# Patient Record
Sex: MALE | Race: WHITE | NOT HISPANIC OR LATINO | Employment: OTHER | URBAN - METROPOLITAN AREA
[De-identification: names, ages, dates, MRNs, and addresses within clinical notes are randomized per-mention and may not be internally consistent; named-entity substitution may affect disease eponyms.]

---

## 2020-01-18 ENCOUNTER — OFFICE VISIT (OUTPATIENT)
Dept: URGENT CARE | Facility: PHYSICIAN GROUP | Age: 60
End: 2020-01-18
Payer: OTHER GOVERNMENT

## 2020-01-18 VITALS
SYSTOLIC BLOOD PRESSURE: 124 MMHG | HEART RATE: 78 BPM | WEIGHT: 268 LBS | TEMPERATURE: 98.1 F | RESPIRATION RATE: 20 BRPM | BODY MASS INDEX: 42.06 KG/M2 | HEIGHT: 67 IN | DIASTOLIC BLOOD PRESSURE: 80 MMHG | OXYGEN SATURATION: 94 %

## 2020-01-18 DIAGNOSIS — J06.9 VIRAL URI: ICD-10-CM

## 2020-01-18 DIAGNOSIS — H00.012 HORDEOLUM EXTERNUM OF RIGHT LOWER EYELID: ICD-10-CM

## 2020-01-18 PROCEDURE — 99203 OFFICE O/P NEW LOW 30 MIN: CPT | Performed by: NURSE PRACTITIONER

## 2020-01-18 ASSESSMENT — ENCOUNTER SYMPTOMS
NAUSEA: 0
FEVER: 0
CHILLS: 0
DIARRHEA: 0
SINUS PAIN: 1
MYALGIAS: 0
EYE DISCHARGE: 0
SORE THROAT: 0
HEADACHES: 1
ORTHOPNEA: 0
COUGH: 0

## 2020-01-18 NOTE — PROGRESS NOTES
Subjective:      Kaiden Quiroz is a 59 y.o. male who presents with Sinus Problem (Post nasal drip, facial pain ongoing 1 week)            HPI New. 59 year old male with nasal congestion and runny nose x 3 days. He has associated sinus pain and pressure. Denies fever, chills, cough, sore throat, nausea or diarrhea. Additionally he reports one week history of swelling and pain to right lower eyelid. Denies discharge or redness to eye itself. He has not taken any medication for these symptoms. He is a smoker.  Patient has no known allergies.  No current outpatient medications on file prior to visit.     No current facility-administered medications on file prior to visit.      Social History     Socioeconomic History   • Marital status:      Spouse name: Not on file   • Number of children: Not on file   • Years of education: Not on file   • Highest education level: Not on file   Occupational History   • Not on file   Social Needs   • Financial resource strain: Not on file   • Food insecurity:     Worry: Not on file     Inability: Not on file   • Transportation needs:     Medical: Not on file     Non-medical: Not on file   Tobacco Use   • Smoking status: Not on file   Substance and Sexual Activity   • Alcohol use: Not on file   • Drug use: Not on file   • Sexual activity: Not on file   Lifestyle   • Physical activity:     Days per week: Not on file     Minutes per session: Not on file   • Stress: Not on file   Relationships   • Social connections:     Talks on phone: Not on file     Gets together: Not on file     Attends Tenriism service: Not on file     Active member of club or organization: Not on file     Attends meetings of clubs or organizations: Not on file     Relationship status: Not on file   • Intimate partner violence:     Fear of current or ex partner: Not on file     Emotionally abused: Not on file     Physically abused: Not on file     Forced sexual activity: Not on file   Other Topics Concern    "  • Not on file   Social History Narrative   • Not on file     Breast Cancer-related family history is not on file.      Review of Systems   Constitutional: Positive for malaise/fatigue. Negative for chills and fever.   HENT: Positive for congestion and sinus pain. Negative for sore throat.    Eyes: Negative for discharge.   Respiratory: Negative for cough.    Cardiovascular: Negative for chest pain and orthopnea.   Gastrointestinal: Negative for diarrhea and nausea.   Musculoskeletal: Negative for myalgias.   Skin:        Swelling right lower eyelid.   Neurological: Positive for headaches.   Endo/Heme/Allergies: Negative for environmental allergies.          Objective:     /80   Pulse 78   Temp 36.7 °C (98.1 °F) (Temporal)   Resp 20   Ht 1.702 m (5' 7\")   Wt 121.6 kg (268 lb)   SpO2 94%   BMI 41.97 kg/m²      Physical Exam  Constitutional:       General: He is not in acute distress.     Appearance: He is well-developed.   HENT:      Head: Normocephalic and atraumatic.      Right Ear: Ear canal and external ear normal. No middle ear effusion. Tympanic membrane is not injected or perforated.      Left Ear: Ear canal and external ear normal.  No middle ear effusion. Tympanic membrane is not injected or perforated.      Nose: Mucosal edema and rhinorrhea present.      Mouth/Throat:      Pharynx: Posterior oropharyngeal erythema present. No oropharyngeal exudate.   Eyes:      General:         Right eye: Hordeolum present. No discharge.         Left eye: No discharge.      Conjunctiva/sclera: Conjunctivae normal.     Neck:      Musculoskeletal: Normal range of motion and neck supple.   Cardiovascular:      Rate and Rhythm: Normal rate and regular rhythm.      Heart sounds: Normal heart sounds. No murmur.   Pulmonary:      Effort: Pulmonary effort is normal. No respiratory distress.      Breath sounds: Normal breath sounds.   Musculoskeletal: Normal range of motion.      Comments: Normal movement of all 4 " extremities.   Lymphadenopathy:      Cervical: No cervical adenopathy.      Upper Body:      Right upper body: No supraclavicular adenopathy.      Left upper body: No supraclavicular adenopathy.   Skin:     General: Skin is warm and dry.   Neurological:      Mental Status: He is alert and oriented to person, place, and time.      Gait: Gait normal.   Psychiatric:         Behavior: Behavior normal.         Thought Content: Thought content normal.                 Assessment/Plan:       1. Viral URI     2. Hordeolum externum of right lower eyelid       Warm, moist compresses to eyelid.  Viral illness at this time with no indication for antibiotics. Reviewed with patient expected course of illness and also reviewed OTC medications that may be used for symptom relief. Follow up 7-10 days if not improving.

## 2020-02-05 ENCOUNTER — TELEPHONE (OUTPATIENT)
Dept: SCHEDULING | Facility: IMAGING CENTER | Age: 60
End: 2020-02-05

## 2020-09-02 ENCOUNTER — HOSPITAL ENCOUNTER (OUTPATIENT)
Dept: LAB | Facility: MEDICAL CENTER | Age: 60
End: 2020-09-02
Attending: INTERNAL MEDICINE
Payer: OTHER GOVERNMENT

## 2020-09-02 LAB
ALBUMIN SERPL BCP-MCNC: 4.2 G/DL (ref 3.2–4.9)
ALBUMIN/GLOB SERPL: 1.5 G/DL
ALP SERPL-CCNC: 68 U/L (ref 30–99)
ALT SERPL-CCNC: 39 U/L (ref 2–50)
ANION GAP SERPL CALC-SCNC: 13 MMOL/L (ref 7–16)
AST SERPL-CCNC: 33 U/L (ref 12–45)
BASOPHILS # BLD AUTO: 0.9 % (ref 0–1.8)
BASOPHILS # BLD: 0.06 K/UL (ref 0–0.12)
BILIRUB SERPL-MCNC: 0.9 MG/DL (ref 0.1–1.5)
BUN SERPL-MCNC: 8 MG/DL (ref 8–22)
CALCIUM SERPL-MCNC: 9.3 MG/DL (ref 8.5–10.5)
CHLORIDE SERPL-SCNC: 103 MMOL/L (ref 96–112)
CHOLEST SERPL-MCNC: 240 MG/DL (ref 100–199)
CO2 SERPL-SCNC: 25 MMOL/L (ref 20–33)
CREAT SERPL-MCNC: 1.03 MG/DL (ref 0.5–1.4)
EOSINOPHIL # BLD AUTO: 0.34 K/UL (ref 0–0.51)
EOSINOPHIL NFR BLD: 5 % (ref 0–6.9)
ERYTHROCYTE [DISTWIDTH] IN BLOOD BY AUTOMATED COUNT: 47.4 FL (ref 35.9–50)
FASTING STATUS PATIENT QL REPORTED: NORMAL
GLOBULIN SER CALC-MCNC: 2.8 G/DL (ref 1.9–3.5)
GLUCOSE SERPL-MCNC: 102 MG/DL (ref 65–99)
HCT VFR BLD AUTO: 51.8 % (ref 42–52)
HDLC SERPL-MCNC: 47 MG/DL
HGB BLD-MCNC: 18.1 G/DL (ref 14–18)
IMM GRANULOCYTES # BLD AUTO: 0.03 K/UL (ref 0–0.11)
IMM GRANULOCYTES NFR BLD AUTO: 0.4 % (ref 0–0.9)
LDLC SERPL CALC-MCNC: 130 MG/DL
LYMPHOCYTES # BLD AUTO: 2.4 K/UL (ref 1–4.8)
LYMPHOCYTES NFR BLD: 35.6 % (ref 22–41)
MCH RBC QN AUTO: 34.8 PG (ref 27–33)
MCHC RBC AUTO-ENTMCNC: 34.9 G/DL (ref 33.7–35.3)
MCV RBC AUTO: 99.6 FL (ref 81.4–97.8)
MONOCYTES # BLD AUTO: 0.6 K/UL (ref 0–0.85)
MONOCYTES NFR BLD AUTO: 8.9 % (ref 0–13.4)
NEUTROPHILS # BLD AUTO: 3.31 K/UL (ref 1.82–7.42)
NEUTROPHILS NFR BLD: 49.2 % (ref 44–72)
NRBC # BLD AUTO: 0 K/UL
NRBC BLD-RTO: 0 /100 WBC
PLATELET # BLD AUTO: 170 K/UL (ref 164–446)
PMV BLD AUTO: 10.4 FL (ref 9–12.9)
POTASSIUM SERPL-SCNC: 4.7 MMOL/L (ref 3.6–5.5)
PROT SERPL-MCNC: 7 G/DL (ref 6–8.2)
RBC # BLD AUTO: 5.2 M/UL (ref 4.7–6.1)
SODIUM SERPL-SCNC: 141 MMOL/L (ref 135–145)
TRIGL SERPL-MCNC: 316 MG/DL (ref 0–149)
WBC # BLD AUTO: 6.7 K/UL (ref 4.8–10.8)

## 2020-09-02 PROCEDURE — 80061 LIPID PANEL: CPT

## 2020-09-02 PROCEDURE — 85025 COMPLETE CBC W/AUTO DIFF WBC: CPT

## 2020-09-02 PROCEDURE — 80053 COMPREHEN METABOLIC PANEL: CPT

## 2020-09-02 PROCEDURE — 36415 COLL VENOUS BLD VENIPUNCTURE: CPT

## 2021-03-15 DIAGNOSIS — Z23 NEED FOR VACCINATION: ICD-10-CM

## 2022-04-02 ENCOUNTER — OFFICE VISIT (OUTPATIENT)
Dept: URGENT CARE | Facility: PHYSICIAN GROUP | Age: 62
End: 2022-04-02
Payer: OTHER GOVERNMENT

## 2022-04-02 VITALS
WEIGHT: 215 LBS | HEIGHT: 67 IN | OXYGEN SATURATION: 95 % | BODY MASS INDEX: 33.74 KG/M2 | SYSTOLIC BLOOD PRESSURE: 164 MMHG | RESPIRATION RATE: 18 BRPM | DIASTOLIC BLOOD PRESSURE: 82 MMHG | TEMPERATURE: 98 F | HEART RATE: 92 BPM

## 2022-04-02 DIAGNOSIS — L08.9 WOUND INFECTION: ICD-10-CM

## 2022-04-02 DIAGNOSIS — T14.8XXA WOUND INFECTION: ICD-10-CM

## 2022-04-02 PROCEDURE — 99213 OFFICE O/P EST LOW 20 MIN: CPT | Performed by: STUDENT IN AN ORGANIZED HEALTH CARE EDUCATION/TRAINING PROGRAM

## 2022-04-02 RX ORDER — DOXYCYCLINE 100 MG/1
100 CAPSULE ORAL 2 TIMES DAILY
Qty: 14 CAPSULE | Refills: 0 | Status: SHIPPED | OUTPATIENT
Start: 2022-04-02 | End: 2022-04-09

## 2022-04-02 RX ORDER — AMOXICILLIN AND CLAVULANATE POTASSIUM 875; 125 MG/1; MG/1
1 TABLET, FILM COATED ORAL 2 TIMES DAILY
Qty: 14 TABLET | Refills: 0 | Status: SHIPPED | OUTPATIENT
Start: 2022-04-02 | End: 2022-04-09

## 2022-04-02 ASSESSMENT — FIBROSIS 4 INDEX: FIB4 SCORE: 1.87

## 2022-04-02 NOTE — LETTER
[facility] [place]  [address]  [place] [address]     April 2, 2022    Patient: Kaiden Quiroz   YOB: 1960   Date of Visit: 4/2/2022       To Whom It May Concern:    Kaiden Quiroz was seen and treated in our department on 4/2/2022. Patient is excused from work on 4/3/22-4/4/22. Patient may return to work on 4/5/22 without restriction.    Sincerely,     Vishnu Machuca P.A.-C.

## 2022-04-02 NOTE — PROGRESS NOTES
"Subjective:   Kaiden Quiroz is a 61 y.o. male who presents for Foot Problem (Abrasion on top of both feet x 1 week)      HPI:  Pleasant 61-year-old male with history of type 2 diabetes presents the clinic with foot injury that occurred on 3/23/2022 and redness around the wounds.  Patient states that he was playing with his grandchildren and got a rug burn on both of his feet.  Patient states that he has been trying to soak his feet to help with healing, but his wounds have not fully healed and he is worried about a possible infection.  Patient denies fever, chills, fatigue, rash, numbness, tingling, burning, weeping from the wounds, discharge from the wounds, sore throat, change in vision, nausea, vomiting, abdominal pain, diarrhea, lower leg pain, lower leg swelling, chest pain, cough, shortness of breath, dizziness, or headache.        Medications:    • amoxicillin-clavulanate Tabs  • doxycycline    Allergies: Patient has no known allergies.    Problem List: Kaiden Quiroz does not have a problem list on file.    Surgical History:  No past surgical history on file.    Past Social Hx: Kaiden Quiroz  reports that he has been smoking. He has been smoking about 0.00 packs per day. He has never used smokeless tobacco.     Past Family Hx:  Kaiden Quiroz family history is not on file.     Problem list, medications, and allergies reviewed by myself today in Epic.     Objective:     BP (!) 164/82   Pulse 92   Temp 36.7 °C (98 °F) (Temporal)   Resp 18   Ht 1.702 m (5' 7\")   Wt 97.5 kg (215 lb)   SpO2 95%   BMI 33.67 kg/m²     Physical Exam  Vitals reviewed.   Constitutional:       Appearance: Normal appearance.   Cardiovascular:      Rate and Rhythm: Normal rate and regular rhythm.      Pulses: Normal pulses.      Heart sounds: Normal heart sounds. No murmur heard.  Pulmonary:      Effort: Pulmonary effort is normal. No respiratory distress.      Breath sounds: Normal breath sounds. No " stridor. No wheezing, rhonchi or rales.   Skin:     General: Skin is warm and dry.      Capillary Refill: Capillary refill takes less than 2 seconds.      Findings: No erythema, lesion or rash.      Comments: Right foot: Patient has 2 circular wounds with overlying scab proximal to the second and third toes.  Erythema, induration, increased warmth, and tenderness to palpation around the wounds.  No ecchymosis, drainage, weeping, or swelling.    Left foot:Patient has 3 circular wounds with overlying scab proximal to the first, second second and third toes.  Erythema, induration, increased warmth, and tenderness to palpation around the wounds.  No ecchymosis, drainage, weeping, or swelling.   Neurological:      General: No focal deficit present.      Mental Status: He is alert and oriented to person, place, and time.         Assessment/Plan:     Diagnosis and associated orders:     1. Wound infection  amoxicillin-clavulanate (AUGMENTIN) 875-125 MG Tab    doxycycline (MONODOX) 100 MG capsule      Comments/MDM:     • Patient presents with multiple delayed healing wounds on the top of his bilateral feet due to rug burn on 3/23/2022 patient states that he has been trying to soak his feet in order to help with healing, but has had minimal improvement in his healing.  Patient states that over the last few days he has noticed increased tenderness and redness to the wounds.  Patient has a history of diabetes that he is not currently being treated for.  • Patient was prescribed Augmentin and doxycycline for suspected wound infection.  Patient was educated on home wound care.  Patient was very agreeable this plan.  • Advised patient that he should not establish care with a primary care provider in order to further evaluate and manage his diabetes.  • ER precautions given.         Differential diagnosis, natural history, supportive care, and indications for immediate follow-up discussed.    Advised the patient to follow-up with  the primary care physician for recheck, reevaluation, and consideration of further management.    Please note that this dictation was created using voice recognition software. I have made a reasonable attempt to correct obvious errors, but I expect that there are errors of grammar and possibly content that I did not discover before finalizing the note.    Electronically signed by Vishnu Machuca PA-C.

## 2022-04-02 NOTE — LETTER
[facility] [place]  [address]  [place] [address]     April 2, 2022    Patient: Kaiden Quiroz   YOB: 1960   Date of Visit: 4/2/2022       To Whom It May Concern:    Kaiden Quiroz was seen and treated in our department on 4/2/2022.  Patient is excused from work on 4/3/22-4/4/22. Patient may return to work on 4/5/22 without restriction.    Sincerely,     Vishnu Machuca P.A.-C.

## 2023-01-12 ENCOUNTER — APPOINTMENT (OUTPATIENT)
Dept: RADIOLOGY | Facility: MEDICAL CENTER | Age: 63
DRG: 023 | End: 2023-01-12
Attending: EMERGENCY MEDICINE
Payer: COMMERCIAL

## 2023-01-12 ENCOUNTER — ANESTHESIA (OUTPATIENT)
Dept: ANESTHESIOLOGY | Facility: MEDICAL CENTER | Age: 63
DRG: 023 | End: 2023-01-12
Payer: COMMERCIAL

## 2023-01-12 ENCOUNTER — HOSPITAL ENCOUNTER (INPATIENT)
Facility: MEDICAL CENTER | Age: 63
LOS: 35 days | DRG: 023 | End: 2023-02-16
Attending: EMERGENCY MEDICINE | Admitting: INTERNAL MEDICINE
Payer: COMMERCIAL

## 2023-01-12 ENCOUNTER — APPOINTMENT (OUTPATIENT)
Dept: RADIOLOGY | Facility: MEDICAL CENTER | Age: 63
DRG: 023 | End: 2023-01-12
Attending: RADIOLOGY
Payer: COMMERCIAL

## 2023-01-12 ENCOUNTER — APPOINTMENT (OUTPATIENT)
Dept: RADIOLOGY | Facility: MEDICAL CENTER | Age: 63
DRG: 023 | End: 2023-01-12
Attending: INTERNAL MEDICINE
Payer: COMMERCIAL

## 2023-01-12 DIAGNOSIS — Z78.9 ALCOHOL USE: ICD-10-CM

## 2023-01-12 DIAGNOSIS — K92.1 MELENA: ICD-10-CM

## 2023-01-12 DIAGNOSIS — I63.9 ACUTE CVA (CEREBROVASCULAR ACCIDENT) (HCC): ICD-10-CM

## 2023-01-12 DIAGNOSIS — R33.9 URINARY RETENTION: ICD-10-CM

## 2023-01-12 DIAGNOSIS — J96.01 ACUTE RESPIRATORY FAILURE WITH HYPOXIA (HCC): ICD-10-CM

## 2023-01-12 DIAGNOSIS — I61.9 STROKE, HEMORRHAGIC (HCC): ICD-10-CM

## 2023-01-12 DIAGNOSIS — F17.200 CURRENT SMOKER: ICD-10-CM

## 2023-01-12 DIAGNOSIS — I10 HYPERTENSION, UNSPECIFIED TYPE: ICD-10-CM

## 2023-01-12 DIAGNOSIS — Z95.828 INTERNAL CAROTID ARTERY STENT PRESENT: ICD-10-CM

## 2023-01-12 DIAGNOSIS — E11.9 TYPE 2 DIABETES MELLITUS WITHOUT COMPLICATION, WITHOUT LONG-TERM CURRENT USE OF INSULIN (HCC): ICD-10-CM

## 2023-01-12 DIAGNOSIS — I63.311 CEREBROVASCULAR ACCIDENT (CVA) DUE TO THROMBOSIS OF RIGHT MIDDLE CEREBRAL ARTERY (HCC): ICD-10-CM

## 2023-01-12 PROBLEM — F10.10 ALCOHOL ABUSE: Status: ACTIVE | Noted: 2023-01-12

## 2023-01-12 LAB
ABO GROUP BLD: NORMAL
APTT PPP: 28.1 SEC (ref 24.7–36)
BASOPHILS # BLD AUTO: 0.4 % (ref 0–1.8)
BASOPHILS # BLD: 0.03 K/UL (ref 0–0.12)
BLD GP AB SCN SERPL QL: NORMAL
EKG IMPRESSION: NORMAL
EOSINOPHIL # BLD AUTO: 0.31 K/UL (ref 0–0.51)
EOSINOPHIL NFR BLD: 3.7 % (ref 0–6.9)
ERYTHROCYTE [DISTWIDTH] IN BLOOD BY AUTOMATED COUNT: 45.9 FL (ref 35.9–50)
EST. AVERAGE GLUCOSE BLD GHB EST-MCNC: 105 MG/DL
HBA1C MFR BLD: 5.3 % (ref 4–5.6)
HCT VFR BLD AUTO: 45.8 % (ref 42–52)
HGB BLD-MCNC: 16.3 G/DL (ref 14–18)
IMM GRANULOCYTES # BLD AUTO: 0.03 K/UL (ref 0–0.11)
IMM GRANULOCYTES NFR BLD AUTO: 0.4 % (ref 0–0.9)
INR PPP: 0.96 (ref 0.87–1.13)
LYMPHOCYTES # BLD AUTO: 1.46 K/UL (ref 1–4.8)
LYMPHOCYTES NFR BLD: 17.4 % (ref 22–41)
MCH RBC QN AUTO: 34.2 PG (ref 27–33)
MCHC RBC AUTO-ENTMCNC: 35.6 G/DL (ref 33.7–35.3)
MCV RBC AUTO: 96 FL (ref 81.4–97.8)
MONOCYTES # BLD AUTO: 0.71 K/UL (ref 0–0.85)
MONOCYTES NFR BLD AUTO: 8.5 % (ref 0–13.4)
NEUTROPHILS # BLD AUTO: 5.84 K/UL (ref 1.82–7.42)
NEUTROPHILS NFR BLD: 69.6 % (ref 44–72)
NRBC # BLD AUTO: 0 K/UL
NRBC BLD-RTO: 0 /100 WBC
PLATELET # BLD AUTO: 142 K/UL (ref 164–446)
PMV BLD AUTO: 10.7 FL (ref 9–12.9)
PROTHROMBIN TIME: 12.7 SEC (ref 12–14.6)
RBC # BLD AUTO: 4.77 M/UL (ref 4.7–6.1)
RH BLD: NORMAL
WBC # BLD AUTO: 8.4 K/UL (ref 4.8–10.8)

## 2023-01-12 PROCEDURE — 83036 HEMOGLOBIN GLYCOSYLATED A1C: CPT

## 2023-01-12 PROCEDURE — 70498 CT ANGIOGRAPHY NECK: CPT

## 2023-01-12 PROCEDURE — 700111 HCHG RX REV CODE 636 W/ 250 OVERRIDE (IP): Performed by: RADIOLOGY

## 2023-01-12 PROCEDURE — 94002 VENT MGMT INPAT INIT DAY: CPT

## 2023-01-12 PROCEDURE — 85730 THROMBOPLASTIN TIME PARTIAL: CPT

## 2023-01-12 PROCEDURE — 99291 CRITICAL CARE FIRST HOUR: CPT

## 2023-01-12 PROCEDURE — 99223 1ST HOSP IP/OBS HIGH 75: CPT | Performed by: PSYCHIATRY & NEUROLOGY

## 2023-01-12 PROCEDURE — 770022 HCHG ROOM/CARE - ICU (200)

## 2023-01-12 PROCEDURE — 700102 HCHG RX REV CODE 250 W/ 637 OVERRIDE(OP)

## 2023-01-12 PROCEDURE — 700117 HCHG RX CONTRAST REV CODE 255: Performed by: RADIOLOGY

## 2023-01-12 PROCEDURE — 70450 CT HEAD/BRAIN W/O DYE: CPT

## 2023-01-12 PROCEDURE — 93005 ELECTROCARDIOGRAM TRACING: CPT | Performed by: EMERGENCY MEDICINE

## 2023-01-12 PROCEDURE — 700101 HCHG RX REV CODE 250: Performed by: STUDENT IN AN ORGANIZED HEALTH CARE EDUCATION/TRAINING PROGRAM

## 2023-01-12 PROCEDURE — 037K3DZ DILATION OF RIGHT INTERNAL CAROTID ARTERY WITH INTRALUMINAL DEVICE, PERCUTANEOUS APPROACH: ICD-10-PCS | Performed by: RADIOLOGY

## 2023-01-12 PROCEDURE — C1760 CLOSURE DEV, VASC: HCPCS

## 2023-01-12 PROCEDURE — 94760 N-INVAS EAR/PLS OXIMETRY 1: CPT

## 2023-01-12 PROCEDURE — 85025 COMPLETE CBC W/AUTO DIFF WBC: CPT

## 2023-01-12 PROCEDURE — 86850 RBC ANTIBODY SCREEN: CPT

## 2023-01-12 PROCEDURE — 99140 ANES COMP EMERGENCY COND: CPT | Performed by: STUDENT IN AN ORGANIZED HEALTH CARE EDUCATION/TRAINING PROGRAM

## 2023-01-12 PROCEDURE — 700111 HCHG RX REV CODE 636 W/ 250 OVERRIDE (IP)

## 2023-01-12 PROCEDURE — 700111 HCHG RX REV CODE 636 W/ 250 OVERRIDE (IP): Performed by: INTERNAL MEDICINE

## 2023-01-12 PROCEDURE — 70496 CT ANGIOGRAPHY HEAD: CPT

## 2023-01-12 PROCEDURE — 700111 HCHG RX REV CODE 636 W/ 250 OVERRIDE (IP): Performed by: STUDENT IN AN ORGANIZED HEALTH CARE EDUCATION/TRAINING PROGRAM

## 2023-01-12 PROCEDURE — 99291 CRITICAL CARE FIRST HOUR: CPT | Performed by: INTERNAL MEDICINE

## 2023-01-12 PROCEDURE — 36620 INSERTION CATHETER ARTERY: CPT | Performed by: STUDENT IN AN ORGANIZED HEALTH CARE EDUCATION/TRAINING PROGRAM

## 2023-01-12 PROCEDURE — 03CK3ZZ EXTIRPATION OF MATTER FROM RIGHT INTERNAL CAROTID ARTERY, PERCUTANEOUS APPROACH: ICD-10-PCS | Performed by: RADIOLOGY

## 2023-01-12 PROCEDURE — 700105 HCHG RX REV CODE 258: Performed by: STUDENT IN AN ORGANIZED HEALTH CARE EDUCATION/TRAINING PROGRAM

## 2023-01-12 PROCEDURE — 86901 BLOOD TYPING SEROLOGIC RH(D): CPT

## 2023-01-12 PROCEDURE — 94799 UNLISTED PULMONARY SVC/PX: CPT

## 2023-01-12 PROCEDURE — 01926 ANES IVNTL RAD ICR ICAR/AORT: CPT | Performed by: STUDENT IN AN ORGANIZED HEALTH CARE EDUCATION/TRAINING PROGRAM

## 2023-01-12 PROCEDURE — 36415 COLL VENOUS BLD VENIPUNCTURE: CPT

## 2023-01-12 PROCEDURE — 99292 CRITICAL CARE ADDL 30 MIN: CPT | Performed by: INTERNAL MEDICINE

## 2023-01-12 PROCEDURE — 71045 X-RAY EXAM CHEST 1 VIEW: CPT

## 2023-01-12 PROCEDURE — 0042T CT-CEREBRAL PERFUSION ANALYSIS: CPT

## 2023-01-12 PROCEDURE — 03CG3ZZ EXTIRPATION OF MATTER FROM INTRACRANIAL ARTERY, PERCUTANEOUS APPROACH: ICD-10-PCS | Performed by: RADIOLOGY

## 2023-01-12 PROCEDURE — 36556 INSERT NON-TUNNEL CV CATH: CPT | Performed by: STUDENT IN AN ORGANIZED HEALTH CARE EDUCATION/TRAINING PROGRAM

## 2023-01-12 PROCEDURE — 5A1935Z RESPIRATORY VENTILATION, LESS THAN 24 CONSECUTIVE HOURS: ICD-10-PCS | Performed by: EMERGENCY MEDICINE

## 2023-01-12 PROCEDURE — 700117 HCHG RX CONTRAST REV CODE 255: Performed by: EMERGENCY MEDICINE

## 2023-01-12 PROCEDURE — 86900 BLOOD TYPING SEROLOGIC ABO: CPT

## 2023-01-12 PROCEDURE — 85610 PROTHROMBIN TIME: CPT

## 2023-01-12 PROCEDURE — A9270 NON-COVERED ITEM OR SERVICE: HCPCS

## 2023-01-12 RX ORDER — ASPIRIN 300 MG/1
SUPPOSITORY RECTAL
Status: COMPLETED
Start: 2023-01-12 | End: 2023-01-12

## 2023-01-12 RX ORDER — ASPIRIN 81 MG/1
81 TABLET, CHEWABLE ORAL DAILY
Status: DISCONTINUED | OUTPATIENT
Start: 2023-01-13 | End: 2023-01-13

## 2023-01-12 RX ORDER — AMOXICILLIN 250 MG
2 CAPSULE ORAL 2 TIMES DAILY
Status: DISCONTINUED | OUTPATIENT
Start: 2023-01-13 | End: 2023-01-19

## 2023-01-12 RX ORDER — POLYETHYLENE GLYCOL 3350 17 G/17G
1 POWDER, FOR SOLUTION ORAL
Status: DISCONTINUED | OUTPATIENT
Start: 2023-01-12 | End: 2023-01-19

## 2023-01-12 RX ORDER — DEXMEDETOMIDINE HYDROCHLORIDE 4 UG/ML
0-.7 INJECTION, SOLUTION INTRAVENOUS CONTINUOUS
Status: DISCONTINUED | OUTPATIENT
Start: 2023-01-12 | End: 2023-01-12

## 2023-01-12 RX ORDER — NICOTINE 21 MG/24HR
14 PATCH, TRANSDERMAL 24 HOURS TRANSDERMAL
Status: DISCONTINUED | OUTPATIENT
Start: 2023-01-13 | End: 2023-01-24

## 2023-01-12 RX ORDER — HYDRALAZINE HYDROCHLORIDE 20 MG/ML
10-20 INJECTION INTRAMUSCULAR; INTRAVENOUS EVERY 6 HOURS PRN
Status: DISCONTINUED | OUTPATIENT
Start: 2023-01-12 | End: 2023-02-16 | Stop reason: HOSPADM

## 2023-01-12 RX ORDER — ASPIRIN 300 MG/1
600 SUPPOSITORY RECTAL ONCE
Status: COMPLETED | OUTPATIENT
Start: 2023-01-12 | End: 2023-01-12

## 2023-01-12 RX ORDER — FOLIC ACID 1 MG/1
1 TABLET ORAL DAILY
Status: DISCONTINUED | OUTPATIENT
Start: 2023-01-13 | End: 2023-01-19

## 2023-01-12 RX ORDER — SODIUM CHLORIDE, SODIUM LACTATE, POTASSIUM CHLORIDE, CALCIUM CHLORIDE 600; 310; 30; 20 MG/100ML; MG/100ML; MG/100ML; MG/100ML
INJECTION, SOLUTION INTRAVENOUS
Status: DISCONTINUED | OUTPATIENT
Start: 2023-01-12 | End: 2023-01-12 | Stop reason: SURG

## 2023-01-12 RX ORDER — FAMOTIDINE 20 MG/1
20 TABLET, FILM COATED ORAL EVERY 12 HOURS
Status: DISCONTINUED | OUTPATIENT
Start: 2023-01-13 | End: 2023-01-14

## 2023-01-12 RX ORDER — SODIUM CHLORIDE, SODIUM LACTATE, POTASSIUM CHLORIDE, CALCIUM CHLORIDE 600; 310; 30; 20 MG/100ML; MG/100ML; MG/100ML; MG/100ML
INJECTION, SOLUTION INTRAVENOUS CONTINUOUS
Status: DISCONTINUED | OUTPATIENT
Start: 2023-01-13 | End: 2023-01-15

## 2023-01-12 RX ORDER — BISACODYL 10 MG
10 SUPPOSITORY, RECTAL RECTAL
Status: DISCONTINUED | OUTPATIENT
Start: 2023-01-12 | End: 2023-01-19

## 2023-01-12 RX ORDER — LABETALOL HYDROCHLORIDE 5 MG/ML
INJECTION, SOLUTION INTRAVENOUS PRN
Status: DISCONTINUED | OUTPATIENT
Start: 2023-01-12 | End: 2023-01-12 | Stop reason: SURG

## 2023-01-12 RX ORDER — ATORVASTATIN CALCIUM 40 MG/1
80 TABLET, FILM COATED ORAL EVERY EVENING
Status: DISCONTINUED | OUTPATIENT
Start: 2023-01-12 | End: 2023-01-14

## 2023-01-12 RX ADMIN — IOHEXOL 100 ML: 300 INJECTION, SOLUTION INTRAVENOUS at 23:30

## 2023-01-12 RX ADMIN — IOHEXOL 80 ML: 350 INJECTION, SOLUTION INTRAVENOUS at 20:18

## 2023-01-12 RX ADMIN — PROPOFOL 20 MCG/KG/MIN: 10 INJECTION, EMULSION INTRAVENOUS at 23:24

## 2023-01-12 RX ADMIN — EPHEDRINE SULFATE 10 MG: 50 INJECTION INTRAMUSCULAR; INTRAVENOUS; SUBCUTANEOUS at 21:48

## 2023-01-12 RX ADMIN — EPHEDRINE SULFATE 10 MG: 50 INJECTION INTRAMUSCULAR; INTRAVENOUS; SUBCUTANEOUS at 22:17

## 2023-01-12 RX ADMIN — ASPIRIN 600 MG: 300 SUPPOSITORY RECTAL at 23:10

## 2023-01-12 RX ADMIN — PROPOFOL 100 MG: 10 INJECTION, EMULSION INTRAVENOUS at 22:55

## 2023-01-12 RX ADMIN — FENTANYL CITRATE 50 MCG: 50 INJECTION, SOLUTION INTRAMUSCULAR; INTRAVENOUS at 23:20

## 2023-01-12 RX ADMIN — IOHEXOL 40 ML: 350 INJECTION, SOLUTION INTRAVENOUS at 20:15

## 2023-01-12 RX ADMIN — ATROPINE SULFATE 0.5 MG: 0.4 INJECTION, SOLUTION INTRAMUSCULAR; INTRAVENOUS; SUBCUTANEOUS at 21:37

## 2023-01-12 RX ADMIN — PROPOFOL 50 MG: 10 INJECTION, EMULSION INTRAVENOUS at 22:25

## 2023-01-12 RX ADMIN — HYDRALAZINE HYDROCHLORIDE 20 MG: 20 INJECTION INTRAMUSCULAR; INTRAVENOUS at 23:25

## 2023-01-12 RX ADMIN — EPHEDRINE SULFATE 5 MG: 50 INJECTION INTRAMUSCULAR; INTRAVENOUS; SUBCUTANEOUS at 22:39

## 2023-01-12 RX ADMIN — PROPOFOL 100 MG: 10 INJECTION, EMULSION INTRAVENOUS at 23:07

## 2023-01-12 RX ADMIN — ROCURONIUM BROMIDE 100 MG: 10 INJECTION, SOLUTION INTRAVENOUS at 22:54

## 2023-01-12 RX ADMIN — LABETALOL HYDROCHLORIDE 10 MG: 5 INJECTION, SOLUTION INTRAVENOUS at 22:47

## 2023-01-12 RX ADMIN — PROPOFOL 150 MG: 10 INJECTION, EMULSION INTRAVENOUS at 21:45

## 2023-01-12 RX ADMIN — ATROPINE SULFATE 0.5 MG: 0.4 INJECTION, SOLUTION INTRAMUSCULAR; INTRAVENOUS; SUBCUTANEOUS at 21:34

## 2023-01-12 RX ADMIN — SODIUM CHLORIDE, POTASSIUM CHLORIDE, SODIUM LACTATE AND CALCIUM CHLORIDE: 600; 310; 30; 20 INJECTION, SOLUTION INTRAVENOUS at 21:45

## 2023-01-12 ASSESSMENT — PAIN DESCRIPTION - PAIN TYPE: TYPE: ACUTE PAIN

## 2023-01-13 ENCOUNTER — APPOINTMENT (OUTPATIENT)
Dept: CARDIOLOGY | Facility: MEDICAL CENTER | Age: 63
DRG: 023 | End: 2023-01-13
Attending: INTERNAL MEDICINE
Payer: COMMERCIAL

## 2023-01-13 ENCOUNTER — APPOINTMENT (OUTPATIENT)
Dept: RADIOLOGY | Facility: MEDICAL CENTER | Age: 63
DRG: 023 | End: 2023-01-13
Attending: INTERNAL MEDICINE
Payer: COMMERCIAL

## 2023-01-13 ENCOUNTER — APPOINTMENT (OUTPATIENT)
Dept: RADIOLOGY | Facility: MEDICAL CENTER | Age: 63
DRG: 023 | End: 2023-01-13
Attending: NURSE PRACTITIONER
Payer: COMMERCIAL

## 2023-01-13 LAB
ABO + RH BLD: NORMAL
ALBUMIN SERPL BCP-MCNC: 3.6 G/DL (ref 3.2–4.9)
ALBUMIN/GLOB SERPL: 1.6 G/DL
ALP SERPL-CCNC: 78 U/L (ref 30–99)
ALT SERPL-CCNC: 38 U/L (ref 2–50)
ANION GAP SERPL CALC-SCNC: 10 MMOL/L (ref 7–16)
AST SERPL-CCNC: 83 U/L (ref 12–45)
BASE EXCESS BLDA CALC-SCNC: -2 MMOL/L (ref -4–3)
BASE EXCESS BLDA CALC-SCNC: -7 MMOL/L (ref -4–3)
BILIRUB SERPL-MCNC: 0.5 MG/DL (ref 0.1–1.5)
BODY TEMPERATURE: ABNORMAL DEGREES
BODY TEMPERATURE: ABNORMAL DEGREES
BREATHS SETTING VENT: 18
BUN SERPL-MCNC: 16 MG/DL (ref 8–22)
CALCIUM ALBUM COR SERPL-MCNC: 8 MG/DL (ref 8.5–10.5)
CALCIUM SERPL-MCNC: 7.7 MG/DL (ref 8.5–10.5)
CHLORIDE SERPL-SCNC: 109 MMOL/L (ref 96–112)
CHOLEST SERPL-MCNC: 187 MG/DL (ref 100–199)
CO2 BLDA-SCNC: 20 MMOL/L (ref 20–33)
CO2 BLDA-SCNC: 23 MMOL/L (ref 20–33)
CO2 SERPL-SCNC: 20 MMOL/L (ref 20–33)
CREAT SERPL-MCNC: 0.71 MG/DL (ref 0.5–1.4)
DELSYS IDSYS: ABNORMAL
DELSYS IDSYS: ABNORMAL
EKG IMPRESSION: NORMAL
ETHANOL BLD-MCNC: <10.1 MG/DL
GFR SERPLBLD CREATININE-BSD FMLA CKD-EPI: 104 ML/MIN/1.73 M 2
GLOBULIN SER CALC-MCNC: 2.2 G/DL (ref 1.9–3.5)
GLUCOSE SERPL-MCNC: 148 MG/DL (ref 65–99)
HCO3 BLDA-SCNC: 18.8 MMOL/L (ref 17–25)
HCO3 BLDA-SCNC: 22.3 MMOL/L (ref 17–25)
HDLC SERPL-MCNC: 49 MG/DL
HOROWITZ INDEX BLDA+IHG-RTO: 126 MM[HG]
HOROWITZ INDEX BLDA+IHG-RTO: 263 MM[HG]
LACTATE SERPL-SCNC: 2.3 MMOL/L (ref 0.5–2)
LDLC SERPL CALC-MCNC: 97 MG/DL
LV EJECT FRACT  99904: 65
LV EJECT FRACT MOD 2C 99903: 75.13
LV EJECT FRACT MOD 4C 99902: 71.82
LV EJECT FRACT MOD BP 99901: 73.88
MAGNESIUM SERPL-MCNC: 1.6 MG/DL (ref 1.5–2.5)
MODE IMODE: ABNORMAL
MODE IMODE: ABNORMAL
O2/TOTAL GAS SETTING VFR VENT: 40 %
O2/TOTAL GAS SETTING VFR VENT: 50 %
PCO2 BLDA: 35.8 MMHG (ref 26–37)
PCO2 BLDA: 36.6 MMHG (ref 26–37)
PCO2 TEMP ADJ BLDA: 34.1 MMHG (ref 26–37)
PCO2 TEMP ADJ BLDA: 36.6 MMHG (ref 26–37)
PEEP END EXPIRATORY PRESSURE IPEEP: 8 CMH20
PEEP END EXPIRATORY PRESSURE IPEEP: 8 CMH20
PERCENT MINUTE VOLUME IPMV: 100
PH BLDA: 7.32 [PH] (ref 7.4–7.5)
PH BLDA: 7.4 [PH] (ref 7.4–7.5)
PH TEMP ADJ BLDA: 7.32 [PH] (ref 7.4–7.5)
PH TEMP ADJ BLDA: 7.42 [PH] (ref 7.4–7.5)
PHOSPHATE SERPL-MCNC: 3.2 MG/DL (ref 2.5–4.5)
PO2 BLDA: 105 MMHG (ref 64–87)
PO2 BLDA: 63 MMHG (ref 64–87)
PO2 TEMP ADJ BLDA: 63 MMHG (ref 64–87)
PO2 TEMP ADJ BLDA: 99 MMHG (ref 64–87)
POTASSIUM SERPL-SCNC: 4.1 MMOL/L (ref 3.6–5.5)
PROT SERPL-MCNC: 5.8 G/DL (ref 6–8.2)
SAO2 % BLDA: 90 % (ref 93–99)
SAO2 % BLDA: 98 % (ref 93–99)
SODIUM SERPL-SCNC: 139 MMOL/L (ref 135–145)
SPECIMEN DRAWN FROM PATIENT: ABNORMAL
SPECIMEN DRAWN FROM PATIENT: ABNORMAL
TIDAL VOLUME IVT: 400 ML
TRIGL SERPL-MCNC: 205 MG/DL (ref 0–149)
TROPONIN T SERPL-MCNC: 9 NG/L (ref 6–19)

## 2023-01-13 PROCEDURE — C1751 CATH, INF, PER/CENT/MIDLINE: HCPCS

## 2023-01-13 PROCEDURE — 94760 N-INVAS EAR/PLS OXIMETRY 1: CPT

## 2023-01-13 PROCEDURE — 700105 HCHG RX REV CODE 258: Performed by: RADIOLOGY

## 2023-01-13 PROCEDURE — 93010 ELECTROCARDIOGRAM REPORT: CPT | Performed by: INTERNAL MEDICINE

## 2023-01-13 PROCEDURE — 94799 UNLISTED PULMONARY SVC/PX: CPT

## 2023-01-13 PROCEDURE — 99232 SBSQ HOSP IP/OBS MODERATE 35: CPT | Performed by: PSYCHIATRY & NEUROLOGY

## 2023-01-13 PROCEDURE — 770022 HCHG ROOM/CARE - ICU (200)

## 2023-01-13 PROCEDURE — 80061 LIPID PANEL: CPT

## 2023-01-13 PROCEDURE — 92610 EVALUATE SWALLOWING FUNCTION: CPT

## 2023-01-13 PROCEDURE — 37799 UNLISTED PX VASCULAR SURGERY: CPT

## 2023-01-13 PROCEDURE — 36556 INSERT NON-TUNNEL CV CATH: CPT | Mod: RT | Performed by: NURSE PRACTITIONER

## 2023-01-13 PROCEDURE — 82803 BLOOD GASES ANY COMBINATION: CPT

## 2023-01-13 PROCEDURE — 82077 ASSAY SPEC XCP UR&BREATH IA: CPT

## 2023-01-13 PROCEDURE — 03HY32Z INSERTION OF MONITORING DEVICE INTO UPPER ARTERY, PERCUTANEOUS APPROACH: ICD-10-PCS | Performed by: INTERNAL MEDICINE

## 2023-01-13 PROCEDURE — 84484 ASSAY OF TROPONIN QUANT: CPT

## 2023-01-13 PROCEDURE — 83605 ASSAY OF LACTIC ACID: CPT

## 2023-01-13 PROCEDURE — 36620 INSERTION CATHETER ARTERY: CPT

## 2023-01-13 PROCEDURE — 93306 TTE W/DOPPLER COMPLETE: CPT

## 2023-01-13 PROCEDURE — 93005 ELECTROCARDIOGRAM TRACING: CPT | Performed by: NURSE PRACTITIONER

## 2023-01-13 PROCEDURE — 99152 MOD SED SAME PHYS/QHP 5/>YRS: CPT

## 2023-01-13 PROCEDURE — 700111 HCHG RX REV CODE 636 W/ 250 OVERRIDE (IP): Performed by: INTERNAL MEDICINE

## 2023-01-13 PROCEDURE — 36556 INSERT NON-TUNNEL CV CATH: CPT

## 2023-01-13 PROCEDURE — 700105 HCHG RX REV CODE 258: Performed by: INTERNAL MEDICINE

## 2023-01-13 PROCEDURE — 83735 ASSAY OF MAGNESIUM: CPT

## 2023-01-13 PROCEDURE — 94003 VENT MGMT INPAT SUBQ DAY: CPT

## 2023-01-13 PROCEDURE — 700117 HCHG RX CONTRAST REV CODE 255: Performed by: INTERNAL MEDICINE

## 2023-01-13 PROCEDURE — 02HV33Z INSERTION OF INFUSION DEVICE INTO SUPERIOR VENA CAVA, PERCUTANEOUS APPROACH: ICD-10-PCS | Performed by: INTERNAL MEDICINE

## 2023-01-13 PROCEDURE — 700102 HCHG RX REV CODE 250 W/ 637 OVERRIDE(OP): Performed by: INTERNAL MEDICINE

## 2023-01-13 PROCEDURE — 36620 INSERTION CATHETER ARTERY: CPT | Performed by: NURSE PRACTITIONER

## 2023-01-13 PROCEDURE — 80053 COMPREHEN METABOLIC PANEL: CPT

## 2023-01-13 PROCEDURE — 700101 HCHG RX REV CODE 250: Performed by: RADIOLOGY

## 2023-01-13 PROCEDURE — 72170 X-RAY EXAM OF PELVIS: CPT

## 2023-01-13 PROCEDURE — A9270 NON-COVERED ITEM OR SERVICE: HCPCS | Performed by: INTERNAL MEDICINE

## 2023-01-13 PROCEDURE — 70551 MRI BRAIN STEM W/O DYE: CPT

## 2023-01-13 PROCEDURE — 94150 VITAL CAPACITY TEST: CPT

## 2023-01-13 PROCEDURE — 71045 X-RAY EXAM CHEST 1 VIEW: CPT

## 2023-01-13 PROCEDURE — 36600 WITHDRAWAL OF ARTERIAL BLOOD: CPT

## 2023-01-13 PROCEDURE — 99291 CRITICAL CARE FIRST HOUR: CPT | Mod: 25 | Performed by: INTERNAL MEDICINE

## 2023-01-13 PROCEDURE — 93306 TTE W/DOPPLER COMPLETE: CPT | Mod: 26 | Performed by: INTERNAL MEDICINE

## 2023-01-13 PROCEDURE — 84100 ASSAY OF PHOSPHORUS: CPT

## 2023-01-13 PROCEDURE — 74018 RADEX ABDOMEN 1 VIEW: CPT

## 2023-01-13 RX ORDER — MAGNESIUM SULFATE HEPTAHYDRATE 40 MG/ML
2 INJECTION, SOLUTION INTRAVENOUS ONCE
Status: COMPLETED | OUTPATIENT
Start: 2023-01-13 | End: 2023-01-13

## 2023-01-13 RX ORDER — ASPIRIN 300 MG/1
300 SUPPOSITORY RECTAL EVERY 6 HOURS PRN
Status: DISCONTINUED | OUTPATIENT
Start: 2023-01-13 | End: 2023-01-13

## 2023-01-13 RX ORDER — ONDANSETRON 2 MG/ML
4 INJECTION INTRAMUSCULAR; INTRAVENOUS EVERY 4 HOURS PRN
Status: DISCONTINUED | OUTPATIENT
Start: 2023-01-13 | End: 2023-02-12

## 2023-01-13 RX ORDER — SENNOSIDES 8.6 MG
1300 CAPSULE ORAL 2 TIMES DAILY PRN
Status: ON HOLD | COMMUNITY
End: 2023-02-06

## 2023-01-13 RX ORDER — MV-MN/C/THEANINE/HERB NO.310 1000-200MG
POWDER IN PACKET (EA) ORAL
Status: ON HOLD | COMMUNITY
End: 2023-02-06

## 2023-01-13 RX ORDER — PROCHLORPERAZINE EDISYLATE 5 MG/ML
10 INJECTION INTRAMUSCULAR; INTRAVENOUS EVERY 6 HOURS PRN
Status: DISCONTINUED | OUTPATIENT
Start: 2023-01-13 | End: 2023-01-19

## 2023-01-13 RX ORDER — ASPIRIN 81 MG/1
81 TABLET, CHEWABLE ORAL DAILY
Status: DISCONTINUED | OUTPATIENT
Start: 2023-01-13 | End: 2023-02-09

## 2023-01-13 RX ORDER — COVID-19 ANTIGEN TEST
2 KIT MISCELLANEOUS 2 TIMES DAILY PRN
Status: ON HOLD | COMMUNITY
End: 2023-02-06

## 2023-01-13 RX ORDER — LABETALOL HYDROCHLORIDE 5 MG/ML
10 INJECTION, SOLUTION INTRAVENOUS ONCE
Status: DISCONTINUED | OUTPATIENT
Start: 2023-01-13 | End: 2023-01-13

## 2023-01-13 RX ORDER — LABETALOL HYDROCHLORIDE 5 MG/ML
INJECTION, SOLUTION INTRAVENOUS
Status: DISCONTINUED
Start: 2023-01-13 | End: 2023-01-13

## 2023-01-13 RX ORDER — ONDANSETRON 2 MG/ML
INJECTION INTRAMUSCULAR; INTRAVENOUS
Status: ACTIVE
Start: 2023-01-13 | End: 2023-01-13

## 2023-01-13 RX ORDER — ASPIRIN 300 MG/1
300 SUPPOSITORY RECTAL DAILY
Status: DISCONTINUED | OUTPATIENT
Start: 2023-01-14 | End: 2023-01-14

## 2023-01-13 RX ORDER — ENOXAPARIN SODIUM 100 MG/ML
40 INJECTION SUBCUTANEOUS DAILY
Status: DISCONTINUED | OUTPATIENT
Start: 2023-01-13 | End: 2023-02-16 | Stop reason: HOSPADM

## 2023-01-13 RX ADMIN — Medication 100 MCG/HR: at 00:14

## 2023-01-13 RX ADMIN — FAMOTIDINE 20 MG: 10 INJECTION, SOLUTION INTRAVENOUS at 17:59

## 2023-01-13 RX ADMIN — FENTANYL CITRATE 100 MCG: 50 INJECTION, SOLUTION INTRAMUSCULAR; INTRAVENOUS at 00:10

## 2023-01-13 RX ADMIN — FENTANYL CITRATE 100 MCG: 50 INJECTION, SOLUTION INTRAMUSCULAR; INTRAVENOUS at 09:38

## 2023-01-13 RX ADMIN — ENOXAPARIN SODIUM 40 MG: 40 INJECTION SUBCUTANEOUS at 17:59

## 2023-01-13 RX ADMIN — HYDRALAZINE HYDROCHLORIDE 20 MG: 20 INJECTION INTRAMUSCULAR; INTRAVENOUS at 09:38

## 2023-01-13 RX ADMIN — ASPIRIN 81 MG: 81 TABLET, CHEWABLE ORAL at 05:56

## 2023-01-13 RX ADMIN — NICARDIPINE HYDROCHLORIDE 5 MG/HR: 25 INJECTION, SOLUTION INTRAVENOUS at 00:32

## 2023-01-13 RX ADMIN — SODIUM CHLORIDE, POTASSIUM CHLORIDE, SODIUM LACTATE AND CALCIUM CHLORIDE: 600; 310; 30; 20 INJECTION, SOLUTION INTRAVENOUS at 16:14

## 2023-01-13 RX ADMIN — NICOTINE 14 MG: 14 PATCH TRANSDERMAL at 05:55

## 2023-01-13 RX ADMIN — DOCUSATE SODIUM 50 MG AND SENNOSIDES 8.6 MG 2 TABLET: 8.6; 5 TABLET, FILM COATED ORAL at 05:56

## 2023-01-13 RX ADMIN — HYDRALAZINE HYDROCHLORIDE 20 MG: 20 INJECTION INTRAMUSCULAR; INTRAVENOUS at 09:39

## 2023-01-13 RX ADMIN — FOLIC ACID 1 MG: 1 TABLET ORAL at 05:56

## 2023-01-13 RX ADMIN — SODIUM CHLORIDE, POTASSIUM CHLORIDE, SODIUM LACTATE AND CALCIUM CHLORIDE: 600; 310; 30; 20 INJECTION, SOLUTION INTRAVENOUS at 01:08

## 2023-01-13 RX ADMIN — PROCHLORPERAZINE EDISYLATE 10 MG: 5 INJECTION INTRAMUSCULAR; INTRAVENOUS at 11:51

## 2023-01-13 RX ADMIN — HUMAN ALBUMIN MICROSPHERES AND PERFLUTREN 3 ML: 10; .22 INJECTION, SOLUTION INTRAVENOUS at 16:23

## 2023-01-13 RX ADMIN — THERA TABS 1 TABLET: TAB at 05:56

## 2023-01-13 RX ADMIN — HYDRALAZINE HYDROCHLORIDE 20 MG: 20 INJECTION INTRAMUSCULAR; INTRAVENOUS at 12:46

## 2023-01-13 RX ADMIN — FAMOTIDINE 20 MG: 20 TABLET, FILM COATED ORAL at 05:56

## 2023-01-13 RX ADMIN — MAGNESIUM SULFATE HEPTAHYDRATE 2 G: 40 INJECTION, SOLUTION INTRAVENOUS at 09:47

## 2023-01-13 RX ADMIN — THIAMINE HYDROCHLORIDE 100 MG: 100 INJECTION, SOLUTION INTRAMUSCULAR; INTRAVENOUS at 06:08

## 2023-01-13 ASSESSMENT — PAIN DESCRIPTION - PAIN TYPE
TYPE: ACUTE PAIN

## 2023-01-13 ASSESSMENT — COPD QUESTIONNAIRES
COPD SCREENING SCORE: 7
HAVE YOU SMOKED AT LEAST 100 CIGARETTES IN YOUR ENTIRE LIFE: YES
DO YOU EVER COUGH UP ANY MUCUS OR PHLEGM?: YES, A FEW DAYS A WEEK OR MONTH
DURING THE PAST 4 WEEKS HOW MUCH DID YOU FEEL SHORT OF BREATH: NONE/LITTLE OF THE TIME

## 2023-01-13 ASSESSMENT — PULMONARY FUNCTION TESTS
FVC: 1320
FVC: 2

## 2023-01-13 NOTE — RESPIRATORY CARE
Extubation    Cuff leak noted: yes  Stridor present: no       O2 (LPM): 4 (01/13/23 1341)     Patient toleration: tolerating well    Events/Summary/Plan: Pt extubated and placed on 4L NC (01/13/23 1341)

## 2023-01-13 NOTE — PROGRESS NOTES
Patient hypotensive with MAP 60 post extubation. Fentanyl gtt and cardene gtt off. MD Cosby notified. Neuro exam remains unchanged. Patient follows commands briskly on right side and very faintly on left side.

## 2023-01-13 NOTE — CONSULTS
Neurology STROKE CODE H&P  Neurohospitalist Service, [facility]    Referring Physician: Joseline Mccarthy M.D.    STROKE CODE: L side weakness, slurred speech    To obtain the most accurate data regarding the time called, and time patient seen, refer to the stroke run-sheet and chart.  For time of CT, refer to the radiology report. See A&P below for TPA Decision and door to needle time if and when applicable.    HPI: Kaiden Quiroz is a 62 year old man with hypertension, type 2 diabetes, smoker, presenting with L side weakness and slurred speech.  Wife, Mary Beth found him at home with symptoms, with last seen normal at 1830 tonight. On EMS arrival, they noted dense L side weakness and slurred speech.  SBPs in 170s, FSBS 147.  On arrival to [facility], NIHSS 20, consistent with dense R MCA syndrome.  Stroke protocol CT revealing occluded cervical ICA, with tandem occlusion of R M1 segment.  SBPs in 190s post-scan.  On ROS, patient reports no infectious prodrome, no constitutional symptoms.  He reports compliance with medications, but denies taking any blood thinners including ASA therapy.  He reports smoking 1ppd and drinking a 6-pack of beer daily.  Denies recreational drug use, though wife endorses occasional marijuana consumption.    Review of systems: In addition to what is detailed in the HPI above, all other systems reviewed and are negative.    Past Medical History:   Type 2 diabetes, hypertension    FHx:  No family history of early strokes, blood clotting disorder    SHx:   reports that he has been smoking. He has never used smokeless tobacco.  6-pack beer daily.    Allergies:  No Known Allergies    Medications:  No current facility-administered medications for this encounter.  No current outpatient medications on file.    Physical Examination:    Vitals:    01/12/23 2035 01/12/23 2036 01/12/23 2037 01/12/23 2038   BP: (!) 158/64  (!) 188/89    Pulse: (!) 53 (!) 53 (!) 53 (!) 52    Resp: 17 18 19 17   Temp:       TempSrc:       SpO2: 97% 98% 97% 99%   Weight:       Height:             General: Eyes closed, but easily arousable to voice  Eye: Examination of optic disks not indicated at this time given acuity of consult  Neck: There is normal range of motion  CV: Regular rate   Extremities:  Clear, dry, intact, without peripheral edema    NEUROLOGICAL EXAM:     Mental status: Eyes closed, but easily arousable  Speech and language: Speech is dysarthric but fluent. The patient is able to name and repeat, and follow commands  Cranial nerve exam: No blink to threat from L.  Forced R gaze deviation.  L face droop  Motor exam: RUE and RLE are sustained antigravity with no drift.  No movement in LUE and LLE.   Sensory exam:  Diminished response to tactile/noxious in L hemibody.  Does appear to neglect L side of world.  Coordination: No ataxia on FTN testing on R  Gait: Deferred due to patient preference.    NIHSS: National Institutes of Health Stroke Scale    [1] 1a:Level of Consciousness    0-alert 1-drowsy   2-stupor   3-coma  [0] 1b:LOC Questions                  0-both  1-one      2-neither  [0] 1c:LOC Commands                   0-both  1-one      2-neither  [2] 2: Best Gaze                     0-nl    1-partial  2-forced  [2] 3: Visual Fields                   0-nl    1-partial  2-complete 3-bilat  [2] 4: Facial Paresis                0-nl    1-minor    2-partial  3-full  MOTOR                       0-nl  [0] 5: Right Arm           1-drift  [4] 6: Left Arm             2-some effort vs gravity  [0] 7: Right Leg           3-no effort vs gravity  [4] 8: Left Leg             4-no movement                             x-untestable  [0] 9: Limb Ataxia                    0-abs   1-1_limb   2-2+_limbs       x-untestable  [2] 10:Sensory                        0-nl    1-partial  2-dense  [0] 11:Best Language/Aphasia         0-nl    1-mild/mod 2-severe   3-mute  [1] 12:Dysarthria                     0-nl     1-mild/mod 2-severe       x-untestable  [2] 13:Neglect/Inattention            0-none  1-partial  2-complete  [20] TOTAL    Baseline Modified Sauk Scale (MRS): 0 = No symptoms    Objective Data:    Labs:  No results found for: PROTHROMBTM, INR   Lab Results   Component Value Date/Time    WBC 6.7 09/02/2020 09:42 AM    RBC 5.20 09/02/2020 09:42 AM    HEMOGLOBIN 18.1 (H) 09/02/2020 09:42 AM    HEMATOCRIT 51.8 09/02/2020 09:42 AM    MCV 99.6 (H) 09/02/2020 09:42 AM    MCH 34.8 (H) 09/02/2020 09:42 AM    MCHC 34.9 09/02/2020 09:42 AM    MPV 10.4 09/02/2020 09:42 AM    NEUTSPOLYS 49.20 09/02/2020 09:42 AM    LYMPHOCYTES 35.60 09/02/2020 09:42 AM    MONOCYTES 8.90 09/02/2020 09:42 AM    EOSINOPHILS 5.00 09/02/2020 09:42 AM    BASOPHILS 0.90 09/02/2020 09:42 AM      Lab Results   Component Value Date/Time    SODIUM 141 09/02/2020 09:42 AM    POTASSIUM 4.7 09/02/2020 09:42 AM    CHLORIDE 103 09/02/2020 09:42 AM    CO2 25 09/02/2020 09:42 AM    GLUCOSE 102 (H) 09/02/2020 09:42 AM    BUN 8 09/02/2020 09:42 AM    CREATININE 1.03 09/02/2020 09:42 AM      Lab Results   Component Value Date/Time    CHOLSTRLTOT 240 (H) 09/02/2020 09:42 AM     (H) 09/02/2020 09:42 AM    HDL 47 09/02/2020 09:42 AM    TRIGLYCERIDE 316 (H) 09/02/2020 09:42 AM       Lab Results   Component Value Date/Time    ALKPHOSPHAT 68 09/02/2020 09:42 AM    ASTSGOT 33 09/02/2020 09:42 AM    ALTSGPT 39 09/02/2020 09:42 AM    TBILIRUBIN 0.9 09/02/2020 09:42 AM        Imaging/Testing:    I interpreted and/or reviewed the patient's neuroimaging    DX-CHEST-PORTABLE (1 VIEW)   Final Result      No acute cardiac or pulmonary abnormalities are identified.      CT-CTA NECK WITH & W/O-POST PROCESSING   Final Result      1.  Occlusion of the right internal carotid artery at its origin.      2.  Occlusion of the right vertebral artery at its origin.      Dr. Gongora discussed findings with Dr. Mccarthy.      CT-CTA HEAD WITH & W/O-POST PROCESS   Final Result         1.   Occlusion of the right internal carotid artery and right middle cerebral artery.   2.  Narrowed left M1 segment with severely narrowed and M2 and M3 branches, may represent chronic changes.      These findings were discussed with the patient's clinician, Joseline Mccarthy, on 1/12/2023 8:27 PM.      CT-CEREBRAL PERFUSION ANALYSIS   Final Result         1.  Cerebral blood flow less than 30% likely representing completed infarct = 52 mL.      2.  T Max more than 6 seconds likely representing combination of completed infarct and ischemia = 172 mL.      3.  Mismatched volume likely representing ischemic brain/penumbra = 120 mL      4.  Please note that the cerebral perfusion was performed on the limited brain tissue around the basal ganglia region. Infarct/ischemia outside the CT perfusion sections can be missed in this study.      CT-HEAD W/O   Final Result      1.  No evidence of intracranial hemorrhage, midline shift or mass effect.      2.  Question of hyperdense right MCA though evaluation limited by oblique positioning.             Assessment and Plan:  Kaiden Quiroz is a 62 year old man with multiple vascular risk factors, presenting with dense R MCA syndrome, found to have a tandem R cervical ICA and M1 occlusion.  While within thrombolytic window- I do not recommend IV-tNK given need to lower blood pressure for safe administration, along with low likelihood his extensive clot burden is able to lyse with IV-therapy. This overall would likely extend his infarct.  Instead, with discussion with Neuro-IR, I recommend maintaining permissive hypertension with SBP goal > 180  to maintain collateral blood flow as bridge therapy to IR suite for clot retrieval +/- carotid stent placement.  He will need ICU admission post-operatively.   Stroke etiology invariably atheroembolic disease.    Problem list:  R MCA stroke  2.   R internal carotid artery occlusion  3.   Type 2 diabetes  4.   Hypertension  5.    Smoker    Recommendations:   - to IR suite with Neuro-IR for clot retrieval of R ICA and M1 thrombus, with possible carotid stent placement.  Maintain SBP > 180 in interval.   - admission to [facility] ICU post-operatively, neurochecks/NIHSS per post-thrombectomy protocol   - blood pressure goal TBD pending TICI results as follows:    If TICI 3/2c: maintain systolic -140  If TICI 2b: maintain systolic 120-160  If TICI 2a or less, maintain systolic -180   - antithrombotic therapy TBD pending procedural outcome  - expedite MRI brain without contrast, does not need to wait 24 hours  - repeat head CT only if there is acute decompensation  - stroke labs:  HgbA1c and lipid panel  - DM management for goal HgbA1c < 7.0.  Acutely aim for FSBS   - start atorvastatin 80mg daily for goal LDL < 70 when able  - from Neurology perspective, may defer TTE and long-term cardiac monitoring as stroke is not cardioembolic in etiology  - SCDs only for DVT chemoppx until MRI completed  - PT/OT/SLP  - smoking cessation counseling    Addendum:  TICI 3 reperfusion with placement of R ICA stent.  Maintain SBP goal 110-140.  ASA 600mg rectal given for stent patency.  Patient intubated due to persistent bradycardia.  Will expedite MRI to determine timing to initiate DAPT.  Continue 300mg ASA FL daily.    Patient discussed with Dr. Mccarthy, ER attending, and Dr. Mobley, Neurointerventionalist    Blayne Jacobs MD  Neurohospitalist, [facility]

## 2023-01-13 NOTE — PROGRESS NOTES
IR Nursing Note      Cerebral Angiogram with Possible Intervention by MD Mobley assisted by RT Love, Right Femoral Artery access site.  Procedure begun with local anesthetic only, though converted to General Anesthesia  intra procedure due to hemodynamic instability; administered by Dr. Ortega.    Vascular Access obtained: 2120  1st Angio: 2125  1st Pass: 2142  Anesthesia start time: 2145  Right Carotid Artery Stent deployed at: 2210  2nd Pass: 2222  TICI 3 obtained at: 2228    Access site sealed with Angioseal at 2241, covered with gauze/tegaderm, C/D/I.  1+ DP pulses.    Report given to PINA Cordero.  Patient transported to [facility] intubated via Dr Ortega, IR RN and IR Tech monitored then transferred care to report RN.    Right Carotid Artery Stent:  [employer], Carotid WallStent, 8mm x 29mm, REF#  [id number], LOT# [id number], Exp. Date 4/25/2026      Angioseal VIP Vascular Closure Device, REF# [id number], LOT# [id number], Exp. Date 9/30/2023

## 2023-01-13 NOTE — ED NOTES
Pt to IR via IR RN, ACLS RN x 3, Dr. Jacobs, and pt's wife Mary Beth. Pt remains somnolent but answers simple questions. No effort against gravity LUE/LLE. Left sided facial droop with somewhat slurred speech. Positive pedal pulses.     Pt belongings underneath gurtomás in one bag (pants and shirt) per IR RN request.

## 2023-01-13 NOTE — ANESTHESIA PROCEDURE NOTES
Airway    Date/Time: 1/12/2023 10:56 PM  Performed by: Houston Ortega D.O.  Authorized by: Houston Ortega D.O.     Location:  OR  Urgency:  Elective  Difficult Airway: No    Indications for Airway Management:  Anesthesia      Spontaneous Ventilation: absent    Sedation Level:  Deep  Preoxygenated: Yes    Patient Position:  Sniffing  Mask Difficulty Assessment:  2 - vent by mask + OA or adjuvant +/- NMBA  Final Airway Type:  Endotracheal airway  Final Endotracheal Airway:  ETT  Cuffed: Yes    Technique Used for Successful ETT Placement:  Direct laryngoscopy    Insertion Site:  Oral  Blade Type:  Romero  Laryngoscope Blade/Videolaryngoscope Blade Size:  4  ETT Size (mm):  7.0  Measured from:  Teeth  ETT to Teeth (cm):  22  Placement Verified by: auscultation and capnometry    Cormack-Lehane Classification:  Grade IIa - partial view of glottis  Number of Attempts at Approach:  1

## 2023-01-13 NOTE — DOCUMENTATION QUERY
[facility]                                                                       Query Response Note      PATIENT:               JUANITA PRICE  ACCT #:                  [id number]  MRN:                     [id number]  :                      1960  ADMIT DATE:       2023 8:01 PM  DISCH DATE:          RESPONDING  PROVIDER #:        [id number]           QUERY TEXT:    Please clarify if acute respiratory failure is an accurate diagnosis for this admission after study based on the diagnostic and clinical indicators documented below.      NOTE:  If an appropriate response is not listed below, please respond with a new note.            The patient's clinical indicators include:  Admit O2 saturation - 97% RA  Admit labs - ABG Ph 7.403, Pco2 35.8, Po2 105    H&P - Dx Acute respiratory failure with hypoxia - Intubated for airway protection due to severe bradycardia during procedure    Treatment - Mechanical ventilation    Risk factors - CVA, bradycardia, current cigarette smoker, HTN    Thank you,  Marion Choudhary BSN  Clinical   Connect via [employer]  Options provided:   -- Acute Respiratory Failure ruled out, patient intubated for airway protection   -- Acute Respiratory Failure ruled in, not present on admission, and due to bradycardia   -- Other explanation, (please specify the other explanation)   -- Unable to Determine      Query created by: Marion Choudhary on 2023 7:42 AM    RESPONSE TEXT:    Acute Respiratory Failure ruled out, patient intubated for airway protection          Electronically signed by:  LARS STOKES MD 2023 10:47 AM

## 2023-01-13 NOTE — DISCHARGE PLANNING
[facility] Transitional Care Coordination    Referral from: Dr. Razo    Insurance Provider on Facesheet: None listed @ this time.  Please reach out to a PFA for a screening.    Potential Rehab Diagnosis: CVA?    Chart review indicates patient may have on going medical management and may have therapy needs to possibly meet inpatient rehab facility criteria with the goal of returning to community.    D/C support will need to be verified: Spouse    Physiatry consultation pended per protocol.  W/U & TX pending.     Thank you for the referral.

## 2023-01-13 NOTE — FLOWSHEET NOTE
01/13/23 1121   Spontaneous Breathing Trial (SBT)   Length of Weaning Trial (Hours) 1   Weaning Parameters   RR (bpm) 12   $ FVC / Vital Capacity (liters)  2   NIF (cm H2O)  -38   Rapid Shallow Breathing Index (RR/VT) 12   Spontaneous VE 8.3   Spontaneous

## 2023-01-13 NOTE — DISCHARGE PLANNING
Care Transition Team Assessment    Mr. Quiroz lives with his wife, Cori in there apartment located on the 2nd floor.  Prior to admission he was independent of ADL's, driving and working.  He does not have MPOA, does drink ETOH regularly but denies abuse/tobacco.  His PCP is Dr. Arthur at [facility], payor is [facility] 1st and [facility] 2nd    Information Source  Orientation Level: Unable to assess  Information Given By: Spouse  Informant's Name: Cori Quiroz  Who is responsible for making decisions for patient? : Spouse    Readmission Evaluation  Is this a readmission?: No    Elopement Risk  Legal Hold: No  Ambulatory or Self Mobile in Wheelchair: No-Not an Elopement Risk    Interdisciplinary Discharge Planning  Does Admitting Nurse Feel This Could be a Complex Discharge?: No  Primary Care Physician: Dr. Arthur  Lives with - Patient's Self Care Capacity: Spouse  Support Systems: Family Member(s)  Housing / Facility: 2 Story Apartment / Condo  Able to Return to Previous ADL's: Future Time w/Therapy  Mobility Issues: Yes  Prior Services: None    Discharge Preparedness  What is your plan after discharge?: Uncertain - pending medical team collaboration  Prior Functional Level: Ambulatory, Drives Self, Independent with Activities of Daily Living    Functional Assesment  Prior Functional Level: Ambulatory, Drives Self, Independent with Activities of Daily Living                   Advance Directive  Advance Directive?: None    Domestic Abuse  Have you ever been the victim of abuse or violence?: No  Physical Abuse or Sexual Abuse: No

## 2023-01-13 NOTE — ED NOTES
2035 50 mcg phenylephrine per Dr. Jacobs and Dr. Mccarthy at bedside. Pharmacy at bedside.   2037 per Dr. Jacobs Q3min BP, push 50 mcg phenylephrine for SBP less than 180. Goal -200. Cath lab preparing for pt.   2040 152/75, 50 mcg elida given  2041 173/81, 100 mcg elida given per Dr. Jacobs at bedside.   2043 218/102

## 2023-01-13 NOTE — PROGRESS NOTES
Unable to complete med rec at this time.   [facility] has not recently filled anything for pt.  Left message for wife.

## 2023-01-13 NOTE — ED PROVIDER NOTES
ED Provider Note    CHIEF COMPLAINT  Acute stroke    EXTERNAL RECORDS REVIEWED  Select: Outpatient Notes prior outpatient notes reveal history with diabetes    HPI/ROS  LIMITATION TO HISTORY   Select: : None  OUTSIDE HISTORIAN(S):  Select: EMS symptoms began 1hr PTA first , now down to 171, report of fall yesterday, L sided weakness, neglect not on thinners, hx of prior CVA without much detail as to deficits but according to wife was fully functioning before tonight     Kaiden Quiroz is a 62 y.o. male who presents to the emerge department chief complaint of cute onset left-sided weakness left facial droop who presented about an hour prior to arrival.  According to the patient's wife he had fallen yesterday he is not on blood thinning medications but today there was sitting there when all of a sudden he fell again did not hit his head this time but had acute weakness in the left upper extremity.  Glucose within normal limits he was extremely hypertensive prior to arrival.  Currently he has a gaze preference and denies any pain.    PAST MEDICAL HISTORY       SURGICAL HISTORY  patient denies any surgical history    FAMILY HISTORY  No family history on file.    SOCIAL HISTORY  Social History     Tobacco Use    Smoking status: Every Day     Packs/day: 0.00     Types: Cigarettes    Smokeless tobacco: Never   Substance and Sexual Activity    Alcohol use: Not on file    Drug use: Not on file    Sexual activity: Not on file       CURRENT MEDICATIONS  Home Medications    **Home medications have not yet been reviewed for this encounter**         ALLERGIES  No Known Allergies    PHYSICAL EXAM  VITAL SIGNS: Systolic blood pressure 188/88, pulse 61, respiratory rate 18, 97% on room air, afebrile  Pulse Ox Interpretation:   Pulse Ox is normal  Constitutional: Alert in no apparent distress.  HENT: Normocephalic atraumatic, MMM  Eyes: PER, Conjunctiva normal, Non-icteric.   Neck: Normal range of motion, No tenderness,  Supple, No stridor.   Cardiovascular: Regular rate and rhythm, no murmurs.   Thorax & Lungs: Normal breath sounds, No respiratory distress, No wheezing, No chest tenderness.   Abdomen: Bowel sounds normal, Soft, No tenderness, No pulsatile masses. No peritoneal signs.  Skin: Warm, Dry, No erythema, No rash.   Back: No bony tenderness, No CVA tenderness.   Extremities/MSK: Intact equal distal pulses, No edema, No tenderness, No cyanosis, no major deformities noted  Neurologic: Alert and oriented x3, left lower facial droop with mild dysarthria right gaze preference flaccid paralysis of left upper and lower extremity      DIAGNOSTIC STUDIES / PROCEDURES  EKG  I have independently interpreted this EKG  Results for orders placed or performed during the hospital encounter of 23   EKG (NOW)   Result Value Ref Range    Report       [facility] Emergency Dept[facility]    Test Date:  2023  Pt Name:    JUANITA PRICE               Department: ER  MRN:        [id number]                      Room:        11  Gender:     Male                         Technician: [id number]  :        1960                   Requested By:ABRAHAM CABA  Order #:    [id number]                    Reading MD:    Measurements  Intervals                                Axis  Rate:       67                           P:          -8  OK:         214                          QRS:        -37  QRSD:       110                          T:          117  QT:         433  QTc:        457    Interpretive Statements  Sinus rhythm  Borderline prolonged OK interval  Left axis deviation  Abnormal R-wave progression, late transition  Nonspecific T abnormalities, lateral leads  No previous ECG available for comparison           LABS  Labs Reviewed   CBC WITH DIFFERENTIAL - Abnormal; Notable for the following components:       Result Value    MCH 34.2 (*)     MCHC 35.6 (*)     Platelet Count 142 (*)     Lymphocytes 17.40 (*)     All other  components within normal limits    Narrative:     Indicate which anticoagulants the patient is on:->UNKNOWN  Biotin intake of greater than 5 mg per day may interfere with  troponin levels, causing false low values.   PROTHROMBIN TIME    Narrative:     Indicate which anticoagulants the patient is on:->UNKNOWN  Biotin intake of greater than 5 mg per day may interfere with  troponin levels, causing false low values.   APTT    Narrative:     Indicate which anticoagulants the patient is on:->UNKNOWN  Biotin intake of greater than 5 mg per day may interfere with  troponin levels, causing false low values.   COD (ADULT)   HEMOGLOBIN A1C   ABO RH CONFIRM   DIAGNOSTIC ALCOHOL   COMP METABOLIC PANEL   TROPONIN   URINE DRUG SCREEN   LACTIC ACID   LIPID PROFILE   CBC WITH DIFFERENTIAL   BASIC METABOLIC PANEL   MAGNESIUM   PHOSPHORUS         RADIOLOGY  I have independently interpreted the diagnostic imaging associated with this visit and am waiting the final reading from the radiologist.       CT head without - no acute bleed    CTA head/neck - occlusion R internal carotid occlusion     DX-CHEST-PORTABLE (1 VIEW)   Final Result         1.  Left midlung and lower lobe infiltrates, similar to prior study.      [id number] VIEW   Final Result         1.  Nonspecific bowel gas pattern in the upper abdomen.   2.  Cardiomegaly   3.  Hazy left lower lobe infiltrates.   4.  Nasogastric tube tip terminates overlying the expected location of the gastric fundus.      DX-CHEST-PORTABLE (1 VIEW)   Final Result      No acute cardiac or pulmonary abnormalities are identified.      CT-CTA NECK WITH & W/O-POST PROCESSING   Final Result      1.  Occlusion of the right internal carotid artery at its origin.      2.  Occlusion of the right vertebral artery at its origin.      Dr. Gongora discussed findings with Dr. Mccarthy.      CT-CTA HEAD WITH & W/O-POST PROCESS   Final Result         1.  Occlusion of the right internal carotid artery and right  middle cerebral artery.   2.  Narrowed left M1 segment with severely narrowed and M2 and M3 branches, may represent chronic changes.      These findings were discussed with the patient's clinician, Joseline Mccarthy, on 1/12/2023 8:27 PM.      CT-CEREBRAL PERFUSION ANALYSIS   Final Result         1.  Cerebral blood flow less than 30% likely representing completed infarct = 52 mL.      2.  T Max more than 6 seconds likely representing combination of completed infarct and ischemia = 172 mL.      3.  Mismatched volume likely representing ischemic brain/penumbra = 120 mL      4.  Please note that the cerebral perfusion was performed on the limited brain tissue around the basal ganglia region. Infarct/ischemia outside the CT perfusion sections can be missed in this study.      CT-HEAD W/O   Final Result      1.  No evidence of intracranial hemorrhage, midline shift or mass effect.      2.  Question of hyperdense right MCA though evaluation limited by oblique positioning.         IR-THROMBO MECHANICAL ARTERY,INIT    (Results Pending)   EC-ECHOCARDIOGRAM COMPLETE W/O CONT    (Results Pending)   MR-BRAIN-W/O    (Results Pending)         COURSE & MEDICAL DECISION MAKING    ED Observation Status? No; Patient does not meet criteria for ED Observation.     INITIAL ASSESSMENT AND PLAN  Care Narrative: Patient resents emerged department in the window for an ischemic stroke.  He was extremely hypertensive prior to arrival left systolic for EMS was in the 170s, he was taken straight to the CT scanner where a Noncon revealed no evidence of hemorrhage.  TNKase was prepared.  Pending a CTA evaluation but appears to be a large clot burden.    ADDITIONAL PROBLEM LIST AND DISPOSITION  8:22 PM  SBP at the bedside >180, HR 60's   Patient has a LARGE clot burden from the neck into the head with an M1 occlusion, DR Jacobs has already alerted IR who are reviewing the imaging -   MAP > 110 and elida pushes   Hold TNK     8:49 PM  Spoke w Dr Razo for  admission  Patient has received 300 of elida pushes to keep MAP > 110     I spoke with Dr. Hernandez with radiology who confirmed that he does have a large occlusion from the right internal carotid up into the right middle cerebral artery.      Dr. Jacobs had spoken with neuro interventional who evaluated the imaging and decided to take the patient to IR.  At this time TNKase was held -was taken emergently to IR    Problem #1: Acute ischemic stroke due to large clot burden  Problem #2: Hypertension  Problem #3: Concern for pneumonia on chest x-ray    I have discussed management of the patient with the following physicians and TOSHIA's:  Reynaldo Razo Woolsey    Discussion of management with other [facility] or appropriate source(s): Select: Pharmacy for elida pushes        Decision tools and prescription drugs considered including, but not limited to: Select: none at this time .    CRITICAL CARE  The very real possibilty of a deterioration of this patient's condition required the highest level of my preparedness for sudden, emergent intervention.  I provided critical care services, which included medication orders, frequent reevaluations of the patient's condition and response to treatment, ordering and reviewing test results, and discussing the case with various consultants.  The critical care time associated with the care of the patient was 45 minutes. Review chart for interventions. This time is exclusive of any other billable procedures.       FINAL DIAGNOSIS  1.  Acute ischemic stroke  2.  Hypertension  3.  Concern for pneumonia on chest x-ray    Critical care time 45 minutes    Electronically signed by: Joseline Mccarthy M.D., 1/12/2023 8:10 PM

## 2023-01-13 NOTE — ANESTHESIA PREPROCEDURE EVALUATION
Date: 01/12/23  Procedure: Procedure Not Yet Scheduled         Relevant Problems   NEURO   (positive) Acute CVA (cerebrovascular accident) (HCC)      CARDIAC   (positive) Hypertension      ENDO   (positive) Type 2 diabetes mellitus without complication, without long-term current use of insulin (HCC)       Physical Exam    Airway - unable to assess  TM distance: >3 FB  Neck ROM: full       Cardiovascular - normal exam  Rhythm: regular  Rate: normal  (-) murmur     Dental - normal exam    Unable to assess dental       Pulmonary - normal exam  Breath sounds clear to auscultation     Abdominal    Neurological - normal exam                 Anesthesia Plan    ASA 4- EMERGENT   ASA physical status 4 criteria: CVA or TIA - recent (< 3 months)ASA physical status emergent criteria: cardiac compromise requiring immediate intervention    Plan - general       Airway plan will be LMA          Induction: intravenous    Postoperative Plan: Postoperative administration of opioids is intended.    Pertinent diagnostic labs and testing reviewed    Informed Consent:  Emergent - Consent given by clinician

## 2023-01-13 NOTE — THERAPY
Occupational Therapy Contact Note:       01/13/23 2717   Interdisciplinary Plan of Care Collaboration   Collaboration Comments OT orders received, pt s/p thrombectomy, activity orders start today at 2143. Will  Hold until pt is clear for OOB/EOB activity       Mariana Yadav, OTR/L

## 2023-01-13 NOTE — H&P
Critical Care History & Physical Note    Date of Service  1/12/2023    Referring Physician  Dr Joseline Mccarthy      Code Status  No Order    Chief Complaint  Chief Complaint   Patient presents with    Possible Stroke     BIBA from home for stroke like symptoms  Wife found pt with slurred speech, L facial droop, and L sided weakness   LKW 1830  Pt had a fall yesterday, + head strike, - thinners  , aox4  Initial NIH of 20 by neurologist, pt taken to CT then red 11       History of Presenting Illness  Kaiden Quiroz is a 62 y.o. male BMI 36, active smoker 1ppd, active drinker 6 pack of beer daily, hx of HTN, DM who presented 1/12/2023 with dense left side weakness, slurred speech. LKW 1830. Stroke alert was activated. Initial . TNKase was not given due to the extent of clot burden/chronicity of the lesion and possibility of extending the infarct if BP is lowered. Neuro recommended thrombectomy by IR, while maintaning SBP >180. Initial NIHSS 20. Pt was taken to IR    Intraop, pt underwent thrombectomy with TICI 3 flow and right ICA stent placement. Procedure was complicated by sinus bradycardia then long 10sec pause. CPR was about to be started but pt gained his pulse back. Anesthesia was called and placed LMA. This was eventually converted to intubation post procedure. Atropine, glycopyrrolate, ephedrine were given. Aspirin 600mg transrectally was given.     Upon ICU arrival, pt was intubated. FiO2 50%/ PEEP 8 sat >95%. SBP in 160s. HR in 90s, NSR. Received hand off from anesthesia, IR RN at bedside. Also discussed case on the phone with Neuro IR Dr. Mobley and Neurology Dr. Jacobs      Review of Systems  Review of Systems   Reason unable to perform ROS: unable to obtain due to mental status.     Past Medical History   has no past medical history on file.    Surgical History   has no past surgical history on file.     Family History  family history is not on file.   Family history reviewed with  patient. There is no family history that is pertinent to the chief complaint.     Social History   reports that he has been smoking. He has never used smokeless tobacco.    Allergies  No Known Allergies    Medications  None       Physical Exam  Temp:  [36.4 °C (97.6 °F)] 36.4 °C (97.6 °F)  Pulse:  [51-77] 70  Resp:  [16-21] 21  BP: (152-218)/() 199/85  SpO2:  [97 %-99 %] 99 %  Blood Pressure: (!) 199/85   Temperature: 36.4 °C (97.6 °F)   Pulse: 70   Respiration: (!) 21   Pulse Oximetry: 99 %       Physical Exam  Vitals and nursing note reviewed.   Constitutional:       Appearance: He is ill-appearing and toxic-appearing.   HENT:      Head: Normocephalic.      Mouth/Throat:      Mouth: Mucous membranes are moist.      Comments: ET Tube in place  Cardiovascular:      Rate and Rhythm: Normal rate and regular rhythm.      Pulses: Normal pulses.      Heart sounds: Normal heart sounds. No murmur heard.  Pulmonary:      Effort: Pulmonary effort is normal. No respiratory distress.      Breath sounds: Normal breath sounds. No wheezing, rhonchi or rales.      Comments: Diminished at bases  Abdominal:      General: Bowel sounds are normal. There is no distension.      Palpations: Abdomen is soft.      Tenderness: There is no abdominal tenderness. There is no guarding.   Musculoskeletal:         General: No swelling or tenderness.      Cervical back: Neck supple.      Right lower leg: No edema.      Left lower leg: No edema.   Skin:     General: Skin is warm and dry.      Capillary Refill: Capillary refill takes less than 2 seconds.      Coloration: Skin is not jaundiced.   Neurological:      Mental Status: He is alert.      Comments: Intubated, sedated  Unable to perform       Laboratory:  Recent Labs     01/12/23 2002   WBC 8.4   RBC 4.77   HEMOGLOBIN 16.3   HEMATOCRIT 45.8   MCV 96.0   MCH 34.2*   MCHC 35.6*   RDW 45.9   PLATELETCT 142*   MPV 10.7         No results for input(s): ALTSGPT, ASTSGOT, ALKPHOSPHAT,  TBILIRUBIN, DBILIRUBIN, GAMMAGT, AMYLASE, LIPASE, ALB, PREALBUMIN, GLUCOSE in the last 72 hours.  Recent Labs     01/12/23 2002   APTT 28.1   INR 0.96     No results for input(s): NTPROBNP in the last 72 hours.      No results for input(s): TROPONINT in the last 72 hours.    Imaging:  DX-CHEST-PORTABLE (1 VIEW)   Final Result      No acute cardiac or pulmonary abnormalities are identified.      CT-CTA NECK WITH & W/O-POST PROCESSING   Final Result      1.  Occlusion of the right internal carotid artery at its origin.      2.  Occlusion of the right vertebral artery at its origin.      Dr. Gongora discussed findings with Dr. Mccarthy.      CT-CTA HEAD WITH & W/O-POST PROCESS   Final Result         1.  Occlusion of the right internal carotid artery and right middle cerebral artery.   2.  Narrowed left M1 segment with severely narrowed and M2 and M3 branches, may represent chronic changes.      These findings were discussed with the patient's clinician, Joseline Mccarthy, on 1/12/2023 8:27 PM.      CT-CEREBRAL PERFUSION ANALYSIS   Final Result         1.  Cerebral blood flow less than 30% likely representing completed infarct = 52 mL.      2.  T Max more than 6 seconds likely representing combination of completed infarct and ischemia = 172 mL.      3.  Mismatched volume likely representing ischemic brain/penumbra = 120 mL      4.  Please note that the cerebral perfusion was performed on the limited brain tissue around the basal ganglia region. Infarct/ischemia outside the CT perfusion sections can be missed in this study.      CT-HEAD W/O   Final Result      1.  No evidence of intracranial hemorrhage, midline shift or mass effect.      2.  Question of hyperdense right MCA though evaluation limited by oblique positioning.         IR-THROMBO MECHANICAL ARTERY,INIT    (Results Pending)       CT head, CTA head and neck and CTP were personally reviewed    Assessment/Plan:  * Acute CVA (cerebrovascular accident) (HCC)- (present  on admission)  Assessment & Plan  Acute stroke  Last seen normal: 1830  TNKase given: no  SBP at admission: 190  CT head w/o contrast at admission: no bleed, hyperdense right MCA sign  CTA head: R ICA and M1 occlusion with severely narrowed left M1/M2/M3  CTA neck: right ICA/vertebral artery occlusion   CTP: 120cc mismatched volume    S/p thrombectomy with TICI 3 and right ICA stent placement  Intraop, pt developed transient bradycardia leading to 10 sec-pause, and before CPR was started, pt gained his pulse back. Anesthesia was called, atropine, glycopyrrolate, ephedrine were given. Pt was placed LMA. After procedure, LMA was converted to intubation  Aspirin 600mg transrectally was given     Plan:   - continue vent support tonight, goal sat >90%  - sedation with propofol and fentanyl. Avoiding precedex given recent bradycardia  - With TICI 3 Flow; goal -140  - Nicardipine gtt to keep -140.   - MRI brain w/o contrast post TNKase. Will expedite, ordered for tomorrow am  - CT head w/o contrast prn acute changes in mental status only  - Start aspirin 81mg daily tomorrow am. Additional antiplatelet TBD, will defer to Neurology   - Stroke labs: A1C, lipid profile, TSH, troponin  - Statin to target goal LDL <70  - TTE with bubble study to evaluate for PFO  - Keep -180.  - PT/OT/Rehab/speech/nutrition  - Dysphagia screen w/ swallow study   - DVT prophylaxis 24 hours post TNKase    Monitor for TNKase related bleed. If bleeding present, check fibrinogen STAT and transfuse w/ cryoprecipitate    Acute respiratory failure with hypoxia (HCC)  Assessment & Plan  Intubated for airway protection due to severe bradycardia during procedure.   Keep intubated tonight  Sedation with propofol and fentanyl   Goal sat >90%.   -140  ABG  ABCDEF bundle    BMI 36.0-36.9,adult  Assessment & Plan  BMI 36    Type 2 diabetes mellitus without complication, without long-term current use of insulin (HCC)  Assessment &  Plan  Check A1C  Goal 120-180 while in hospital     Alcohol abuse  Assessment & Plan  Active alcohol drink 6 packs of beers daily   Monitor for withdrawal  Fluids, vitamins, folate, thiamine    Current smoker  Assessment & Plan  Active smoker 1ppd.   Nicotine patch        VTE prophylaxis: SCDs/TEDs      The patient remains critically ill. Critical care time = 95 mins in directly providing and coordinating critical care and extensive data review. No time overlap and excludes procedures.     D/w Dr. Jacobs, Neurology and Dr. Mccarthy, ED    Bob Razo, JUAN J.HATTIE.

## 2023-01-13 NOTE — ED NOTES
"Unable to obtain any information from the patient to confirm his name & date of birth (patient could not participate in med rec process).  Call placed to Spouse with phone number on file for Cori Quiroz: [phone], but when she answered she did not confirm that \"Cori\" had answered the line.   Call placed to the preferred pharmacy on file was closed for the evening ([address]            "

## 2023-01-13 NOTE — PROGRESS NOTES
2 failed PIV attempts without U/S and 2 failed attempts with U/S. MD aware. TISHA Roman at bedside for CVC insertion. Cardene currently being held as not compatible with Propofol. MD aware.

## 2023-01-13 NOTE — PROGRESS NOTES
Marcella from Lab called with critical result of AST and Co2 at 0314. Critical lab result read back to Marcella.   Jessie GILES notified of critical lab result at 0320.  Critical lab result read back by Jessie GILES.

## 2023-01-13 NOTE — PROGRESS NOTES
Critical Care Progress Note    Date of admission  1/12/2023    Chief Complaint  Stroke    Hospital Course  Kaiden Quiroz is a 62 y.o. male BMI 36, active smoker 1ppd, active drinker 6 pack of beer daily, hx of HTN, DM who presented 1/12/2023 with dense left side weakness, slurred speech. LKW 1830. Stroke alert was activated. Initial . TNKase was not given due to the extent of clot burden/chronicity of the lesion and possibility of extending the infarct if BP is lowered. Neuro recommended thrombectomy by IR, while maintaning SBP >180. Initial NIHSS 20. Pt was taken to IR     Intraop, pt underwent thrombectomy with TICI 3 flow and right ICA stent placement. Procedure was complicated by sinus bradycardia then long 10sec pause. CPR was about to be started but pt gained his pulse back. Anesthesia was called and placed LMA. This was eventually converted to intubation post procedure. Atropine, glycopyrrolate, ephedrine were given. Aspirin 600mg transrectally was given.     Interval Problem Update  Reviewed last 24 hour events:              - Tm: Afebrile              - HR: 50 to 60s              - SBP: 120s              - Neuro:RASS-1, does not follow, LUE/LLE 0/5              - GI: NPO              - UOP: 890 mL              - Mcclendon: Yes              - Lines: PIV, CVC, A-line              - PPx: ASA, H2, Lovenox              - CXR (personally reviewed):               - SAT/SBT   - Mobility level 1    Infusions:  Nicardipine    Review of Systems  Review of Systems   Unable to perform ROS: Intubated      Vital Signs for last 24 hours   Temp:  [36.4 °C (97.6 °F)] 36.4 °C (97.6 °F)  Pulse:  [51-94] 58  Resp:  [10-72] 16  BP: (152-218)/() 194/108  SpO2:  [92 %-100 %] 97 %    Hemodynamic parameters for last 24 hours       Respiratory Information for the last 24 hours  Vent Mode: ASV  Rate (breaths/min): 18  Vt Target (mL): 400  PEEP/CPAP: 8  MAP: 11  Control VTE (exp VT): 837    Physical Exam   Physical  Exam  Constitutional:       Comments: Intubated   HENT:      Mouth/Throat:      Mouth: Mucous membranes are moist.   Eyes:      Pupils: Pupils are equal, round, and reactive to light.   Cardiovascular:      Rate and Rhythm: Normal rate and regular rhythm.      Heart sounds: No murmur heard.    No friction rub. No gallop.   Pulmonary:      Effort: No respiratory distress.      Breath sounds: No wheezing or rales.   Abdominal:      General: There is no distension.      Palpations: Abdomen is soft.   Musculoskeletal:      Cervical back: Neck supple.      Right lower leg: No edema.      Left lower leg: No edema.   Skin:     General: Skin is warm and dry.   Neurological:      Comments: Awake, opens eyes to voice.  Follows commands on the right.  Left upper and lower extremities remain flaccid on my exam.  However I am told he is moved his left upper extremity for the bedside nurse.   Psychiatric:      Comments: Unable to obtain       Medications  Current Facility-Administered Medications   Medication Dose Route Frequency Provider Last Rate Last Admin    labetalol (NORMODYNE/TRANDATE) injection 10 mg  10 mg Intravenous Once Bob Razo D.O.        aspirin (ASA) chewable tab 81 mg  81 mg Enteral Tube DAILY RUDI MorganO.   81 mg at 01/13/23 0556    atorvastatin (LIPITOR) tablet 80 mg  80 mg Oral Q EVENING Bob Razo D.O.        Respiratory Therapy Consult   Nebulization Continuous RT Bob Razo D.O.        famotidine (PEPCID) tablet 20 mg  20 mg Enteral Tube Q12HRS RUDI MorganO.   20 mg at 01/13/23 0556    Or    famotidine (PEPCID) injection 20 mg  20 mg Intravenous Q12HRS Bob Razo D.O.        senna-docusate (PERICOLACE or SENOKOT S) 8.6-50 MG per tablet 2 Tablet  2 Tablet Enteral Tube BID RUDI MorganO.   2 Tablet at 01/13/23 0556    And    polyethylene glycol/lytes (MIRALAX) PACKET 1 Packet  1 Packet Enteral Tube QDAY PRN Bob Razo D.O.        And    magnesium hydroxide (MILK OF MAGNESIA) suspension 30 mL   30 mL Enteral Tube QDAY PRN RUDI MorganOTravis        And    bisacodyl (DULCOLAX) suppository 10 mg  10 mg Rectal QDAY PRN Bob Razo D.O.        MD Alert...ICU Electrolyte Replacement per Pharmacy   Other PHARMACY TO DOSE RUDI MorganO.        lidocaine (XYLOCAINE) 1 % injection 2 mL  2 mL Tracheal Tube Q30 MIN PRN RUDI MorganO.        niCARdipine (CARDENE) 25 mg in  mL Infusion  0-15 mg/hr Intravenous Continuous Petra Mobley M.D. 50 mL/hr at 01/13/23 0032 5 mg/hr at 01/13/23 0032    hydrALAZINE (APRESOLINE) injection 10-20 mg  10-20 mg Intravenous Q6HRS PRN JUAN J Morgan.O.   20 mg at 01/12/23 2325    fentaNYL (SUBLIMAZE) injection 100 mcg  100 mcg Intravenous Q15 MIN PRN RUDI MorganO.        And    fentaNYL (SUBLIMAZE) injection 200 mcg  200 mcg Intravenous Q15 MIN PRN RUDI MorganOTravis        And    fentaNYL (SUBLIMAZE) 50 mcg/mL in 50mL (Continuous Infusion)   Intravenous Continuous JUAN J Morgan.O. 2 mL/hr at 01/13/23 0014 100 mcg/hr at 01/13/23 0014    And    propofol (DIPRIVAN) injection  0-80 mcg/kg/min (Ideal) Intravenous Continuous Bob Razo D.O. 7.9 mL/hr at 01/13/23 0214 20 mcg/kg/min at 01/13/23 0214    thiamine (B-1) IVPB 100 mg in 100 mL D5W (premix)  100 mg Intravenous DAILY Bob Razo D.O. 200 mL/hr at 01/13/23 0608 100 mg at 01/13/23 0608    folic acid (FOLVITE) tablet 1 mg  1 mg Enteral Tube DAILY Bob Razo D.O.   1 mg at 01/13/23 0556    lactated ringers infusion   Intravenous Continuous JUAN J Morgan.O. 100 mL/hr at 01/13/23 0108 New Bag at 01/13/23 0108    nicotine (NICODERM) 14 MG/24HR 14 mg  14 mg Transdermal Daily-0600 RUDI MorganOTravis   14 mg at 01/13/23 0555    multivitamin tablet 1 Tablet  1 Tablet Enteral Tube DAILY Bob Razo D.O.   1 Tablet at 01/13/23 0556       Fluids    Intake/Output Summary (Last 24 hours) at 1/13/2023 0700  Last data filed at 1/13/2023 0600  Gross per 24 hour   Intake 1094.83 ml   Output 890 ml   Net 204.83 ml       Laboratory  Recent  Labs     01/13/23  0302   ISTATAPH 7.403   ISTATAPCO2 35.8   ISTATAPO2 105*   ISTATATCO2 23   [id number] 98   ISTATARTHCO3 22.3   ISTATARTBE -2   ISTATTEMP 96.6 F   ISTATFIO2 40   ISTATSPEC Arterial   ISTATAPHTC 7.419   [id number] 99*         Recent Labs     01/13/23  0242   SODIUM 139   POTASSIUM 4.1   CHLORIDE 109   CO2 20   BUN 16   CREATININE 0.71   MAGNESIUM 1.6   PHOSPHORUS 3.2   CALCIUM 7.7*     Recent Labs     01/13/23  0242   ALTSGPT 38   ASTSGOT 83*   ALKPHOSPHAT 78   TBILIRUBIN 0.5   GLUCOSE 148*     Recent Labs     01/12/23 2002 01/13/23 0242   WBC 8.4  --    NEUTSPOLYS 69.60  --    LYMPHOCYTES 17.40*  --    MONOCYTES 8.50  --    EOSINOPHILS 3.70  --    BASOPHILS 0.40  --    ASTSGOT  --  83*   ALTSGPT  --  38   ALKPHOSPHAT  --  78   TBILIRUBIN  --  0.5     Recent Labs     01/12/23 2002   RBC 4.77   HEMOGLOBIN 16.3   HEMATOCRIT 45.8   PLATELETCT 142*   PROTHROMBTM 12.7   APTT 28.1   INR 0.96       Imaging  X-Ray:  I have personally reviewed the images and compared with prior images.  CT:    Reviewed  MRI:   Reviewed    Assessment/Plan  * Acute CVA (cerebrovascular accident) (HCC)- (present on admission)  Assessment & Plan  Acute stroke  Last seen normal: 1830  TNKase given: no  SBP at admission: 190  CT head w/o contrast at admission: no bleed, hyperdense right MCA sign  CTA head: R ICA and M1 occlusion with severely narrowed left M1/M2/M3  CTA neck: right ICA/vertebral artery occlusion   CTP: 120cc mismatched volume    S/p thrombectomy with TICI 3 and right ICA stent placement  Intraop, pt developed transient bradycardia leading to 10 sec-pause, and before CPR was started, pt gained his pulse back. Anesthesia was called, atropine, glycopyrrolate, ephedrine were given. Pt was placed LMA. After procedure, LMA was converted to intubation  Aspirin 600mg transrectally was given     Plan:   - continue vent support tonight, goal sat >90%  - sedation with propofol and fentanyl. Avoiding precedex given  recent bradycardia  - With TICI 3 Flow; goal -140  - Nicardipine gtt to keep -140.   - MRI brain w/o contrast post TNKase. Will expedite, ordered for tomorrow am  - CT head w/o contrast prn acute changes in mental status only  - Start aspirin 81mg daily tomorrow am. Additional antiplatelet TBD, will defer to Neurology   - Stroke labs: A1C, lipid profile, TSH, troponin  - Statin to target goal LDL <70  - TTE with bubble study to evaluate for PFO  - Keep -180.  - PT/OT/Rehab/speech/nutrition  - Dysphagia screen w/ swallow study   - DVT prophylaxis 24 hours post TNKase    Monitor for TNKase related bleed. If bleeding present, check fibrinogen STAT and transfuse w/ cryoprecipitate    Acute respiratory failure with hypoxia (HCC)  Assessment & Plan  Intubated for airway protection due to severe bradycardia during procedure.   Keep intubated tonight  Sedation with propofol and fentanyl   Goal sat >90%.   -140  ABG  ABCDEF bundle    BMI 36.0-36.9,adult  Assessment & Plan  BMI 36    Type 2 diabetes mellitus without complication, without long-term current use of insulin (HCC)  Assessment & Plan  Check A1C  Goal 120-180 while in hospital     Alcohol abuse  Assessment & Plan  Active alcohol drink 6 packs of beers daily   Monitor for withdrawal  Fluids, vitamins, folate, thiamine    Current smoker  Assessment & Plan  Active smoker 1ppd.   Nicotine patch         VTE:  Lovenox  Ulcer: H2 Antagonist  Lines: Central Line  Ongoing indication addressed and Arterial Line  Ongoing indication addressed    I have performed a physical exam and reviewed and updated ROS and Plan today (1/13/2023). In review of yesterday's note (1/12/2023), there are no changes except as documented above.     Discussed patient condition and risk of morbidity and/or mortality with Family, RT, Pharmacy, Patient, and neurology    The patient remains critically ill.  I have assessed and reassessed the respiratory status and made  ventilator adjustments based upon arterial blood gas analysis, ventilator waveforms and airway mechanics.  I have assessed and reassessed the blood pressure, hemodynamics, cardiovascular neurologic status. This patient remains at high risk for worsening cardiopulmonary dysfunction and death without the above critical care interventions.    Critical care time = 60 minutes in directly providing and coordinating critical care and extensive data review.  No time overlap and excludes procedures.

## 2023-01-13 NOTE — ASSESSMENT & PLAN NOTE
Intubated for airway protection.  Extubated 1/13  Requiring HFNC.   2/2 pulmonary edema  -ECHO w/ evidence of volume overload, overall normal function  IV Lasix diuresis.  Mcclendon for strict I/o  -target 2-3L net negative fluid balance daily  -improving

## 2023-01-13 NOTE — ED TRIAGE NOTES
"Chief Complaint   Patient presents with    Possible Stroke     BIBA from home for stroke like symptoms  Wife found pt with slurred speech, L facial droop, and L sided weakness   LKW 1830  Pt had a fall yesterday, + head strike, - thinners  , aox4  Initial NIH of 20 by neurologist, pt taken to CT then red 11     BP (!) 188/88   Pulse 61   Resp 18   Ht 1.702 m (5' 7\")   Wt 105 kg (232 lb)   SpO2 97%   BMI 36.34 kg/m²     "

## 2023-01-13 NOTE — PROCEDURES
Central Line Insertion    Date/Time: 1/13/2023 12:17 AM  Performed by: Jessie Magallanes  Authorized by: Jessie Magallanes     Consent:     Consent obtained:  Emergent situation    Consent given by: unable due to clinical condition.    Risks discussed:  Arterial puncture, incorrect placement, nerve damage, bleeding, infection and pneumothorax    Alternatives discussed:  No treatment and delayed treatment  Universal protocol:     Patient states understanding of procedure being performed: unable due to clinical condition.      Relevant documents present and verified: yes      Test results available and properly labeled: yes      Imaging studies available: yes      Required blood products, implants, devices, and special equipment available: yes      Site/side marked: yes      Immediately prior to procedure, a time out was called: yes      Patient identity confirmed:  Arm band and hospital-assigned identification number  Pre-procedure details:     Hand hygiene: Hand hygiene performed prior to insertion      Sterile barrier technique: All elements of maximal sterile technique followed      Skin preparation:  ChloraPrep    Skin preparation agent: Skin preparation agent completely dried prior to procedure    Sedation:     Sedation type: see EMAR.  Procedure details:     Location:  R internal jugular    Patient position:  Flat    Procedural supplies:  Triple lumen    Catheter size:  7 Fr    Landmarks identified: yes      Ultrasound guidance: yes      Sterile ultrasound techniques: Sterile gel and sterile probe covers were used      Number of attempts:  1    Successful placement: yes    Post-procedure details:     Post-procedure:  Dressing applied    Guidewire: guidewire removal confirmed      Assessment:  Blood return through all ports, no pneumothorax on x-ray, free fluid flow and placement verified by x-ray    Patient tolerance of procedure:  Tolerated well, no immediate complications  Comments:      The patient tolerated the  procedure well, the wire is accounted for, the line is good to use.

## 2023-01-13 NOTE — PROGRESS NOTES
Marcella from Lab called with critical result of Lactic at 0438. Critical lab result read back to Marcella.   Jessie GILES notified of critical lab result at 0438.  Critical lab result read back by Jessie GILES.

## 2023-01-13 NOTE — PROGRESS NOTES
Marcella from Lab called with critical result of Lactic at 2135. Critical lab result read back to Marcella.   Jessie GILES notified of critical lab result at 2140.  Critical lab result read back by Jessie GILES.

## 2023-01-13 NOTE — PROGRESS NOTES
Neurology Progress Note  Neurohospitalist Service, [facility]    Referring Physician: Nelson Cosby M.D.      Interval History: Presented with R MCA syndrome on 1/12, admitted overnight to RICU following R cervical carotid and M1 clot retrieval and R ICA stent placement.  TICI 3 reperfusion.   Intubated for sustained bradycardia.  SBPs 110s-130s post-operatively.    Review of systems: In addition to what is detailed in the HPI and/or updated in the interval history, all other systems reviewed and are negative.    Past Medical History, Past Surgical History and Social History reviewed and unchanged from prior    Medications:    Current Facility-Administered Medications:     labetalol (NORMODYNE/TRANDATE) injection 10 mg, 10 mg, Intravenous, Once, Bob Razo D.O.    aspirin (ASA) chewable tab 81 mg, 81 mg, Enteral Tube, DAILY, Bob Razo D.O., 81 mg at 01/13/23 0556    atorvastatin (LIPITOR) tablet 80 mg, 80 mg, Oral, Q EVENING, Bob Razo D.O.    Respiratory Therapy Consult, , Nebulization, Continuous RT, Bob Razo D.O.    famotidine (PEPCID) tablet 20 mg, 20 mg, Enteral Tube, Q12HRS, 20 mg at 01/13/23 0556 **OR** famotidine (PEPCID) injection 20 mg, 20 mg, Intravenous, Q12HRS, Bob Razo D.O.    senna-docusate (PERICOLACE or SENOKOT S) 8.6-50 MG per tablet 2 Tablet, 2 Tablet, Enteral Tube, BID, 2 Tablet at 01/13/23 0556 **AND** polyethylene glycol/lytes (MIRALAX) PACKET 1 Packet, 1 Packet, Enteral Tube, QDAY PRN **AND** magnesium hydroxide (MILK OF MAGNESIA) suspension 30 mL, 30 mL, Enteral Tube, QDAY PRN **AND** bisacodyl (DULCOLAX) suppository 10 mg, 10 mg, Rectal, QDAY PRN, Bob Razo D.O.    MD Alert...ICU Electrolyte Replacement per Pharmacy, , Other, PHARMACY TO DOSE, Bob Razo D.O.    lidocaine (XYLOCAINE) 1 % injection 2 mL, 2 mL, Tracheal Tube, Q30 MIN PRN, Bob Razo D.O.    niCARdipine (CARDENE) 25 mg in  mL Infusion, 0-15 mg/hr, Intravenous, Continuous,  "Petra Mobley M.D., Last Rate: 50 mL/hr at 01/13/23 0032, 5 mg/hr at 01/13/23 0032    hydrALAZINE (APRESOLINE) injection 10-20 mg, 10-20 mg, Intravenous, Q6HRS PRN, Bob Razo D.O., 20 mg at 01/12/23 2325    fentaNYL (SUBLIMAZE) injection 100 mcg, 100 mcg, Intravenous, Q15 MIN PRN **AND** fentaNYL (SUBLIMAZE) injection 200 mcg, 200 mcg, Intravenous, Q15 MIN PRN **AND** fentaNYL (SUBLIMAZE) 50 mcg/mL in 50mL (Continuous Infusion), , Intravenous, Continuous, Last Rate: 2 mL/hr at 01/13/23 0722, 100 mcg/hr at 01/13/23 0722 **AND** propofol (DIPRIVAN) injection, 0-80 mcg/kg/min (Ideal), Intravenous, Continuous, Last Rate: 7.9 mL/hr at 01/13/23 0214, 20 mcg/kg/min at 01/13/23 0214 **AND** Triglyceride, , , Every 3 Days (0300), RUDI MorganO.    thiamine (B-1) IVPB 100 mg in 100 mL D5W (premix), 100 mg, Intravenous, DAILY, Bob Razo D.O., Last Rate: 200 mL/hr at 01/13/23 0608, 100 mg at 01/13/23 0608    folic acid (FOLVITE) tablet 1 mg, 1 mg, Enteral Tube, DAILY, Bob Razo D.O., 1 mg at 01/13/23 0556    lactated ringers infusion, , Intravenous, Continuous, JUAN J Morgan.O., Last Rate: 100 mL/hr at 01/13/23 0108, New Bag at 01/13/23 0108    nicotine (NICODERM) 14 MG/24HR 14 mg, 14 mg, Transdermal, Daily-0600, Bob Razo D.O., 14 mg at 01/13/23 0555    multivitamin tablet 1 Tablet, 1 Tablet, Enteral Tube, DAILY, Bob Razo D.O., 1 Tablet at 01/13/23 0556    Physical Examination:   BP (!) 194/108   Pulse (!) 57   Temp 36.4 °C (97.6 °F) (Temporal)   Resp (!) 11   Ht 1.702 m (5' 7\")   Wt 105 kg (232 lb)   SpO2 98%   BMI 36.34 kg/m²       General: Patient is intubated  Neck: There is normal range of motion  CV: Regular rate   Extremities:  Warm, dry, and intact, without peripheral lower extremity edema    NEUROLOGICAL EXAM:     Mental status: Intubated, eyes closed.  Speech and language: Follows commands briskly to wiggle toes, show thumbs up on R side.  Cranial nerve exam: R gaze preference, no clear " tracking.  L face droop.   Motor exam: RUE and RLE following distal commands, strong right  strength. LUE/LLE with no movements to commands  Sensory exam:  Diminished response to tactile/noxious in L hemibody.  Does appear to neglect L side of world.  Coordination: No ataxia on elicited movements  Gait: Deferred due to patient preference.     NIHSS: National Institutes of Health Stroke Scale     [1] 1a:Level of Consciousness           0-alert 1-drowsy   2-stupor   3-coma  [0] 1b:LOC Questions                         0-both  1-one      2-neither  [0] 1c:LOC Commands                       0-both  1-one      2-neither  [1] 2: Best Gaze                      0-nl    1-partial  2-forced  [0] 3: Visual Fields                              0-nl    1-partial  2-complete 3-bilat  [2] 4: Facial Paresis                0-nl    1-minor    2-partial  3-full  MOTOR                                              0-nl  [0] 5: Right Arm                       1-drift  [4] 6: Left Arm                                     2-some effort vs gravity  [0] 7: Right Leg                       3-no effort vs gravity  [4] 8: Left Leg                                      4-no movement                                                              x-untestable  [0] 9: Limb Ataxia                    0-abs   1-1_limb   2-2+_limbs                                                              x-untestable  [2] 10:Sensory                        0-nl    1-partial  2-dense  [0] 11:Best Language/Aphasia         0-nl    1-mild/mod 2-severe   3-mute  [x] 12:Dysarthria                     0-nl    1-mild/mod 2-severe                                                              x-untestable  [2] 13:Neglect/Inattention                   0-none  1-partial  2-complete  [18] TOTAL      Objective Data:    Labs:  Lab Results   Component Value Date/Time    PROTHROMBTM 12.7 01/12/2023 08:02 PM    INR 0.96 01/12/2023 08:02 PM      Lab Results   Component Value Date/Time     WBC 8.4 01/12/2023 08:02 PM    RBC 4.77 01/12/2023 08:02 PM    HEMOGLOBIN 16.3 01/12/2023 08:02 PM    HEMATOCRIT 45.8 01/12/2023 08:02 PM    MCV 96.0 01/12/2023 08:02 PM    MCH 34.2 (H) 01/12/2023 08:02 PM    MCHC 35.6 (H) 01/12/2023 08:02 PM    MPV 10.7 01/12/2023 08:02 PM    NEUTSPOLYS 69.60 01/12/2023 08:02 PM    LYMPHOCYTES 17.40 (L) 01/12/2023 08:02 PM    MONOCYTES 8.50 01/12/2023 08:02 PM    EOSINOPHILS 3.70 01/12/2023 08:02 PM    BASOPHILS 0.40 01/12/2023 08:02 PM      Lab Results   Component Value Date/Time    SODIUM 139 01/13/2023 02:42 AM    POTASSIUM 4.1 01/13/2023 02:42 AM    CHLORIDE 109 01/13/2023 02:42 AM    CO2 20 01/13/2023 02:42 AM    GLUCOSE 148 (H) 01/13/2023 02:42 AM    BUN 16 01/13/2023 02:42 AM    CREATININE 0.71 01/13/2023 02:42 AM      Lab Results   Component Value Date/Time    CHOLSTRLTOT 187 01/13/2023 02:42 AM    LDL 97 01/13/2023 02:42 AM    HDL 49 01/13/2023 02:42 AM    TRIGLYCERIDE 205 (H) 01/13/2023 02:42 AM       Lab Results   Component Value Date/Time    ALKPHOSPHAT 78 01/13/2023 02:42 AM    ASTSGOT 83 (H) 01/13/2023 02:42 AM    ALTSGPT 38 01/13/2023 02:42 AM    TBILIRUBIN 0.5 01/13/2023 02:42 AM        Imaging/Testing:    I interpreted and/or reviewed the patient's neuroimaging    MR-BRAIN-W/O   Final Result         Acute infarction involving the right frontal region, right basal ganglia and right caudate nucleus. Minimal punctate infarction involving the right parietal cortex. There is slight mass effect upon the right lateral ventricle with only minimal midline    shift measuring approximately 4 mm.      Petechial hemorrhage is noted within the right frontal cortical infarct. Also noted is petechial hemorrhage in the right basal ganglia and right caudate nucleus but without surrounding vasogenic edema.         DX-PELVIS-1 OR 2 VIEWS   Final Result         1.  No acute traumatic bony injury.      DX-CHEST-PORTABLE (1 VIEW)   Final Result         1.  Left midlung and lower lobe  infiltrates, similar to prior study.      [id number] VIEW   Final Result         1.  Nonspecific bowel gas pattern in the upper abdomen.   2.  Cardiomegaly   3.  Hazy left lower lobe infiltrates.   4.  Nasogastric tube tip terminates overlying the expected location of the gastric fundus.      DX-CHEST-PORTABLE (1 VIEW)   Final Result      No acute cardiac or pulmonary abnormalities are identified.      CT-CTA NECK WITH & W/O-POST PROCESSING   Final Result      1.  Occlusion of the right internal carotid artery at its origin.      2.  Occlusion of the right vertebral artery at its origin.      Dr. Gongora discussed findings with Dr. Mccarthy.      CT-CTA HEAD WITH & W/O-POST PROCESS   Final Result         1.  Occlusion of the right internal carotid artery and right middle cerebral artery.   2.  Narrowed left M1 segment with severely narrowed and M2 and M3 branches, may represent chronic changes.      These findings were discussed with the patient's clinician, Joseline Mccarthy, on 1/12/2023 8:27 PM.      CT-CEREBRAL PERFUSION ANALYSIS   Final Result         1.  Cerebral blood flow less than 30% likely representing completed infarct = 52 mL.      2.  T Max more than 6 seconds likely representing combination of completed infarct and ischemia = 172 mL.      3.  Mismatched volume likely representing ischemic brain/penumbra = 120 mL      4.  Please note that the cerebral perfusion was performed on the limited brain tissue around the basal ganglia region. Infarct/ischemia outside the CT perfusion sections can be missed in this study.      CT-HEAD W/O   Final Result      1.  No evidence of intracranial hemorrhage, midline shift or mass effect.      2.  Question of hyperdense right MCA though evaluation limited by oblique positioning.         IR-THROMBO MECHANICAL ARTERY,INIT    (Results Pending)   EC-ECHOCARDIOGRAM COMPLETE W/O CONT    (Results Pending)       Assessment and Plan:  Kaiden Quiroz is a 62 year old man,  smoker, multiple other vascular risk factors, presenting with dense R MCA syndrome, found to have tandem R cervical ICA and M1 occlusion.   He is now s/p TICI 3 mechanical thrombectomy on R ICA and M1 clot, and s/p R ICA stent placement.  Course complicated by bradycardia requiring intubation.  MRI brain this AM with expected R frontal infarct with petechial hemorrhagic conversion.  At this time, will maintain on ASA monotherapy to mitigate further hemorrhage risk.  Will work towards extubation today and continue strict BP control.    Additional secondary stroke prevention regimen as noted below.  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    Problem list:  R MCA stroke  2.   R internal carotid artery occlusion  3.   Type 2 diabetes  4.   Hypertension  5.   Smoker    Plan:   - neurochecks/NIHSS per post-thrombectomy protocol, work towards extubation   - STRICT SBP goal 110-140 to mitigate reperfusion injury   - continue ASA 81mg daily   - start DAPT with plavix 75mg daily in 10 days on 1/23- complete 6 week course   - stroke labs: HgbA1c 5.3, LDL 97   - DM management for goal HgbA1c < 7   - continue atorvastatin 80mg daily for goal LDL < 70   - smoking cessation counseling   - from Neurology perspective, may defer TTE and long-term cardiac monitoring as stroke etiology is no cardioembolic.   - PT/OT/SLP as able   - ok to start lovenox SQ for DVT chemoppx    The evaluation of the patient, and recommended management, was discussed with the ICU staff. I have performed a physical exam and reviewed and updated ROS and Plan today (1/13/2023).     Blayne Jacobs MD  Neurohospitalist, Acute Care Services

## 2023-01-13 NOTE — PROGRESS NOTES
4 Eyes Skin Assessment Completed by PINA Cordero and PINA Morales.    Head WDL  Ears WDL  Nose WDL  Mouth Redness and Bleeding  Neck WDL  Breast/Chest WDL  Shoulder Blades WDL  Spine WDL  (R) Arm/Elbow/Hand WDL  (L) Arm/Elbow/Hand WDL  Abdomen WDL  Groin WDL  Scrotum/Coccyx/Buttocks Redness and Blanching  (R) Leg WDL  (L) Leg WDL  (R) Heel/Foot/Toe WDL  (L) Heel/Foot/Toe WDL          Devices In Places Pulse Ox, Mcclendon, Arterial Line, OG/NG, and Central Line      Interventions In Place Sacral Mepilex    Possible Skin Injury No    Pictures Uploaded Into Epic N/A  Wound Consult Placed N/A  RN Wound Prevention Protocol Ordered No

## 2023-01-13 NOTE — ANESTHESIA PROCEDURE NOTES
Airway    Date/Time: 1/12/2023 9:47 PM  Performed by: Houston Ortega D.O.  Authorized by: Houston Ortega D.O.     Location:  OR  Urgency:  Elective  Indications for Airway Management:  Anesthesia      Spontaneous Ventilation: absent    Sedation Level:  Deep  Preoxygenated: Yes    Final Airway Type:  Supraglottic airway  Final Supraglottic Airway:  Standard LMA    SGA Size:  5  Number of Attempts at Approach:  1

## 2023-01-13 NOTE — PROGRESS NOTES
Med rec complete per Pt's wife by phone.   Wife unsure of frequency of Neuriva.   Allergies reviewed

## 2023-01-13 NOTE — ASSESSMENT & PLAN NOTE
Acute stroke  Last seen normal: 1830  TNKase given: no  SBP at admission: 190  CT head w/o contrast at admission: no bleed, hyperdense right MCA sign  CTA head: R ICA and M1 occlusion with severely narrowed left M1/M2/M3  CTA neck: right ICA/vertebral artery occlusion   CTP: 120cc mismatched volume  MRI brain: Acute infarction involving the right frontal region, right basal ganglia and right caudate nucleus. Minimal punctate infarction involving the right parietal cortex. There is slight mass effect upon the right lateral ventricle with only minimal midline   shift measuring approximately 4 mm.  + Petechial hemorrhage is noted within the right frontal cortical infarct. Also noted is petechial hemorrhage in the right basal ganglia and right caudate nucleus but without surrounding vasogenic edema.  CT head 1/15: petechial hemorrhages stable from prior image      S/p thrombectomy with TICI 3 and right ICA stent placement  Intraop, pt developed transient bradycardia leading to 10 sec-pause, and before CPR was started, pt gained his pulse back. Anesthesia was called, atropine, glycopyrrolate, ephedrine were given. Pt was placed LMA. After procedure, LMA was converted to intubation  Aspirin 600mg transrectally was given     - With TICI 3 Flow; goal -140  - CT head w/o contrast prn acute changes in mental status only  - Start aspirin 81mg daily  - Delay Plavix initiation due to hemorrhagic transformation  - Statin to target goal LDL <70  - TTE with bubble study to evaluate for PFO-->Negative, overall normal ECHO  - Keep -180.  - PT/OT/Rehab/speech/nutrition  - Dysphagia screen w/ swallow study   - Lovenox ppx  -appreciate neuro consult

## 2023-01-13 NOTE — PROCEDURES
"Arterial Line Insertion    Date/Time: 1/13/2023 12:37 AM  Performed by: Jessie Magallanes  Authorized by: Jessie Magallanes   Consent: The procedure was performed in an emergent situation.  Risks and benefits: risks, benefits and alternatives were discussed (RADHA due to pt clinical condition)  Consent given by: emergent.  Patient states understanding of procedure being performed: unable due to clinical condition.  Patient's understanding of procedure matches consent: unable due to clinical condition.  Procedure consent matches procedure scheduled: unable due to clinical condition.  Relevant documents: relevant documents present and verified  Test results: test results available and properly labeled  Site marked: the operative site was marked  Imaging studies: imaging studies available  Required items: required blood products, implants, devices, and special equipment available  Patient identity confirmed: arm band and hospital-assigned identification number  Time out: Immediately prior to procedure a \"time out\" was called to verify the correct patient, procedure, equipment, support staff and site/side marked as required.  Preparation: Patient was prepped and draped in the usual sterile fashion.  Indications: multiple ABGs, respiratory failure and hemodynamic monitoring  Location: left radial  Anesthesia: local infiltration    Anesthesia:  Local Anesthetic: lidocaine 1% without epinephrine  Anesthetic total: 0.5 mL    Sedation:  Patient sedated: yes  Sedatives: see MAR for details  Analgesia: see MAR for details    Pedro's test normal: yes  Needle gauge: 20  Seldinger technique: Seldinger technique used  Number of attempts: 1  Post-procedure: line sutured and dressing applied  Post-procedure CMS: normal and unchanged  Patient tolerance: patient tolerated the procedure well with no immediate complications  Comments: The wire is accounted for, there is an appropriate waveform noted on the monitor.            "

## 2023-01-13 NOTE — ANESTHESIA POSTPROCEDURE EVALUATION
Patient: Kaiden Quiroz    Procedure Summary     Date: 01/12/23 Room / Location:     Anesthesia Start: 2145 Anesthesia Stop: 2321    Procedure: Procedure Not Yet Scheduled Diagnosis:     Scheduled Providers:  Responsible Provider: Houston Ortega D.O.    Anesthesia Type: general ASA Status: 4 - Emergent          Final Anesthesia Type: general  Last vitals  BP   Blood Pressure: (!) 164/105    Temp   36.4 °C (97.6 °F)    Pulse   80   Resp   19    SpO2   98 %      Anesthesia Post Evaluation    Patient location during evaluation: ICU  Patient participation: complete - patient cannot participate    Airway patency: patent  Anesthetic complications: no  Cardiovascular status: hemodynamically stable  Respiratory status: ETT, intubated, acceptable and ventilator  Hydration status: euvolemic    PONV: none          No notable events documented.

## 2023-01-13 NOTE — ANESTHESIA TIME REPORT
Anesthesia Start and Stop Event Times     Date Time Event    1/12/2023 2140 Ready for Procedure     2145 Anesthesia Start     2321 Anesthesia Stop        Responsible Staff  01/12/23    Name Role Begin End    Houston Ortega D.O. Anesth 2145 2321        Overtime Reason:  no overtime (within assigned shift)    Comments:

## 2023-01-13 NOTE — ASSESSMENT & PLAN NOTE
Active alcohol drink 6 packs of beers daily   Monitor for withdrawal  Fluids, vitamins, folate, thiamine  -precedex drip  -librium taper  -ativan RAAS protocol

## 2023-01-13 NOTE — OR SURGEON
Immediate Post- Operative Note        PostOp Diagnosis: Right ICA and  MCA occlusion, acute stroke    Procedure(s): Cerebral Angiogram and right MCA mechanical thrombectomy, Rt ICA angioplasty and stent placement    Findings: left MCA Occlusion TICI 3 recanalization achieved.    Access: Rt CFA 8 fr sheath closed with Angioseal.    Estimated Blood Loss:~ 50 ml    Complications: None.    Discussed with Neurohospitalist and family.    Post procedure:    Patient to ICU  Maintain SBP strictly between 110-140 mm Hg       Petra Mobley M.D.

## 2023-01-13 NOTE — CARE PLAN
Problem: Ventilation  Goal: Ability to achieve and maintain unassisted ventilation or tolerate decreased levels of ventilator support  Description: Target End Date:  4 days     Document on Vent flowsheet    1.  Support and monitor invasive and noninvasive mechanical ventilation  2.  Monitor ventilator weaning response  3.  Perform ventilator associated pneumonia prevention interventions  4.  Manage ventilation therapy by monitoring diagnostic test results  Outcome: Not Met                                   Ventilator Daily Summary     Vent Day # 2  8@24  APVcmv/18/400/+8/40%    Plan is for extubation in the morning.     Plan: Continue current ventilator settings and wean mechanical ventilation as tolerated per physician orders.

## 2023-01-14 ENCOUNTER — APPOINTMENT (OUTPATIENT)
Dept: RADIOLOGY | Facility: MEDICAL CENTER | Age: 63
DRG: 023 | End: 2023-01-14
Attending: INTERNAL MEDICINE
Payer: COMMERCIAL

## 2023-01-14 LAB
ANION GAP SERPL CALC-SCNC: 9 MMOL/L (ref 7–16)
BASOPHILS # BLD AUTO: 0.2 % (ref 0–1.8)
BASOPHILS # BLD: 0.02 K/UL (ref 0–0.12)
BUN SERPL-MCNC: 10 MG/DL (ref 8–22)
CALCIUM SERPL-MCNC: 8.1 MG/DL (ref 8.5–10.5)
CHLORIDE SERPL-SCNC: 107 MMOL/L (ref 96–112)
CO2 SERPL-SCNC: 23 MMOL/L (ref 20–33)
CREAT SERPL-MCNC: 0.68 MG/DL (ref 0.5–1.4)
EOSINOPHIL # BLD AUTO: 0.04 K/UL (ref 0–0.51)
EOSINOPHIL NFR BLD: 0.4 % (ref 0–6.9)
ERYTHROCYTE [DISTWIDTH] IN BLOOD BY AUTOMATED COUNT: 50.4 FL (ref 35.9–50)
GFR SERPLBLD CREATININE-BSD FMLA CKD-EPI: 105 ML/MIN/1.73 M 2
GLUCOSE SERPL-MCNC: 121 MG/DL (ref 65–99)
HCT VFR BLD AUTO: 38 % (ref 42–52)
HGB BLD-MCNC: 12.8 G/DL (ref 14–18)
IMM GRANULOCYTES # BLD AUTO: 0.02 K/UL (ref 0–0.11)
IMM GRANULOCYTES NFR BLD AUTO: 0.2 % (ref 0–0.9)
LYMPHOCYTES # BLD AUTO: 1.24 K/UL (ref 1–4.8)
LYMPHOCYTES NFR BLD: 13.9 % (ref 22–41)
MAGNESIUM SERPL-MCNC: 1.8 MG/DL (ref 1.5–2.5)
MCH RBC QN AUTO: 34.3 PG (ref 27–33)
MCHC RBC AUTO-ENTMCNC: 33.7 G/DL (ref 33.7–35.3)
MCV RBC AUTO: 101.9 FL (ref 81.4–97.8)
MONOCYTES # BLD AUTO: 0.71 K/UL (ref 0–0.85)
MONOCYTES NFR BLD AUTO: 8 % (ref 0–13.4)
NEUTROPHILS # BLD AUTO: 6.86 K/UL (ref 1.82–7.42)
NEUTROPHILS NFR BLD: 77.3 % (ref 44–72)
NRBC # BLD AUTO: 0 K/UL
NRBC BLD-RTO: 0 /100 WBC
PHOSPHATE SERPL-MCNC: 2.4 MG/DL (ref 2.5–4.5)
PLATELET # BLD AUTO: 116 K/UL (ref 164–446)
PMV BLD AUTO: 10.6 FL (ref 9–12.9)
POTASSIUM SERPL-SCNC: 4 MMOL/L (ref 3.6–5.5)
RBC # BLD AUTO: 3.73 M/UL (ref 4.7–6.1)
SODIUM SERPL-SCNC: 139 MMOL/L (ref 135–145)
WBC # BLD AUTO: 8.9 K/UL (ref 4.8–10.8)

## 2023-01-14 PROCEDURE — 80048 BASIC METABOLIC PNL TOTAL CA: CPT

## 2023-01-14 PROCEDURE — 84100 ASSAY OF PHOSPHORUS: CPT

## 2023-01-14 PROCEDURE — 700102 HCHG RX REV CODE 250 W/ 637 OVERRIDE(OP): Performed by: INTERNAL MEDICINE

## 2023-01-14 PROCEDURE — 83735 ASSAY OF MAGNESIUM: CPT

## 2023-01-14 PROCEDURE — 700111 HCHG RX REV CODE 636 W/ 250 OVERRIDE (IP): Performed by: INTERNAL MEDICINE

## 2023-01-14 PROCEDURE — 700102 HCHG RX REV CODE 250 W/ 637 OVERRIDE(OP)

## 2023-01-14 PROCEDURE — 99232 SBSQ HOSP IP/OBS MODERATE 35: CPT | Performed by: PSYCHIATRY & NEUROLOGY

## 2023-01-14 PROCEDURE — A9270 NON-COVERED ITEM OR SERVICE: HCPCS

## 2023-01-14 PROCEDURE — 99291 CRITICAL CARE FIRST HOUR: CPT | Performed by: INTERNAL MEDICINE

## 2023-01-14 PROCEDURE — 92526 ORAL FUNCTION THERAPY: CPT

## 2023-01-14 PROCEDURE — 71045 X-RAY EXAM CHEST 1 VIEW: CPT

## 2023-01-14 PROCEDURE — A9270 NON-COVERED ITEM OR SERVICE: HCPCS | Performed by: NURSE PRACTITIONER

## 2023-01-14 PROCEDURE — 700102 HCHG RX REV CODE 250 W/ 637 OVERRIDE(OP): Performed by: NURSE PRACTITIONER

## 2023-01-14 PROCEDURE — 99406 BEHAV CHNG SMOKING 3-10 MIN: CPT | Performed by: PHYSICAL MEDICINE & REHABILITATION

## 2023-01-14 PROCEDURE — 700101 HCHG RX REV CODE 250: Performed by: INTERNAL MEDICINE

## 2023-01-14 PROCEDURE — A9270 NON-COVERED ITEM OR SERVICE: HCPCS | Performed by: INTERNAL MEDICINE

## 2023-01-14 PROCEDURE — 700105 HCHG RX REV CODE 258: Performed by: INTERNAL MEDICINE

## 2023-01-14 PROCEDURE — 700105 HCHG RX REV CODE 258: Performed by: RADIOLOGY

## 2023-01-14 PROCEDURE — 99223 1ST HOSP IP/OBS HIGH 75: CPT | Mod: 25 | Performed by: PHYSICAL MEDICINE & REHABILITATION

## 2023-01-14 PROCEDURE — 700111 HCHG RX REV CODE 636 W/ 250 OVERRIDE (IP): Performed by: NURSE PRACTITIONER

## 2023-01-14 PROCEDURE — 770022 HCHG ROOM/CARE - ICU (200)

## 2023-01-14 PROCEDURE — 700101 HCHG RX REV CODE 250: Performed by: RADIOLOGY

## 2023-01-14 PROCEDURE — 85025 COMPLETE CBC W/AUTO DIFF WBC: CPT

## 2023-01-14 RX ORDER — DEXMEDETOMIDINE HYDROCHLORIDE 4 UG/ML
.1-1.5 INJECTION, SOLUTION INTRAVENOUS CONTINUOUS
Status: DISCONTINUED | OUTPATIENT
Start: 2023-01-14 | End: 2023-01-17

## 2023-01-14 RX ORDER — MAGNESIUM SULFATE HEPTAHYDRATE 40 MG/ML
2 INJECTION, SOLUTION INTRAVENOUS ONCE
Status: COMPLETED | OUTPATIENT
Start: 2023-01-14 | End: 2023-01-14

## 2023-01-14 RX ORDER — AMLODIPINE BESYLATE 5 MG/1
5 TABLET ORAL
Status: DISCONTINUED | OUTPATIENT
Start: 2023-01-14 | End: 2023-01-20

## 2023-01-14 RX ORDER — CHLORDIAZEPOXIDE HYDROCHLORIDE 25 MG/1
25 CAPSULE, GELATIN COATED ORAL EVERY 8 HOURS
Status: DISCONTINUED | OUTPATIENT
Start: 2023-01-14 | End: 2023-01-18

## 2023-01-14 RX ORDER — QUETIAPINE FUMARATE 25 MG/1
25 TABLET, FILM COATED ORAL NIGHTLY
Status: DISCONTINUED | OUTPATIENT
Start: 2023-01-14 | End: 2023-01-14

## 2023-01-14 RX ORDER — DIAZEPAM 5 MG/ML
2.5 INJECTION, SOLUTION INTRAMUSCULAR; INTRAVENOUS EVERY 4 HOURS PRN
Status: DISCONTINUED | OUTPATIENT
Start: 2023-01-14 | End: 2023-01-16 | Stop reason: ALTCHOICE

## 2023-01-14 RX ORDER — ASPIRIN 300 MG/1
SUPPOSITORY RECTAL
Status: COMPLETED
Start: 2023-01-14 | End: 2023-01-14

## 2023-01-14 RX ORDER — ATORVASTATIN CALCIUM 80 MG/1
80 TABLET, FILM COATED ORAL EVERY EVENING
Status: DISCONTINUED | OUTPATIENT
Start: 2023-01-14 | End: 2023-02-09

## 2023-01-14 RX ADMIN — ASPIRIN 300 MG: 300 SUPPOSITORY RECTAL at 06:00

## 2023-01-14 RX ADMIN — SODIUM CHLORIDE, POTASSIUM CHLORIDE, SODIUM LACTATE AND CALCIUM CHLORIDE: 600; 310; 30; 20 INJECTION, SOLUTION INTRAVENOUS at 15:01

## 2023-01-14 RX ADMIN — MAGNESIUM SULFATE HEPTAHYDRATE 2 G: 40 INJECTION, SOLUTION INTRAVENOUS at 08:31

## 2023-01-14 RX ADMIN — ATORVASTATIN CALCIUM 80 MG: 80 TABLET, FILM COATED ORAL at 17:14

## 2023-01-14 RX ADMIN — FAMOTIDINE 20 MG: 10 INJECTION, SOLUTION INTRAVENOUS at 05:11

## 2023-01-14 RX ADMIN — DEXMEDETOMIDINE 0.2 MCG/KG/HR: 200 INJECTION, SOLUTION INTRAVENOUS at 19:53

## 2023-01-14 RX ADMIN — HYDRALAZINE HYDROCHLORIDE 20 MG: 20 INJECTION INTRAMUSCULAR; INTRAVENOUS at 01:54

## 2023-01-14 RX ADMIN — NICOTINE 14 MG: 14 PATCH TRANSDERMAL at 05:44

## 2023-01-14 RX ADMIN — SODIUM CHLORIDE 5 MG/HR: 9 INJECTION, SOLUTION INTRAVENOUS at 21:13

## 2023-01-14 RX ADMIN — SODIUM CHLORIDE, POTASSIUM CHLORIDE, SODIUM LACTATE AND CALCIUM CHLORIDE: 600; 310; 30; 20 INJECTION, SOLUTION INTRAVENOUS at 03:57

## 2023-01-14 RX ADMIN — SODIUM CHLORIDE 10 MG/HR: 9 INJECTION, SOLUTION INTRAVENOUS at 12:32

## 2023-01-14 RX ADMIN — DOCUSATE SODIUM 50 MG AND SENNOSIDES 8.6 MG 2 TABLET: 8.6; 5 TABLET, FILM COATED ORAL at 17:14

## 2023-01-14 RX ADMIN — DIAZEPAM 2.5 MG: 5 INJECTION, SOLUTION INTRAMUSCULAR; INTRAVENOUS at 21:46

## 2023-01-14 RX ADMIN — NICARDIPINE HYDROCHLORIDE 7.5 MG/HR: 25 INJECTION, SOLUTION INTRAVENOUS at 07:11

## 2023-01-14 RX ADMIN — CHLORDIAZEPOXIDE HYDROCHLORIDE 25 MG: 25 CAPSULE ORAL at 21:10

## 2023-01-14 RX ADMIN — NICARDIPINE HYDROCHLORIDE 10 MG/HR: 25 INJECTION, SOLUTION INTRAVENOUS at 10:13

## 2023-01-14 RX ADMIN — SODIUM PHOSPHATE, MONOBASIC, MONOHYDRATE AND SODIUM PHOSPHATE, DIBASIC, ANHYDROUS 15 MMOL: 142; 276 INJECTION, SOLUTION INTRAVENOUS at 08:27

## 2023-01-14 RX ADMIN — ENOXAPARIN SODIUM 40 MG: 40 INJECTION SUBCUTANEOUS at 17:14

## 2023-01-14 RX ADMIN — AMLODIPINE BESYLATE 5 MG: 5 TABLET ORAL at 15:05

## 2023-01-14 RX ADMIN — ONDANSETRON 4 MG: 2 INJECTION INTRAMUSCULAR; INTRAVENOUS at 18:20

## 2023-01-14 RX ADMIN — THIAMINE HYDROCHLORIDE 100 MG: 100 INJECTION, SOLUTION INTRAMUSCULAR; INTRAVENOUS at 05:11

## 2023-01-14 ASSESSMENT — PAIN DESCRIPTION - PAIN TYPE
TYPE: ACUTE PAIN

## 2023-01-14 ASSESSMENT — ENCOUNTER SYMPTOMS
VOMITING: 0
PHOTOPHOBIA: 0
CARDIOVASCULAR NEGATIVE: 1
FOCAL WEAKNESS: 1
ABDOMINAL PAIN: 0
PSYCHIATRIC NEGATIVE: 1
NAUSEA: 0
RESPIRATORY NEGATIVE: 1
NECK PAIN: 0
DOUBLE VISION: 0

## 2023-01-14 ASSESSMENT — FIBROSIS 4 INDEX: FIB4 SCORE: 7.08

## 2023-01-14 NOTE — THERAPY
Speech Language Pathology  Daily Treatment     Patient Name: Kaiden Quiroz  Age:  62 y.o., Sex:  male  Medical Record #: [id number]  Today's Date: 1/14/2023     Precautions  Precautions: (P) Fall Risk, Swallow Precautions ( See Comments)    Assessment    Patient was reassessed today with RN reporting that he was more alert this morning.  Patient was sleepy when I entered and required max verbal and tactile cues for minimal arousal to participate in swallow evaluation. The patient presented with some impaired oral acceptance, required max verbal cues to open mouth and to strip bolus from utensil. Patient only briefly opens his eyes and cannot keep them open more than 2-3 seconds before closing and promptly returning to a sleep like state. Containment was appropriate, but coughing appreciated with sips of water from spoon and ice chips given.  Cup sips and applesauce were not attempted this date due to inability to maintain alertness.  Family asking questions about offering when he is awake.  Education provided that patient needs to be able to maintain alertness for medication, nutrition/hydration to be safe.  Patient has brief moments of alertness which are not long enough at this time for adequate oral intake.  They verbalized agreement and understanding.  Recommend consideration of alternative source of nutrition/hydration in the short term until patient is able to adequately alert for safe po.        Clinical Impressions     The patient remains drowsy and not appropriate to initiate a PO diet currently. Recommend re-evaluation when patient is more awake and able to participate further.      Recommendations  1.  NPO with consideration for short term alternative source of nutrition/hydration if in line with goals.   2.  Instrumentation: None indicated at this time  3.  Swallowing Instructions & Precautions:   Supervision: 1:1 feeding with constant supervision  Positioning: Fully upright and midline during oral  "intake  Medication: Crush with applesauce or puree, as appropriate, Syringe, Non Oral   Strategies: Small bites/sips  Oral Care: Q8h  4.  Okay for single ice chips 1:1 with nursing after oral care and with HOB at 90* to reduce xerostomia and maintain the integrity of the swallow musculature.      Plan    Continue current treatment plan.    Discharge Recommendations: (P) Recommend post-acute placement for additional speech therapy services prior to discharge home    Subjective    Rn cleared for clinical swallow evaluation.  RN reports he was more alert this morning.  Currently, patient is very lethargic, briefly opens his eyes and opens eyes intermittently for ice chip presentations, but promptly closes them and appears to fall back to sleep quickly.  Patient's wife is in the room and family is on facetime to observe patient and ask questions.       Objective       01/14/23 1126   Precautions   Precautions Fall Risk;Swallow Precautions ( See Comments)   Vitals   O2 (LPM) 2   O2 Delivery Device Silicone Nasal Cannula   Pain 0 - 10 Group   Therapist Pain Assessment Post Activity Pain Same as Prior to Activity   Dysphagia    Diet / Liquid Recommendation NPO   Nutritional Liquid Intake Rating Scale Nothing by mouth   Nutritional Food Intake Rating Scale Nothing by mouth   Recommended Route of Medication Administration   Medication Administration  Via Gastric Tube   Patient / Family Goals   Patient / Family Goal #1 \"He wants to drink\" per patient wife.   Goal #1 Outcome Progressing slower than expected   Short Term Goals   Short Term Goal # 1 Pt will consume prefeeding trials without any overt s/sx of aspiration   Goal Outcome # 1 Progressing slower than expected   Anticipated Discharge Needs   Discharge Recommendations Recommend post-acute placement for additional speech therapy services prior to discharge home   Therapy Recommendations Upon DC Dysphagia Training;Patient / Family / Caregiver Education   Interdisciplinary " Plan of Care Collaboration   IDT Collaboration with  Nursing;Family / Caregiver   Patient Position at End of Therapy In Bed;Bed Alarm On;Tray Table within Reach;Phone within Reach;Family / Friend in Room  (patient's wife in room.  Family on phone facetiming to ask questions)   Collaboration Comments Rn and family updated regarding results/recommendations.  Patient not alert enough and not able to maintain alertness for safe po.

## 2023-01-14 NOTE — THERAPY
Speech Language Pathology   Clinical Swallow Evaluation     Patient Name: Kaiden Quiroz  AGE:  62 y.o., SEX:  male  Medical Record #: [id number]  Today's Date: 1/13/2023          Assessment    Patient is 62 y.o. 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 He is now s/p TICI 3 mechanical thrombectomy on R ICA and M1 clot, and s/p R ICA stent placement.       MRI brain this AM with expected R frontal infarct with petechial hemorrhagic conversion    PMHx:    Level of Consciousness: Drowsy, Obtunded  Affect/Behavior: Calm  Follows Directives: Inconsistent  Orientation: Self  Hearing: Functional hearing  Vision: Functional vision    Prior Living Situation & Level of Function:  Patient consumed close to regular texture diet at baseline, some softer foods given dentition.     Oral Mechanism Evaluation  Facial Symmetry: Pt did not follow commands to assess  Facial Sensation: Pt did not follow commands to assess  Labial Observations: Pt did not follow commands to assess  Lingual Observations: Pt did not follow commands for assessment  Dentition: Fair, Poor, Scattered dentition  Comments:    Voice  Quality: Hoarse  Resonance: WFL  Intensity: Soft  Cough: WFL  Comments:    Current Method of Nutrition   NPO until cleared by speech pathology    Assessment  Positioning: Metz's (60-90 degrees)  Bolus Administration: SLP  Oxygen Requirements:  4 L Nasal Cannula  Factor(s) Affecting Performance: None    Swallowing Trials  Ice: WFL  Thin Liquid (TN0): Impaired  Liquidised (LQ3): WFL    Comments:    Patient received and appreciated as sleepy and required max verbal and tactile cues for arousal to participate in swallow evaluation. The patient presented with some impaired oral acceptance, required max verbal cues to open mouth and to strip bolus from utensil. Containment was appropriate. Coughing appreciated with cup sips of thin liquids, no overt s/sx of aspiration with tsp sips of thin  liquids or with liquidized textures.      Clinical Impressions    The patient drowsy and not appropriate to initiate a PO diet with currently. Recommend re-evaluation in the morning when patient is more awake and able to participate further.      Recommendations  1.  NPO pending re-eval in the morning.   2.  Instrumentation: None indicated at this time  3.  Swallowing Instructions & Precautions:   Supervision: 1:1 feeding with constant supervision  Positioning: Fully upright and midline during oral intake  Medication: Crush with applesauce or puree, as appropriate, Syringe, Non Oral   Strategies: Small bites/sips  Oral Care: Q8h    Plan    Recommend Speech Therapy 5 times per week until therapy goals are met for the following treatments:  Dysphagia Training.    Discharge Recommendations: (P) Recommend post-acute placement for additional speech therapy services prior to discharge home     Objective       01/13/23 1636   Vitals   O2 (LPM) 4   O2 Delivery Device Silicone Nasal Cannula   Pain 0 - 10 Group   Therapist Pain Assessment Post Activity Pain Same as Prior to Activity;Nurse Notified;0   Prior Level Of Function   Patient's Primary Language English   Patient / Family Goals   Patient / Family Goal #1 none stated   Short Term Goals   Short Term Goal # 1 Pt will consume prefeeding trials without any overt s/sx of aspiration   Education Group   Education Provided Dysphagia   Dysphagia Patient Response Patient;Acceptance;Explanation;Verbal Demonstration   Problem List   Problem List Dysphagia   Anticipated Discharge Needs   Discharge Recommendations Recommend post-acute placement for additional speech therapy services prior to discharge home   Therapy Recommendations Upon DC Dysphagia Training

## 2023-01-14 NOTE — CARE PLAN
The patient is Stable - Low risk of patient condition declining or worsening    Shift Goals  Clinical Goals: Stable neuro exam  Patient Goals: unable to assess  Family Goals: unable to assess    Progress made toward(s) clinical / shift goals:  Patient neuro exam remains stable throughout the night, patient with improvement of movement in the upper extremity. Requires PRN to meet MAP goal.   Patient got up to edge of bed with max assist      Problem: Hemodynamic Monitoring  Goal: Patient's hemodynamics, fluid balance and neurologic status will be stable or improve  Outcome: Progressing     Problem: Neuro Status  Goal: Neuro status will remain stable or improve  Outcome: Progressing     Problem: Mobility - Stroke  Goal: Patient's capacity to carry out activities will improve  Outcome: Progressing

## 2023-01-14 NOTE — CONSULTS
Physical Medicine and Rehabilitation Consultation              Date of initial consultation: 1/14/2023  Consulting provider: Bob Razo D.O.  Reason for consultation: assess for acute inpatient rehab appropriateness  LOS: 2 Day(s)    Chief complaint: Stroke    HPI: The patient is a 62 y.o. right hand dominant male with a past medical history of hypertension, type 2 diabetes, tobacco abuse;  who presented on 1/12/2023  8:01 PM with left-sided weakness and slurred speech.  EMS activated by spouse, patient brought to [facility] as a stroke alert, found to have R MCA syndrome, NIH of 20. Stroke work up found occluded cervical ICA and right M1 occlusion. Patient was determined to be too high risk for tPA, instead he underwent clot retrieval with TICI 3 reperfusion and right ICA stenting.  Procedure was complicated by sinus bradycardia followed by 10-second pause followed by spontaneous ROSC.  Patient was intubated at that time, extubated 1/13/2023. POD #1 NIH decreased to 18.  Follow-up MR brain found acute infarction in the right frontal cortex, right basal ganglia, and right caudate nucleus with petechial hemorrhages in each area and slight mass-effect causing 4 mm midline shift.      The patient currently reports headache, blurry vision, left-sided weakness, difficulty swallowing requiring NG tube.  Some shortness of breath, currently on 3 L oxygen mask in house but not at home.  Patient is joined the room by his spouse, Mary Beth who is very supportive.  Cori works for her [employer], Monday through Thursday 6:30 AM to 5 PM.  She is looking into taking FMLA.    ROS  Pertinent positives are mentioned in the HPI, all others reviewed and are negative.    Social Hx:  1 SH  18 PARDEEP  With: Spouse.  Patient and spouse live in second-floor apartment with no elevator access.  Spouse is looking into getting a first-floor apartment.    Employment: /maintenance for [employer]  Tobacco: 1ppd  Alcohol:  6pk/day  Drugs: Marijuana    THERAPY:  Restrictions: Fall risk, swallow precautions  PT: Functional mobility   Pending    OT: ADLs  Pending    SLP:   1/13: N.p.o.     IMAGING:      MR brain 1/13/2023  Acute infarction involving the right frontal region, right basal ganglia and right caudate nucleus. Minimal punctate infarction involving the right parietal cortex. There is slight mass effect upon the right lateral ventricle with only minimal midline   shift measuring approximately 4 mm.     Petechial hemorrhage is noted within the right frontal cortical infarct. Also noted is petechial hemorrhage in the right basal ganglia and right caudate nucleus but without surrounding vasogenic edema.    PROCEDURES:  1/12/2023 Petra Mobley M.D.  Cerebral Angiogram and right MCA mechanical thrombectomy, Rt ICA angioplasty and stent placement   Findings: left MCA Occlusion TICI 3 recanalization achieved.    PMH:  No past medical history on file.    PSH:  No past surgical history on file.    FHX:  Non-pertinent to today's issues    Medications:  Current Facility-Administered Medications   Medication Dose    sodium phosphate 15 mmol in dextrose 5% 250 mL ivpb  15 mmol    [Held by provider] aspirin (ASA) chewable tab 81 mg  81 mg    enoxaparin (Lovenox) inj 40 mg  40 mg    ondansetron (ZOFRAN) syringe/vial injection 4 mg  4 mg    prochlorperazine (COMPAZINE) injection 10 mg  10 mg    aspirin (ASA) suppository 300 mg  300 mg    atorvastatin (LIPITOR) tablet 80 mg  80 mg    Respiratory Therapy Consult      senna-docusate (PERICOLACE or SENOKOT S) 8.6-50 MG per tablet 2 Tablet  2 Tablet    And    polyethylene glycol/lytes (MIRALAX) PACKET 1 Packet  1 Packet    And    magnesium hydroxide (MILK OF MAGNESIA) suspension 30 mL  30 mL    And    bisacodyl (DULCOLAX) suppository 10 mg  10 mg    MD Alert...ICU Electrolyte Replacement per Pharmacy      lidocaine (XYLOCAINE) 1 % injection 2 mL  2 mL    niCARdipine (CARDENE) 25 mg in   "mL Infusion  0-15 mg/hr    hydrALAZINE (APRESOLINE) injection 10-20 mg  10-20 mg    thiamine (B-1) IVPB 100 mg in 100 mL D5W (premix)  100 mg    folic acid (FOLVITE) tablet 1 mg  1 mg    lactated ringers infusion      nicotine (NICODERM) 14 MG/24HR 14 mg  14 mg    multivitamin tablet 1 Tablet  1 Tablet       Allergies:  No Known Allergies      Physical Exam:  Vitals: /57   Pulse 66   Temp 36.4 °C (97.6 °F) (Temporal)   Resp (!) 24   Ht 1.702 m (5' 7\")   Wt 110 kg (243 lb 6.2 oz)   SpO2 96%   Gen: NAD  Head: NC/AT, NG tube right nare  Eyes/ Nose/ Mouth: Right eye with exudate, difficult to open, EOMI moist mucous membranes  Cardio: RRR, good distal perfusion, warm extremities  Pulm: normal respiratory effort, no cyanosis   Abd: Soft NTND, negative borborygmi   Ext: No peripheral edema. No calf tenderness. No clubbing.    Mental status: follows commands  Speech: fluent, no aphasia or dysarthria    CRANIAL NERVES:  2,3: visual acuity grossly intact, PERRL  3,4,6: EOMI bilaterally, no nystagmus or diplopia  5: sensation intact to light touch bilaterally and symmetric  7: Left facial droop  8: hearing grossly intact  9,10: symmetric palate elevation  11: SCM/Trapezius strength 5/5 bilaterally  12: tongue protrudes left      Motor:      Upper Extremity  Myotome R L   Shoulder flexion C5 5 1/5   Elbow flexion C5 5 3/5   Wrist extension C6 5 0/5   Elbow extension C7 5 2/5   Finger flexion C8 5 2/5   Finger abduction T1 5 2/5     Lower Extremity Myotome R L   Hip flexion L2 5 4/5   Knee extension L3 5 4/5   Ankle dorsiflexion L4 5 2/5   Toe extension L5 5 5   Ankle plantarflexion S1 5 5     Sensory:   intact to light touch through out    Labs: Reviewed and significant for   Recent Labs     01/12/23 2002 01/14/23  0343   RBC 4.77 3.73*   HEMOGLOBIN 16.3 12.8*   HEMATOCRIT 45.8 38.0*   PLATELETCT 142* 116*   PROTHROMBTM 12.7  --    APTT 28.1  --    INR 0.96  --      Recent Labs     01/13/23  0242 01/14/23  0343 "   SODIUM 139 139   POTASSIUM 4.1 4.0   CHLORIDE 109 107   CO2 20 23   GLUCOSE 148* 121*   BUN 16 10   CREATININE 0.71 0.68   CALCIUM 7.7* 8.1*     Recent Results (from the past 24 hour(s))   EC-ECHOCARDIOGRAM COMPLETE W/ CONT    Collection Time: 01/13/23  4:21 PM   Result Value Ref Range    Eject.Frac. MOD BP 73.88     Eject.Frac. MOD 4C 71.82     Eject.Frac. MOD 2C 75.13     Left Ventrical Ejection Fraction 65    CBC with Differential    Collection Time: 01/14/23  3:43 AM   Result Value Ref Range    WBC 8.9 4.8 - 10.8 K/uL    RBC 3.73 (L) 4.70 - 6.10 M/uL    Hemoglobin 12.8 (L) 14.0 - 18.0 g/dL    Hematocrit 38.0 (L) 42.0 - 52.0 %    .9 (H) 81.4 - 97.8 fL    MCH 34.3 (H) 27.0 - 33.0 pg    MCHC 33.7 33.7 - 35.3 g/dL    RDW 50.4 (H) 35.9 - 50.0 fL    Platelet Count 116 (L) 164 - 446 K/uL    MPV 10.6 9.0 - 12.9 fL    Neutrophils-Polys 77.30 (H) 44.00 - 72.00 %    Lymphocytes 13.90 (L) 22.00 - 41.00 %    Monocytes 8.00 0.00 - 13.40 %    Eosinophils 0.40 0.00 - 6.90 %    Basophils 0.20 0.00 - 1.80 %    Immature Granulocytes 0.20 0.00 - 0.90 %    Nucleated RBC 0.00 /100 WBC    Neutrophils (Absolute) 6.86 1.82 - 7.42 K/uL    Lymphs (Absolute) 1.24 1.00 - 4.80 K/uL    Monos (Absolute) 0.71 0.00 - 0.85 K/uL    Eos (Absolute) 0.04 0.00 - 0.51 K/uL    Baso (Absolute) 0.02 0.00 - 0.12 K/uL    Immature Granulocytes (abs) 0.02 0.00 - 0.11 K/uL    NRBC (Absolute) 0.00 K/uL   Basic Metabolic Panel (BMP)    Collection Time: 01/14/23  3:43 AM   Result Value Ref Range    Sodium 139 135 - 145 mmol/L    Potassium 4.0 3.6 - 5.5 mmol/L    Chloride 107 96 - 112 mmol/L    Co2 23 20 - 33 mmol/L    Glucose 121 (H) 65 - 99 mg/dL    Bun 10 8 - 22 mg/dL    Creatinine 0.68 0.50 - 1.40 mg/dL    Calcium 8.1 (L) 8.5 - 10.5 mg/dL    Anion Gap 9.0 7.0 - 16.0   Magnesium    Collection Time: 01/14/23  3:43 AM   Result Value Ref Range    Magnesium 1.8 1.5 - 2.5 mg/dL   Phosphorus    Collection Time: 01/14/23  3:43 AM   Result Value Ref Range     Phosphorus 2.4 (L) 2.5 - 4.5 mg/dL   ESTIMATED GFR    Collection Time: 01/14/23  3:43 AM   Result Value Ref Range    GFR (CKD-EPI) 105 >60 mL/min/1.73 m 2         ASSESSMENT:  Patient is a 62 y.o. male admitted with Right MCA syndrome now s/p thrombectomy with TICI 3 reperfusion and right ICA stenting    [facility] Code / Diagnosis to Support: 0001.2 - Stroke: Right Body Involvement (Left Brain)    Rehabilitation: Impaired ADLs and mobility  Patient is a good candidate for inpatient rehab based on needs for PT, OT, and speech therapy.  Patient will also benefit from family training.  Patient has a good discharge situation which will be home with spouse.     Barriers to transfer include: Insurance authorization, TCCs to verify disposition, medical clearance and bed availability     All cases are subject to administrative review and recommendations may change    Disposition recommendations:  -Functionally, patient is a good candidate for IPR.  He has deficits with mobility and ADLs secondary to his right MCA stroke, now status post thrombectomy and right ICA stenting.   -Patient's candidacy for IPR is limited by his second floor apartment with no elevator access.  Patient will either need to be able to complete stairs prior to going home, or wife needs to find a first-floor apartment.  Spouse has been updated, she will look for first-floor accommodations.  This is a holiday weekend, possibly no update until Tuesday 1/17.  -If spouse is unable to secure first-floor housing, patient will likely need SNF placement.  -TCC to determine primary insurance.  Wife reports  and [employer].   -Awaiting PT and OT evaluation  -PMR to follow in the periphery for rehab appropriateness, please reach out with questions or request for medical management      Medical Complexity:    Right MCA ischemic stroke  -Status post thrombectomy with TICI 3 reperfusion and right ICA stenting  -NIH improvement from 20 to 18  -Secondary stroke prevention  with aspirin monotherapy to mitigate hemorrhage risk, may start DAPT on 1/23 for 6 weeks per neurology, high-dose statin, 80 mg atorvastatin nightly  -Continue PT OT while in-house  -Continue SLP, currently n.p.o. secondary to lethargy  -Potential candidate for IPR pending appropriate discharge location  -Good family support from spouse who is willing to take FMLA    Hypertension  -Currently requiring nicardipine drip  -Per neuro, BP goal 110-140 systolic    Tobacco abuse  - Tobacco abuse cessation counseling given today for ~5 min as it pertains to vascular disease, heart attack, stroke, wound healing, and pulmonary disease.   -NicoDerm patch 14 mg daily    DVT PPX: Lovenox      Thank you for allowing us to participate in the care of this patient.     Patient was seen for 84 minutes on unit/floor of which > 50% of time was spent on counseling and coordination of care regarding the above, including prognosis, risk reduction, benefits of treatment, and options for next stage of care.    Tushar Norman, DO   Physical Medicine and Rehabilitation     Please note that this dictation was created using voice recognition software. I have made every reasonable attempt to correct obvious errors, but there may be errors of grammar and possibly content that I did not discover before finalizing the note.

## 2023-01-14 NOTE — PROGRESS NOTES
NEUROLOGY PROGRESS NOTE      BACKGROUND:    62 y.o. male was admitted on 1/12/2023  8:01 PM for Acute CVA (cerebrovascular accident) (HCC) [I63.9].  He is being followed by Neurology for right MCA stroke status post successful thrombectomy and right ICA stent placement.    SUBJECTIVE:   Continue with left-sided weakness.  He is lethargic but able to follow commands.    VITALS:  Vitals:    01/14/23 1015 01/14/23 1030 01/14/23 1045 01/14/23 1100   BP:    111/57   Pulse: 71 73 73 73   Resp: (!) 24 (!) 42 (!) 35 (!) 26   Temp:       TempSrc:       SpO2: 93% 92% 94% 95%   Weight:       Height:           NEUROLOGICAL EXAM:   He is lethargic and drowsy but arousable.  He has left facial weakness in an upper motor neuron pattern.  Facial sensation appears intact.  Pupils are equal and reactive.  He has mild right gaze preference.  He has normal strength direct testing of right upper and lower extremity.  He is able to move his left upper and lower extremity with gravity eliminated.  He has decreased sensation to light touch and temperature in left upper and lower extremity.  Coordination could not be tested.  Gait was deferred.    OBJECTIVE:    NEUROIMAGING:    DX-CHEST-PORTABLE (1 VIEW)   Final Result      1.  Interval extubation without pneumothorax.   2.  Stable enlarged cardiac silhouette.   3.  There is bibasilar linear atelectasis.      EC-ECHOCARDIOGRAM COMPLETE W/ CONT   Final Result      MR-BRAIN-W/O   Final Result         Acute infarction involving the right frontal region, right basal ganglia and right caudate nucleus. Minimal punctate infarction involving the right parietal cortex. There is slight mass effect upon the right lateral ventricle with only minimal midline    shift measuring approximately 4 mm.      Petechial hemorrhage is noted within the right frontal cortical infarct. Also noted is petechial hemorrhage in the right basal ganglia and right caudate nucleus but without surrounding vasogenic edema.          DX-PELVIS-1 OR 2 VIEWS   Final Result         1.  No acute traumatic bony injury.      DX-CHEST-PORTABLE (1 VIEW)   Final Result         1.  Left midlung and lower lobe infiltrates, similar to prior study.      MY-RIQEOLJ-2 VIEW   Final Result         1.  Nonspecific bowel gas pattern in the upper abdomen.   2.  Cardiomegaly   3.  Hazy left lower lobe infiltrates.   4.  Nasogastric tube tip terminates overlying the expected location of the gastric fundus.      DX-CHEST-PORTABLE (1 VIEW)   Final Result      No acute cardiac or pulmonary abnormalities are identified.      CT-CTA NECK WITH & W/O-POST PROCESSING   Final Result      1.  Occlusion of the right internal carotid artery at its origin.      2.  Occlusion of the right vertebral artery at its origin.      Dr. Gongora discussed findings with Dr. Mccarthy.      CT-CTA HEAD WITH & W/O-POST PROCESS   Final Result         1.  Occlusion of the right internal carotid artery and right middle cerebral artery.   2.  Narrowed left M1 segment with severely narrowed and M2 and M3 branches, may represent chronic changes.      These findings were discussed with the patient's clinician, Joseline Mccarthy, on 1/12/2023 8:27 PM.      CT-CEREBRAL PERFUSION ANALYSIS   Final Result         1.  Cerebral blood flow less than 30% likely representing completed infarct = 52 mL.      2.  T Max more than 6 seconds likely representing combination of completed infarct and ischemia = 172 mL.      3.  Mismatched volume likely representing ischemic brain/penumbra = 120 mL      4.  Please note that the cerebral perfusion was performed on the limited brain tissue around the basal ganglia region. Infarct/ischemia outside the CT perfusion sections can be missed in this study.      CT-HEAD W/O   Final Result      1.  No evidence of intracranial hemorrhage, midline shift or mass effect.      2.  Question of hyperdense right MCA though evaluation limited by oblique positioning.         IR-THROMBO  MECHANICAL ARTERY,INIT    (Results Pending)       MEDICATIONS:  Current Facility-Administered Medications   Medication Dose Route Frequency Provider Last Rate Last Admin    sodium phosphate 15 mmol in dextrose 5% 250 mL ivpb  15 mmol Intravenous Once Nelson Cosby M.D. 62.5 mL/hr at 01/14/23 0827 15 mmol at 01/14/23 0827    niCARdipine (CARDENE) 50 mg in  mL Infusion  0-15 mg/hr Intravenous Continuous Nelson Cosby M.D.        [Held by provider] aspirin (ASA) chewable tab 81 mg  81 mg Enteral Tube DAILY Bob Razo D.O.   81 mg at 01/13/23 0556    enoxaparin (Lovenox) inj 40 mg  40 mg Subcutaneous DAILY AT 1800 Nelson Cosby M.D.   40 mg at 01/13/23 1759    ondansetron (ZOFRAN) syringe/vial injection 4 mg  4 mg Intravenous Q4HRS PRN Nelson Cosby M.D.        prochlorperazine (COMPAZINE) injection 10 mg  10 mg Intravenous Q6HRS PRN Nelson Cosby M.D.   10 mg at 01/13/23 1151    aspirin (ASA) suppository 300 mg  300 mg Rectal DAILY Jessie ANTONY Latona   300 mg at 01/14/23 0600    atorvastatin (LIPITOR) tablet 80 mg  80 mg Oral Q EVENING Bob Razo D.O.        Respiratory Therapy Consult   Nebulization Continuous RT Bob Razo D.O.        senna-docusate (PERICOLACE or SENOKOT S) 8.6-50 MG per tablet 2 Tablet  2 Tablet Enteral Tube BID Bob Razo D.O.   2 Tablet at 01/13/23 0556    And    polyethylene glycol/lytes (MIRALAX) PACKET 1 Packet  1 Packet Enteral Tube QDAY PRN Bob Razo D.O.        And    magnesium hydroxide (MILK OF MAGNESIA) suspension 30 mL  30 mL Enteral Tube QDAY PRN Bob Razo D.O.        And    bisacodyl (DULCOLAX) suppository 10 mg  10 mg Rectal QDAY PRN Bob Razo D.O. MD Alert...ICU Electrolyte Replacement per Pharmacy   Other PHARMACY TO DOSE Bob Razo D.O.        lidocaine (XYLOCAINE) 1 % injection 2 mL  2 mL Tracheal Tube Q30 MIN PRN Bob Tedja, D.O.        hydrALAZINE (APRESOLINE) injection 10-20 mg  10-20 mg Intravenous Q6HRS PRN RUDI MorganO.   20 mg at 01/14/23  0154    thiamine (B-1) IVPB 100 mg in 100 mL D5W (premix)  100 mg Intravenous DAILY Bob Razo D.O.   Stopped at 01/14/23 0541    folic acid (FOLVITE) tablet 1 mg  1 mg Enteral Tube DAILY Bob Razo D.O.   1 mg at 01/13/23 0556    lactated ringers infusion   Intravenous Continuous Bob Razo D.O. 100 mL/hr at 01/14/23 0357 New Bag at 01/14/23 0357    nicotine (NICODERM) 14 MG/24HR 14 mg  14 mg Transdermal Daily-0600 Bob Razo D.O.   14 mg at 01/14/23 0544    multivitamin tablet 1 Tablet  1 Tablet Enteral Tube DAILY Bob Razo D.O.   1 Tablet at 01/13/23 0556       LABS:      Recent Labs     01/12/23 2002 01/14/23 0343   WBC 8.4 8.9   RBC 4.77 3.73*   HEMOGLOBIN 16.3 12.8*   HEMATOCRIT 45.8 38.0*   MCV 96.0 101.9*   MCH 34.2* 34.3*   MCHC 35.6* 33.7   RDW 45.9 50.4*   PLATELETCT 142* 116*   MPV 10.7 10.6     Recent Labs     01/13/23  0242 01/14/23 0343   SODIUM 139 139   POTASSIUM 4.1 4.0   CHLORIDE 109 107   CO2 20 23   GLUCOSE 148* 121*   BUN 16 10     INR   Date Value Ref Range Status   01/12/2023 0.96 0.87 - 1.13 Final     Comment:     INR - Non-therapeutic Reference Range: 0.87-1.13  INR - Therapeutic Reference Range: 2.0-4.0       No results found for: POCINR  Lab Results   Component Value Date/Time    CREATININE 0.68 01/14/2023 0343     Lab Results   Component Value Date/Time    IFAFRICA >60 09/02/2020 0942    IFNOTAFR >60 09/02/2020 0942       ASSESSMENT AND PLAN:   62 year old man with multiple stroke risk factors who presented with profound left-sided weakness and was found to have right ICA and right M1 MCA occlusion.  He is status post successful mechanical thrombectomy with TICI 3 result and also status post right ICA stent placement.   MRI brain on 1/13/2023 revealed R frontal infarct with petechial hemorrhagic conversion.  At this time, will maintain on ASA monotherapy to mitigate further hemorrhage risk.   Plan:  Continue every 4 hour neuro check.  Maintain systolic blood pressure  110s-140.  Continue with aspirin 81 mg daily.  May start DAPT on 1/23/2023 for duration of 6 weeks.  Echocardiogram was unremarkable with ejection fraction of 65% and negative for shunt and normal left atrial size.  Hemoglobin A1c is 5.3 at goal.  LDL is 97, he will continue with atorvastatin 80 mg daily with a goal of LDL less than 70.  Continue physical therapy, Occupational Therapy and speech therapy.  Continue with Lovenox for DVT prevention.    Please note that this dictation was created using voice recognition software. The accuracy of the dictation is limited to the abilities of the software. I have made every reasonable attempt to correct obvious errors, but I expect that there are errors of grammar and possibly content that I did not discover before finalizing the note.       Alert and oriented to person, place and time

## 2023-01-14 NOTE — PROGRESS NOTES
Critical Care Progress Note    Date of admission  1/12/2023    Chief Complaint  Stroke    Hospital Course  Kaiden Quiroz is a 62 y.o. male BMI 36, active smoker 1ppd, active drinker 6 pack of beer daily, hx of HTN, DM who presented 1/12/2023 with dense left side weakness, slurred speech. LKW 1830. Stroke alert was activated. Initial . TNKase was not given due to the extent of clot burden/chronicity of the lesion and possibility of extending the infarct if BP is lowered. Neuro recommended thrombectomy by IR, while maintaning SBP >180. Initial NIHSS 20. Pt was taken to IR     Intraop, pt underwent thrombectomy with TICI 3 flow and right ICA stent placement. Procedure was complicated by sinus bradycardia then long 10sec pause. CPR was about to be started but pt gained his pulse back. Anesthesia was called and placed LMA. This was eventually converted to intubation post procedure. Atropine, glycopyrrolate, ephedrine were given. Aspirin 600mg transrectally was given.     1/13- Extubated.    Interval Problem Update  Reviewed last 24 hour events:              - Tm: Afebrile              - HR: 60s              - SBP: 130s              - Neuro: Aox3 but confused. LUE/LLE 2/5.              - GI: NPO              - UOP: 890 mL              - Mcclendon: Yes              - Lines: PIV, CVC, A-line              - PPx: ASA, H2, Lovenox              - Mobility level 1    Infusions:  Nicardipine    Review of Systems  Review of Systems   Unable to perform ROS: Intubated   HENT: Negative.     Eyes:  Negative for double vision and photophobia.   Respiratory: Negative.     Cardiovascular: Negative.    Gastrointestinal:  Negative for abdominal pain, nausea and vomiting.   Genitourinary: Negative.    Musculoskeletal:  Negative for neck pain.   Neurological:  Positive for focal weakness.   Psychiatric/Behavioral: Negative.     All other systems reviewed and are negative.     Vital Signs for last 24 hours   Temp:  [36.4 °C (97.5  °F)] 36.4 °C (97.5 °F)  Pulse:  [56-84] 80  Resp:  [14-43] 31  BP: ()/(48-77) 120/58  SpO2:  [89 %-99 %] 94 %    Hemodynamic parameters for last 24 hours       Respiratory Information for the last 24 hours       Physical Exam   Physical Exam  HENT:      Mouth/Throat:      Mouth: Mucous membranes are moist.   Eyes:      Pupils: Pupils are equal, round, and reactive to light.   Cardiovascular:      Rate and Rhythm: Normal rate and regular rhythm.      Heart sounds: No murmur heard.    No friction rub. No gallop.   Pulmonary:      Effort: No respiratory distress.      Breath sounds: No wheezing or rales.   Abdominal:      General: There is no distension.      Palpations: Abdomen is soft.   Musculoskeletal:      Cervical back: Neck supple.      Right lower leg: No edema.      Left lower leg: No edema.   Skin:     General: Skin is warm and dry.   Neurological:      Comments: Awake, opens eyes to voice.  Left-sided neglect follows commands on the right.  Left upper and lower extremities remain flaccid on my exam.  However I am told he is moved his left upper extremity with physical therapy.   Psychiatric:      Comments: Unable to obtain       Medications  Current Facility-Administered Medications   Medication Dose Route Frequency Provider Last Rate Last Admin    niCARdipine (CARDENE) 50 mg in  mL Infusion  0-15 mg/hr Intravenous Continuous Nelson Cosby M.D. 75 mL/hr at 01/14/23 1310 7.5 mg/hr at 01/14/23 1310    Pharmacy Consult: Enteral tube insertion - review meds/change route/product selection  1 Each Other PHARMACY TO DOSE Nelson Cosby M.D.        [Held by provider] aspirin (ASA) chewable tab 81 mg  81 mg Enteral Tube DAILY Bob Razo D.O.   81 mg at 01/13/23 0556    enoxaparin (Lovenox) inj 40 mg  40 mg Subcutaneous DAILY AT 1800 Nelson Cosby M.D.   40 mg at 01/13/23 1759    ondansetron (ZOFRAN) syringe/vial injection 4 mg  4 mg Intravenous Q4HRS PRN Nelson Cosby M.D.        prochlorperazine  (COMPAZINE) injection 10 mg  10 mg Intravenous Q6HRS PRN Nelson Cosby M.D.   10 mg at 01/13/23 1151    aspirin (ASA) suppository 300 mg  300 mg Rectal DAILY Jessie CASSIA Latona   300 mg at 01/14/23 0600    atorvastatin (LIPITOR) tablet 80 mg  80 mg Oral Q EVENING Bob Razo D.O.        Respiratory Therapy Consult   Nebulization Continuous RT Bob Razo D.O.        senna-docusate (PERICOLACE or SENOKOT S) 8.6-50 MG per tablet 2 Tablet  2 Tablet Enteral Tube BID RUDI MorganOTravis   2 Tablet at 01/13/23 0556    And    polyethylene glycol/lytes (MIRALAX) PACKET 1 Packet  1 Packet Enteral Tube QDAY PRN Bob Razo D.O.        And    magnesium hydroxide (MILK OF MAGNESIA) suspension 30 mL  30 mL Enteral Tube QDAY PRN Bob Razo D.O.        And    bisacodyl (DULCOLAX) suppository 10 mg  10 mg Rectal QDAY PRN Bob Razo D.O.        MD Alert...ICU Electrolyte Replacement per Pharmacy   Other PHARMACY TO DOSE Bob Razo D.O.        lidocaine (XYLOCAINE) 1 % injection 2 mL  2 mL Tracheal Tube Q30 MIN PRN Bob Razo D.O.        hydrALAZINE (APRESOLINE) injection 10-20 mg  10-20 mg Intravenous Q6HRS PRN RUDI MorganOTravis   20 mg at 01/14/23 0154    thiamine (B-1) IVPB 100 mg in 100 mL D5W (premix)  100 mg Intravenous DAILY RUDI MorganO.   Stopped at 01/14/23 0541    folic acid (FOLVITE) tablet 1 mg  1 mg Enteral Tube DAILY RUDI MorganOTravis   1 mg at 01/13/23 0556    lactated ringers infusion   Intravenous Continuous Bob Razo D.O. 100 mL/hr at 01/14/23 0357 New Bag at 01/14/23 0357    nicotine (NICODERM) 14 MG/24HR 14 mg  14 mg Transdermal Daily-0600 RUDI MorganOTravis   14 mg at 01/14/23 0544    multivitamin tablet 1 Tablet  1 Tablet Enteral Tube DAILY Bob Razo D.O.   1 Tablet at 01/13/23 0556       Fluids    Intake/Output Summary (Last 24 hours) at 1/14/2023 1430  Last data filed at 1/14/2023 1400  Gross per 24 hour   Intake 3683.9 ml   Output 1270 ml   Net 2413.9 ml       Laboratory  Recent Labs     01/13/23  0042  01/13/23  0302   ISTATAPH 7.318* 7.403   ISTATAPCO2 36.6 35.8   ISTATAPO2 63* 105*   ISTATATCO2 20 23   [id number] 90* 98   ISTATARTHCO3 18.8 22.3   ISTATARTBE -7* -2   ISTATTEMP 98.5 F 96.6 F   ISTATFIO2 50 40   ISTATSPEC Arterial Arterial   ISTATAPHTC 7.318* 7.419   [id number] 63* 99*         Recent Labs     01/13/23 0242 01/14/23  0343   SODIUM 139 139   POTASSIUM 4.1 4.0   CHLORIDE 109 107   CO2 20 23   BUN 16 10   CREATININE 0.71 0.68   MAGNESIUM 1.6 1.8   PHOSPHORUS 3.2 2.4*   CALCIUM 7.7* 8.1*     Recent Labs     01/13/23 0242 01/14/23 0343   ALTSGPT 38  --    ASTSGOT 83*  --    ALKPHOSPHAT 78  --    TBILIRUBIN 0.5  --    GLUCOSE 148* 121*     Recent Labs     01/12/23 2002 01/13/23 0242 01/14/23  0343   WBC 8.4  --  8.9   NEUTSPOLYS 69.60  --  77.30*   LYMPHOCYTES 17.40*  --  13.90*   MONOCYTES 8.50  --  8.00   EOSINOPHILS 3.70  --  0.40   BASOPHILS 0.40  --  0.20   ASTSGOT  --  83*  --    ALTSGPT  --  38  --    ALKPHOSPHAT  --  78  --    TBILIRUBIN  --  0.5  --      Recent Labs     01/12/23 2002 01/14/23 0343   RBC 4.77 3.73*   HEMOGLOBIN 16.3 12.8*   HEMATOCRIT 45.8 38.0*   PLATELETCT 142* 116*   PROTHROMBTM 12.7  --    APTT 28.1  --    INR 0.96  --        Imaging  NG tube in good position    Assessment/Plan  * Acute CVA (cerebrovascular accident) (HCC)- (present on admission)  Assessment & Plan  Acute stroke  Last seen normal: 1830  TNKase given: no  SBP at admission: 190  CT head w/o contrast at admission: no bleed, hyperdense right MCA sign  CTA head: R ICA and M1 occlusion with severely narrowed left M1/M2/M3  CTA neck: right ICA/vertebral artery occlusion   CTP: 120cc mismatched volume    S/p thrombectomy with TICI 3 and right ICA stent placement  Intraop, pt developed transient bradycardia leading to 10 sec-pause, and before CPR was started, pt gained his pulse back. Anesthesia was called, atropine, glycopyrrolate, ephedrine were given. Pt was placed LMA. After procedure, LMA was converted to  intubation  Aspirin 600mg transrectally was given     - With TICI 3 Flow; goal -140  - CT head w/o contrast prn acute changes in mental status only  - Start aspirin 81mg daily tomorrow am.   - Delay Plavix initiation due to hemorrhagic transformation  - Statin to target goal LDL <70  - TTE with bubble study to evaluate for PFO  - Keep -180.  - PT/OT/Rehab/speech/nutrition  - Dysphagia screen w/ swallow study   - DVT prophylaxis 24 hours post TNKase        Acute respiratory failure with hypoxia (HCC)  Assessment & Plan  Intubated for airway protection.  Extubated 1/13    BMI 36.0-36.9,adult  Assessment & Plan  BMI 36    Type 2 diabetes mellitus without complication, without long-term current use of insulin (HCC)  Assessment & Plan  Check A1C  Goal 120-180 while in hospital     Alcohol abuse  Assessment & Plan  Active alcohol drink 6 packs of beers daily   Monitor for withdrawal  Fluids, vitamins, folate, thiamine    Current smoker  Assessment & Plan  Active smoker 1ppd.   Nicotine patch    Hypertension  Assessment & Plan  Systolic blood pressure goal 110- 140  Titrate Nicardipine infusion  Add Norvasc milligrams daily         VTE:  Lovenox  Ulcer: H2 Antagonist  Lines: Central Line  Ongoing indication addressed and Arterial Line  Ongoing indication addressed    I have performed a physical exam and reviewed and updated ROS and Plan today (1/14/2023). In review of yesterday's note (1/13/2023), there are no changes except as documented above.     Discussed patient condition and risk of morbidity and/or mortality with Family, RT, Pharmacy, Patient, and neurology    The patient remains critically ill.  I have assessed and reassessed the respiratory status and made ventilator adjustments based upon arterial blood gas analysis, ventilator waveforms and airway mechanics.  I have assessed and reassessed the blood pressure, hemodynamics, cardiovascular neurologic status. This patient remains at high risk for  worsening cardiopulmonary dysfunction and death without the above critical care interventions.    Critical care time 35  minutes in directly providing and coordinating critical care and extensive data review.  No time overlap and excludes procedures.

## 2023-01-14 NOTE — DISCHARGE PLANNING
[facility] Coordination    Physiatry to consult.  Extubated 1/13.  Would welcome PT/OT as clinically appropriate.  TCC monitoring.

## 2023-01-15 ENCOUNTER — APPOINTMENT (OUTPATIENT)
Dept: RADIOLOGY | Facility: MEDICAL CENTER | Age: 63
DRG: 023 | End: 2023-01-15
Attending: INTERNAL MEDICINE
Payer: COMMERCIAL

## 2023-01-15 PROBLEM — F05 DELIRIUM DUE TO MEDICAL CONDITION WITH BEHAVIORAL DISTURBANCE: Status: ACTIVE | Noted: 2023-01-15

## 2023-01-15 LAB
ANION GAP SERPL CALC-SCNC: 9 MMOL/L (ref 7–16)
BASOPHILS # BLD AUTO: 0.4 % (ref 0–1.8)
BASOPHILS # BLD: 0.03 K/UL (ref 0–0.12)
BUN SERPL-MCNC: 8 MG/DL (ref 8–22)
CALCIUM SERPL-MCNC: 8.2 MG/DL (ref 8.5–10.5)
CHLORIDE SERPL-SCNC: 101 MMOL/L (ref 96–112)
CO2 SERPL-SCNC: 23 MMOL/L (ref 20–33)
CREAT SERPL-MCNC: 0.56 MG/DL (ref 0.5–1.4)
CRP SERPL HS-MCNC: 6.65 MG/DL (ref 0–0.75)
EOSINOPHIL # BLD AUTO: 0.1 K/UL (ref 0–0.51)
EOSINOPHIL NFR BLD: 1.2 % (ref 0–6.9)
ERYTHROCYTE [DISTWIDTH] IN BLOOD BY AUTOMATED COUNT: 44 FL (ref 35.9–50)
GFR SERPLBLD CREATININE-BSD FMLA CKD-EPI: 112 ML/MIN/1.73 M 2
GLUCOSE SERPL-MCNC: 120 MG/DL (ref 65–99)
HCT VFR BLD AUTO: 35.4 % (ref 42–52)
HGB BLD-MCNC: 12.2 G/DL (ref 14–18)
IMM GRANULOCYTES # BLD AUTO: 0.04 K/UL (ref 0–0.11)
IMM GRANULOCYTES NFR BLD AUTO: 0.5 % (ref 0–0.9)
LYMPHOCYTES # BLD AUTO: 1.18 K/UL (ref 1–4.8)
LYMPHOCYTES NFR BLD: 14 % (ref 22–41)
MAGNESIUM SERPL-MCNC: 1.4 MG/DL (ref 1.5–2.5)
MCH RBC QN AUTO: 34.2 PG (ref 27–33)
MCHC RBC AUTO-ENTMCNC: 34.5 G/DL (ref 33.7–35.3)
MCV RBC AUTO: 99.2 FL (ref 81.4–97.8)
MONOCYTES # BLD AUTO: 0.69 K/UL (ref 0–0.85)
MONOCYTES NFR BLD AUTO: 8.2 % (ref 0–13.4)
NEUTROPHILS # BLD AUTO: 6.39 K/UL (ref 1.82–7.42)
NEUTROPHILS NFR BLD: 75.7 % (ref 44–72)
NRBC # BLD AUTO: 0 K/UL
NRBC BLD-RTO: 0 /100 WBC
PHOSPHATE SERPL-MCNC: 2.4 MG/DL (ref 2.5–4.5)
PLATELET # BLD AUTO: 110 K/UL (ref 164–446)
PMV BLD AUTO: 11.1 FL (ref 9–12.9)
POTASSIUM SERPL-SCNC: 3.8 MMOL/L (ref 3.6–5.5)
PREALB SERPL-MCNC: 17.9 MG/DL (ref 18–38)
RBC # BLD AUTO: 3.57 M/UL (ref 4.7–6.1)
SODIUM SERPL-SCNC: 133 MMOL/L (ref 135–145)
WBC # BLD AUTO: 8.4 K/UL (ref 4.8–10.8)

## 2023-01-15 PROCEDURE — 700102 HCHG RX REV CODE 250 W/ 637 OVERRIDE(OP): Performed by: INTERNAL MEDICINE

## 2023-01-15 PROCEDURE — 700101 HCHG RX REV CODE 250: Performed by: INTERNAL MEDICINE

## 2023-01-15 PROCEDURE — 770022 HCHG ROOM/CARE - ICU (200)

## 2023-01-15 PROCEDURE — 83735 ASSAY OF MAGNESIUM: CPT

## 2023-01-15 PROCEDURE — 700102 HCHG RX REV CODE 250 W/ 637 OVERRIDE(OP): Performed by: NURSE PRACTITIONER

## 2023-01-15 PROCEDURE — 80048 BASIC METABOLIC PNL TOTAL CA: CPT

## 2023-01-15 PROCEDURE — 99291 CRITICAL CARE FIRST HOUR: CPT | Performed by: INTERNAL MEDICINE

## 2023-01-15 PROCEDURE — 99232 SBSQ HOSP IP/OBS MODERATE 35: CPT | Performed by: PSYCHIATRY & NEUROLOGY

## 2023-01-15 PROCEDURE — 700105 HCHG RX REV CODE 258: Performed by: INTERNAL MEDICINE

## 2023-01-15 PROCEDURE — 85025 COMPLETE CBC W/AUTO DIFF WBC: CPT

## 2023-01-15 PROCEDURE — 84134 ASSAY OF PREALBUMIN: CPT

## 2023-01-15 PROCEDURE — 700111 HCHG RX REV CODE 636 W/ 250 OVERRIDE (IP): Performed by: INTERNAL MEDICINE

## 2023-01-15 PROCEDURE — 70450 CT HEAD/BRAIN W/O DYE: CPT

## 2023-01-15 PROCEDURE — A9270 NON-COVERED ITEM OR SERVICE: HCPCS | Performed by: INTERNAL MEDICINE

## 2023-01-15 PROCEDURE — 84100 ASSAY OF PHOSPHORUS: CPT

## 2023-01-15 PROCEDURE — 94640 AIRWAY INHALATION TREATMENT: CPT

## 2023-01-15 PROCEDURE — 86140 C-REACTIVE PROTEIN: CPT

## 2023-01-15 PROCEDURE — A9270 NON-COVERED ITEM OR SERVICE: HCPCS | Performed by: NURSE PRACTITIONER

## 2023-01-15 PROCEDURE — 71045 X-RAY EXAM CHEST 1 VIEW: CPT

## 2023-01-15 RX ORDER — FUROSEMIDE 10 MG/ML
20 INJECTION INTRAMUSCULAR; INTRAVENOUS EVERY 6 HOURS
Status: DISCONTINUED | OUTPATIENT
Start: 2023-01-15 | End: 2023-01-15

## 2023-01-15 RX ORDER — MAGNESIUM SULFATE HEPTAHYDRATE 40 MG/ML
4 INJECTION, SOLUTION INTRAVENOUS ONCE
Status: COMPLETED | OUTPATIENT
Start: 2023-01-15 | End: 2023-01-15

## 2023-01-15 RX ORDER — FUROSEMIDE 10 MG/ML
20 INJECTION INTRAMUSCULAR; INTRAVENOUS EVERY 6 HOURS
Status: DISCONTINUED | OUTPATIENT
Start: 2023-01-15 | End: 2023-01-16

## 2023-01-15 RX ADMIN — SODIUM CHLORIDE 5 MG/HR: 9 INJECTION, SOLUTION INTRAVENOUS at 05:16

## 2023-01-15 RX ADMIN — THERA TABS 1 TABLET: TAB at 05:06

## 2023-01-15 RX ADMIN — FUROSEMIDE 20 MG: 10 INJECTION, SOLUTION INTRAMUSCULAR; INTRAVENOUS at 18:18

## 2023-01-15 RX ADMIN — POTASSIUM PHOSPHATE, MONOBASIC AND POTASSIUM PHOSPHATE, DIBASIC 15 MMOL: 224; 236 INJECTION, SOLUTION, CONCENTRATE INTRAVENOUS at 09:32

## 2023-01-15 RX ADMIN — DOCUSATE SODIUM 50 MG AND SENNOSIDES 8.6 MG 2 TABLET: 8.6; 5 TABLET, FILM COATED ORAL at 18:17

## 2023-01-15 RX ADMIN — FOLIC ACID 1 MG: 1 TABLET ORAL at 05:06

## 2023-01-15 RX ADMIN — SODIUM CHLORIDE, POTASSIUM CHLORIDE, SODIUM LACTATE AND CALCIUM CHLORIDE: 600; 310; 30; 20 INJECTION, SOLUTION INTRAVENOUS at 01:07

## 2023-01-15 RX ADMIN — CHLORDIAZEPOXIDE HYDROCHLORIDE 25 MG: 25 CAPSULE ORAL at 13:14

## 2023-01-15 RX ADMIN — FUROSEMIDE 20 MG: 10 INJECTION, SOLUTION INTRAMUSCULAR; INTRAVENOUS at 13:14

## 2023-01-15 RX ADMIN — AMLODIPINE BESYLATE 5 MG: 5 TABLET ORAL at 05:06

## 2023-01-15 RX ADMIN — MAGNESIUM SULFATE HEPTAHYDRATE 4 G: 40 INJECTION, SOLUTION INTRAVENOUS at 08:33

## 2023-01-15 RX ADMIN — DEXMEDETOMIDINE 0.4 MCG/KG/HR: 200 INJECTION, SOLUTION INTRAVENOUS at 07:06

## 2023-01-15 RX ADMIN — DOCUSATE SODIUM 50 MG AND SENNOSIDES 8.6 MG 2 TABLET: 8.6; 5 TABLET, FILM COATED ORAL at 05:06

## 2023-01-15 RX ADMIN — HYDRALAZINE HYDROCHLORIDE 20 MG: 20 INJECTION INTRAMUSCULAR; INTRAVENOUS at 05:25

## 2023-01-15 RX ADMIN — ASPIRIN 81 MG: 81 TABLET, CHEWABLE ORAL at 05:07

## 2023-01-15 RX ADMIN — THIAMINE HYDROCHLORIDE 100 MG: 100 INJECTION, SOLUTION INTRAMUSCULAR; INTRAVENOUS at 05:24

## 2023-01-15 RX ADMIN — NICOTINE 14 MG: 14 PATCH TRANSDERMAL at 05:07

## 2023-01-15 RX ADMIN — CHLORDIAZEPOXIDE HYDROCHLORIDE 25 MG: 25 CAPSULE ORAL at 05:09

## 2023-01-15 RX ADMIN — CHLORDIAZEPOXIDE HYDROCHLORIDE 25 MG: 25 CAPSULE ORAL at 22:23

## 2023-01-15 RX ADMIN — ATORVASTATIN CALCIUM 80 MG: 80 TABLET, FILM COATED ORAL at 18:17

## 2023-01-15 RX ADMIN — ENOXAPARIN SODIUM 40 MG: 40 INJECTION SUBCUTANEOUS at 18:17

## 2023-01-15 ASSESSMENT — ENCOUNTER SYMPTOMS
CARDIOVASCULAR NEGATIVE: 1
VOMITING: 0
NECK PAIN: 0
RESPIRATORY NEGATIVE: 1
HALLUCINATIONS: 1
ABDOMINAL PAIN: 0
DOUBLE VISION: 0
PHOTOPHOBIA: 0
FOCAL WEAKNESS: 1
NAUSEA: 0

## 2023-01-15 ASSESSMENT — PAIN DESCRIPTION - PAIN TYPE
TYPE: ACUTE PAIN

## 2023-01-15 ASSESSMENT — FIBROSIS 4 INDEX: FIB4 SCORE: 7.47

## 2023-01-15 NOTE — PROGRESS NOTES
Plan to advance TF to goal rate of 70 and decrease MIVF to match a total fluid rate of 75mL/ hr. Once TF at goal, can DC MIVF per Dr. Cosby

## 2023-01-15 NOTE — THERAPY
Speech Language Therapy Contact Note    Patient Name: Kaiden Quiroz  Age:  62 y.o., Sex:  male  Medical Record #: [id number]  Today's Date: 1/15/2023    Discussed missed therapy with Rn Jass who also had patient yesterday.  Patient has new increased oxygen needs and also agitation today.         01/15/23 1148   Treatment Variance   Reason For Missed Therapy Medical - Patient Is Not Medically Stable;Medical - Patient Agitated   Interdisciplinary Plan of Care Collaboration   IDT Collaboration with  Nursing   Collaboration Comments Patient with increased oxygen needs and agitation per RN, not appropriate for dysphagia reassessment at this time.  Service will continue to follow for dysphagia reassessment.

## 2023-01-15 NOTE — PROGRESS NOTES
Critical Care Progress Note    Date of admission  1/12/2023    Chief Complaint  Stroke    Hospital Course  Kaiden Quiroz is a 62 y.o. male BMI 36, active smoker 1ppd, active drinker 6 pack of beer daily, hx of HTN, DM who presented 1/12/2023 with dense left side weakness, slurred speech. LKW 1830. Stroke alert was activated. Initial . TNKase was not given due to the extent of clot burden/chronicity of the lesion and possibility of extending the infarct if BP is lowered. Neuro recommended thrombectomy by IR, while maintaning SBP >180. Initial NIHSS 20. Pt was taken to IR     Intraop, pt underwent thrombectomy with TICI 3 flow and right ICA stent placement. Procedure was complicated by sinus bradycardia then long 10sec pause. CPR was about to be started but pt gained his pulse back. Anesthesia was called and placed LMA. This was eventually converted to intubation post procedure. Atropine, glycopyrrolate, ephedrine were given. Aspirin 600mg transrectally was given.     1/13- Extubated.    Interval Problem Update  Reviewed last 24 hour events:   - Agitated delirium over night.   - Precedex started.   - Increased oxygen requirement              - Tm: Afebrile              - HR: 60s              - SBP: 130s              - Neuro: Aox3 but confused. LUE/LLE 2/5.              - GI: EN @ 25              - UOP: 2.8L              - Mcclendon: Yes              - Lines: PIV, CVC, A-line   - CXR: Central venous catheter and feeding tube in good position.  Diffuse pulmonary vascular congestion              - PPx: ASA, H2, Lovenox              - Mobility level 1    I/O- +3L    Infusions:  Nicardipine, 7.5->5->2.5  Dexmedetomidine    Review of Systems  Review of Systems   HENT: Negative.     Eyes:  Negative for double vision and photophobia.   Respiratory: Negative.     Cardiovascular: Negative.    Gastrointestinal:  Negative for abdominal pain, nausea and vomiting.   Genitourinary: Negative.    Musculoskeletal:  Negative  for neck pain.   Neurological:  Positive for focal weakness.   Psychiatric/Behavioral:  Positive for hallucinations.    All other systems reviewed and are negative.     Vital Signs for last 24 hours   Temp:  [35.9 °C (96.6 °F)-36.2 °C (97.1 °F)] 35.9 °C (96.6 °F)  Pulse:  [62-96] 69  Resp:  [15-46] 21  BP: ()/(52-76) 119/58  SpO2:  [89 %-99 %] 92 %    Hemodynamic parameters for last 24 hours       Respiratory Information for the last 24 hours         Physical Exam  Constitutional:       General: He is not in acute distress.  HENT:      Mouth/Throat:      Mouth: Mucous membranes are moist.   Eyes:      Pupils: Pupils are equal, round, and reactive to light.   Cardiovascular:      Rate and Rhythm: Normal rate and regular rhythm.      Heart sounds: No murmur heard.    No friction rub. No gallop.   Pulmonary:      Effort: No respiratory distress.      Breath sounds: No wheezing or rales.   Abdominal:      General: There is no distension.      Palpations: Abdomen is soft.   Musculoskeletal:      Cervical back: Neck supple.      Right lower leg: No edema.      Left lower leg: No edema.   Skin:     General: Skin is warm and dry.   Neurological:      Comments: Somnolent, opens eyes to voice.  Left-sided neglect follows commands on the right.  Left upper and lower extremities remain flaccid on my exam.     Psychiatric:      Comments: +visual hallucinations of cats in the room.       Medications  Current Facility-Administered Medications   Medication Dose Route Frequency Provider Last Rate Last Admin    potassium phosphate 15 mmol in  mL ivpb  15 mmol Intravenous Once Nelson Cosby M.D. 62.5 mL/hr at 01/15/23 0932 15 mmol at 01/15/23 0932    furosemide (LASIX) injection 20 mg  20 mg Intravenous Q6HRS Nelson Cosby M.D.        niCARdipine (CARDENE) 50 mg in  mL Infusion  0-15 mg/hr Intravenous Continuous Nelson Cosby M.D.   Stopped at 01/15/23 0644    Pharmacy Consult: Enteral tube insertion - review  meds/change route/product selection  1 Each Other PHARMACY TO DOSE Nelson Cosby M.D.        amLODIPine (NORVASC) tablet 5 mg  5 mg Enteral Tube Q DAY Nelson Cosby M.D.   5 mg at 01/15/23 0506    atorvastatin (LIPITOR) tablet 80 mg  80 mg Enteral Tube Q EVENING Nelson Cosby M.D.   80 mg at 01/14/23 1714    dexmedetomidine (PRECEDEX) 400 mcg/100mL NS premix infusion  0.1-1.5 mcg/kg/hr (Ideal) Intravenous Continuous Chinyere Reza M.D. 6.6 mL/hr at 01/15/23 0706 0.4 mcg/kg/hr at 01/15/23 0706    chlordiazePOXIDE (Librium) capsule 25 mg  25 mg Enteral Tube Q8HRS Jessie ANTONY Latona   25 mg at 01/15/23 0509    diazePAM (VALIUM) injection 2.5 mg  2.5 mg Intravenous Q4HRS PRN Jessie ANTONY Latona   2.5 mg at 01/14/23 2146    aspirin (ASA) chewable tab 81 mg  81 mg Enteral Tube DAILY RUDI MorganOTravis   81 mg at 01/15/23 0507    enoxaparin (Lovenox) inj 40 mg  40 mg Subcutaneous DAILY AT 1800 Nelson Cosby M.D.   40 mg at 01/14/23 1714    ondansetron (ZOFRAN) syringe/vial injection 4 mg  4 mg Intravenous Q4HRS PRN Nelson Cosby M.D.   4 mg at 01/14/23 1820    prochlorperazine (COMPAZINE) injection 10 mg  10 mg Intravenous Q6HRS PRN Nelson Cosby M.D.   10 mg at 01/13/23 1151    Respiratory Therapy Consult   Nebulization Continuous RT Bob Razo D.O.        senna-docusate (PERICOLACE or SENOKOT S) 8.6-50 MG per tablet 2 Tablet  2 Tablet Enteral Tube BID RUDI MorganOTravis   2 Tablet at 01/15/23 0506    And    polyethylene glycol/lytes (MIRALAX) PACKET 1 Packet  1 Packet Enteral Tube QDAY PRN Bob Razo D.O.        And    magnesium hydroxide (MILK OF MAGNESIA) suspension 30 mL  30 mL Enteral Tube QDAY PRN Bob Razo D.O.        And    bisacodyl (DULCOLAX) suppository 10 mg  10 mg Rectal QDAY PRN Bob Razo D.O.        MD Alert...ICU Electrolyte Replacement per Pharmacy   Other PHARMACY TO DOSE Bob Razo D.O.        lidocaine (XYLOCAINE) 1 % injection 2 mL  2 mL Tracheal Tube Q30 MIN PRN Bob Razo D.O.         hydrALAZINE (APRESOLINE) injection 10-20 mg  10-20 mg Intravenous Q6HRS PRN Bob Razo D.O.   20 mg at 01/15/23 0525    folic acid (FOLVITE) tablet 1 mg  1 mg Enteral Tube DAILY Bob Razo D.O.   1 mg at 01/15/23 0506    nicotine (NICODERM) 14 MG/24HR 14 mg  14 mg Transdermal Daily-0600 Bob Razo D.O.   14 mg at 01/15/23 0507    multivitamin tablet 1 Tablet  1 Tablet Enteral Tube DAILY Bob Razo D.O.   1 Tablet at 01/15/23 0506       Fluids    Intake/Output Summary (Last 24 hours) at 1/15/2023 1235  Last data filed at 1/15/2023 1200  Gross per 24 hour   Intake 4164.1 ml   Output 2685 ml   Net 1479.1 ml       Laboratory  Recent Labs     01/13/23  0042 01/13/23  0302   ISTATAPH 7.318* 7.403   ISTATAPCO2 36.6 35.8   ISTATAPO2 63* 105*   ISTATATCO2 20 23   [id number] 90* 98   ISTATARTHCO3 18.8 22.3   ISTATARTBE -7* -2   ISTATTEMP 98.5 F 96.6 F   ISTATFIO2 50 40   ISTATSPEC Arterial Arterial   ISTATAPHTC 7.318* 7.419   [id number] 63* 99*         Recent Labs     01/13/23  0242 01/14/23  0343 01/15/23  0416   SODIUM 139 139 133*   POTASSIUM 4.1 4.0 3.8   CHLORIDE 109 107 101   CO2 20 23 23   BUN 16 10 8   CREATININE 0.71 0.68 0.56   MAGNESIUM 1.6 1.8 1.4*   PHOSPHORUS 3.2 2.4* 2.4*   CALCIUM 7.7* 8.1* 8.2*     Recent Labs     01/13/23  0242 01/14/23  0343 01/15/23  0416   ALTSGPT 38  --   --    ASTSGOT 83*  --   --    ALKPHOSPHAT 78  --   --    TBILIRUBIN 0.5  --   --    PREALBUMIN  --   --  17.9*   GLUCOSE 148* 121* 120*     Recent Labs     01/12/23  2002 01/13/23  0242 01/14/23  0343 01/15/23  0416   WBC 8.4  --  8.9 8.4   NEUTSPOLYS 69.60  --  77.30* 75.70*   LYMPHOCYTES 17.40*  --  13.90* 14.00*   MONOCYTES 8.50  --  8.00 8.20   EOSINOPHILS 3.70  --  0.40 1.20   BASOPHILS 0.40  --  0.20 0.40   ASTSGOT  --  83*  --   --    ALTSGPT  --  38  --   --    ALKPHOSPHAT  --  78  --   --    TBILIRUBIN  --  0.5  --   --      Recent Labs     01/12/23  2002 01/14/23  0343 01/15/23  0416   RBC 4.77 3.73* 3.57*   HEMOGLOBIN  16.3 12.8* 12.2*   HEMATOCRIT 45.8 38.0* 35.4*   PLATELETCT 142* 116* 110*   PROTHROMBTM 12.7  --   --    APTT 28.1  --   --    INR 0.96  --   --        Imaging  NG tube in good position    Assessment/Plan  * Acute CVA (cerebrovascular accident) (HCC)- (present on admission)  Assessment & Plan  Acute stroke  Last seen normal: 1830  TNKase given: no  SBP at admission: 190  CT head w/o contrast at admission: no bleed, hyperdense right MCA sign  CTA head: R ICA and M1 occlusion with severely narrowed left M1/M2/M3  CTA neck: right ICA/vertebral artery occlusion   CTP: 120cc mismatched volume    S/p thrombectomy with TICI 3 and right ICA stent placement  Intraop, pt developed transient bradycardia leading to 10 sec-pause, and before CPR was started, pt gained his pulse back. Anesthesia was called, atropine, glycopyrrolate, ephedrine were given. Pt was placed LMA. After procedure, LMA was converted to intubation  Aspirin 600mg transrectally was given     - With TICI 3 Flow; goal -140  - CT head w/o contrast prn acute changes in mental status only  - Start aspirin 81mg daily tomorrow am.   - Delay Plavix initiation due to hemorrhagic transformation  - Statin to target goal LDL <70  - TTE with bubble study to evaluate for PFO  - Keep -180.  - PT/OT/Rehab/speech/nutrition  - Dysphagia screen w/ swallow study   - DVT prophylaxis 24 hours post TNKase        Delirium due to medical condition with behavioral disturbance  Assessment & Plan  Attributed to sleep wake cycle disturbance, alcohol withdrawal considered however there are no tremors or tachycardia.  Verbal orientation  Limit procedures at night  Seroquel QHS (QTc 458ms)    Acute respiratory failure with hypoxia (HCC)  Assessment & Plan  Intubated for airway protection.  Extubated 1/13  Requiring HFNC. Attributed to +fluid balance and/or microaspiration  IVF stopped  Lasix diuresis.    BMI 36.0-36.9,adult  Assessment & Plan  BMI 36    Type 2 diabetes  mellitus without complication, without long-term current use of insulin (HCC)  Assessment & Plan  Monitor glycemic control with insulin therapy    Alcohol abuse  Assessment & Plan  Active alcohol drink 6 packs of beers daily   Monitor for withdrawal  Fluids, vitamins, folate, thiamine    Current smoker  Assessment & Plan  Active smoker 1ppd.   Nicotine patch    Hypertension  Assessment & Plan  Systolic blood pressure goal 110- 140  Titrate Nicardipine infusion  Continue Norvasc         VTE:  Lovenox  Ulcer: H2 Antagonist  Lines: Central Line  Ongoing indication addressed and Arterial Line  Ongoing indication addressed    I have performed a physical exam and reviewed and updated ROS and Plan today (1/15/2023). In review of yesterday's note (1/14/2023), there are no changes except as documented above.     Discussed patient condition and risk of morbidity and/or mortality with Family, RT, Pharmacy, Patient, and neurology    The patient remains critically ill.  I have assessed and reassessed the respiratory status and made ventilator adjustments based upon arterial blood gas analysis, ventilator waveforms and airway mechanics.  I have assessed and reassessed the blood pressure, hemodynamics, cardiovascular neurologic status. This patient remains at high risk for worsening cardiopulmonary dysfunction and death without the above critical care interventions.    Critical care time 40 minutes in directly providing and coordinating critical care and extensive data review.  No time overlap and excludes procedures.

## 2023-01-15 NOTE — CARE PLAN
The patient is Stable - Low risk of patient condition declining or worsening    Shift Goals  Clinical Goals: Monitor ETOH withdrawal, stable neuro exam  Patient Goals: none stated  Family Goals: unable to assess    Progress made toward(s) clinical / shift goals:  Patient is on scheduled librium, on light sedation for agitation. Neuro status remains stable throughout the night.   Problem: Neuro Status  Goal: Neuro status will remain stable or improve  Outcome: Progressing    Problem: Risk for Aspiration  Goal: Patient's risk for aspiration will be absent or decrease  Outcome: Progressing   NG tube in placed, head of bed at 30 degrees. Patient tolerating tube feed   Problem: Safety - Medical Restraint  Goal: Remains free of injury from restraints (Restraint for Interference with Medical Device)  Outcome: Progressing   Less restrictive measurements have been implemented and failed prior to application of restraints. No injury related to restraints noted. Assess patient at least every 2 hours.   Problem: Optimal Care for Alcohol Withdrawal  Goal: Optimal Care for the alcohol withdrawal patient  Outcome: Progressing

## 2023-01-15 NOTE — PROGRESS NOTES
NEUROLOGY PROGRESS NOTE      BACKGROUND:    62 y.o. male was admitted on 1/12/2023  8:01 PM for Acute CVA (cerebrovascular accident) (HCC) [I63.9].  He is being followed by Neurology for right MCA stroke status post successful thrombectomy and right ICA stent placement.    SUBJECTIVE:   Continue with left-sided weakness.  He is lethargic but able to follow commands.    VITALS:  Vitals:    01/15/23 0630 01/15/23 0655 01/15/23 0700 01/15/23 0800   BP:   108/59 123/64   Pulse: 62  65 67   Resp: 19  19 (!) 23   Temp:    36 °C (96.8 °F)   TempSrc:    Temporal   SpO2: 96% 95% 91% 91%   Weight:       Height:           NEUROLOGICAL EXAM:   He is lethargic and drowsy but arousable.  He has left facial weakness in an upper motor neuron pattern.  Facial sensation appears intact.  Pupils are equal and reactive.  He has mild right gaze preference.  He has normal strength direct testing of right upper and lower extremity.  He is able to move his left upper and lower extremity with gravity eliminated.  He has decreased sensation to light touch and temperature in left upper and lower extremity.  Coordination could not be tested.  Gait was deferred.    OBJECTIVE:    NEUROIMAGING:    DX-ABDOMEN FOR TUBE PLACEMENT   Final Result         Gastric drainage tube with tip projecting over the expected area of the stomach.      DX-CHEST-PORTABLE (1 VIEW)   Final Result      1.  Interval extubation without pneumothorax.   2.  Stable enlarged cardiac silhouette.   3.  There is bibasilar linear atelectasis.      EC-ECHOCARDIOGRAM COMPLETE W/ CONT   Final Result      MR-BRAIN-W/O   Final Result         Acute infarction involving the right frontal region, right basal ganglia and right caudate nucleus. Minimal punctate infarction involving the right parietal cortex. There is slight mass effect upon the right lateral ventricle with only minimal midline    shift measuring approximately 4 mm.      Petechial hemorrhage is noted within the right  frontal cortical infarct. Also noted is petechial hemorrhage in the right basal ganglia and right caudate nucleus but without surrounding vasogenic edema.         DX-PELVIS-1 OR 2 VIEWS   Final Result         1.  No acute traumatic bony injury.      DX-CHEST-PORTABLE (1 VIEW)   Final Result         1.  Left midlung and lower lobe infiltrates, similar to prior study.      [id number] VIEW   Final Result         1.  Nonspecific bowel gas pattern in the upper abdomen.   2.  Cardiomegaly   3.  Hazy left lower lobe infiltrates.   4.  Nasogastric tube tip terminates overlying the expected location of the gastric fundus.      DX-CHEST-PORTABLE (1 VIEW)   Final Result      No acute cardiac or pulmonary abnormalities are identified.      CT-CTA NECK WITH & W/O-POST PROCESSING   Final Result      1.  Occlusion of the right internal carotid artery at its origin.      2.  Occlusion of the right vertebral artery at its origin.      Dr. Gongora discussed findings with Dr. Mccarthy.      CT-CTA HEAD WITH & W/O-POST PROCESS   Final Result         1.  Occlusion of the right internal carotid artery and right middle cerebral artery.   2.  Narrowed left M1 segment with severely narrowed and M2 and M3 branches, may represent chronic changes.      These findings were discussed with the patient's clinician, Joseline Mccarthy, on 1/12/2023 8:27 PM.      CT-CEREBRAL PERFUSION ANALYSIS   Final Result         1.  Cerebral blood flow less than 30% likely representing completed infarct = 52 mL.      2.  T Max more than 6 seconds likely representing combination of completed infarct and ischemia = 172 mL.      3.  Mismatched volume likely representing ischemic brain/penumbra = 120 mL      4.  Please note that the cerebral perfusion was performed on the limited brain tissue around the basal ganglia region. Infarct/ischemia outside the CT perfusion sections can be missed in this study.      CT-HEAD W/O   Final Result      1.  No evidence of  intracranial hemorrhage, midline shift or mass effect.      2.  Question of hyperdense right MCA though evaluation limited by oblique positioning.         IR-THROMBO MECHANICAL ARTERY,INIT    (Results Pending)   DX-CHEST-PORTABLE (1 VIEW)    (Results Pending)       MEDICATIONS:  Current Facility-Administered Medications   Medication Dose Route Frequency Provider Last Rate Last Admin    magnesium sulfate IVPB premix 4 g  4 g Intravenous Once Nelson Cosby M.D. 25 mL/hr at 01/15/23 0833 4 g at 01/15/23 0833    potassium phosphate 15 mmol in  mL ivpb  15 mmol Intravenous Once Nelson Cosby M.D. 62.5 mL/hr at 01/15/23 0932 15 mmol at 01/15/23 0932    niCARdipine (CARDENE) 50 mg in  mL Infusion  0-15 mg/hr Intravenous Continuous Nelson Cosby M.D.   Stopped at 01/15/23 0644    Pharmacy Consult: Enteral tube insertion - review meds/change route/product selection  1 Each Other PHARMACY TO DOSE Nelson Cosby M.D.        amLODIPine (NORVASC) tablet 5 mg  5 mg Enteral Tube Q DAY Nelson Cosby M.D.   5 mg at 01/15/23 0506    atorvastatin (LIPITOR) tablet 80 mg  80 mg Enteral Tube Q EVENING Nelson Cosby M.D.   80 mg at 01/14/23 1714    dexmedetomidine (PRECEDEX) 400 mcg/100mL NS premix infusion  0.1-1.5 mcg/kg/hr (Ideal) Intravenous Continuous Chinyere Reza M.D. 6.6 mL/hr at 01/15/23 0706 0.4 mcg/kg/hr at 01/15/23 0706    chlordiazePOXIDE (Librium) capsule 25 mg  25 mg Enteral Tube Q8HRS Jessie LTravis Latona   25 mg at 01/15/23 0509    diazePAM (VALIUM) injection 2.5 mg  2.5 mg Intravenous Q4HRS PRN Jessie L. Latona   2.5 mg at 01/14/23 2146    aspirin (ASA) chewable tab 81 mg  81 mg Enteral Tube DAILY Bob Razo D.O.   81 mg at 01/15/23 0507    enoxaparin (Lovenox) inj 40 mg  40 mg Subcutaneous DAILY AT 1800 Nelson Cosby M.D.   40 mg at 01/14/23 1714    ondansetron (ZOFRAN) syringe/vial injection 4 mg  4 mg Intravenous Q4HRS PRN Nelson Cosby M.D.   4 mg at 01/14/23 1820    prochlorperazine  (COMPAZINE) injection 10 mg  10 mg Intravenous Q6HRS PRN Nelson Cosby M.D.   10 mg at 01/13/23 1151    Respiratory Therapy Consult   Nebulization Continuous RT Bob Razo D.O.        senna-docusate (PERICOLACE or SENOKOT S) 8.6-50 MG per tablet 2 Tablet  2 Tablet Enteral Tube BID RUDI MorganOTravis   2 Tablet at 01/15/23 0506    And    polyethylene glycol/lytes (MIRALAX) PACKET 1 Packet  1 Packet Enteral Tube QDAY PRN oBb Razo D.O.        And    magnesium hydroxide (MILK OF MAGNESIA) suspension 30 mL  30 mL Enteral Tube QDAY PRN Bbo Razo D.O.        And    bisacodyl (DULCOLAX) suppository 10 mg  10 mg Rectal QDAY PRN Bob Razo D.O.        MD Alert...ICU Electrolyte Replacement per Pharmacy   Other PHARMACY TO DOSE Bob Razo D.O.        lidocaine (XYLOCAINE) 1 % injection 2 mL  2 mL Tracheal Tube Q30 MIN PRN Bob Razo D.O.        hydrALAZINE (APRESOLINE) injection 10-20 mg  10-20 mg Intravenous Q6HRS PRN RUDI MorganOTravis   20 mg at 01/15/23 0525    folic acid (FOLVITE) tablet 1 mg  1 mg Enteral Tube DAILY RUDI MorganO.   1 mg at 01/15/23 0506    lactated ringers infusion   Intravenous Continuous Nelson Cosby M.D. 25 mL/hr at 01/15/23 0919 Rate Change at 01/15/23 0919    nicotine (NICODERM) 14 MG/24HR 14 mg  14 mg Transdermal Daily-0600 RUDI MorganOTravis   14 mg at 01/15/23 0507    multivitamin tablet 1 Tablet  1 Tablet Enteral Tube DAILY RUDI MorganO.   1 Tablet at 01/15/23 0506       LABS:      Recent Labs     01/12/23 2002 01/14/23  0343 01/15/23  0416   WBC 8.4 8.9 8.4   RBC 4.77 3.73* 3.57*   HEMOGLOBIN 16.3 12.8* 12.2*   HEMATOCRIT 45.8 38.0* 35.4*   MCV 96.0 101.9* 99.2*   MCH 34.2* 34.3* 34.2*   MCHC 35.6* 33.7 34.5   RDW 45.9 50.4* 44.0   PLATELETCT 142* 116* 110*   MPV 10.7 10.6 11.1       Recent Labs     01/13/23  0242 01/14/23  0343 01/15/23  0416   SODIUM 139 139 133*   POTASSIUM 4.1 4.0 3.8   CHLORIDE 109 107 101   CO2 20 23 23   GLUCOSE 148* 121* 120*   BUN 16 10 8       INR    Date Value Ref Range Status   01/12/2023 0.96 0.87 - 1.13 Final     Comment:     INR - Non-therapeutic Reference Range: 0.87-1.13  INR - Therapeutic Reference Range: 2.0-4.0       No results found for: POCINR  Lab Results   Component Value Date/Time    CREATININE 0.68 01/14/2023 0343     Lab Results   Component Value Date/Time    IFAFRICA >60 09/02/2020 0942    IFNOTAFR >60 09/02/2020 0942       ASSESSMENT AND PLAN:   62 year old man with multiple stroke risk factors who presented with profound left-sided weakness and was found to have right ICA and right M1 MCA occlusion.  He is status post successful mechanical thrombectomy with TICI 3 result and also status post right ICA stent placement.   MRI brain on 1/13/2023 revealed R frontal infarct with petechial hemorrhagic conversion.  At this time, will maintain on ASA monotherapy to mitigate further hemorrhage risk.   Plan:  Continue every 4 hour neuro check.  Maintain systolic blood pressure 110s-140.  Continue with aspirin 81 mg daily.  May start DAPT on 1/23/2023 for duration of 6 weeks.  Echocardiogram was unremarkable with ejection fraction of 65% and negative for shunt and normal left atrial size.  Hemoglobin A1c is 5.3 at goal.  LDL is 97, he will continue with atorvastatin 80 mg daily with a goal of LDL less than 70.  Continue physical therapy, Occupational Therapy and speech therapy.  Continue with Lovenox for DVT prevention.  Discussed with Dr. Cosby  Neurology will follow as needed, please call us if there is any question.    Please note that this dictation was created using voice recognition software. The accuracy of the dictation is limited to the abilities of the software. I have made every reasonable attempt to correct obvious errors, but I expect that there are errors of grammar and possibly content that I did not discover before finalizing the note.

## 2023-01-15 NOTE — ASSESSMENT & PLAN NOTE
Attributed to sleep wake cycle disturbance, alcohol withdrawal considered however there are no tremors or tachycardia.  Verbal orientation  Limit procedures at night

## 2023-01-15 NOTE — CARE PLAN
Problem: Humidified High Flow Nasal Cannula  Goal: Maintain adequate oxygenation dependent on patient condition  Description: Target End Date:  resolve prior to discharge or when underlying condition is resolved/stabilized    60/100%    1.  Implement humidified high flow oxygen therapy  2.  Titrate high flow oxygen to maintain appropriate SpO2  Outcome: Progressing

## 2023-01-15 NOTE — PROGRESS NOTES
Patient with increase in oxygen needs. Was on 5L at the beginning of shift, now on 6L, SpO2 sitting at 88-90%. Lungs sound are diminished but clear. Neuro exam remains stable, no new deficit. Attempted to sit patient up at 90 degrees, encourage coughing and provide deep suctioning without improvement.   Patient usually wear CPAP at night, unable to tonight due to restraints in placed and NG tube. TISHA Magallanes notified of event, RT to placed patient on HFNC.

## 2023-01-15 NOTE — CARE PLAN
The patient is Stable - Low risk of patient condition declining or worsening    Shift Goals  Clinical Goals: SBP < 140, Q4H neuro, safety  Patient Goals: none stated  Family Goals: unable to assess      Problem: Optimal Care of the Stroke Patient  Goal: Optimal emergency care for the stroke patient  Outcome: Not Met  Goal: Optimal acute care for the stroke patient  Outcome: Not Met     Problem: Knowledge Deficit - Stroke Education  Goal: Patient's knowledge of stroke and risk factors will improve  Outcome: Not Met     Problem: Neuro Status  Goal: Neuro status will remain stable or improve  Outcome: Not Met     Problem: Respiratory - Stroke Patient  Goal: Patient will achieve/maintain optimum respiratory rate/effort  Outcome: Not Met     Problem: Safety - Medical Restraint  Goal: Remains free of injury from restraints (Restraint for Interference with Medical Device)  Outcome: Not Met  Goal: Free from restraint(s) (Restraint for Interference with Medical Device)  Outcome: Not Met

## 2023-01-15 NOTE — DIETARY
"Nutrition Support Assessment:  Day 3 of admit.  Kaiden Quiroz is a 62 y.o. male with admitting DX of Acute CVA      Current problem list:  HTN  Current smoker  ETOH abuse  T2DM  Acute respiratory failure with hypoxia     Assessment:  Estimated Nutritional Needs based on:   Height: 170.2 cm (5' 7.01\")  Weight: 105 kg (231 lb 4.2 oz) via bed scale   Weight to Use in Calculations: 105 kg (231 lb 4.2 oz)  Ideal Body Weight: 67.1 kg (148 lb)  Percent Ideal Body Weight: 156.3  Body mass index is 36.21 kg/m²., BMI classification: Obesity class II    Calculation/Equation: REE= 1815 kcals   Total calories/day: 1785- 1995 kcals  (17- 19 kcals/kg)  Total Grams Protein / day: 126.1 - 137 g  (Grams Protein / k.2 - 1.4)     Evaluation:   Indication for nutrition support given pt has left-sided weakness and difficulty swallowing. Per SLP, pt remains drowsy and not appropriate to initiate PO diet currently. SLP recommends NPO and short term alternative source of nutrition/hydration.   Enteral access via NGT   Pt was extubated on ; POD #2 clot retrieval with TICI 3 reperfusion and right ICA stenting  Pt on 6 L HFNC   Labs: Na 133, Phos 2.4, Mag 1.4, Pre-alb 17.9, CRP 6.65, A1c 5.3  Meds: Librium, precedex 0.4 mcg/kg/hr, folvite, LR infusion, mag sulfate, MVI, potassium phosphate, senna, thiamine, sodium phosphate  +BM: PTA  Pt is at risk for refeeding given low electrolyte labs   Replete with Fiber is a standard formula that is appropriate to meet nutritional needs at this time.      Malnutrition Risk: No risks identified      Recommendations/Plan:  Initiate Replete with Fiber at a rate of 25 ml/hr and advance by 20 ml q 8 hrs to a goal rate of 80 ml/hr, this provides 1920 kcals, 123 g protein, and 1594 mL of free water in 24 hrs.   Fluids per MD   Diet advancements per MD   Monitor for refeeding: Order BMP w/ Mg and Phos x 7 days. Replete K, Phos and Mg prn. Supplement 100 mg Thiamine x 7 days to reduce risk of " refeeding  Monitor weights     RD following.

## 2023-01-16 ENCOUNTER — APPOINTMENT (OUTPATIENT)
Dept: RADIOLOGY | Facility: MEDICAL CENTER | Age: 63
DRG: 023 | End: 2023-01-16
Attending: NURSE PRACTITIONER
Payer: COMMERCIAL

## 2023-01-16 PROBLEM — E66.01 SEVERE OBESITY (BMI 35.0-35.9 WITH COMORBIDITY) (HCC): Status: ACTIVE | Noted: 2023-01-12

## 2023-01-16 PROBLEM — J81.1 NONCARDIOGENIC PULMONARY EDEMA: Status: ACTIVE | Noted: 2023-01-16

## 2023-01-16 PROBLEM — E83.42 HYPOMAGNESEMIA: Status: ACTIVE | Noted: 2023-01-16

## 2023-01-16 PROBLEM — F10.931 ALCOHOL WITHDRAWAL SYNDROME, WITH DELIRIUM (HCC): Status: ACTIVE | Noted: 2023-01-16

## 2023-01-16 PROBLEM — F10.239 ALCOHOL DEPENDENCE WITH WITHDRAWAL (HCC): Status: ACTIVE | Noted: 2023-01-12

## 2023-01-16 PROBLEM — F10.939 ALCOHOL WITHDRAWAL (HCC): Status: ACTIVE | Noted: 2023-01-16

## 2023-01-16 LAB
ALBUMIN SERPL BCP-MCNC: 3.4 G/DL (ref 3.2–4.9)
ALBUMIN/GLOB SERPL: 1.2 G/DL
ALP SERPL-CCNC: 85 U/L (ref 30–99)
ALT SERPL-CCNC: 23 U/L (ref 2–50)
ANION GAP SERPL CALC-SCNC: 10 MMOL/L (ref 7–16)
AST SERPL-CCNC: 27 U/L (ref 12–45)
BASOPHILS # BLD AUTO: 0.2 % (ref 0–1.8)
BASOPHILS # BLD: 0.02 K/UL (ref 0–0.12)
BILIRUB SERPL-MCNC: 1.2 MG/DL (ref 0.1–1.5)
BUN SERPL-MCNC: 11 MG/DL (ref 8–22)
CALCIUM ALBUM COR SERPL-MCNC: 9.1 MG/DL (ref 8.5–10.5)
CALCIUM SERPL-MCNC: 8.6 MG/DL (ref 8.5–10.5)
CHLORIDE SERPL-SCNC: 101 MMOL/L (ref 96–112)
CO2 SERPL-SCNC: 25 MMOL/L (ref 20–33)
CREAT SERPL-MCNC: 0.67 MG/DL (ref 0.5–1.4)
CRP SERPL HS-MCNC: 5.41 MG/DL (ref 0–0.75)
EOSINOPHIL # BLD AUTO: 0.14 K/UL (ref 0–0.51)
EOSINOPHIL NFR BLD: 1.7 % (ref 0–6.9)
ERYTHROCYTE [DISTWIDTH] IN BLOOD BY AUTOMATED COUNT: 42.4 FL (ref 35.9–50)
GFR SERPLBLD CREATININE-BSD FMLA CKD-EPI: 106 ML/MIN/1.73 M 2
GLOBULIN SER CALC-MCNC: 2.9 G/DL (ref 1.9–3.5)
GLUCOSE SERPL-MCNC: 117 MG/DL (ref 65–99)
HCT VFR BLD AUTO: 36.6 % (ref 42–52)
HGB BLD-MCNC: 12.8 G/DL (ref 14–18)
IMM GRANULOCYTES # BLD AUTO: 0.04 K/UL (ref 0–0.11)
IMM GRANULOCYTES NFR BLD AUTO: 0.5 % (ref 0–0.9)
LYMPHOCYTES # BLD AUTO: 1.29 K/UL (ref 1–4.8)
LYMPHOCYTES NFR BLD: 15.3 % (ref 22–41)
MAGNESIUM SERPL-MCNC: 1.5 MG/DL (ref 1.5–2.5)
MCH RBC QN AUTO: 33.9 PG (ref 27–33)
MCHC RBC AUTO-ENTMCNC: 35 G/DL (ref 33.7–35.3)
MCV RBC AUTO: 96.8 FL (ref 81.4–97.8)
MONOCYTES # BLD AUTO: 0.78 K/UL (ref 0–0.85)
MONOCYTES NFR BLD AUTO: 9.3 % (ref 0–13.4)
NEUTROPHILS # BLD AUTO: 6.16 K/UL (ref 1.82–7.42)
NEUTROPHILS NFR BLD: 73 % (ref 44–72)
NRBC # BLD AUTO: 0 K/UL
NRBC BLD-RTO: 0 /100 WBC
PHOSPHATE SERPL-MCNC: 3 MG/DL (ref 2.5–4.5)
PLATELET # BLD AUTO: 133 K/UL (ref 164–446)
PMV BLD AUTO: 11 FL (ref 9–12.9)
POTASSIUM SERPL-SCNC: 3.6 MMOL/L (ref 3.6–5.5)
PREALB SERPL-MCNC: 15.4 MG/DL (ref 18–38)
PROT SERPL-MCNC: 6.3 G/DL (ref 6–8.2)
RBC # BLD AUTO: 3.78 M/UL (ref 4.7–6.1)
SODIUM SERPL-SCNC: 136 MMOL/L (ref 135–145)
WBC # BLD AUTO: 8.4 K/UL (ref 4.8–10.8)

## 2023-01-16 PROCEDURE — 97163 PT EVAL HIGH COMPLEX 45 MIN: CPT

## 2023-01-16 PROCEDURE — A9270 NON-COVERED ITEM OR SERVICE: HCPCS | Performed by: INTERNAL MEDICINE

## 2023-01-16 PROCEDURE — 85025 COMPLETE CBC W/AUTO DIFF WBC: CPT

## 2023-01-16 PROCEDURE — 700101 HCHG RX REV CODE 250: Performed by: INTERNAL MEDICINE

## 2023-01-16 PROCEDURE — 700105 HCHG RX REV CODE 258: Performed by: INTERNAL MEDICINE

## 2023-01-16 PROCEDURE — 86140 C-REACTIVE PROTEIN: CPT

## 2023-01-16 PROCEDURE — 80053 COMPREHEN METABOLIC PANEL: CPT

## 2023-01-16 PROCEDURE — 700102 HCHG RX REV CODE 250 W/ 637 OVERRIDE(OP): Performed by: INTERNAL MEDICINE

## 2023-01-16 PROCEDURE — 700111 HCHG RX REV CODE 636 W/ 250 OVERRIDE (IP): Performed by: INTERNAL MEDICINE

## 2023-01-16 PROCEDURE — 84100 ASSAY OF PHOSPHORUS: CPT

## 2023-01-16 PROCEDURE — 97167 OT EVAL HIGH COMPLEX 60 MIN: CPT

## 2023-01-16 PROCEDURE — 84134 ASSAY OF PREALBUMIN: CPT

## 2023-01-16 PROCEDURE — HZ2ZZZZ DETOXIFICATION SERVICES FOR SUBSTANCE ABUSE TREATMENT: ICD-10-PCS | Performed by: INTERNAL MEDICINE

## 2023-01-16 PROCEDURE — 700102 HCHG RX REV CODE 250 W/ 637 OVERRIDE(OP): Performed by: NURSE PRACTITIONER

## 2023-01-16 PROCEDURE — A9270 NON-COVERED ITEM OR SERVICE: HCPCS | Performed by: NURSE PRACTITIONER

## 2023-01-16 PROCEDURE — 99233 SBSQ HOSP IP/OBS HIGH 50: CPT | Performed by: INTERNAL MEDICINE

## 2023-01-16 PROCEDURE — 99221 1ST HOSP IP/OBS SF/LOW 40: CPT | Performed by: STUDENT IN AN ORGANIZED HEALTH CARE EDUCATION/TRAINING PROGRAM

## 2023-01-16 PROCEDURE — 94640 AIRWAY INHALATION TREATMENT: CPT

## 2023-01-16 PROCEDURE — 770000 HCHG ROOM/CARE - INTERMEDIATE ICU *

## 2023-01-16 PROCEDURE — 83735 ASSAY OF MAGNESIUM: CPT

## 2023-01-16 PROCEDURE — 700111 HCHG RX REV CODE 636 W/ 250 OVERRIDE (IP): Performed by: STUDENT IN AN ORGANIZED HEALTH CARE EDUCATION/TRAINING PROGRAM

## 2023-01-16 PROCEDURE — 700111 HCHG RX REV CODE 636 W/ 250 OVERRIDE (IP): Performed by: NURSE PRACTITIONER

## 2023-01-16 RX ORDER — FUROSEMIDE 10 MG/ML
40 INJECTION INTRAMUSCULAR; INTRAVENOUS
Status: DISCONTINUED | OUTPATIENT
Start: 2023-01-16 | End: 2023-01-18

## 2023-01-16 RX ORDER — LORAZEPAM 2 MG/ML
3 INJECTION INTRAMUSCULAR
Status: DISCONTINUED | OUTPATIENT
Start: 2023-01-16 | End: 2023-01-18

## 2023-01-16 RX ORDER — LORAZEPAM 2 MG/ML
1 INJECTION INTRAMUSCULAR
Status: DISCONTINUED | OUTPATIENT
Start: 2023-01-16 | End: 2023-01-18

## 2023-01-16 RX ORDER — LORAZEPAM 2 MG/ML
4 INJECTION INTRAMUSCULAR
Status: DISCONTINUED | OUTPATIENT
Start: 2023-01-16 | End: 2023-01-18

## 2023-01-16 RX ORDER — LORAZEPAM 2 MG/ML
2 INJECTION INTRAMUSCULAR
Status: DISCONTINUED | OUTPATIENT
Start: 2023-01-16 | End: 2023-01-18

## 2023-01-16 RX ORDER — ACETAMINOPHEN 325 MG/1
650 TABLET ORAL EVERY 6 HOURS PRN
Status: DISCONTINUED | OUTPATIENT
Start: 2023-01-16 | End: 2023-01-27

## 2023-01-16 RX ORDER — GAUZE BANDAGE 2" X 2"
100 BANDAGE TOPICAL DAILY
Status: DISCONTINUED | OUTPATIENT
Start: 2023-01-16 | End: 2023-01-19

## 2023-01-16 RX ORDER — MAGNESIUM SULFATE HEPTAHYDRATE 40 MG/ML
4 INJECTION, SOLUTION INTRAVENOUS ONCE
Status: COMPLETED | OUTPATIENT
Start: 2023-01-16 | End: 2023-01-16

## 2023-01-16 RX ADMIN — ENOXAPARIN SODIUM 40 MG: 40 INJECTION SUBCUTANEOUS at 17:00

## 2023-01-16 RX ADMIN — DEXMEDETOMIDINE 0.3 MCG/KG/HR: 200 INJECTION, SOLUTION INTRAVENOUS at 02:12

## 2023-01-16 RX ADMIN — ACETAMINOPHEN 650 MG: 325 TABLET ORAL at 05:29

## 2023-01-16 RX ADMIN — THERA TABS 1 TABLET: TAB at 05:30

## 2023-01-16 RX ADMIN — CHLORDIAZEPOXIDE HYDROCHLORIDE 25 MG: 25 CAPSULE ORAL at 05:29

## 2023-01-16 RX ADMIN — HYDRALAZINE HYDROCHLORIDE 10 MG: 20 INJECTION INTRAMUSCULAR; INTRAVENOUS at 01:53

## 2023-01-16 RX ADMIN — FUROSEMIDE 20 MG: 10 INJECTION, SOLUTION INTRAMUSCULAR; INTRAVENOUS at 05:29

## 2023-01-16 RX ADMIN — CHLORDIAZEPOXIDE HYDROCHLORIDE 25 MG: 25 CAPSULE ORAL at 22:19

## 2023-01-16 RX ADMIN — CHLORDIAZEPOXIDE HYDROCHLORIDE 25 MG: 25 CAPSULE ORAL at 14:07

## 2023-01-16 RX ADMIN — DIAZEPAM 2.5 MG: 5 INJECTION, SOLUTION INTRAMUSCULAR; INTRAVENOUS at 02:44

## 2023-01-16 RX ADMIN — HYDRALAZINE HYDROCHLORIDE 20 MG: 20 INJECTION INTRAMUSCULAR; INTRAVENOUS at 12:52

## 2023-01-16 RX ADMIN — FUROSEMIDE 40 MG: 10 INJECTION, SOLUTION INTRAMUSCULAR; INTRAVENOUS at 16:57

## 2023-01-16 RX ADMIN — FUROSEMIDE 20 MG: 10 INJECTION, SOLUTION INTRAMUSCULAR; INTRAVENOUS at 00:02

## 2023-01-16 RX ADMIN — SODIUM CHLORIDE 5 MG/HR: 9 INJECTION, SOLUTION INTRAVENOUS at 02:30

## 2023-01-16 RX ADMIN — MAGNESIUM SULFATE HEPTAHYDRATE 4 G: 40 INJECTION, SOLUTION INTRAVENOUS at 08:42

## 2023-01-16 RX ADMIN — HYDRALAZINE HYDROCHLORIDE 10 MG: 20 INJECTION INTRAMUSCULAR; INTRAVENOUS at 00:48

## 2023-01-16 RX ADMIN — ATORVASTATIN CALCIUM 80 MG: 80 TABLET, FILM COATED ORAL at 17:00

## 2023-01-16 RX ADMIN — ACETAMINOPHEN 650 MG: 325 TABLET ORAL at 14:08

## 2023-01-16 RX ADMIN — FOLIC ACID 1 MG: 1 TABLET ORAL at 05:29

## 2023-01-16 RX ADMIN — ASPIRIN 81 MG: 81 TABLET, CHEWABLE ORAL at 05:29

## 2023-01-16 RX ADMIN — THIAMINE HCL TAB 100 MG 100 MG: 100 TAB at 12:17

## 2023-01-16 RX ADMIN — AMLODIPINE BESYLATE 5 MG: 5 TABLET ORAL at 05:30

## 2023-01-16 RX ADMIN — NICOTINE 14 MG: 14 PATCH TRANSDERMAL at 05:29

## 2023-01-16 RX ADMIN — DOCUSATE SODIUM 50 MG AND SENNOSIDES 8.6 MG 2 TABLET: 8.6; 5 TABLET, FILM COATED ORAL at 17:00

## 2023-01-16 RX ADMIN — POTASSIUM BICARBONATE 50 MEQ: 978 TABLET, EFFERVESCENT ORAL at 08:39

## 2023-01-16 ASSESSMENT — COGNITIVE AND FUNCTIONAL STATUS - GENERAL
SUGGESTED CMS G CODE MODIFIER MOBILITY: CM
MOVING FROM LYING ON BACK TO SITTING ON SIDE OF FLAT BED: A LOT
WALKING IN HOSPITAL ROOM: TOTAL
TURNING FROM BACK TO SIDE WHILE IN FLAT BAD: UNABLE
SUGGESTED CMS G CODE MODIFIER DAILY ACTIVITY: CM
PERSONAL GROOMING: A LOT
CLIMB 3 TO 5 STEPS WITH RAILING: TOTAL
STANDING UP FROM CHAIR USING ARMS: TOTAL
PERSONAL GROOMING: A LOT
TURNING FROM BACK TO SIDE WHILE IN FLAT BAD: A LOT
MOVING FROM LYING ON BACK TO SITTING ON SIDE OF FLAT BED: UNABLE
MOVING TO AND FROM BED TO CHAIR: A LOT
STANDING UP FROM CHAIR USING ARMS: TOTAL
CLIMB 3 TO 5 STEPS WITH RAILING: TOTAL
DRESSING REGULAR UPPER BODY CLOTHING: TOTAL
TOILETING: TOTAL
DAILY ACTIVITIY SCORE: 8
DAILY ACTIVITIY SCORE: 7
MOVING TO AND FROM BED TO CHAIR: UNABLE
WALKING IN HOSPITAL ROOM: TOTAL
EATING MEALS: TOTAL
HELP NEEDED FOR BATHING: TOTAL
HELP NEEDED FOR BATHING: TOTAL
SUGGESTED CMS G CODE MODIFIER MOBILITY: CN
MOBILITY SCORE: 6
DRESSING REGULAR LOWER BODY CLOTHING: TOTAL
DRESSING REGULAR UPPER BODY CLOTHING: A LOT
MOBILITY SCORE: 9
SUGGESTED CMS G CODE MODIFIER DAILY ACTIVITY: CM
TOILETING: TOTAL
DRESSING REGULAR LOWER BODY CLOTHING: TOTAL
EATING MEALS: TOTAL

## 2023-01-16 ASSESSMENT — LIFESTYLE VARIABLES
AVERAGE NUMBER OF DAYS PER WEEK YOU HAVE A DRINK CONTAINING ALCOHOL: 3
EVER HAD A DRINK FIRST THING IN THE MORNING TO STEADY YOUR NERVES TO GET RID OF A HANGOVER: NO
HOW MANY TIMES IN THE PAST YEAR HAVE YOU HAD 5 OR MORE DRINKS IN A DAY: 3
TOTAL SCORE: 0
TOTAL SCORE: 0
SUBSTANCE_ABUSE: 1
ALCOHOL_USE: YES
ON A TYPICAL DAY WHEN YOU DRINK ALCOHOL HOW MANY DRINKS DO YOU HAVE: 6
TOTAL SCORE: 0
EVER FELT BAD OR GUILTY ABOUT YOUR DRINKING: NO
HAVE YOU EVER FELT YOU SHOULD CUT DOWN ON YOUR DRINKING: NO
HAVE PEOPLE ANNOYED YOU BY CRITICIZING YOUR DRINKING: NO
CONSUMPTION TOTAL: POSITIVE

## 2023-01-16 ASSESSMENT — ENCOUNTER SYMPTOMS
SORE THROAT: 0
COUGH: 1
SINUS PAIN: 0
CHILLS: 0
ABDOMINAL PAIN: 0
BRUISES/BLEEDS EASILY: 0
HEMOPTYSIS: 0
HALLUCINATIONS: 1
MYALGIAS: 0
DOUBLE VISION: 0
HEADACHES: 1
RESPIRATORY NEGATIVE: 1
NAUSEA: 0
EYE PAIN: 0
FOCAL WEAKNESS: 1
BLOOD IN STOOL: 0
CARDIOVASCULAR NEGATIVE: 1
FEVER: 0
DIARRHEA: 0
SHORTNESS OF BREATH: 1
VOMITING: 0
NERVOUS/ANXIOUS: 0
WEAKNESS: 1
FLANK PAIN: 0
SPUTUM PRODUCTION: 0
DEPRESSION: 0
SENSORY CHANGE: 1
CONSTIPATION: 0
NECK PAIN: 0
BACK PAIN: 0
PHOTOPHOBIA: 0

## 2023-01-16 ASSESSMENT — PATIENT HEALTH QUESTIONNAIRE - PHQ9
2. FEELING DOWN, DEPRESSED, IRRITABLE, OR HOPELESS: NOT AT ALL
1. LITTLE INTEREST OR PLEASURE IN DOING THINGS: NOT AT ALL
SUM OF ALL RESPONSES TO PHQ9 QUESTIONS 1 AND 2: 0
2. FEELING DOWN, DEPRESSED, IRRITABLE, OR HOPELESS: NOT AT ALL
SUM OF ALL RESPONSES TO PHQ9 QUESTIONS 1 AND 2: 0
1. LITTLE INTEREST OR PLEASURE IN DOING THINGS: NOT AT ALL

## 2023-01-16 ASSESSMENT — PAIN DESCRIPTION - PAIN TYPE
TYPE: ACUTE PAIN

## 2023-01-16 ASSESSMENT — GAIT ASSESSMENTS: GAIT LEVEL OF ASSIST: UNABLE TO PARTICIPATE

## 2023-01-16 ASSESSMENT — ACTIVITIES OF DAILY LIVING (ADL): TOILETING: INDEPENDENT

## 2023-01-16 ASSESSMENT — COPD QUESTIONNAIRES
DO YOU EVER COUGH UP ANY MUCUS OR PHLEGM?: YES, A FEW DAYS A WEEK OR MONTH
DURING THE PAST 4 WEEKS HOW MUCH DID YOU FEEL SHORT OF BREATH: NONE/LITTLE OF THE TIME
HAVE YOU SMOKED AT LEAST 100 CIGARETTES IN YOUR ENTIRE LIFE: YES
COPD SCREENING SCORE: 7

## 2023-01-16 ASSESSMENT — FIBROSIS 4 INDEX: FIB4 SCORE: 2.58

## 2023-01-16 NOTE — PROGRESS NOTES
Critical Care Progress Note    Date of admission  1/12/2023    Chief Complaint  Stroke    Hospital Course  Kaiden Quiroz is a 62 y.o. male BMI 36, active smoker 1ppd, active drinker 6 pack of beer daily, hx of HTN, DM who presented 1/12/2023 with dense left side weakness, slurred speech. LKW 1830. Stroke alert was activated. Initial . TNKase was not given due to the extent of clot burden/chronicity of the lesion and possibility of extending the infarct if BP is lowered. Neuro recommended thrombectomy by IR, while maintaning SBP >180. Initial NIHSS 20. Pt was taken to IR     Intraop, pt underwent thrombectomy with TICI 3 flow and right ICA stent placement. Procedure was complicated by sinus bradycardia then long 10sec pause. CPR was about to be started but pt gained his pulse back. Anesthesia was called and placed LMA. This was eventually converted to intubation post procedure. Atropine, glycopyrrolate, ephedrine were given. Aspirin 600mg transrectally was given.     1/13- Extubated. S/p right MCA thrombectomy and right ICA stent  1/14-CT head with hemorrhagic transformation, not worsened from MRI brain from 1/13  1/15-IV lasix diuresis for hypoxic resp failure, weaned HFNC. Starting to have alcohol withdrawal, agitation, started on precedex and librium  1/16- improved sign, weaned off nicardipine drip, weaned off precedex drip, intermittent somnolent but alert, following commands. Still significant left sided deficits      Interval Problem Update  Reviewed last 24 hour events:              - Tm: Afebrile              - HR: 50s-70s SB/SR              - SBP: 108-130s, off nicardipine drip since 6AM 1/16, goal SBP<140              - Neuro: Aox3 right side 5/5 strength, left side 0/5 LUE, 1-2/5 LLE              - GI: EN at goal   - Resp: weaned off HFNC to oxymask this AM              - UOP: 5.4L on 20mg q6h IV lasix              - Mcclendon: Yes, urinary retention and strict I/O IV lasix diuresis               - Lines: PIV, CVC, A-line, probably can remove A line and CVC today   - CXR 1/15: Central venous catheter and feeding tube in good position.  Diffuse pulmonary vascular congestion              - PPx: ASA, H2, Lovenox              - precedex drip off, on scheduled librium for alcohol withdrawals  - ON received valium prn push, dc'd, placed on ICU protocol with lorazepam      Review of Systems  Review of Systems   HENT: Negative.     Eyes:  Negative for double vision and photophobia.   Respiratory: Negative.     Cardiovascular: Negative.    Gastrointestinal:  Negative for abdominal pain, nausea and vomiting.   Genitourinary: Negative.    Musculoskeletal:  Negative for neck pain.   Neurological:  Positive for focal weakness.   Psychiatric/Behavioral:  Positive for hallucinations.    All other systems reviewed and are negative.     Vital Signs for last 24 hours   Temp:  [36 °C (96.8 °F)-36.3 °C (97.3 °F)] 36.3 °C (97.3 °F)  Pulse:  [55-74] 65  Resp:  [6-44] 26  BP: ()/(55-69) 111/66  SpO2:  [91 %-100 %] 95 %    Hemodynamic parameters for last 24 hours       Respiratory Information for the last 24 hours         Physical Exam  Constitutional:       General: He is not in acute distress.  HENT:      Mouth/Throat:      Mouth: Mucous membranes are moist.   Eyes:      Pupils: Pupils are equal, round, and reactive to light.   Cardiovascular:      Rate and Rhythm: Normal rate and regular rhythm.      Heart sounds: No murmur heard.    No friction rub. No gallop.   Pulmonary:      Effort: No respiratory distress.      Breath sounds: No wheezing or rales.   Abdominal:      General: There is no distension.      Palpations: Abdomen is soft.   Musculoskeletal:      Cervical back: Neck supple.      Right lower leg: No edema.      Left lower leg: No edema.   Skin:     General: Skin is warm and dry.   Neurological:      Mental Status: He is oriented to person, place, and time.      Comments: Opens eyes to voice and responding /  obeying commands. Right sided 5/5 strength, left sided neglect. LUE 0/5, LLE 1/5 strength, moves foot and knee, but cannot raise       Medications  Current Facility-Administered Medications   Medication Dose Route Frequency Provider Last Rate Last Admin    acetaminophen (Tylenol) tablet 650 mg  650 mg Enteral Tube Q6HRS PRN Jessie ANTONY Latona   650 mg at 01/16/23 0529    thiamine (Vitamin B-1) tablet 100 mg  100 mg Enteral Tube DAILY Carmen Cardozo A.P.R.NTravis   100 mg at 01/16/23 1217    furosemide (LASIX) injection 40 mg  40 mg Intravenous BID DIURETIC Maritza Painting M.D.        LORazepam (ATIVAN) injection 4 mg  4 mg Intravenous Q15 MIN PRN Maritza Painting M.D.        Or    LORazepam (ATIVAN) injection 3 mg  3 mg Intravenous Q15 MIN PRN Maritza Painting M.D.        Or    LORazepam (ATIVAN) injection 2 mg  2 mg Intravenous Q15 MIN PRN Maritza Painting M.D.        Or    LORazepam (ATIVAN) injection 1 mg  1 mg Intravenous Q15 MIN PRN Maritza Painting M.D.        niCARdipine (CARDENE) 50 mg in  mL Infusion  0-15 mg/hr Intravenous Continuous Nelson Cosby M.D.   Stopped at 01/16/23 0556    Pharmacy Consult: Enteral tube insertion - review meds/change route/product selection  1 Each Other PHARMACY TO DOSE Nelson Cosby M.D.        amLODIPine (NORVASC) tablet 5 mg  5 mg Enteral Tube Q DAY Nelson Cosby M.D.   5 mg at 01/16/23 0530    atorvastatin (LIPITOR) tablet 80 mg  80 mg Enteral Tube Q EVENING Nelson Cosby M.D.   80 mg at 01/15/23 1817    dexmedetomidine (PRECEDEX) 400 mcg/100mL NS premix infusion  0.1-1.5 mcg/kg/hr (Ideal) Intravenous Continuous Chinyere Reza M.D. 3.3 mL/hr at 01/16/23 0834 0.2 mcg/kg/hr at 01/16/23 0834    chlordiazePOXIDE (Librium) capsule 25 mg  25 mg Enteral Tube Q8HRS Jessie ANTONY Latona   25 mg at 01/16/23 0529    aspirin (ASA) chewable tab 81 mg  81 mg Enteral Tube DAILY RUDI MorganOTravis   81 mg at 01/16/23 0529    enoxaparin (Lovenox) inj 40 mg  40 mg Subcutaneous  DAILY AT 1800 Nelson Cosby M.D.   40 mg at 01/15/23 1817    ondansetron (ZOFRAN) syringe/vial injection 4 mg  4 mg Intravenous Q4HRS PRN Nelson Cosby M.D.   4 mg at 01/14/23 1820    prochlorperazine (COMPAZINE) injection 10 mg  10 mg Intravenous Q6HRS PRN Nelson Cosby M.D.   10 mg at 01/13/23 1151    Respiratory Therapy Consult   Nebulization Continuous RT Bob Razo D.O.        senna-docusate (PERICOLACE or SENOKOT S) 8.6-50 MG per tablet 2 Tablet  2 Tablet Enteral Tube BID Bob Razo D.O.   2 Tablet at 01/15/23 1817    And    polyethylene glycol/lytes (MIRALAX) PACKET 1 Packet  1 Packet Enteral Tube QDAY PRN Bob Razo D.O.        And    magnesium hydroxide (MILK OF MAGNESIA) suspension 30 mL  30 mL Enteral Tube QDAY PRN Bob Razo D.O.        And    bisacodyl (DULCOLAX) suppository 10 mg  10 mg Rectal QDAY PRN Bob Razo D.O.        MD Alert...ICU Electrolyte Replacement per Pharmacy   Other PHARMACY TO DOSE Bob Razo D.O.        hydrALAZINE (APRESOLINE) injection 10-20 mg  10-20 mg Intravenous Q6HRS PRN RUDI MorganOTravis   20 mg at 01/16/23 1252    folic acid (FOLVITE) tablet 1 mg  1 mg Enteral Tube DAILY RUDI MorganOTravis   1 mg at 01/16/23 0529    nicotine (NICODERM) 14 MG/24HR 14 mg  14 mg Transdermal Daily-0600 RUDI MorganOTravis   14 mg at 01/16/23 0529    multivitamin tablet 1 Tablet  1 Tablet Enteral Tube DAILY Bob Razo D.O.   1 Tablet at 01/16/23 0530       Fluids    Intake/Output Summary (Last 24 hours) at 1/16/2023 1341  Last data filed at 1/16/2023 1000  Gross per 24 hour   Intake 1721.04 ml   Output 5235 ml   Net -3513.96 ml       Laboratory            Recent Labs     01/14/23  0343 01/15/23  0416 01/16/23  0350   SODIUM 139 133* 136   POTASSIUM 4.0 3.8 3.6   CHLORIDE 107 101 101   CO2 23 23 25   BUN 10 8 11   CREATININE 0.68 0.56 0.67   MAGNESIUM 1.8 1.4* 1.5   PHOSPHORUS 2.4* 2.4* 3.0   CALCIUM 8.1* 8.2* 8.6     Recent Labs     01/14/23  0343 01/15/23  0416 01/16/23  0350   ALTSGPT   --   --  23   ASTSGOT  --   --  27   ALKPHOSPHAT  --   --  85   TBILIRUBIN  --   --  1.2   PREALBUMIN  --  17.9*  --    GLUCOSE 121* 120* 117*     Recent Labs     01/14/23  0343 01/15/23  0416 01/16/23  0350   WBC 8.9 8.4 8.4   NEUTSPOLYS 77.30* 75.70* 73.00*   LYMPHOCYTES 13.90* 14.00* 15.30*   MONOCYTES 8.00 8.20 9.30   EOSINOPHILS 0.40 1.20 1.70   BASOPHILS 0.20 0.40 0.20   ASTSGOT  --   --  27   ALTSGPT  --   --  23   ALKPHOSPHAT  --   --  85   TBILIRUBIN  --   --  1.2     Recent Labs     01/14/23  0343 01/15/23  0416 01/16/23  0350   RBC 3.73* 3.57* 3.78*   HEMOGLOBIN 12.8* 12.2* 12.8*   HEMATOCRIT 38.0* 35.4* 36.6*   PLATELETCT 116* 110* 133*       Imaging  NG tube in good position    Assessment/Plan  * Acute CVA (cerebrovascular accident) (HCC)- (present on admission)  Assessment & Plan  Acute stroke  Last seen normal: 1830  TNKase given: no  SBP at admission: 190  CT head w/o contrast at admission: no bleed, hyperdense right MCA sign  CTA head: R ICA and M1 occlusion with severely narrowed left M1/M2/M3  CTA neck: right ICA/vertebral artery occlusion   CTP: 120cc mismatched volume  MRI brain: Acute infarction involving the right frontal region, right basal ganglia and right caudate nucleus. Minimal punctate infarction involving the right parietal cortex. There is slight mass effect upon the right lateral ventricle with only minimal midline   shift measuring approximately 4 mm.  + Petechial hemorrhage is noted within the right frontal cortical infarct. Also noted is petechial hemorrhage in the right basal ganglia and right caudate nucleus but without surrounding vasogenic edema.  CT head 1/15: petechial hemorrhages stable from prior image      S/p thrombectomy with TICI 3 and right ICA stent placement  Intraop, pt developed transient bradycardia leading to 10 sec-pause, and before CPR was started, pt gained his pulse back. Anesthesia was called, atropine, glycopyrrolate, ephedrine were given. Pt was placed  LMA. After procedure, LMA was converted to intubation  Aspirin 600mg transrectally was given     - With TICI 3 Flow; goal -140  - CT head w/o contrast prn acute changes in mental status only  - Start aspirin 81mg daily  - Delay Plavix initiation due to hemorrhagic transformation  - Statin to target goal LDL <70  - TTE with bubble study to evaluate for PFO-->Negative, overall normal ECHO  - Keep -180.  - PT/OT/Rehab/speech/nutrition  - Dysphagia screen w/ swallow study   - Lovenox ppx  -appreciate neuro consult        Alcohol withdrawal (HCC)  Assessment & Plan  -thiamine, folate, s/p rally bag  -ativan PRN protocol ordered  -librium 25mg q8h pending taper  -telemetry monitoring  -avoid compounding long acting benzos  -precedex drip    Hypomagnesemia  Assessment & Plan  -replete to target Mg >2    Delirium due to medical condition with behavioral disturbance  Assessment & Plan  Attributed to sleep wake cycle disturbance, alcohol withdrawal considered however there are no tremors or tachycardia.  Verbal orientation  Limit procedures at night      Acute respiratory failure with hypoxia ([facility])  Assessment & Plan  Intubated for airway protection.  Extubated 1/13  Requiring HFNC.   2/2 pulmonary edema  -ECHO w/ evidence of volume overload, overall normal function  IV Lasix diuresis.  Mcclendon for strict I/o  -target 2-3L net negative fluid balance daily  -improving    BMI 36.0-36.9,adult  Assessment & Plan  BMI 36    Type 2 diabetes mellitus without complication, without long-term current use of insulin (HCC)  Assessment & Plan  Monitor glycemic control with insulin therapy  -goal -180  -most recent a1c 5.3  -dc'd [facility]      Alcohol abuse  Assessment & Plan  Active alcohol drink 6 packs of beers daily   Monitor for withdrawal  Fluids, vitamins, folate, thiamine  -precedex drip  -librium taper  -ativan RAAS protocol    Current smoker  Assessment & Plan  Active smoker 1ppd.   Nicotine patch  Cessation  counseling    Hypertension  Assessment & Plan  Systolic blood pressure goal 110- 140  Titrate Nicardipine infusion  Continue Norvasc         VTE:  Lovenox  Ulcer: H2 Antagonist  Lines: Central Line  Ongoing indication addressed and Arterial Line  Ongoing indication addressed    I have performed a physical exam and reviewed and updated ROS and Plan today (1/16/2023). In review of yesterday's note (1/15/2023), there are no changes except as documented above.     Discussed patient condition and risk of morbidity and/or mortality with Family, RT, Pharmacy, Patient, and neurology

## 2023-01-16 NOTE — DISCHARGE PLANNING
Please review the consult from Dr. Norman regarding post acute recommendations.  Current barriers: HFNC @ 40, Precedex gtt & FOS to enter home.

## 2023-01-16 NOTE — ASSESSMENT & PLAN NOTE
-thiamine, folate, s/p rally bag  -ativan PRN protocol ordered  -librium 25mg q8h pending taper  -telemetry monitoring  -avoid compounding long acting benzos  -precedex drip

## 2023-01-16 NOTE — CARE PLAN
The patient is Watcher - Medium risk of patient condition declining or worsening    Shift Goals  Clinical Goals: Q4H neuro, RASS 0 to -1, safety  Patient Goals: none stated  Family Goals: unable to assess        Problem: Optimal Care of the Stroke Patient  Goal: Optimal emergency care for the stroke patient  Outcome: Not Met  Goal: Optimal acute care for the stroke patient  Outcome: Not Met     Problem: Knowledge Deficit - Stroke Education  Goal: Patient's knowledge of stroke and risk factors will improve  Outcome: Not Met     Problem: Neuro Status  Goal: Neuro status will remain stable or improve  Outcome: Not Met     Problem: Respiratory - Stroke Patient  Goal: Patient will achieve/maintain optimum respiratory rate/effort  Outcome: Not Met     Problem: Risk for Aspiration  Goal: Patient's risk for aspiration will be absent or decrease  Outcome: Not Met     Problem: Safety - Medical Restraint  Goal: Remains free of injury from restraints (Restraint for Interference with Medical Device)  Outcome: Not Met  Goal: Free from restraint(s) (Restraint for Interference with Medical Device)  Outcome: Not Met

## 2023-01-16 NOTE — THERAPY
Speech Language Therapy Contact Note    Patient Name: Kaiden Quiroz  Age:  62 y.o., Sex:  male  Medical Record #: [id number]  Today's Date: 1/16/2023    Discussed missed therapy with RN.    Attempted to see pt for a swallow reassessment. However, pt is not able to maintain arousal per discussion w/ RN. Will reattempt as medically appropriate.       01/16/23 0851   Treatment Variance   Reason For Missed Therapy Medical - Patient Unarousable

## 2023-01-16 NOTE — PROGRESS NOTES
[facility] Team Attending Note  (see full Resident note in EPIC)     I have seen and examined the patient today.  I have reviewed the medical record, laboratory data, imaging, and all relevant studies.  I have discussed the assessment and plan of care with the Internal Medicine Resident.     Kaiden Quiroz is a 62 y.o. male BMI 36, active smoker 1ppd, active drinker 6 pack of beer daily, hx of HTN, DM who presented 1/12/2023 with dense left side weakness, slurred speech. LKW 1830. Stroke alert was activated. Initial . TNKase was not given due to the extent of clot burden/chronicity of the lesion and possibility of extending the infarct if BP is lowered. Neuro recommended thrombectomy by IR, while maintaning SBP >180. Initial NIHSS 20. Pt was taken to IR.  Intraop, pt underwent thrombectomy with TICI 3 flow and right ICA stent placement. Procedure was complicated by sinus bradycardia then long 10sec pause. CPR was about to be started but pt gained his pulse back. Anesthesia was called and placed LMA. This was eventually converted to intubation post procedure. Atropine, glycopyrrolate, ephedrine were given. Aspirin 600mg transrectally was given.     1/13 extubated  1/14 confused, L sided weakness  1/15 Delirium, DEX, HFNC, Nicardipine    24 hr review:  DEX 0.2 - WDs  One PRN Benzo  Oriented x one, later in day Ox3  2/5 strength L  HFNC 30L/50%   <-  60/100 weaning  Lasix  CXR 1/15 - pulm edema, CM  Tm 97  SB/SR  SBp 90-130s  Goal < 140  Nicardipine off at 5 AM  PRN hydralazine  TF NG goal  Mcclendon for retention  UO good  Mobiilty II  Lovenox  Plavix to start 1/23  ASA  Amlodipine    Assessment    Acute CVA    CT head w/o contrast: no bleed, hyperdense RMCA sign  CTA head: R ICA and M1 occlusion  Severely narrowed left M1/M2/M3  CTA neck: right ICA/vertebral artery occlusion   S/p R ICA stent and thrombectomy - TICI 3 flow   Intraop clay -> 10 sec pause -> chem code/LMA/intubated  Goal -140  CT head  w/o contrast prn acute changes  Start aspirin 81mg daily  Delay Plavix initiation due to hemorrhagic transformation  Statin to target goal LDL <70  S/p TTE w/bubble study-->Negative, overall normal ECHO   Maintain euglycemia, euvolemia, normothermia  PT/OT/SLP w/ swallow study   Lovenox ppx ok per neuro    Acute respiratory failure   Intubated with chem code intra-op for IR procedure   Extubated 1/13   Mobilize/IS   RT protocols   ECHO w/normal LV function   Pulmonary edema - lasix forced diuresis ongoing   Weaning HFNC -> NC    Delirium   Multifactorial-CVA/WDs/Intra-op issues   Improved with DEX/Benzos -wean   Thiamine, vits etc    ETOH abuse/WDs  H/o 6 packs of beers daily   Fluids, vitamins, folate, thiamine  Precedex drip  Librium taper-avoid valium TOO long half life  IV Ativan RAAS protocol    Hypomagnesemia   Replete    DM II   SSI - goal -180   Hypoglycemia protocols   A1c 5.3    TOB abuse  Active smoker 1ppd.   Nicotine patch  Cessation counseling    HTN  Systolic blood pressure goal 110- 140  Actively Titrate Nicardipine infusion  Continue Norvasc-reassess Rx daily     Obesity   BMI 37.4    Case reviewed with RN, Charge RN, RT, Clinical pharmacy, [facility] Resident, Neurology and patient    Ok to transfer to [facility], improved significantly over the course of the day

## 2023-01-16 NOTE — CARE PLAN
The patient is Watcher - Medium risk of patient condition declining or worsening    Shift Goals  Clinical Goals: stable neuro examp; decreased O2 demand  Patient Goals: [place]  Family Goals: [place]    Progress made toward(s) clinical / shift goals:  neuro exam remains stable    Patient is not progressing towards the following goals: O2 demand are the same       Problem: Optimal Care of the Stroke Patient  Goal: Optimal emergency care for the stroke patient  Description: Target End Date:  End of day 1    Time of Onset    1.  Time of last known well obtained  2.  Patient and family/caregiver verbalize understanding of diagnosis, medications and testing  3.  NIHSS performed and documented, including date and time, for ischemic stroke patients prior to any acute recanalization therapy (thrombolytics or mechanical) or within 12 hours of arrival if no intervention is warranted  4.  Consults and referrals placed to appropriate departments    Medications Administration as Ordered:    1.  Implement appropriate reversal agents for INR greater than 1.5  2.  Pre-alteplase administration of antihypertensives for SBP >185 DBP >110  3.  Post-alteplase administration of antihypertensives for SBP >185, DBP >105  4.  Thrombolytic Therapy for qualifying ischemic stroke patients who arrive within 4.5 hours of time of Last Known Well. Thrombolytic therapy administered within 30 minutes or a documented reason for delay  Outcome: Progressing  Goal: Optimal acute care for the stroke patient  Description: Target End Date:  1 to 3 days    - Vital signs and neuro checks performed and documented per order  - NIHSS completed and documented per order  - Continuous telemetry monitoring for 72 hours or until discontinued by provider  - Head CT without contrast obtained  - Consideration of MRI/MRA  - MRI screening form completed in worklist if MRI ordered  - Echocardiogram with Bubble Study ordered/completed with consideration of BLAKE  - Carotid Doppler  ordered/completed (Not required if CTA of neck completed in ED)  - Lipid Panel obtained within 48 hours of admission  - PT, PTT, INR obtained per Anticoagulation orders (if applicable)  - Antithrombotic therapy by end of hospital day 2 for ischemic stroke. Provider must document reason if contraindicated.  - Venous Thromboembolism (VTE) Prophylaxis by end of hospital day 2 for ischemic and hemorrhagic stroke. Provider must document reason if contraindicated  - Dysphagia screen completed and documented prior to any PO intake. Patient to remain NPO until Speech Therapy evaluation if thrombolytic or thrombectomy performed  - Rehabilitation assessment including PT/OT/SLP evaluations for referral to Physical Medicine and Rehabilitation services. If none needed, provider needs to document reason  - Neurology consult placed  - Consideration of cardiology consult for cryptogenic strokes  Outcome: Progressing     Problem: Knowledge Deficit - Stroke Education  Goal: Patient's knowledge of stroke and risk factors will improve  Description: Target End Date:  1-3 days or as soon as patient condition allows    Document in Patient Education    1.  Stroke education booklet provided  2.  Education regarding EMS activation, need for follow up, medication prescribed at discharge, risk factors for stroke/lifestyle modifications, warning signs and symptoms of stroke provided  Outcome: Progressing     Problem: Psychosocial - Patient Condition  Goal: Patient's ability to verbalize feelings about condition will improve  Description: Target End Date:  Prior to discharge or change in level of care    1.  Discuss coping with medical condition and its effects  2.  Encourage patient participation in care  3.  Encourage acknowledgement of body changes and accompanying emotions  4.  Perform depression screening  Outcome: Progressing  Goal: Patient's ability to re-evaluate and adapt role responsibilities will improve  Description: Target End Date:   Prior to discharge or change in level of care    1.  Assess family support  2.  Encourage support system participation in care  3.  Encouraged verbalization of feelings regarding caregiver responsibilities  4.  Discuss changes in role and responsibilities caused by patient's condition  Outcome: Progressing     Problem: Discharge Planning - Stroke  Goal: Ensure Stroke Core Measures are met prior to discharge  Description: Target End Date:  Prior to discharge or change in level of care    1. Patient discharged on antithrombotic therapy (Ischemic Stroke)  2. Patient discharged on intensive statin if LDL is greater than or equal to 70 mg/dl (Ischemic Stroke)  3. Patient discharged on anticoagulation therapy for patients with atrial fibrillation/flutter (Ischemic Stroke)  4. Smoking education/cessation provided if applicable  Outcome: Progressing  Goal: Patient’s continuum of care needs will be met  Description: Target End Date:  Prior to discharge or change in level of care    1.  Potential discharge barriers identified upon admission and throughout hospital stay  2.  Ensure appropriate referrals in place for follow up with specialists after discharge  3.  Ensure appropriate referrals in place for DMEs if applicable  4.  Collaboration with transitional care team and interdisciplinary team to meet discharge needs  5.  Involve patient and family/caregiver in prioritizing goals for discharge planning  6.  Educate patient and caregiver about discharge instructions, medications, and follow up appointments  7.  Referral placed to [facility]  8.  Assure orders and follow up appointments are made for outpatient extended cardiac monitoring if appropriate  Outcome: Progressing     Problem: Neuro Status  Goal: Neuro status will remain stable or improve  Description: Target End Date:  Prior to discharge or change in level of care    Document on Neuro assessment in the Assessment flowsheet    1.  Assess and monitor  neurologic status per provider order/protocol/unit policy  2.  Assess level of consciousness and orientation  3.  Assess for speech, dysarthria, dysphagia, facial symmetry  4.  Assess visual field, eye movements, gaze preference, pupil reaction and size  5.  Assess muscle strength and motor response in all four extremities  6.  Assess for sensation (numbness and tingling)  7.  Assess basic neuro reflexes (cough, gag, corneal)  8.  Identify changes in neuro status and report to provider for testing/treatment orders  Outcome: Progressing     Problem: Hemodynamic Monitoring  Goal: Patient's hemodynamics, fluid balance and neurologic status will be stable or improve  Description: Target End Date:  Prior to discharge or change in level of care    1.  Vital signs, pulse oximetry and cardiac monitor per provider order and/or policy  2.  Frequent pulse checks performed post thrombectomy  3.  Frequent monitoring for signs of bleeding post TPA administration  4.  Proper management of IV infusions  5.  Intake and output monitored per provider order  6.  Daily weight obtained per unit policy or provider order  7.  Peripheral pulses and capillary refill assessed as needed  8.  Monitor for signs/symptoms of excessive bleeding  9.  Body temperature assessed and fevers treated  10. Patient positioned for maximum circulation/cardiac output  Outcome: Progressing     Problem: Respiratory - Stroke Patient  Goal: Patient will achieve/maintain optimum respiratory rate/effort  Description: Target End Date:  Prior to discharge or change in level of care    Document on Assessment flowsheet    1.  Assess and monitor respiratory rate, rhythm, depth and effort of respiration  2.  Oxygenation assessed throughout shift (recommendation of >94% for new stroke patients)  3.  Oxygen administered and/or titrated per order  4.  Collaboration with RT to administer medication/treatments per order  5.  Patient educated on importance of turning, coughing,  and deep breathing  6.  Patient positioned for maximum ventilatory efficiency  7.  Airway suctioning provided as needed  8.  Incentive spirometry encouraged 5-10 times every hour or while awake  Outcome: Progressing     Problem: Dysphagia  Goal: Dysphagia will improve  Description: Target End Date:  Prior to discharge or change in level of care    1.  Assess and monitor ability to swallow  2.  Collaborate with Speech Therapy to determine appropriate adaptation for safe administration of medications and oral nutrition  3.  Elevate head of bed to 90 degrees during feedings and for 30 minutes after each feeding  4.  Encourage proper swallowing techniques  5.  Screening on admission or as soon as possible  Outcome: Progressing     Problem: Risk for Aspiration  Goal: Patient's risk for aspiration will be absent or decrease  Description: Target End Date:  Prior to discharge or change in level of care    1.   Complete dysphagia screening on admission  2.   NPO until dysphagia screening complete or medically cleared  3.   Collaborate with Speech Therapy, Clinical Dietitian and interdisciplinary team  4.   Implement aspiration precautions  5.   Assist patient up to chair for meals  6.   Elevate head of bed 90 degrees if patient is unable to get out of bed  7.   Encourage small bites  8.   Ensure foods/liquids are of appropriate consistency  9.   Assess for any signs/symptoms of aspiration  10. Assess breath sounds and vital signs after oral intake  Outcome: Progressing     Problem: Urinary Elimination  Goal: Establish and maintain regular urinary output  Description: Target End Date:  Prior to discharge or change in level of care    Document on I/O and Assessment flowsheets    1.  Evaluate need to continue indwelling catheter every shift  2.  Assess signs and symptoms of urinary retention  3.  Assess post-void residual volumes  4.  Implement bladder training program  5.  Encourage scheduled voidings  6.  Assist patient to  sit on bedside commode or toilet for voiding  7.  Educate patient and family/caregiver on use and purpose of urine collection devices (document in Patient Education)  Outcome: Progressing     Problem: Bowel Elimination  Goal: Establish and maintain regular bowel function  Description: Target End Date:  Prior to discharge or change in level of care    1.   Note date of last BM  2.   Educate about diet, fluid intake, medication and activity to promote bowel function  3.   Educate signs and symptoms of constipation and interventions to implement  4.   Pharmacologic bowel management per provider order  5.   Regular toileting schedule  6.   Upright position for toileting  7.   High fiber diet  8.   Encourage hydration  9.   Collaborate with Clinical Dietician  10. Care and maintenance of ostomy if applicable  Outcome: Progressing     Problem: Mobility - Stroke  Goal: Patient's capacity to carry out activities will improve  Description: Target End Date:  Prior to discharge or change in level of care    1.  Assess for barriers to mobility/activity  2.  Implement activity per interdisciplinary team recommendations  3.  Target activity level identified and patient/family/caregiver aware of goal  4.  Provide assistive devices  5.  Instruct patient/caregiver on proper use of assistive/adaptive devices  6.  Schedule activities and rest periods to decrease effects of fatigue  7.  Encourage mobilization to extent of ability  8.  Maintain proper body alignment  9.  Provide adequate pain management to allow progressive mobilization  10. Implement pace maker precautions as needed  Outcome: Progressing  Goal: Spasticity will be prevented or improved  Description: Target End Date:  Prior to discharge or change in level of care    1.  Muscle relaxing agents considered or administered per order  2.  Splints applied properly and used accordingly to therapist's recommendations  3.  Assistance with stretching and range of motion exercises  provided  Outcome: Progressing  Goal: Subluxation will be prevented or improved  Description: Target End Date:  Prior to discharge or change in level of care    1.   Ensure proper handling during transfers, ambulation, and repositioning in bed  2.   Reduce traction to affected limb and provide adequate support of weight  3.   Perform passive range of motion  4.  Assist with active range of motion  Outcome: Progressing     Problem: Self Care  Goal: Patient will have the ability to perform ADLs independently or with assistance (bathe, groom, dress, toilet and feed)  Description: Target End Date:  Prior to discharge or change in level of care    Document on ADL flowsheet    1.  Assess the capability and level of deficiency to perform ADLs  2.  Encourage family/care giver involvement  3.  Provide assistive devices  4.  Consider PT/OT evaluations  5.  Maintain support, give positive feedback, encourage self-care allowing extra time and verbal cuing as needed  6.  Avoid doing something for patients they can do themselves, but provide assistance as needed  7.  Assist in anticipating/planning individual needs  8.  Collaborate with Case Management and  to meet discharge needs  Outcome: Progressing     Problem: Knowledge Deficit - Standard  Goal: Patient and family/care givers will demonstrate understanding of plan of care, disease process/condition, diagnostic tests and medications  Description: Target End Date:  1-3 days or as soon as patient condition allows    Document in Patient Education    1.  Patient and family/caregiver oriented to unit, equipment, visitation policy and means for communicating concern  2.  Complete/review Learning Assessment  3.  Assess knowledge level of disease process/condition, treatment plan, diagnostic tests and medications  4.  Explain disease process/condition, treatment plan, diagnostic tests and medications  Outcome: Progressing     Problem: Pain - Standard  Goal: Alleviation  of pain or a reduction in pain to the patient’s comfort goal  Description: Target End Date:  Prior to discharge or change in level of care    Document on Vitals flowsheet    1.  Document pain using the appropriate pain scale per order or unit policy  2.  Educate and implement non-pharmacologic comfort measures (i.e. relaxation, distraction, massage, cold/heat therapy, etc.)  3.  Pain management medications as ordered  4.  Reassess pain after pain med administration per policy  5.  If opiods administered assess patient's response to pain medication is appropriate per POSS sedation scale  6.  Follow pain management plan developed in collaboration with patient and interdisciplinary team (including palliative care or pain specialists if applicable)  Outcome: Progressing     Problem: Safety - Medical Restraint  Goal: Remains free of injury from restraints (Restraint for Interference with Medical Device)  Description: INTERVENTIONS:  1. Determine that other, less restrictive measures have been tried or would not be effective before applying the restraint  2. Evaluate the patient's condition at the time of restraint application  3. Educate patient/family regarding the reason for restraint  4. Q2H: Monitor safety, psychosocial status, comfort, circulation, respiratory status, LOC, nutrition and hydration  Outcome: Progressing  Goal: Free from restraint(s) (Restraint for Interference with Medical Device)  Description: INTERVENTIONS:  1.  ONCE/SHIFT or MINIMUM Q12H: Assess and document the continuing need for restraints  2.  Q24H: Continued use of restraint requires LIP to perform face to face examination and written order  3.  Identify and implement measures to help patient regain control  4.  Educate patient/family on discontinuation criteria   5.  Assess patient's understanding and retention of education provided  6.  Assess readiness for release & initiate progressive release per protocol  7.  Identify and document  criteria for restraints  Outcome: Progressing     Problem: Skin Integrity  Goal: Skin integrity is maintained or improved  Description: Target End Date:  Prior to discharge or change in level of care    Document interventions on Skin Risk/Lucius flowsheet groups and corresponding LDA    1.  Assess and monitor skin integrity, appearance and/or temperature  2.  Assess risk factors for impaired skin integrity and/or pressures ulcers  3.  Implement precautions to protect skin integrity in collaboration with interdisciplinary team  4.  Implement pressure ulcer prevention protocol if at risk for skin breakdown  5.  Confirm wound care consult if at risk for skin breakdown  6.  Ensure patient use of pressure relieving devices  (Low air loss bed, waffle overlay, heel protectors, ROHO cushion, etc)  Outcome: Progressing     Problem: Fall Risk  Goal: Patient will remain free from falls  Description: Target End Date:  Prior to discharge or change in level of care    Document interventions on the Carol Ramos Fall Risk Assessment    1.  Assess for fall risk factors  2.  Implement fall precautions  Outcome: Progressing     Problem: Optimal Care for Alcohol Withdrawal  Goal: Optimal Care for the alcohol withdrawal patient  Description: Target End Date:  1 to 3 days    1.  Alcohol history screening done on admission  2.  CIWA-AR score assessment (includes assessment of nausea/vomiting, tremor, sweats, anxiety, agitation, tactile, visual and auditory disturbances, headache and orientation/sensorium).  Document on CIWA flowsheet.  3.  Follow CIWA-AR score protocol  4.  Frequent reorientation  5.  Monitor for fluid and electrolyte imbalance.  6.  Assess for respiratory depression due to sedation (pulse ox)  7.  Consider thiamine, multivitamins, folic acid and magnesium sulfate per provider order  8.  Collaborate with Social Workers/Case Management  9.  Collaborate with mental health  Outcome: Progressing

## 2023-01-16 NOTE — PROGRESS NOTES
Patient has c/o new HA. He is hypertensive at the time SBP 150s, cardene restarted at this time. No neuro changes noted. Patient states he is feeling anxious and is restless in bed; PRN valium for anxiety given.    Received order for tylenol from TIS[facility] Magallanes.

## 2023-01-16 NOTE — THERAPY
Occupational Therapy   Initial Evaluation     Patient Name: Kaiden Quiroz  Age:  62 y.o., Sex:  male  Medical Record #: [id number]  Today's Date: 1/16/2023     Precautions  Precautions: Fall Risk, Nasogastric Tube, Swallow Precautions ( See Comments)  Comments: L hemiparesis; R wrist restraint    Assessment  Patient is 62 y.o. male with a diagnosis of R ICA and MCA occlusion, s/p R MCA thrombectomy and R ICA angioplasty and stent. He is currently on HHFNC.  Additional factors influencing patient status / progress: L hemiparesis, weakness, fatigue, impaired balance, impaired trunk control, impaired vision, abnormal muscle tone, impaired cognition.      Plan    Occupational Therapy Initial Treatment Plan   Treatment Interventions: Self Care / Activities of Daily Living, Adaptive Equipment, Neuro Re-Education / Balance, Therapeutic Exercises, Therapeutic Activity  Treatment Frequency: 4 Times per Week  Duration: Until Therapy Goals Met    DC Equipment Recommendations: Unable to determine at this time  Discharge Recommendations: Recommend post-acute placement for additional occupational therapy services prior to discharge home        Objective       01/16/23 1004   Prior Living Situation   Prior Services Home-Independent   Housing / Facility 2 Story House   Bathroom Set up Bathtub / Shower Combination;Grab Bars;Shower Curtain   Equipment Owned Single Point Cane;Grab Bar(s) In Tub / Shower   Lives with - Patient's Self Care Capacity Spouse;Adult Children   Comments pt lives with his wife and 3 adult children. reports wife works. reports children don't work.   Prior Level of ADL Function   Self Feeding Independent   Grooming / Hygiene Independent   Bathing Independent   Dressing Independent   Toileting Independent   Prior Level of IADL Function   Medication Management Independent   Laundry Independent   Kitchen Mobility Independent   Finances Independent   Home Management Independent   Shopping Independent   Prior  Level Of Mobility Independent With Device in Community;Independent Without Device in Home   Driving / Transportation Driving Independent;Utilizes Public Transportation   Occupation (Pre-Hospital Vocational) Employed Full Time   Precautions   Precautions Fall Risk;Nasogastric Tube;Swallow Precautions ( See Comments)   Comments HHFNC; L hemiparesis, R wrist restraint   Pain 0 - 10 Group   Therapist Pain Assessment Nurse Notified;0   Cognition    Cognition / Consciousness X   Speech/ Communication Delayed Responses;Dysarthric   Level of Consciousness Alert   Ability To Follow Commands 1 Step   Safety Awareness Impaired;Impulsive   New Learning Impaired   Attention Impaired   Sequencing Impaired   Initiation Impaired   Comments pleasant and cooperative, intermittent restlessness   Active ROM Upper Body   Active ROM Upper Body  X   Dominant Hand Right   Comments LUE grossly limited, able to retract/elevate shoulder, trace abduction noted   Strength Upper Body   Upper Body Strength  X   Comments LUE grossly limited (more distal than proximal) 0-1/5   Sensation Upper Body   Upper Extremity Sensation  X   Comments absent LUE sensation   Upper Body Muscle Tone   Upper Body Muscle Tone  X   Lt Upper Extremity Muscle Tone Non Functional;Hypotonic   Neurological Concerns   Neurological Concerns Yes   Sitting Posture During ADL's Lateral Lean Right;Posterior Lean   Balance Assessment   Sitting Balance (Static) Trace   Sitting Balance (Dynamic) Dependent   Weight Shift Sitting Poor   Comments did not assess standing due to poor sitting balance   Bed Mobility    Supine to Sit Total Assist   Sit to Supine Total Assist   Scooting Total Assist   Comments x2 person   ADL Assessment   Grooming Minimal Assist;Seated   Lower Body Dressing Total Assist   How much help from another person does the patient currently need...   Putting on and taking off regular lower body clothing? 1   Bathing (including washing, rinsing, and drying)? 1    Toileting, which includes using a toilet, bedpan, or urinal? 1   Putting on and taking off regular upper body clothing? 2   Taking care of personal grooming such as brushing teeth? 2   Eating meals? 1   6 Clicks Daily Activity Score 8   mRS Prior to admission   Prior to admission mRS 1   Modified [place] (mRS)   Modified Gisele Score 5   Functional Mobility   Sit to Stand Unable to Participate   Mobility EOB only   Visual Perception   Visual Perception  X   Comments dificulty scanning past midline to the left   Patient / Family Goals   Patient / Family Goal #1 to go home   Short Term Goals   Short Term Goal # 1 pt will groom seated EOB w/ setup   Short Term Goal # 2 pt will maintain sitting balance unsupported 10 sec in prep for seated ADLs   Short Term Goal # 3 pt will demo ADL txfs with Bandar

## 2023-01-16 NOTE — THERAPY
"Physical Therapy   Initial Evaluation     Patient Name: Kaiden Quiroz  Age:  62 y.o., Sex:  male  Medical Record #: [id number]  Today's Date: 1/16/2023     Precautions  Precautions: Fall Risk;Nasogastric Tube;Swallow Precautions ( See Comments)  Comments: L hemiparesis; R wrist restraint    Assessment    Pt is a 62 y.o. male admitted on 1/12/2023 for right MCA stroke, status post thrombectomy on 1/12/2023,  and right ICA stent placement on 1/12/23. Pt has a PMHx of HTM, DM, BMI of 36, active smoker 1 pack a day, and active drinker of 6 beers daily. Pt presented w/ impaired mobility, activity tolerance, L hemiparesis, gross R weakness, impaired balance, coordination, and cognition. Pt was mobilized as described below. Recommended for post-acute placement upon d/c. Will continue to follow for IP acute therapy to address functional deficits described above.     Plan    Physical Therapy Initial Treatment Plan   Treatment Plan : Bed Mobility, Equipment, Gait Training, Neuro Re-Education / Balance, Orthotics Training , Self Care / Home Evaluation, Stair Training, Therapeutic Activities, Therapeutic Exercise  Treatment Frequency: 4 Times per Week  Duration: Until Therapy Goals Met    DC Equipment Recommendations: Unable to determine at this time  Discharge Recommendations: Recommend post-acute placement for additional physical therapy services prior to discharge home       Subjective    \"Ow! My left arm feels heavy\"     Objective       01/16/23 1037   Cosignature   Documentation Review Approved with modifications made by preceptor in flowsheet   Charge Group   PT Evaluation PT Evaluation High   Total Time Spent   PT Evaluation Time Spent (Mins) 35   PT Total Time Spent (Calculated) 35   Initial Contact Note    Initial Contact Note Order Received and Verified, Physical Therapy Evaluation in Progress with Full Report to Follow.   Precautions   Precautions Fall Risk;Nasogastric Tube;Swallow Precautions ( See Comments) "   Comments L hemiparesis; R wrist restraint   Vitals   Pulse 65  (65 supine)   Blood Pressure 111/66  (120/52 supine, 111/66 EOB)   Pulse Oximetry 95 %   O2 (LPM) 30   O2 Delivery Device Heated High Flow Nasal Cannula   Vitals Comments FiO2 50%; new O2 needs, on room air at baseline   Pain 0 - 10 Group   Therapist Pain Assessment Nurse Notified  (not formally assessed, pt reporting L UE heaviness)   Prior Living Situation   Prior Services None   Housing / Facility 2 Story House  (reports can stay on first floor; pt may be an unreliable historian at this time)   Steps Into Home 4   Steps In Home 12   Rail Both Rail (Steps into Home)  (pt did not report rails in the home)   Bathroom Set up Bathtub / Shower Combination;Grab Bars  (pt reports grab bars are being placed in bathroom, not currently there)   Equipment Owned Single Point Cane   Lives with - Patient's Self Care Capacity Spouse;Adult Children  (pt reports his 3 adult children and spouse all live at home with him. Spouse works full time and away from home during the day. Adult kids are reported to be inconcsistently home during the day. Pt currently unreliable historian.)   Comments Pt reports he works full time and work requires physical labor. Commutes to work via bus/shuttle   Prior Level of Functional Mobility   Bed Mobility Independent   Transfer Status Independent   Ambulation Independent   Distance Ambulation (Feet)   (community)   Assistive Devices Used Single Point Cane   Stairs Independent   Cognition    Cognition / Consciousness X   Speech/ Communication Delayed Responses;Dysarthric;Incomprehensible Words;Nods Appropriately   Level of Consciousness Responds to voice  (pt requiring continual VC to open eyes throughout session)   Ability To Follow Commands 1 Step   Safety Awareness Impaired   New Learning Impaired   Attention Impaired   Sequencing Impaired   Initiation Impaired   Comments pt demonstrated slight agitation and fixation on lines w/ R hand  during bed mobility. Easily calmed. Otherwise pleasant and cooperative.   Active ROM Upper Body   Active ROM Upper Body  X   Dominant Hand Right   Comments L UE hemiparesis; able to shrug shoulders and complete scap retraction, unable to lift LUE.   Strength Upper Body   Upper Body Strength  X  (B gross UE weakness; L>R; pt able to bear weight through R UE during bed mobility)   Comments LUE distally 0 to 1/5, shoulder flexion 1+/5, shoulder elevation 1+/5   Sensation Upper Body   Upper Extremity Sensation  X   Comments absent to pain LUE   Upper Body Muscle Tone   Upper Body Muscle Tone  X   Lt Upper Extremity Muscle Tone Hypotonic   Passive ROM Lower Body   Passive ROM Lower Body WDL   Active ROM Lower Body    Active ROM Lower Body  X   Comments limited 2/2 to gross B LE weakness (L>R). L LAQ completed 75% of available range. RLE full LAQ. Required assist to bring LLE to EOB   Strength Lower Body   Lower Body Strength  X   Rt Hip Flexion Strength 3- (F-)   Rt Hip Abduction Strength 3 (F)   Rt Hip Adduction Strength 3 (F)   Rt Knee Extension Strength 3 (F)   Rt Ankle Dorsiflexion Strength 3 (F)   Rt Ankle Plantar Flexion Strength 3 (F)   Lt Hip Flexion Strength 2+ (P+)   Lt Hip Abduction Strength 2- (P-)   Lt Hip Adduction Strength 2 (P)   Lt Knee Extension Strength 2+ (P+)   Lt Ankle Dorsiflexion Strength 0 (Zero)   Lt Ankle Plantar Flexion Strength 1 (T)   Sensation Lower Body   Lower Extremity Sensation   X   Rt Lower Extremity Sharp / Dull Sensation Impaired   Lt Lower Extremity Light Touch Impaired   Lt Lower Extremity Sharp / Dull Sensation Absent   Paresthesia Present   (Pt reported numbness in L calf)   Comments absent pain BLE   Lower Body Muscle Tone   Lower Body Muscle Tone  X   Lt Lower Extremity Muscle Tone Hypotonic   Neurological Concerns   Neurological Concerns Yes   Sitting Posture During ADL's Lateral Lean Right;Posterior Lean;Posterior Pelvic Tilt   Paresthesia Present  (L calf)   Footdrop  Present  (Left)   Coordination Lower Body    Coordination Lower Body  X   Comments not formally assessed 2/2 to gross B LE weakness and L hemiparesis   Vision   Vision Comments pt demosntrated impaired tracking to R and L. Unable to cross midline with eyes, hwoever, would track head fully   Balance Assessment   Sitting Balance (Static) Trace   Sitting Balance (Dynamic) Dependent   Weight Shift Sitting Poor   Comments w/ maxA x2   Bed Mobility    Supine to Sit Maximal Assist  (x2 for safety and linge management)   Sit to Supine Total Assist  (x2 for safety and line management)   Scooting Total Assist   Gait Analysis   Gait Level Of Assist Unable to Participate   Comments limited 2/2 to gross LE weakness and L hemiparesis   Functional Mobility   Sit to Stand Unable to Participate   Mobility supine>EOB>supine   Comments limited 2/2 to gross LE weakness and L hemiparesis, poor EOB balance   ICU Target Mobility Level   ICU Mobility - Targeted Level Level 2   How much difficulty does the patient currently have...   Turning over in bed (including adjusting bedclothes, sheets and blankets)? 1   Sitting down on and standing up from a chair with arms (e.g., wheelchair, bedside commode, etc.) 1   Moving from lying on back to sitting on the side of the bed? 1   How much help from another person does the patient currently need...   Moving to and from a bed to a chair (including a wheelchair)? 1   Need to walk in a hospital room? 1   Climbing 3-5 steps with a railing? 1   6 clicks Mobility Score 6   Activity Tolerance   Sitting Edge of Bed 8 min   Short Term Goals    Short Term Goal # 1 Pt will transition from supine to EOB w/ Bandar in 6 visits to improve independence in bed mobility   Short Term Goal # 2 Pt will transfer from EOB to chair w/ FWW w/ ModA in 6 visits to improve OOB mobility   Short Term Goal # 3 Pt will perform a STS w/ FWW w/ Bandar in 6 visits to improve OOB mobility   Short Term Goal # 4 Pt will be able to  ambulate 15 ft w/ FWW w/ ModA in 6 visits to access household distances   Short Term Goal # 5 Pt will be able to complete 4 steps w/ FWW w/Mod A in 6 visits to access household  (if d/c home in future)   Education Group   Education Provided Role of Physical Therapist   Role of Physical Therapist Patient Response Patient;Acceptance;Explanation;Verbal Demonstration   Physical Therapy Initial Treatment Plan    Treatment Plan  Bed Mobility;Equipment;Gait Training;Neuro Re-Education / Balance;Orthotics Training ;Self Care / Home Evaluation;Stair Training;Therapeutic Activities;Therapeutic Exercise   Treatment Frequency 4 Times per Week   Duration Until Therapy Goals Met   Problem List    Problems Impaired Bed Mobility;Impaired Transfers;Impaired Ambulation;Functional Strength Deficit;Impaired Balance;Impaired Coordination;Impaired Vision;Decreased Activity Tolerance;Safety Awareness Deficits / Cognition;Motor Planning / Sequencing   Anticipated Discharge Equipment and Recommendations   DC Equipment Recommendations Unable to determine at this time   Discharge Recommendations Recommend post-acute placement for additional physical therapy services prior to discharge home   Interdisciplinary Plan of Care Collaboration   IDT Collaboration with  Occupational Therapist;Nursing   Patient Position at End of Therapy In Bed;Wrist Restraints Applied;Call Light within Reach;Phone within Reach  (R wrist restraint only as found)   Collaboration Comments RN updated   Session Information   Date / Session Number  1/16 - 1 (1/4, 1/22)

## 2023-01-17 LAB
ANION GAP SERPL CALC-SCNC: 11 MMOL/L (ref 7–16)
BUN SERPL-MCNC: 15 MG/DL (ref 8–22)
CALCIUM SERPL-MCNC: 9.3 MG/DL (ref 8.5–10.5)
CHLORIDE SERPL-SCNC: 100 MMOL/L (ref 96–112)
CO2 SERPL-SCNC: 27 MMOL/L (ref 20–33)
CREAT SERPL-MCNC: 0.73 MG/DL (ref 0.5–1.4)
GFR SERPLBLD CREATININE-BSD FMLA CKD-EPI: 103 ML/MIN/1.73 M 2
GLUCOSE SERPL-MCNC: 124 MG/DL (ref 65–99)
MAGNESIUM SERPL-MCNC: 1.7 MG/DL (ref 1.5–2.5)
POTASSIUM SERPL-SCNC: 3.7 MMOL/L (ref 3.6–5.5)
SODIUM SERPL-SCNC: 138 MMOL/L (ref 135–145)

## 2023-01-17 PROCEDURE — A9270 NON-COVERED ITEM OR SERVICE: HCPCS | Performed by: NURSE PRACTITIONER

## 2023-01-17 PROCEDURE — 770020 HCHG ROOM/CARE - TELE (206)

## 2023-01-17 PROCEDURE — 700102 HCHG RX REV CODE 250 W/ 637 OVERRIDE(OP): Performed by: NURSE PRACTITIONER

## 2023-01-17 PROCEDURE — 700111 HCHG RX REV CODE 636 W/ 250 OVERRIDE (IP): Performed by: INTERNAL MEDICINE

## 2023-01-17 PROCEDURE — A9270 NON-COVERED ITEM OR SERVICE: HCPCS | Performed by: INTERNAL MEDICINE

## 2023-01-17 PROCEDURE — 83735 ASSAY OF MAGNESIUM: CPT

## 2023-01-17 PROCEDURE — 80048 BASIC METABOLIC PNL TOTAL CA: CPT

## 2023-01-17 PROCEDURE — 700102 HCHG RX REV CODE 250 W/ 637 OVERRIDE(OP): Performed by: INTERNAL MEDICINE

## 2023-01-17 PROCEDURE — 700111 HCHG RX REV CODE 636 W/ 250 OVERRIDE (IP): Performed by: STUDENT IN AN ORGANIZED HEALTH CARE EDUCATION/TRAINING PROGRAM

## 2023-01-17 PROCEDURE — 99233 SBSQ HOSP IP/OBS HIGH 50: CPT | Performed by: INTERNAL MEDICINE

## 2023-01-17 RX ORDER — MAGNESIUM SULFATE HEPTAHYDRATE 40 MG/ML
2 INJECTION, SOLUTION INTRAVENOUS ONCE
Status: COMPLETED | OUTPATIENT
Start: 2023-01-17 | End: 2023-01-17

## 2023-01-17 RX ADMIN — POTASSIUM BICARBONATE 25 MEQ: 978 TABLET, EFFERVESCENT ORAL at 08:18

## 2023-01-17 RX ADMIN — ASPIRIN 81 MG: 81 TABLET, CHEWABLE ORAL at 05:40

## 2023-01-17 RX ADMIN — FUROSEMIDE 40 MG: 10 INJECTION, SOLUTION INTRAMUSCULAR; INTRAVENOUS at 05:41

## 2023-01-17 RX ADMIN — MAGNESIUM SULFATE HEPTAHYDRATE 2 G: 40 INJECTION, SOLUTION INTRAVENOUS at 08:19

## 2023-01-17 RX ADMIN — HYDRALAZINE HYDROCHLORIDE 20 MG: 20 INJECTION INTRAMUSCULAR; INTRAVENOUS at 08:18

## 2023-01-17 RX ADMIN — THERA TABS 1 TABLET: TAB at 05:41

## 2023-01-17 RX ADMIN — AMLODIPINE BESYLATE 5 MG: 5 TABLET ORAL at 05:41

## 2023-01-17 RX ADMIN — HYDRALAZINE HYDROCHLORIDE 10 MG: 20 INJECTION INTRAMUSCULAR; INTRAVENOUS at 01:08

## 2023-01-17 RX ADMIN — CHLORDIAZEPOXIDE HYDROCHLORIDE 25 MG: 25 CAPSULE ORAL at 14:14

## 2023-01-17 RX ADMIN — CHLORDIAZEPOXIDE HYDROCHLORIDE 25 MG: 25 CAPSULE ORAL at 21:02

## 2023-01-17 RX ADMIN — THIAMINE HCL TAB 100 MG 100 MG: 100 TAB at 05:41

## 2023-01-17 RX ADMIN — HYDRALAZINE HYDROCHLORIDE 10 MG: 20 INJECTION INTRAMUSCULAR; INTRAVENOUS at 01:32

## 2023-01-17 RX ADMIN — FUROSEMIDE 40 MG: 10 INJECTION, SOLUTION INTRAMUSCULAR; INTRAVENOUS at 15:45

## 2023-01-17 RX ADMIN — ENOXAPARIN SODIUM 40 MG: 40 INJECTION SUBCUTANEOUS at 17:26

## 2023-01-17 RX ADMIN — CHLORDIAZEPOXIDE HYDROCHLORIDE 25 MG: 25 CAPSULE ORAL at 05:41

## 2023-01-17 RX ADMIN — DOCUSATE SODIUM 50 MG AND SENNOSIDES 8.6 MG 2 TABLET: 8.6; 5 TABLET, FILM COATED ORAL at 17:26

## 2023-01-17 RX ADMIN — ATORVASTATIN CALCIUM 80 MG: 80 TABLET, FILM COATED ORAL at 17:26

## 2023-01-17 RX ADMIN — NICOTINE 14 MG: 14 PATCH TRANSDERMAL at 05:41

## 2023-01-17 RX ADMIN — FOLIC ACID 1 MG: 1 TABLET ORAL at 05:41

## 2023-01-17 NOTE — CONSULTS
Hospital Medicine Consultation    Date of Service  1/16/2023    Referring Physician  Malik Corado M.D.    Consulting Physician  Dr. Silveira    Reason for Consultation  Transfer to [facility]    History of Presenting Illness  62 y.o. male with HTN, T2DM, tobacco use DO who presented 1/12/2023 with Left hemiparesis and facial droop due to Right MCA CVA.    He presented 1/12/22 with Left facial droop and Left-sided weakness. CTH demonstrated hyperdense MCA, for which subsequent CT perfusion demonstrated infarct with penumbra. CTA demonstrated Right ICA and Right MCA occlusion. Neurology was consulted. He was not a candidate for tPA due to hypertension. Neuro-IR performed a Right MCA thrombectomy and Right ICA stent 1/12/23. Subsequent MRI demonstrated Right frontal, basal ganglia, and caudate nucleus infarct. Petechial hemorrhages were noted concerning for hemorrhagic conversion, for which he was not a candidate for DAPT but treated with ASA monotherapy per Neurology recommendation. He was admitted to the ICU post-procedure for neurologic monitoring and nicardipine gtt due to uncontrolled HTN.     During his IR procedure he developed a sinus pause for which CPR was not required prior to spontaneous ROSC. He was administered atropine and glycopyrrolate for sinus bradycardia. He was intubated post-procedure and extubated 1/13/23 to [facility]. CXR demonstrated evolving pulmonary edema. TTE demonstrated normal systolic function and was negative for shunt. He has been diuresed with IV lasix with vast improvement to SpO2 >96% on 4 L/min.     On 1/15/23 he began exhibiting signs of alcohol withdrawal. He was initiated on RASS protocol with precedex gtt and librium taper. He has weaned from precedex gtt today. He is transitioned off nicardipine gtt to amlodipine 5 mg daily.    Today he reports nonproductive cough which has caused a headache. His chest feels tight and worsens with deep inspiration. He denies other pain, N/V, F/C, bowel  dysfunction. He reports family history of HTN and T2DM. He has not been taking any prescribed medications for his own HTN nor diabetes. He is resolved to quit smoking, MJ, and drinking alcohol.    POC discussed with primary RN and RT. Initiating on CPAP. Will transfer to [facility].    Review of Systems  Review of Systems   Constitutional:  Negative for chills, fever and malaise/fatigue.   HENT:  Negative for ear pain, nosebleeds, sinus pain and sore throat.    Eyes:  Negative for pain.   Respiratory:  Positive for cough and shortness of breath. Negative for hemoptysis and sputum production.    Cardiovascular:  Positive for chest pain. Negative for leg swelling.   Gastrointestinal:  Negative for abdominal pain, blood in stool, constipation, diarrhea, melena, nausea and vomiting.   Genitourinary:  Negative for dysuria, flank pain and hematuria.   Musculoskeletal:  Negative for back pain, joint pain, myalgias and neck pain.   Neurological:  Positive for sensory change, focal weakness, weakness and headaches.   Endo/Heme/Allergies:  Does not bruise/bleed easily.   Psychiatric/Behavioral:  Positive for substance abuse. Negative for depression. The patient is not nervous/anxious.      Past Medical History   has a past medical history of Diabetes (HCC) and Hypertension.    Surgical History  He denies any prior surgeries.    Family History  family history includes Diabetes in his father and mother; Hypertension in his father, mother, and sister.    Social History   reports that he quit smoking 4 days ago. His smoking use included cigarettes. He started smoking about 47 years ago. He has a 23.00 pack-year smoking history. He has never used smokeless tobacco. He reports current alcohol use of about 25.2 oz per week. He reports current drug use. Drug: Inhaled.    Medications  Prior to Admission Medications   Prescriptions Last Dose Informant Patient Reported? Taking?   Cobalamin Combinations (NEURIVA PLUS) Cap unk at unk Family  Member Yes Yes   Sig: Take  by mouth.   Naproxen Sodium 220 MG Cap unk at unk Family Member Yes Yes   Sig: Take 2 Capsules by mouth 2 times a day as needed.   acetaminophen (TYLENOL) 650 MG CR tablet 1/12/2023 at am Family Member Yes Yes   Sig: Take 1,300 mg by mouth 2 times a day as needed for Moderate Pain.      Facility-Administered Medications: None       Allergies  No Known Allergies    Physical Exam  Temp:  [35.8 °C (96.5 °F)-36.3 °C (97.3 °F)] 35.8 °C (96.5 °F)  Pulse:  [55-74] 72  Resp:  [6-49] 22  BP: ()/(55-69) 110/57  SpO2:  [92 %-100 %] 97 %    Physical Exam  Vitals and nursing note reviewed. Exam conducted with a chaperone present (RT at bedside).   Constitutional:       General: He is not in acute distress.     Appearance: He is ill-appearing (Acutely) and diaphoretic. He is not toxic-appearing.   HENT:      Head: Normocephalic.      Nose: Nose normal.      Comments: +NGT     Mouth/Throat:      Mouth: Mucous membranes are dry.   Eyes:      General: No scleral icterus.     Conjunctiva/sclera: Conjunctivae normal.   Cardiovascular:      Rate and Rhythm: Normal rate and regular rhythm.      Pulses: Normal pulses.      Heart sounds: Normal heart sounds. No murmur heard.    No friction rub. No gallop.   Pulmonary:      Effort: Tachypnea present. No respiratory distress.      Breath sounds: Decreased air movement present. Decreased breath sounds present. No wheezing, rhonchi or rales.   Chest:      Chest wall: No tenderness.   Abdominal:      General: Abdomen is protuberant. Bowel sounds are normal. There is no distension.      Palpations: Abdomen is soft.      Tenderness: There is no abdominal tenderness. There is no guarding or rebound.   Genitourinary:     Comments: +[place]. Urine yellow / clear.  Musculoskeletal:      Cervical back: Neck supple.      Right lower leg: Edema present.      Left lower leg: Edema present.      Comments: 2+ pedal   Skin:     General: Skin is warm.   Neurological:       Cranial Nerves: Dysarthria and facial asymmetry present.      Sensory: Sensory deficit (Mild decrease in LUE and LLE, able to identify generalized area of touch) present.      Motor: Weakness (5/5 RUE, 5/5 RLE, 1/5 LUE, 1/5 LLE) present.      Deep Tendon Reflexes: Babinski sign absent on the right side. Babinski sign present on the left side.      Comments: Somnolent but arousable.   Psychiatric:         Attention and Perception: Attention normal.         Mood and Affect: Mood normal. Affect is blunt.         Speech: Speech is delayed and slurred.         Behavior: Behavior is cooperative.         Thought Content: Thought content normal.         Cognition and Memory: Cognition and memory normal.         Judgment: Judgment normal.       Fluids  Date 01/16/23 0700 - 01/17/23 0659   Shift 6890-8497 0125-9571 1884-6982 24 Hour Total   INTAKE   I.V. 108.1   108.1   NG/ 260  780   Enteral 150 30  180   Shift Total 778.1 290  1068.1   OUTPUT   Urine 885 525  1410   Shift Total 885 525  1410   Weight (kg) 108.3 108.3 108.3 108.3       Laboratory  Recent Labs     01/14/23  0343 01/15/23  0416 01/16/23  0350   WBC 8.9 8.4 8.4   RBC 3.73* 3.57* 3.78*   HEMOGLOBIN 12.8* 12.2* 12.8*   HEMATOCRIT 38.0* 35.4* 36.6*   .9* 99.2* 96.8   MCH 34.3* 34.2* 33.9*   MCHC 33.7 34.5 35.0   RDW 50.4* 44.0 42.4   PLATELETCT 116* 110* 133*   MPV 10.6 11.1 11.0     Recent Labs     01/14/23  0343 01/15/23  0416 01/16/23  0350   SODIUM 139 133* 136   POTASSIUM 4.0 3.8 3.6   CHLORIDE 107 101 101   CO2 23 23 25   GLUCOSE 121* 120* 117*   BUN 10 8 11   CREATININE 0.68 0.56 0.67   CALCIUM 8.1* 8.2* 8.6                     Imaging  CT-HEAD W/O   Final Result         1.  Evolving infarct/encephalomalacia in the right MCA distribution.   2.  Areas of hemorrhage in the right basal ganglia and involving the right frontal and anterior temporal sulci, similar compared to MRI January 13, 2023   3.  Atherosclerosis.      DX-CHEST-PORTABLE (1 VIEW)    Final Result         Diffuse interstitial and groundglass opacity throughout both lungs could relate to developing pulmonary edema.      DX-ABDOMEN FOR TUBE PLACEMENT   Final Result         Gastric drainage tube with tip projecting over the expected area of the stomach.      DX-CHEST-PORTABLE (1 VIEW)   Final Result      1.  Interval extubation without pneumothorax.   2.  Stable enlarged cardiac silhouette.   3.  There is bibasilar linear atelectasis.      EC-ECHOCARDIOGRAM COMPLETE W/ CONT   Final Result      MR-BRAIN-W/O   Final Result         Acute infarction involving the right frontal region, right basal ganglia and right caudate nucleus. Minimal punctate infarction involving the right parietal cortex. There is slight mass effect upon the right lateral ventricle with only minimal midline    shift measuring approximately 4 mm.      Petechial hemorrhage is noted within the right frontal cortical infarct. Also noted is petechial hemorrhage in the right basal ganglia and right caudate nucleus but without surrounding vasogenic edema.         DX-PELVIS-1 OR 2 VIEWS   Final Result         1.  No acute traumatic bony injury.      DX-CHEST-PORTABLE (1 VIEW)   Final Result         1.  Left midlung and lower lobe infiltrates, similar to prior study.      CI-[id number]-6 VIEW   Final Result         1.  Nonspecific bowel gas pattern in the upper abdomen.   2.  Cardiomegaly   3.  Hazy left lower lobe infiltrates.   4.  Nasogastric tube tip terminates overlying the expected location of the gastric fundus.      IR-THROMBO MECHANICAL ARTERY,INIT   Final Result         62-year-old patient who presented with complete occlusion of the right ICA at its origin underwent emergent mechanical thrombectomy. After initial angioplasty,   a right cervical internal carotid artery stent was deployed and right MCA thrombectomy was performed. The final angiographic images show complete restoration of flow into the right ICA and MCA with no  evidence of distal intracranial embolization.      Final recanalization score: TICI 3      I, Petra Mobley was physically present and participated during the entire procedure of the IR-THROMBO MECHANICAL ARTERY,INIT.                  DX-CHEST-PORTABLE (1 VIEW)   Final Result      No acute cardiac or pulmonary abnormalities are identified.      CT-CTA NECK WITH & W/O-POST PROCESSING   Final Result      1.  Occlusion of the right internal carotid artery at its origin.      2.  Occlusion of the right vertebral artery at its origin.      Dr. Gongora discussed findings with Dr. Mccarthy.      CT-CTA HEAD WITH & W/O-POST PROCESS   Final Result         1.  Occlusion of the right internal carotid artery and right middle cerebral artery.   2.  Narrowed left M1 segment with severely narrowed and M2 and M3 branches, may represent chronic changes.      These findings were discussed with the patient's clinician, Joseline Mccarthy, on 1/12/2023 8:27 PM.      CT-CEREBRAL PERFUSION ANALYSIS   Final Result         1.  Cerebral blood flow less than 30% likely representing completed infarct = 52 mL.      2.  T Max more than 6 seconds likely representing combination of completed infarct and ischemia = 172 mL.      3.  Mismatched volume likely representing ischemic brain/penumbra = 120 mL      4.  Please note that the cerebral perfusion was performed on the limited brain tissue around the basal ganglia region. Infarct/ischemia outside the CT perfusion sections can be missed in this study.      CT-HEAD W/O   Final Result      1.  No evidence of intracranial hemorrhage, midline shift or mass effect.      2.  Question of hyperdense right MCA though evaluation limited by oblique positioning.             Assessment/Plan  * Acute CVA (cerebrovascular accident) (HCC)- (present on admission)  Assessment & Plan  Right MCA CVA with hemorrhagic conversion  Neurology consulted - ASA without DAPT until 1/23/23 due to hemorrhagic conversion  S/p  right ICA stent and MCA thrombectomy 1/12/23  TTE negative for shunt  Tobacco cessation  PT/OT/SLP - recommending post-acute placement  Physiatry referral - pending confirmation of insurance and post-AR disposition    Alcohol withdrawal syndrome, with delirium (HCC)- (present on admission)  Assessment & Plan  Developed 1/15/23  RASS - lorazepam protocol  Weaned off precedex  Librium taper      Acute respiratory failure with hypoxia (HCC)- (present on admission)  Assessment & Plan  Due to CVA and pulmonary edema - see separate plans  CPAP for KENDAL  Continue diuresis    Tobacco use disorder- (present on admission)  Assessment & Plan  Resolved to quit  Discuss MAT / NRT prior to discharge    Noncardiogenic pulmonary edema- (present on admission)  Assessment & Plan  CXR for AHRF demonstrated bilateral pulmonary GGOs consistent with edema  Likely due to pHTN from KENDAL/OHS  TTE without systolic dysfunction, unable to estimate RVSP  Continue IV lasix      Delirium due to medical condition with behavioral disturbance- (present on admission)  Assessment & Plan  Improved  Post-CVA and confounded by EtOH withdrawal - see separate plans    Type 2 diabetes mellitus without complication, without long-term current use of insulin (HCC)- (present on admission)  Assessment & Plan  A1c 5.3  No indication for strict BG control - POCT/SSI deferred  Atorvastatin    Alcohol dependence with withdrawal (HCC)- (present on admission)  Assessment & Plan  Reports 6-pack daily intake  Developed alcohol withdrawal - see separate plan  MAT / Support group discussion prior to discharge  Empiric MVN + thiamine + folate    Primary hypertension- (present on admission)  Assessment & Plan  Familial and exacerbated by obesity / tobacco use  Weaned from nicardapine gtt  Amlodipine  SBP goal 110-140 for now per Neurology, eventually <130    Hypomagnesemia  Assessment & Plan  Due to lasix  Repeat AM Mg during diuresis    Severe obesity (BMI 35.0-35.9 with  comorbidity) (HCC)- (present on admission)  Assessment & Plan  Co-morbid HTN, T2DM, KENDAL  Outpatient weight loss      VTE Ppx: LMWH

## 2023-01-17 NOTE — PROGRESS NOTES
4 Eyes Skin Assessment Completed by PINA Hwang and PINA Cortez.    Head WDL  Ears WDL  Nose Redness and Blanching to top of nose. R nare red, possible wound.   Mouth WDL  Neck Dressing to right side from previous central line.   Breast/Chest WDL  Shoulder Blades WDL  Spine WDL  (R) Arm/Elbow/Hand Bruising   (L) Arm/Elbow/Hand Redness and Blanching, Bruising   Abdomen Scar  Groin Bruising to right groin site  Scrotum/Coccyx/Buttocks Redness and Blanching  (R) Leg Bruising  (L) Leg Bruising  (R) Heel/Foot/Toe Redness and Blanching  (L) Heel/Foot/Toe Redness and Blanching          Devices In Places Tele Box, Pulse Ox, SCD's, and Oxy Mask      Interventions In Place Sacral Mepilex, Waffle Overlay, TAP System, and Q2 Turns    Possible Skin Injury Yes    Pictures Uploaded Into Epic Yes  Wound Consult Placed Yes  RN Wound Prevention Protocol Ordered Yes

## 2023-01-17 NOTE — DIETARY
Nutrition Services Brief Update:    Problem: Nutritional:  Goal: Nutrition support tolerated and meeting greater than 85% of estimated needs  Outcome: MET    Pt is receiving TF Replete with Fiber at goal rate 80 ml/hr.

## 2023-01-17 NOTE — ASSESSMENT & PLAN NOTE
Right MCA CVA with hemorrhagic conversion  S/p right ICA stent and MCA thrombectomy 1/12/23  Neurology consulted   High Intensity statin therapy   Continue DAPT  Blood pressure control we will increase lisinopril  TTE negative for shunt  Tobacco cessation  PEG placed 1/24, Patient is s/p FEES 2/8, started on a modified diet.  2/4 repeated CT head: Interval decrease in mass effect from subacute right MCA territory infarction with expected evolution of macro hemorrhagic transformation.  PT/OT/SLP - recommending post-acute placement accepted by SNF awaiting insurance Auth

## 2023-01-17 NOTE — ASSESSMENT & PLAN NOTE
Improved oxygen requirements    01/19/23 CXRAY consistent with Aspiration Pneumonia - Started on IV unasyn  Subsequently trnasitioned to [facility].  At present off Abx's  MRSA nares neg  trachael aspirate cultures neg  CTA chest neg for PE  TTE LVEF 70%, no signficant valvular or structural abnormalities    Cont weaning O2

## 2023-01-17 NOTE — PROGRESS NOTES
"2000: Patient on home CPAP with nose only mask and desalting to mid 80s. Patient is mouth breathing, placed on oxy mask @ 6L and O2 sats improved to low 90s.     2100: Called into patient's room by RT. Patient had dislodged his NG tube and it came out about 3 inches. Tube feeds stopped. NG tube replaced and retaped with new tape. Abd x-ray for tube placement ordered to reverify NG placement.     RP placed patient home CPAP on patient with a full face mask. Patient tolerating well. O2 sat 90-92%, patient still with c/o \"I can't breath well.\"   "

## 2023-01-17 NOTE — ASSESSMENT & PLAN NOTE
CXR for AHRF demonstrated bilateral pulmonary GGOs consistent with edema  Likely due to pHTN from KENDAL/OHS  TTE without systolic dysfunction, unable to estimate RVSP  Continue IV lasix

## 2023-01-17 NOTE — CARE PLAN
The patient is Stable - Low risk of patient condition declining or worsening    Shift Goals  Clinical Goals: SBP <140; respratory stability  Patient Goals: get better  Family Goals: [place]    Progress made toward(s) clinical / shift goals:  Respratory stability: patient on home bipap, O2 weaned from 10 to 7L overnight     Patient is not progressing towards the following goals:    SBP<140: Pt. SBP in goal until ~0000; then SBP 140s-150s, even with PRNs, and AM meds.       Problem: Optimal Care of the Stroke Patient  Goal: Optimal emergency care for the stroke patient  Description: Target End Date:  End of day 1    Time of Onset    1.  Time of last known well obtained  2.  Patient and family/caregiver verbalize understanding of diagnosis, medications and testing  3.  NIHSS performed and documented, including date and time, for ischemic stroke patients prior to any acute recanalization therapy (thrombolytics or mechanical) or within 12 hours of arrival if no intervention is warranted  4.  Consults and referrals placed to appropriate departments    Medications Administration as Ordered:    1.  Implement appropriate reversal agents for INR greater than 1.5  2.  Pre-alteplase administration of antihypertensives for SBP >185 DBP >110  3.  Post-alteplase administration of antihypertensives for SBP >185, DBP >105  4.  Thrombolytic Therapy for qualifying ischemic stroke patients who arrive within 4.5 hours of time of Last Known Well. Thrombolytic therapy administered within 30 minutes or a documented reason for delay  Outcome: Progressing  Goal: Optimal acute care for the stroke patient  Description: Target End Date:  1 to 3 days    - Vital signs and neuro checks performed and documented per order  - NIHSS completed and documented per order  - Continuous telemetry monitoring for 72 hours or until discontinued by provider  - Head CT without contrast obtained  - Consideration of MRI/MRA  - MRI screening form completed in worklist  if MRI ordered  - Echocardiogram with Bubble Study ordered/completed with consideration of BLAKE  - Carotid Doppler ordered/completed (Not required if CTA of neck completed in ED)  - Lipid Panel obtained within 48 hours of admission  - PT, PTT, INR obtained per Anticoagulation orders (if applicable)  - Antithrombotic therapy by end of hospital day 2 for ischemic stroke. Provider must document reason if contraindicated.  - Venous Thromboembolism (VTE) Prophylaxis by end of hospital day 2 for ischemic and hemorrhagic stroke. Provider must document reason if contraindicated  - Dysphagia screen completed and documented prior to any PO intake. Patient to remain NPO until Speech Therapy evaluation if thrombolytic or thrombectomy performed  - Rehabilitation assessment including PT/OT/SLP evaluations for referral to Physical Medicine and Rehabilitation services. If none needed, provider needs to document reason  - Neurology consult placed  - Consideration of cardiology consult for cryptogenic strokes  Outcome: Progressing     Problem: Knowledge Deficit - Stroke Education  Goal: Patient's knowledge of stroke and risk factors will improve  Description: Target End Date:  1-3 days or as soon as patient condition allows    Document in Patient Education    1.  Stroke education booklet provided  2.  Education regarding EMS activation, need for follow up, medication prescribed at discharge, risk factors for stroke/lifestyle modifications, warning signs and symptoms of stroke provided  Outcome: Progressing     Problem: Psychosocial - Patient Condition  Goal: Patient's ability to verbalize feelings about condition will improve  Description: Target End Date:  Prior to discharge or change in level of care    1.  Discuss coping with medical condition and its effects  2.  Encourage patient participation in care  3.  Encourage acknowledgement of body changes and accompanying emotions  4.  Perform depression screening  Outcome:  Progressing  Goal: Patient's ability to re-evaluate and adapt role responsibilities will improve  Description: Target End Date:  Prior to discharge or change in level of care    1.  Assess family support  2.  Encourage support system participation in care  3.  Encouraged verbalization of feelings regarding caregiver responsibilities  4.  Discuss changes in role and responsibilities caused by patient's condition  Outcome: Progressing     Problem: Discharge Planning - Stroke  Goal: Ensure Stroke Core Measures are met prior to discharge  Description: Target End Date:  Prior to discharge or change in level of care    1. Patient discharged on antithrombotic therapy (Ischemic Stroke)  2. Patient discharged on intensive statin if LDL is greater than or equal to 70 mg/dl (Ischemic Stroke)  3. Patient discharged on anticoagulation therapy for patients with atrial fibrillation/flutter (Ischemic Stroke)  4. Smoking education/cessation provided if applicable  Outcome: Progressing  Goal: Patient’s continuum of care needs will be met  Description: Target End Date:  Prior to discharge or change in level of care    1.  Potential discharge barriers identified upon admission and throughout hospital stay  2.  Ensure appropriate referrals in place for follow up with specialists after discharge  3.  Ensure appropriate referrals in place for DMEs if applicable  4.  Collaboration with transitional care team and interdisciplinary team to meet discharge needs  5.  Involve patient and family/caregiver in prioritizing goals for discharge planning  6.  Educate patient and caregiver about discharge instructions, medications, and follow up appointments  7.  Referral placed to [facility]  8.  Assure orders and follow up appointments are made for outpatient extended cardiac monitoring if appropriate  Outcome: Progressing     Problem: Neuro Status  Goal: Neuro status will remain stable or improve  Description: Target End Date:  Prior to  discharge or change in level of care    Document on Neuro assessment in the Assessment flowsheet    1.  Assess and monitor neurologic status per provider order/protocol/unit policy  2.  Assess level of consciousness and orientation  3.  Assess for speech, dysarthria, dysphagia, facial symmetry  4.  Assess visual field, eye movements, gaze preference, pupil reaction and size  5.  Assess muscle strength and motor response in all four extremities  6.  Assess for sensation (numbness and tingling)  7.  Assess basic neuro reflexes (cough, gag, corneal)  8.  Identify changes in neuro status and report to provider for testing/treatment orders  Outcome: Progressing     Problem: Hemodynamic Monitoring  Goal: Patient's hemodynamics, fluid balance and neurologic status will be stable or improve  Description: Target End Date:  Prior to discharge or change in level of care    1.  Vital signs, pulse oximetry and cardiac monitor per provider order and/or policy  2.  Frequent pulse checks performed post thrombectomy  3.  Frequent monitoring for signs of bleeding post TPA administration  4.  Proper management of IV infusions  5.  Intake and output monitored per provider order  6.  Daily weight obtained per unit policy or provider order  7.  Peripheral pulses and capillary refill assessed as needed  8.  Monitor for signs/symptoms of excessive bleeding  9.  Body temperature assessed and fevers treated  10. Patient positioned for maximum circulation/cardiac output  Outcome: Progressing     Problem: Respiratory - Stroke Patient  Goal: Patient will achieve/maintain optimum respiratory rate/effort  Description: Target End Date:  Prior to discharge or change in level of care    Document on Assessment flowsheet    1.  Assess and monitor respiratory rate, rhythm, depth and effort of respiration  2.  Oxygenation assessed throughout shift (recommendation of >94% for new stroke patients)  3.  Oxygen administered and/or titrated per order  4.   Collaboration with RT to administer medication/treatments per order  5.  Patient educated on importance of turning, coughing, and deep breathing  6.  Patient positioned for maximum ventilatory efficiency  7.  Airway suctioning provided as needed  8.  Incentive spirometry encouraged 5-10 times every hour or while awake  Outcome: Progressing     Problem: Dysphagia  Goal: Dysphagia will improve  Description: Target End Date:  Prior to discharge or change in level of care    1.  Assess and monitor ability to swallow  2.  Collaborate with Speech Therapy to determine appropriate adaptation for safe administration of medications and oral nutrition  3.  Elevate head of bed to 90 degrees during feedings and for 30 minutes after each feeding  4.  Encourage proper swallowing techniques  5.  Screening on admission or as soon as possible  Outcome: Progressing     Problem: Risk for Aspiration  Goal: Patient's risk for aspiration will be absent or decrease  Description: Target End Date:  Prior to discharge or change in level of care    1.   Complete dysphagia screening on admission  2.   NPO until dysphagia screening complete or medically cleared  3.   Collaborate with Speech Therapy, Clinical Dietitian and interdisciplinary team  4.   Implement aspiration precautions  5.   Assist patient up to chair for meals  6.   Elevate head of bed 90 degrees if patient is unable to get out of bed  7.   Encourage small bites  8.   Ensure foods/liquids are of appropriate consistency  9.   Assess for any signs/symptoms of aspiration  10. Assess breath sounds and vital signs after oral intake  Outcome: Progressing     Problem: Urinary Elimination  Goal: Establish and maintain regular urinary output  Description: Target End Date:  Prior to discharge or change in level of care    Document on I/O and Assessment flowsheets    1.  Evaluate need to continue indwelling catheter every shift  2.  Assess signs and symptoms of urinary retention  3.  Assess  post-void residual volumes  4.  Implement bladder training program  5.  Encourage scheduled voidings  6.  Assist patient to sit on bedside commode or toilet for voiding  7.  Educate patient and family/caregiver on use and purpose of urine collection devices (document in Patient Education)  Outcome: Progressing     Problem: Bowel Elimination  Goal: Establish and maintain regular bowel function  Description: Target End Date:  Prior to discharge or change in level of care    1.   Note date of last BM  2.   Educate about diet, fluid intake, medication and activity to promote bowel function  3.   Educate signs and symptoms of constipation and interventions to implement  4.   Pharmacologic bowel management per provider order  5.   Regular toileting schedule  6.   Upright position for toileting  7.   High fiber diet  8.   Encourage hydration  9.   Collaborate with Clinical Dietician  10. Care and maintenance of ostomy if applicable  Outcome: Progressing     Problem: Mobility - Stroke  Goal: Patient's capacity to carry out activities will improve  Description: Target End Date:  Prior to discharge or change in level of care    1.  Assess for barriers to mobility/activity  2.  Implement activity per interdisciplinary team recommendations  3.  Target activity level identified and patient/family/caregiver aware of goal  4.  Provide assistive devices  5.  Instruct patient/caregiver on proper use of assistive/adaptive devices  6.  Schedule activities and rest periods to decrease effects of fatigue  7.  Encourage mobilization to extent of ability  8.  Maintain proper body alignment  9.  Provide adequate pain management to allow progressive mobilization  10. Implement pace maker precautions as needed  Outcome: Progressing  Goal: Spasticity will be prevented or improved  Description: Target End Date:  Prior to discharge or change in level of care    1.  Muscle relaxing agents considered or administered per order  2.  Splints applied  properly and used accordingly to therapist's recommendations  3.  Assistance with stretching and range of motion exercises provided  Outcome: Progressing  Goal: Subluxation will be prevented or improved  Description: Target End Date:  Prior to discharge or change in level of care    1.   Ensure proper handling during transfers, ambulation, and repositioning in bed  2.   Reduce traction to affected limb and provide adequate support of weight  3.   Perform passive range of motion  4.  Assist with active range of motion  Outcome: Progressing     Problem: Self Care  Goal: Patient will have the ability to perform ADLs independently or with assistance (bathe, groom, dress, toilet and feed)  Description: Target End Date:  Prior to discharge or change in level of care    Document on ADL flowsheet    1.  Assess the capability and level of deficiency to perform ADLs  2.  Encourage family/care giver involvement  3.  Provide assistive devices  4.  Consider PT/OT evaluations  5.  Maintain support, give positive feedback, encourage self-care allowing extra time and verbal cuing as needed  6.  Avoid doing something for patients they can do themselves, but provide assistance as needed  7.  Assist in anticipating/planning individual needs  8.  Collaborate with Case Management and  to meet discharge needs  Outcome: Progressing     Problem: Knowledge Deficit - Standard  Goal: Patient and family/care givers will demonstrate understanding of plan of care, disease process/condition, diagnostic tests and medications  Description: Target End Date:  1-3 days or as soon as patient condition allows    Document in Patient Education    1.  Patient and family/caregiver oriented to unit, equipment, visitation policy and means for communicating concern  2.  Complete/review Learning Assessment  3.  Assess knowledge level of disease process/condition, treatment plan, diagnostic tests and medications  4.  Explain disease  process/condition, treatment plan, diagnostic tests and medications  Outcome: Progressing     Problem: Pain - Standard  Goal: Alleviation of pain or a reduction in pain to the patient’s comfort goal  Description: Target End Date:  Prior to discharge or change in level of care    Document on Vitals flowsheet    1.  Document pain using the appropriate pain scale per order or unit policy  2.  Educate and implement non-pharmacologic comfort measures (i.e. relaxation, distraction, massage, cold/heat therapy, etc.)  3.  Pain management medications as ordered  4.  Reassess pain after pain med administration per policy  5.  If opiods administered assess patient's response to pain medication is appropriate per POSS sedation scale  6.  Follow pain management plan developed in collaboration with patient and interdisciplinary team (including palliative care or pain specialists if applicable)  Outcome: Progressing     Problem: Safety - Medical Restraint  Goal: Remains free of injury from restraints (Restraint for Interference with Medical Device)  Description: INTERVENTIONS:  1. Determine that other, less restrictive measures have been tried or would not be effective before applying the restraint  2. Evaluate the patient's condition at the time of restraint application  3. Educate patient/family regarding the reason for restraint  4. Q2H: Monitor safety, psychosocial status, comfort, circulation, respiratory status, LOC, nutrition and hydration  Outcome: Progressing  Goal: Free from restraint(s) (Restraint for Interference with Medical Device)  Description: INTERVENTIONS:  1.  ONCE/SHIFT or MINIMUM Q12H: Assess and document the continuing need for restraints  2.  Q24H: Continued use of restraint requires LIP to perform face to face examination and written order  3.  Identify and implement measures to help patient regain control  4.  Educate patient/family on discontinuation criteria   5.  Assess patient's understanding and  retention of education provided  6.  Assess readiness for release & initiate progressive release per protocol  7.  Identify and document criteria for restraints  Outcome: Progressing     Problem: Skin Integrity  Goal: Skin integrity is maintained or improved  Description: Target End Date:  Prior to discharge or change in level of care    Document interventions on Skin Risk/Lucius flowsheet groups and corresponding LDA    1.  Assess and monitor skin integrity, appearance and/or temperature  2.  Assess risk factors for impaired skin integrity and/or pressures ulcers  3.  Implement precautions to protect skin integrity in collaboration with interdisciplinary team  4.  Implement pressure ulcer prevention protocol if at risk for skin breakdown  5.  Confirm wound care consult if at risk for skin breakdown  6.  Ensure patient use of pressure relieving devices  (Low air loss bed, waffle overlay, heel protectors, ROHO cushion, etc)  Outcome: Progressing     Problem: Fall Risk  Goal: Patient will remain free from falls  Description: Target End Date:  Prior to discharge or change in level of care    Document interventions on the Carol Ramos Fall Risk Assessment    1.  Assess for fall risk factors  2.  Implement fall precautions  Outcome: Progressing     Problem: Optimal Care for Alcohol Withdrawal  Goal: Optimal Care for the alcohol withdrawal patient  Description: Target End Date:  1 to 3 days    1.  Alcohol history screening done on admission  2.  CIWA-AR score assessment (includes assessment of nausea/vomiting, tremor, sweats, anxiety, agitation, tactile, visual and auditory disturbances, headache and orientation/sensorium).  Document on CIWA flowsheet.  3.  Follow CIWA-AR score protocol  4.  Frequent reorientation  5.  Monitor for fluid and electrolyte imbalance.  6.  Assess for respiratory depression due to sedation (pulse ox)  7.  Consider thiamine, multivitamins, folic acid and magnesium sulfate per provider order  8.   Collaborate with Social Workers/Case Management  9.  Collaborate with mental health  Outcome: Progressing

## 2023-01-17 NOTE — DIETARY
Nutrition Services: Brief update    Pt is currently receiving TF Replete with Fiber with goal rate 80 ml/hr. This provides 1920 kcals, 123 gm protein, and 1594 ml free water per day. SLP deferred eval on 1/16.     Kitchen informed RD staff we are out of Replete with Fiber.    Recommendations/Plan:  While we are out of stock of Replete with Fiber, appropriate substitute is Impact Peptide 1.5 with goal rate 55 ml/hr to provide 1980 kcals, 124 gm protein, and 1016 ml free water per day. Will discuss with RN.   Fluids per MD.   Diet upgrades per SLP.       RD following.

## 2023-01-17 NOTE — CARE PLAN
Problem: Ventilation  Goal: Ability to achieve and maintain unassisted ventilation or tolerate decreased levels of ventilator support  Description: Target End Date:  4 days     Document on Vent flowsheet    1.  Support and monitor invasive and noninvasive mechanical ventilation  2.  Monitor ventilator weaning response  3.  Perform ventilator associated pneumonia prevention interventions  4.  Manage ventilation therapy by monitoring diagnostic test results  Outcome: Progressing     Pt wearinh his home CPAP unit w 8L O2 bleed in     Problem: Humidified High Flow Nasal Cannula  Goal: Maintain adequate oxygenation dependent on patient condition  Description: Target End Date:  resolve prior to discharge or when underlying condition is resolved/stabilized    1.  Implement humidified high flow oxygen therapy  2.  Titrate high flow oxygen to maintain appropriate SpO2  Outcome: Met    Pt on 6L oxymask during daytime

## 2023-01-17 NOTE — ASSESSMENT & PLAN NOTE
Resolved    Persistent 01/20/23  Secondary to stroke, delirium and now concern for relation to underlying pneumonia  Limit restraints as able  Avoid polypharmacy, sedating medications  Delirium precautions  Management of underlying medical conditions  Obtain EEG 01/20/23 and it did not show seizure activity.   cont's to improve daily though examination is limited by dysarthria at times

## 2023-01-17 NOTE — PROGRESS NOTES
"Patient is much more awake and alert tonight than prior shift. He is A&Ox3 - to self, place, and stroke. He still has c/o \"I can't take a deep breath.\"     Patient has wet sounding, non-productive cough. Patient coughs on command but does not cough anything up.     Explained to patient about pulmonary edema and that he's receiving lasix to help get the fluid off, but that it will take time before his breathing is more comfortable. Reassured the patient that his O2 saturation is fine and that he is breathing ok even if it feels like it is more difficult to breath than normal, and reassured the patient that we are monitoring his O2 and breathing to ensure he is safe. Patient accepted reassurance and acknowledged we are here to help him.    "

## 2023-01-17 NOTE — DISCHARGE PLANNING
Msg left for Cori, spouse to discuss [facility] &  D/C resources/support.    [id number]-Spoke with Cori, spouse.  She is working on securing a single level dwelling.  She will call me once she has an update.

## 2023-01-18 PROBLEM — G81.94 ACUTE LEFT HEMIPARESIS (HCC): Status: ACTIVE | Noted: 2023-01-18

## 2023-01-18 PROBLEM — I61.9 STROKE, HEMORRHAGIC (HCC): Status: ACTIVE | Noted: 2023-01-18

## 2023-01-18 PROBLEM — I63.511 ACUTE ISCHEMIC RIGHT MIDDLE CEREBRAL ARTERY (MCA) STROKE (HCC): Status: ACTIVE | Noted: 2023-01-12

## 2023-01-18 PROBLEM — R13.12 OROPHARYNGEAL DYSPHAGIA: Status: ACTIVE | Noted: 2023-01-18

## 2023-01-18 LAB
ALBUMIN SERPL BCP-MCNC: 3.9 G/DL (ref 3.2–4.9)
ANION GAP SERPL CALC-SCNC: 10 MMOL/L (ref 7–16)
BUN SERPL-MCNC: 19 MG/DL (ref 8–22)
CALCIUM ALBUM COR SERPL-MCNC: 9.9 MG/DL (ref 8.5–10.5)
CALCIUM SERPL-MCNC: 9.8 MG/DL (ref 8.5–10.5)
CHLORIDE SERPL-SCNC: 99 MMOL/L (ref 96–112)
CO2 SERPL-SCNC: 28 MMOL/L (ref 20–33)
CREAT SERPL-MCNC: 0.7 MG/DL (ref 0.5–1.4)
ERYTHROCYTE [DISTWIDTH] IN BLOOD BY AUTOMATED COUNT: 43.9 FL (ref 35.9–50)
GFR SERPLBLD CREATININE-BSD FMLA CKD-EPI: 105 ML/MIN/1.73 M 2
GLUCOSE SERPL-MCNC: 151 MG/DL (ref 65–99)
HCT VFR BLD AUTO: 39.9 % (ref 42–52)
HGB BLD-MCNC: 13.9 G/DL (ref 14–18)
MAGNESIUM SERPL-MCNC: 1.7 MG/DL (ref 1.5–2.5)
MCH RBC QN AUTO: 34 PG (ref 27–33)
MCHC RBC AUTO-ENTMCNC: 34.8 G/DL (ref 33.7–35.3)
MCV RBC AUTO: 97.6 FL (ref 81.4–97.8)
NT-PROBNP SERPL IA-MCNC: 189 PG/ML (ref 0–125)
PHOSPHATE SERPL-MCNC: 4.1 MG/DL (ref 2.5–4.5)
PLATELET # BLD AUTO: 225 K/UL (ref 164–446)
PMV BLD AUTO: 10.3 FL (ref 9–12.9)
POTASSIUM SERPL-SCNC: 4 MMOL/L (ref 3.6–5.5)
RBC # BLD AUTO: 4.09 M/UL (ref 4.7–6.1)
SODIUM SERPL-SCNC: 137 MMOL/L (ref 135–145)
WBC # BLD AUTO: 11.9 K/UL (ref 4.8–10.8)

## 2023-01-18 PROCEDURE — 700102 HCHG RX REV CODE 250 W/ 637 OVERRIDE(OP): Performed by: STUDENT IN AN ORGANIZED HEALTH CARE EDUCATION/TRAINING PROGRAM

## 2023-01-18 PROCEDURE — A9270 NON-COVERED ITEM OR SERVICE: HCPCS | Performed by: NURSE PRACTITIONER

## 2023-01-18 PROCEDURE — 700111 HCHG RX REV CODE 636 W/ 250 OVERRIDE (IP): Performed by: INTERNAL MEDICINE

## 2023-01-18 PROCEDURE — 700111 HCHG RX REV CODE 636 W/ 250 OVERRIDE (IP): Performed by: STUDENT IN AN ORGANIZED HEALTH CARE EDUCATION/TRAINING PROGRAM

## 2023-01-18 PROCEDURE — A9270 NON-COVERED ITEM OR SERVICE: HCPCS | Performed by: INTERNAL MEDICINE

## 2023-01-18 PROCEDURE — 97602 WOUND(S) CARE NON-SELECTIVE: CPT

## 2023-01-18 PROCEDURE — A9270 NON-COVERED ITEM OR SERVICE: HCPCS | Performed by: STUDENT IN AN ORGANIZED HEALTH CARE EDUCATION/TRAINING PROGRAM

## 2023-01-18 PROCEDURE — 99233 SBSQ HOSP IP/OBS HIGH 50: CPT | Performed by: STUDENT IN AN ORGANIZED HEALTH CARE EDUCATION/TRAINING PROGRAM

## 2023-01-18 PROCEDURE — 700102 HCHG RX REV CODE 250 W/ 637 OVERRIDE(OP): Performed by: NURSE PRACTITIONER

## 2023-01-18 PROCEDURE — 80069 RENAL FUNCTION PANEL: CPT

## 2023-01-18 PROCEDURE — 83880 ASSAY OF NATRIURETIC PEPTIDE: CPT

## 2023-01-18 PROCEDURE — 36415 COLL VENOUS BLD VENIPUNCTURE: CPT

## 2023-01-18 PROCEDURE — 700102 HCHG RX REV CODE 250 W/ 637 OVERRIDE(OP): Performed by: INTERNAL MEDICINE

## 2023-01-18 PROCEDURE — 85027 COMPLETE CBC AUTOMATED: CPT

## 2023-01-18 PROCEDURE — 83735 ASSAY OF MAGNESIUM: CPT

## 2023-01-18 PROCEDURE — 770020 HCHG ROOM/CARE - TELE (206)

## 2023-01-18 RX ORDER — FUROSEMIDE 10 MG/ML
20 INJECTION INTRAMUSCULAR; INTRAVENOUS
Status: DISCONTINUED | OUTPATIENT
Start: 2023-01-19 | End: 2023-01-20

## 2023-01-18 RX ORDER — MAGNESIUM SULFATE HEPTAHYDRATE 40 MG/ML
4 INJECTION, SOLUTION INTRAVENOUS ONCE
Status: COMPLETED | OUTPATIENT
Start: 2023-01-18 | End: 2023-01-18

## 2023-01-18 RX ORDER — LANOLIN ALCOHOL/MO/W.PET/CERES
400 CREAM (GRAM) TOPICAL DAILY
Status: DISCONTINUED | OUTPATIENT
Start: 2023-01-18 | End: 2023-01-19

## 2023-01-18 RX ADMIN — AMLODIPINE BESYLATE 5 MG: 5 TABLET ORAL at 05:11

## 2023-01-18 RX ADMIN — MAGNESIUM SULFATE HEPTAHYDRATE 4 G: 40 INJECTION, SOLUTION INTRAVENOUS at 10:16

## 2023-01-18 RX ADMIN — CHLORDIAZEPOXIDE HYDROCHLORIDE 25 MG: 25 CAPSULE ORAL at 15:40

## 2023-01-18 RX ADMIN — DOCUSATE SODIUM 50 MG AND SENNOSIDES 8.6 MG 2 TABLET: 8.6; 5 TABLET, FILM COATED ORAL at 17:07

## 2023-01-18 RX ADMIN — ATORVASTATIN CALCIUM 80 MG: 80 TABLET, FILM COATED ORAL at 17:07

## 2023-01-18 RX ADMIN — FOLIC ACID 1 MG: 1 TABLET ORAL at 05:11

## 2023-01-18 RX ADMIN — ENOXAPARIN SODIUM 40 MG: 40 INJECTION SUBCUTANEOUS at 17:07

## 2023-01-18 RX ADMIN — NICOTINE 14 MG: 14 PATCH TRANSDERMAL at 05:12

## 2023-01-18 RX ADMIN — THERA TABS 1 TABLET: TAB at 05:11

## 2023-01-18 RX ADMIN — FUROSEMIDE 40 MG: 10 INJECTION, SOLUTION INTRAMUSCULAR; INTRAVENOUS at 05:13

## 2023-01-18 RX ADMIN — Medication 400 MG: at 10:14

## 2023-01-18 RX ADMIN — CHLORDIAZEPOXIDE HYDROCHLORIDE 25 MG: 25 CAPSULE ORAL at 05:11

## 2023-01-18 RX ADMIN — FUROSEMIDE 40 MG: 10 INJECTION, SOLUTION INTRAMUSCULAR; INTRAVENOUS at 15:37

## 2023-01-18 RX ADMIN — DOCUSATE SODIUM 50 MG AND SENNOSIDES 8.6 MG 2 TABLET: 8.6; 5 TABLET, FILM COATED ORAL at 05:13

## 2023-01-18 RX ADMIN — THIAMINE HCL TAB 100 MG 100 MG: 100 TAB at 05:11

## 2023-01-18 RX ADMIN — ASPIRIN 81 MG: 81 TABLET, CHEWABLE ORAL at 05:11

## 2023-01-18 ASSESSMENT — COGNITIVE AND FUNCTIONAL STATUS - GENERAL
PERSONAL GROOMING: A LOT
DRESSING REGULAR UPPER BODY CLOTHING: A LOT
TOILETING: A LOT
WALKING IN HOSPITAL ROOM: TOTAL
TURNING FROM BACK TO SIDE WHILE IN FLAT BAD: A LOT
MOVING TO AND FROM BED TO CHAIR: A LOT
MOVING FROM LYING ON BACK TO SITTING ON SIDE OF FLAT BED: UNABLE
SUGGESTED CMS G CODE MODIFIER DAILY ACTIVITY: CL
CLIMB 3 TO 5 STEPS WITH RAILING: TOTAL
EATING MEALS: A LOT
DRESSING REGULAR LOWER BODY CLOTHING: A LOT
HELP NEEDED FOR BATHING: TOTAL
DAILY ACTIVITIY SCORE: 11
SUGGESTED CMS G CODE MODIFIER MOBILITY: CM
STANDING UP FROM CHAIR USING ARMS: TOTAL
MOBILITY SCORE: 8

## 2023-01-18 ASSESSMENT — ENCOUNTER SYMPTOMS
CHILLS: 0
MYALGIAS: 0
WEAKNESS: 1
ABDOMINAL PAIN: 0
FEVER: 0
PALPITATIONS: 0
SHORTNESS OF BREATH: 0
VOMITING: 0
COUGH: 0
NAUSEA: 1

## 2023-01-18 ASSESSMENT — PAIN DESCRIPTION - PAIN TYPE: TYPE: ACUTE PAIN

## 2023-01-18 NOTE — PROGRESS NOTES
Hospital Medicine Daily Progress Note    Date of Service  1/17/2023    Chief Complaint  Kaiden Quiroz is a 62 y.o. male admitted 1/12/2023 with Possible Stroke (BIBA from home for stroke like symptoms/Wife found pt with slurred speech, L facial droop, and L sided weakness /LKW 1830/Pt had a fall yesterday, + head strike, - thinners/, aox4/Initial NIH of 20 by neurologist, pt taken to CT then red 11)        Hospital Course  No notes on file  62 y.o. male with HTN, T2DM, tobacco use DO who presented 1/12/2023 with Left hemiparesis and facial droop due to Right MCA CVA.     He presented 1/12/22 with Left facial droop and Left-sided weakness. CTH demonstrated hyperdense MCA, for which subsequent CT perfusion demonstrated infarct with penumbra. CTA demonstrated Right ICA and Right MCA occlusion. Neurology was consulted. He was not a candidate for tPA due to hypertension. Neuro-IR performed a Right MCA thrombectomy and Right ICA stent 1/12/23. Subsequent MRI demonstrated Right frontal, basal ganglia, and caudate nucleus infarct. Petechial hemorrhages were noted concerning for hemorrhagic conversion, for which he was not a candidate for DAPT but treated with ASA monotherapy per Neurology recommendation. He was admitted to the ICU post-procedure for neurologic monitoring and nicardipine gtt due to uncontrolled HTN.      During his IR procedure he developed a sinus pause for which CPR was not required prior to spontaneous ROSC. He was administered atropine and glycopyrrolate for sinus bradycardia. He was intubated post-procedure and extubated 1/13/23 to [facility]. CXR demonstrated evolving pulmonary edema. TTE demonstrated normal systolic function and was negative for shunt. He has been diuresed with IV lasix with vast improvement to SpO2 >96% on 4 L/min.      On 1/15/23 he began exhibiting signs of alcohol withdrawal. He was initiated on RASS protocol with precedex gtt and librium taper. He has weaned from precedex  "gtt today. He is transitioned off nicardipine gtt to amlodipine 5 mg daily.     Today he reports nonproductive cough which has caused a headache. His chest feels tight and worsens with deep inspiration. He denies other pain, N/V, F/C, bowel dysfunction. He reports family history of HTN and T2DM. He has not been taking any prescribed medications for his own HTN nor diabetes. He is resolved to quit smoking, MJ, and drinking alcohol.     POC discussed with primary RN and RT. Initiating on CPAP. \"    Dr. Corado 1/17/2023.    Interval Problem Update  1/17. He has mild cognitive deficits but is back to baseline. Family present. - I spoke with Dr. Rivas Neurology and we reviewed the MRI and CT scans. At this time OK to use baby aspirin and no need for stric BP monitoring. Patient will be downgraded to the floor.     I have discussed this patient's plan of care and discharge plan at IDT rounds today with Case Management, Nursing, Nursing leadership, and other members of the IDT team.    Consultants/Specialty  Neurology    Code Status  Full Code    Disposition  Patient is not medically cleared for discharge.   Anticipate discharge to  TBD .  I have placed the appropriate orders for post-discharge needs.    Review of Systems  Review of Systems   Unable to perform ROS: Mental acuity      Physical Exam  Temp:  [36 °C (96.8 °F)-37.1 °C (98.8 °F)] 36.7 °C (98 °F)  Pulse:  [68-80] 78  Resp:  [15-34] 18  BP: ()/(50-95) 122/61  SpO2:  [91 %-99 %] 96 %    Physical Exam  Vitals and nursing note reviewed.   Constitutional:       Appearance: He is obese.      Comments: Older appearing.   HENT:      Head: Normocephalic and atraumatic.      Right Ear: External ear normal.      Left Ear: External ear normal.      Nose: Nose normal.      Mouth/Throat:      Mouth: Mucous membranes are moist.   Eyes:      General: No scleral icterus.     Conjunctiva/sclera: Conjunctivae normal.   Cardiovascular:      Rate and Rhythm: Normal rate and " regular rhythm.      Heart sounds: No murmur heard.    No friction rub. No gallop.   Pulmonary:      Effort: Pulmonary effort is normal.      Breath sounds: Normal breath sounds.   Abdominal:      General: Abdomen is flat. Bowel sounds are normal. There is no distension.      Palpations: Abdomen is soft.      Tenderness: There is no abdominal tenderness. There is no guarding.   Musculoskeletal:         General: Normal range of motion.      Cervical back: Normal range of motion and neck supple.   Skin:     General: Skin is warm.   Neurological:      Mental Status: He is alert. Mental status is at baseline.      Comments: Mild cognitive deficits.   Psychiatric:         Mood and Affect: Mood normal.         Behavior: Behavior normal.         Thought Content: Thought content normal.         Judgment: Judgment normal.       Fluids    Intake/Output Summary (Last 24 hours) at 1/17/2023 1654  Last data filed at 1/17/2023 1100  Gross per 24 hour   Intake 1089.14 ml   Output 3570 ml   Net -2480.86 ml       Laboratory  Recent Labs     01/15/23  0416 01/16/23  0350   WBC 8.4 8.4   RBC 3.57* 3.78*   HEMOGLOBIN 12.2* 12.8*   HEMATOCRIT 35.4* 36.6*   MCV 99.2* 96.8   MCH 34.2* 33.9*   MCHC 34.5 35.0   RDW 44.0 42.4   PLATELETCT 110* 133*   MPV 11.1 11.0     Recent Labs     01/15/23  0416 01/16/23  0350 01/17/23  0312   SODIUM 133* 136 138   POTASSIUM 3.8 3.6 3.7   CHLORIDE 101 101 100   CO2 23 25 27   GLUCOSE 120* 117* 124*   BUN 8 11 15   CREATININE 0.56 0.67 0.73   CALCIUM 8.2* 8.6 9.3                   Imaging  DX-ABDOMEN FOR TUBE PLACEMENT   Final Result         1.  Nonspecific bowel gas pattern in the upper abdomen.   2.  Nasogastric tube tip terminates overlying the expected location of the gastric body.   3.  Cardiomegaly   4.  Hazy linear density left lung base could represent atelectasis or infiltrate.      CT-HEAD W/O   Final Result         1.  Evolving infarct/encephalomalacia in the right MCA distribution.   2.  Areas  of hemorrhage in the right basal ganglia and involving the right frontal and anterior temporal sulci, similar compared to MRI January 13, 2023   3.  Atherosclerosis.      DX-CHEST-PORTABLE (1 VIEW)   Final Result         Diffuse interstitial and groundglass opacity throughout both lungs could relate to developing pulmonary edema.      DX-ABDOMEN FOR TUBE PLACEMENT   Final Result         Gastric drainage tube with tip projecting over the expected area of the stomach.      DX-CHEST-PORTABLE (1 VIEW)   Final Result      1.  Interval extubation without pneumothorax.   2.  Stable enlarged cardiac silhouette.   3.  There is bibasilar linear atelectasis.      EC-ECHOCARDIOGRAM COMPLETE W/ CONT   Final Result      MR-BRAIN-W/O   Final Result         Acute infarction involving the right frontal region, right basal ganglia and right caudate nucleus. Minimal punctate infarction involving the right parietal cortex. There is slight mass effect upon the right lateral ventricle with only minimal midline    shift measuring approximately 4 mm.      Petechial hemorrhage is noted within the right frontal cortical infarct. Also noted is petechial hemorrhage in the right basal ganglia and right caudate nucleus but without surrounding vasogenic edema.         DX-PELVIS-1 OR 2 VIEWS   Final Result         1.  No acute traumatic bony injury.      DX-CHEST-PORTABLE (1 VIEW)   Final Result         1.  Left midlung and lower lobe infiltrates, similar to prior study.      FB-RBWILNO-1 VIEW   Final Result         1.  Nonspecific bowel gas pattern in the upper abdomen.   2.  Cardiomegaly   3.  Hazy left lower lobe infiltrates.   4.  Nasogastric tube tip terminates overlying the expected location of the gastric fundus.      IR-THROMBO MECHANICAL ARTERY,INIT   Final Result         62-year-old patient who presented with complete occlusion of the right ICA at its origin underwent emergent mechanical thrombectomy. After initial angioplasty,   a right  cervical internal carotid artery stent was deployed and right MCA thrombectomy was performed. The final angiographic images show complete restoration of flow into the right ICA and MCA with no evidence of distal intracranial embolization.      Final recanalization score: TICI 3      I, Petra Mobley was physically present and participated during the entire procedure of the IR-THROMBO MECHANICAL ARTERY,INIT.                  DX-CHEST-PORTABLE (1 VIEW)   Final Result      No acute cardiac or pulmonary abnormalities are identified.      CT-CTA NECK WITH & W/O-POST PROCESSING   Final Result      1.  Occlusion of the right internal carotid artery at its origin.      2.  Occlusion of the right vertebral artery at its origin.      Dr. Gongora discussed findings with Dr. Mccarthy.      CT-CTA HEAD WITH & W/O-POST PROCESS   Final Result         1.  Occlusion of the right internal carotid artery and right middle cerebral artery.   2.  Narrowed left M1 segment with severely narrowed and M2 and M3 branches, may represent chronic changes.      These findings were discussed with the patient's clinician, Joseline Mccarthy, on 1/12/2023 8:27 PM.      CT-CEREBRAL PERFUSION ANALYSIS   Final Result         1.  Cerebral blood flow less than 30% likely representing completed infarct = 52 mL.      2.  T Max more than 6 seconds likely representing combination of completed infarct and ischemia = 172 mL.      3.  Mismatched volume likely representing ischemic brain/penumbra = 120 mL      4.  Please note that the cerebral perfusion was performed on the limited brain tissue around the basal ganglia region. Infarct/ischemia outside the CT perfusion sections can be missed in this study.      CT-HEAD W/O   Final Result      1.  No evidence of intracranial hemorrhage, midline shift or mass effect.      2.  Question of hyperdense right MCA though evaluation limited by oblique positioning.              Assessment/Plan  * Acute CVA (cerebrovascular  accident) (HCC)- (present on admission)  Assessment & Plan  Right MCA CVA with hemorrhagic conversion  Neurology consulted - ASA without DAPT until 1/23/23 due to hemorrhagic conversion  S/p right ICA stent and MCA thrombectomy 1/12/23  TTE negative for shunt  Tobacco cessation  PT/OT/SLP - recommending post-acute placement  Physiatry referral - pending confirmation of insurance and post-AR disposition    Noncardiogenic pulmonary edema- (present on admission)  Assessment & Plan  CXR for AHRF demonstrated bilateral pulmonary GGOs consistent with edema  Likely due to pHTN from KENDAL/OHS  TTE without systolic dysfunction, unable to estimate RVSP  Continue IV lasix      Alcohol withdrawal syndrome, with delirium ([facility])- (present on admission)  Assessment & Plan  Developed 1/15/23  RASS - lorazepam protocol  Weaned off precedex  Librium taper      Hypomagnesemia  Assessment & Plan  Due to lasix  Repeat AM Mg during diuresis    Delirium due to medical condition with behavioral disturbance- (present on admission)  Assessment & Plan  Improved  Post-CVA and confounded by EtOH withdrawal - see separate plans    Acute respiratory failure with hypoxia ([facility])- (present on admission)  Assessment & Plan  Due to CVA and pulmonary edema - see separate plans  CPAP for KENDAL  Continue diuresis    Severe obesity (BMI 35.0-35.9 with comorbidity) ([facility])- (present on admission)  Assessment & Plan  Co-morbid HTN, T2DM, KENDAL  Outpatient weight loss    Type 2 diabetes mellitus without complication, without long-term current use of insulin ([facility])- (present on admission)  Assessment & Plan  A1c 5.3  No indication for strict BG control - POCT/SSI deferred  Atorvastatin    Alcohol dependence with withdrawal ([facility])- (present on admission)  Assessment & Plan  Reports 6-pack daily intake  Developed alcohol withdrawal - see separate plan  MAT / Support group discussion prior to discharge  Empiric MVN + thiamine + folate    Tobacco use disorder- (present on  admission)  Assessment & Plan  Resolved to quit  Discuss MAT / NRT prior to discharge    Primary hypertension- (present on admission)  Assessment & Plan  Familial and exacerbated by obesity / tobacco use  Weaned from nicardapine gtt  Amlodipine  SBP goal 110-140 for now per Neurology, eventually <130         VTE prophylaxis: enoxaparin ppx    I have performed a physical exam and reviewed and updated ROS and Plan today (1/17/2023). In review of yesterday's note (1/16/2023), there are no changes except as documented above.

## 2023-01-18 NOTE — WOUND TEAM
[facility]  Inpatient Services  Wound and Skin Care Brief Evaluation    Admission Date: 1/12/2023     Last order of IP CONSULT TO WOUND CARE was found on 1/17/2023 from Hospital Encounter on 1/12/2023     HPI, PMH, SH: Reviewed    Chief Complaint   Patient presents with    Possible Stroke     BIBA from home for stroke like symptoms  Wife found pt with slurred speech, L facial droop, and L sided weakness   LKW 1830  Pt had a fall yesterday, + head strike, - thinners  , aox4  Initial NIH of 20 by neurologist, pt taken to CT then red 11     Diagnosis: Acute CVA (cerebrovascular accident) (HCC) [I63.9]    Unit where seen by Wound Team: S176/02     Wound consult placed regarding R nare. Chart and images reviewed. This discussed with bedside RN. This RN in to assess patient.  Dried blood cleansed off with NS and gauze. R nare pink and intact. No pressure injuries or advanced wound care needs identified. Wound consult completed. No further follow up unless indicated and consulted.         RSKIN:   CURRENTLY IN PLACE (X), APPLIED THIS VISIT (A), ORDERED (O):   Q shift Lucius:  X  Q shift pressure point assessments:  X    Surface/Positioning   Pressure redistribution mattress       X     Low Airloss          Bariatric foam      Bariatric NUNO     Waffle cushion        Waffle Overlay          Reposition q 2 hours      TAPs Turning system     Z Kaveh Pillow     Offloading/Redistribution   Sacral Mepilex (Silicone dressing)     Heel Mepilex (Silicone dressing)         Heel float boots (Prevalon boot)             Float Heels off Bed with Pillows           Respiratory oxymask  Silicone O2 tubing         Gray Foam Ear protectors     Cannula fixation Device (Tender )          High flow offloading Clip    Elastic head band offloading device      Anchorfast                                                         Trach with Optifoam split foam             Containment/Moisture Prevention     Rectal tube or BMS     Purwick/Condom Cath        Mcclendon Catheter  X  Barrier wipes           Barrier paste       Antifungal tx      Interdry        Mobilization RADHA     Up to chair        Ambulate      PT/OT      Nutrition    Dietician        Diabetes Education      PO     TF  X   TPN     NPO   # days     Other

## 2023-01-18 NOTE — CARE PLAN
The patient is Watcher - Medium risk of patient condition declining or worsening    Shift Goals  Clinical Goals: SBP <140, wean O2  Patient Goals: RADHA  Family Goals: radha    Progress made toward(s) clinical / shift goals:  Patient remains on 4l via oxymask      Patient is not progressing towards the following goals:

## 2023-01-18 NOTE — PROGRESS NOTES
Monitor Summary: SR 70-85, IL 0.18, QRS 0.10, QT 0.36, with rare PACs per strip from monitor room.

## 2023-01-18 NOTE — CARE PLAN
The patient is Watcher - Medium risk of patient condition declining or worsening    Shift Goals  Clinical Goals: stable vitals, stable neuro checks, safety  Patient Goals: rest  Family Goals: [place]    Progress made toward(s) clinical / shift goals:    Patient vitals remain stable    Patient is not progressing towards the following goals:patient orientation not improving,   Problem: Knowledge Deficit - Stroke Education  Goal: Patient's knowledge of stroke and risk factors will improve  Description: Target End Date:  1-3 days or as soon as patient condition allows    Document in Patient Education    1.  Stroke education booklet provided  2.  Education regarding EMS activation, need for follow up, medication prescribed at discharge, risk factors for stroke/lifestyle modifications, warning signs and symptoms of stroke provided  Outcome: Not Progressing     Problem: Neuro Status  Goal: Neuro status will remain stable or improve  Description: Target End Date:  Prior to discharge or change in level of care    Document on Neuro assessment in the Assessment flowsheet    1.  Assess and monitor neurologic status per provider order/protocol/unit policy  2.  Assess level of consciousness and orientation  3.  Assess for speech, dysarthria, dysphagia, facial symmetry  4.  Assess visual field, eye movements, gaze preference, pupil reaction and size  5.  Assess muscle strength and motor response in all four extremities  6.  Assess for sensation (numbness and tingling)  7.  Assess basic neuro reflexes (cough, gag, corneal)  8.  Identify changes in neuro status and report to provider for testing/treatment orders  Outcome: Not Progressing     Problem: Dysphagia  Goal: Dysphagia will improve  Description: Target End Date:  Prior to discharge or change in level of care    1.  Assess and monitor ability to swallow  2.  Collaborate with Speech Therapy to determine appropriate adaptation for safe administration of medications and oral  nutrition  3.  Elevate head of bed to 90 degrees during feedings and for 30 minutes after each feeding  4.  Encourage proper swallowing techniques  5.  Screening on admission or as soon as possible  Outcome: Not Progressing     Problem: Mobility - Stroke  Goal: Patient's capacity to carry out activities will improve  Description: Target End Date:  Prior to discharge or change in level of care    1.  Assess for barriers to mobility/activity  2.  Implement activity per interdisciplinary team recommendations  3.  Target activity level identified and patient/family/caregiver aware of goal  4.  Provide assistive devices  5.  Instruct patient/caregiver on proper use of assistive/adaptive devices  6.  Schedule activities and rest periods to decrease effects of fatigue  7.  Encourage mobilization to extent of ability  8.  Maintain proper body alignment  9.  Provide adequate pain management to allow progressive mobilization  10. Implement pace maker precautions as needed  Outcome: Not Progressing     Problem: Self Care  Goal: Patient will have the ability to perform ADLs independently or with assistance (bathe, groom, dress, toilet and feed)  Description: Target End Date:  Prior to discharge or change in level of care    Document on ADL flowsheet    1.  Assess the capability and level of deficiency to perform ADLs  2.  Encourage family/care giver involvement  3.  Provide assistive devices  4.  Consider PT/OT evaluations  5.  Maintain support, give positive feedback, encourage self-care allowing extra time and verbal cuing as needed  6.  Avoid doing something for patients they can do themselves, but provide assistance as needed  7.  Assist in anticipating/planning individual needs  8.  Collaborate with Case Management and  to meet discharge needs  Outcome: Not Progressing     Problem: Knowledge Deficit - Standard  Goal: Patient and family/care givers will demonstrate understanding of plan of care, disease  process/condition, diagnostic tests and medications  Description: Target End Date:  1-3 days or as soon as patient condition allows    Document in Patient Education    1.  Patient and family/caregiver oriented to unit, equipment, visitation policy and means for communicating concern  2.  Complete/review Learning Assessment  3.  Assess knowledge level of disease process/condition, treatment plan, diagnostic tests and medications  4.  Explain disease process/condition, treatment plan, diagnostic tests and medications  Outcome: Not Progressing         Problem: Knowledge Deficit - Stroke Education  Goal: Patient's knowledge of stroke and risk factors will improve  Description: Target End Date:  1-3 days or as soon as patient condition allows    Document in Patient Education    1.  Stroke education booklet provided  2.  Education regarding EMS activation, need for follow up, medication prescribed at discharge, risk factors for stroke/lifestyle modifications, warning signs and symptoms of stroke provided  Outcome: Not Progressing     Problem: Neuro Status  Goal: Neuro status will remain stable or improve  Description: Target End Date:  Prior to discharge or change in level of care    Document on Neuro assessment in the Assessment flowsheet    1.  Assess and monitor neurologic status per provider order/protocol/unit policy  2.  Assess level of consciousness and orientation  3.  Assess for speech, dysarthria, dysphagia, facial symmetry  4.  Assess visual field, eye movements, gaze preference, pupil reaction and size  5.  Assess muscle strength and motor response in all four extremities  6.  Assess for sensation (numbness and tingling)  7.  Assess basic neuro reflexes (cough, gag, corneal)  8.  Identify changes in neuro status and report to provider for testing/treatment orders  Outcome: Not Progressing     Problem: Dysphagia  Goal: Dysphagia will improve  Description: Target End Date:  Prior to discharge or change in level  of care    1.  Assess and monitor ability to swallow  2.  Collaborate with Speech Therapy to determine appropriate adaptation for safe administration of medications and oral nutrition  3.  Elevate head of bed to 90 degrees during feedings and for 30 minutes after each feeding  4.  Encourage proper swallowing techniques  5.  Screening on admission or as soon as possible  Outcome: Not Progressing     Problem: Mobility - Stroke  Goal: Patient's capacity to carry out activities will improve  Description: Target End Date:  Prior to discharge or change in level of care    1.  Assess for barriers to mobility/activity  2.  Implement activity per interdisciplinary team recommendations  3.  Target activity level identified and patient/family/caregiver aware of goal  4.  Provide assistive devices  5.  Instruct patient/caregiver on proper use of assistive/adaptive devices  6.  Schedule activities and rest periods to decrease effects of fatigue  7.  Encourage mobilization to extent of ability  8.  Maintain proper body alignment  9.  Provide adequate pain management to allow progressive mobilization  10. Implement pace maker precautions as needed  Outcome: Not Progressing     Problem: Self Care  Goal: Patient will have the ability to perform ADLs independently or with assistance (bathe, groom, dress, toilet and feed)  Description: Target End Date:  Prior to discharge or change in level of care    Document on ADL flowsheet    1.  Assess the capability and level of deficiency to perform ADLs  2.  Encourage family/care giver involvement  3.  Provide assistive devices  4.  Consider PT/OT evaluations  5.  Maintain support, give positive feedback, encourage self-care allowing extra time and verbal cuing as needed  6.  Avoid doing something for patients they can do themselves, but provide assistance as needed  7.  Assist in anticipating/planning individual needs  8.  Collaborate with Case Management and  to meet discharge  needs  Outcome: Not Progressing     Problem: Knowledge Deficit - Standard  Goal: Patient and family/care givers will demonstrate understanding of plan of care, disease process/condition, diagnostic tests and medications  Description: Target End Date:  1-3 days or as soon as patient condition allows    Document in Patient Education    1.  Patient and family/caregiver oriented to unit, equipment, visitation policy and means for communicating concern  2.  Complete/review Learning Assessment  3.  Assess knowledge level of disease process/condition, treatment plan, diagnostic tests and medications  4.  Explain disease process/condition, treatment plan, diagnostic tests and medications  Outcome: Not Progressing

## 2023-01-18 NOTE — CARE PLAN
Problem: Dysphagia  Goal: Dysphagia will improve  Outcome: Not Progressing     Problem: Urinary Elimination  Goal: Establish and maintain regular urinary output  Outcome: Not Progressing     Problem: Mobility - Stroke  Goal: Patient's capacity to carry out activities will improve  Outcome: Not Progressing     Problem: Self Care  Goal: Patient will have the ability to perform ADLs independently or with assistance (bathe, groom, dress, toilet and feed)  Outcome: Not Progressing     Problem: Optimal Care of the Stroke Patient  Goal: Optimal acute care for the stroke patient  Outcome: Progressing     Problem: Knowledge Deficit - Stroke Education  Goal: Patient's knowledge of stroke and risk factors will improve  Outcome: Progressing     Problem: Psychosocial - Patient Condition  Goal: Patient's ability to verbalize feelings about condition will improve  Outcome: Progressing     Problem: Neuro Status  Goal: Neuro status will remain stable or improve  Outcome: Progressing   The patient is Stable - Low risk of patient condition declining or worsening    Shift Goals  Clinical Goals: stable neuro status  Patient Goals: rest  Family Goals: [place]    Progress made toward(s) clinical / shift goals:  understands poc. Stable neuro status    Patient is not progressing towards the following goals: continued npo. Unable to mobilize. Continued thacker      Problem: Dysphagia  Goal: Dysphagia will improve  Outcome: Not Progressing     Problem: Urinary Elimination  Goal: Establish and maintain regular urinary output  Outcome: Not Progressing     Problem: Mobility - Stroke  Goal: Patient's capacity to carry out activities will improve  Outcome: Not Progressing     Problem: Self Care  Goal: Patient will have the ability to perform ADLs independently or with assistance (bathe, groom, dress, toilet and feed)  Outcome: Not Progressing

## 2023-01-18 NOTE — PROGRESS NOTES
Patient continuously pulling tele box off, swinging his right arm at staff during care, taking oxygen off. Provider Nehemias Freeman notified. Restraint orders for right arm received.

## 2023-01-19 ENCOUNTER — APPOINTMENT (OUTPATIENT)
Dept: RADIOLOGY | Facility: MEDICAL CENTER | Age: 63
DRG: 023 | End: 2023-01-19
Attending: INTERNAL MEDICINE
Payer: COMMERCIAL

## 2023-01-19 PROBLEM — J18.9 PNEUMONIA DUE TO INFECTIOUS ORGANISM: Status: ACTIVE | Noted: 2023-01-19

## 2023-01-19 PROBLEM — J81.1 NONCARDIOGENIC PULMONARY EDEMA: Status: RESOLVED | Noted: 2023-01-16 | Resolved: 2023-01-19

## 2023-01-19 PROBLEM — E83.42 HYPOMAGNESEMIA: Status: RESOLVED | Noted: 2023-01-16 | Resolved: 2023-01-19

## 2023-01-19 PROBLEM — F10.931 ALCOHOL WITHDRAWAL SYNDROME, WITH DELIRIUM (HCC): Status: RESOLVED | Noted: 2023-01-16 | Resolved: 2023-01-19

## 2023-01-19 PROBLEM — G92.8 TOXIC METABOLIC ENCEPHALOPATHY: Status: ACTIVE | Noted: 2023-01-15

## 2023-01-19 LAB
ANION GAP SERPL CALC-SCNC: 13 MMOL/L (ref 7–16)
BUN SERPL-MCNC: 35 MG/DL (ref 8–22)
CALCIUM SERPL-MCNC: 9.7 MG/DL (ref 8.5–10.5)
CHLORIDE SERPL-SCNC: 97 MMOL/L (ref 96–112)
CO2 SERPL-SCNC: 28 MMOL/L (ref 20–33)
CREAT SERPL-MCNC: 0.71 MG/DL (ref 0.5–1.4)
CRP SERPL HS-MCNC: 11.11 MG/DL (ref 0–0.75)
ERYTHROCYTE [DISTWIDTH] IN BLOOD BY AUTOMATED COUNT: 44.5 FL (ref 35.9–50)
GFR SERPLBLD CREATININE-BSD FMLA CKD-EPI: 104 ML/MIN/1.73 M 2
GLUCOSE SERPL-MCNC: 148 MG/DL (ref 65–99)
HCT VFR BLD AUTO: 43.4 % (ref 42–52)
HGB BLD-MCNC: 14.7 G/DL (ref 14–18)
MAGNESIUM SERPL-MCNC: 2.3 MG/DL (ref 1.5–2.5)
MCH RBC QN AUTO: 34 PG (ref 27–33)
MCHC RBC AUTO-ENTMCNC: 33.9 G/DL (ref 33.7–35.3)
MCV RBC AUTO: 100.5 FL (ref 81.4–97.8)
PHOSPHATE SERPL-MCNC: 3.4 MG/DL (ref 2.5–4.5)
PLATELET # BLD AUTO: 276 K/UL (ref 164–446)
PMV BLD AUTO: 10.5 FL (ref 9–12.9)
POTASSIUM SERPL-SCNC: 3.6 MMOL/L (ref 3.6–5.5)
PREALB SERPL-MCNC: 18.1 MG/DL (ref 18–38)
PROCALCITONIN SERPL-MCNC: 0.13 NG/ML
RBC # BLD AUTO: 4.32 M/UL (ref 4.7–6.1)
SODIUM SERPL-SCNC: 138 MMOL/L (ref 135–145)
WBC # BLD AUTO: 12 K/UL (ref 4.8–10.8)

## 2023-01-19 PROCEDURE — 84134 ASSAY OF PREALBUMIN: CPT

## 2023-01-19 PROCEDURE — 92526 ORAL FUNCTION THERAPY: CPT

## 2023-01-19 PROCEDURE — 700111 HCHG RX REV CODE 636 W/ 250 OVERRIDE (IP): Performed by: INTERNAL MEDICINE

## 2023-01-19 PROCEDURE — 700101 HCHG RX REV CODE 250: Performed by: INTERNAL MEDICINE

## 2023-01-19 PROCEDURE — A9270 NON-COVERED ITEM OR SERVICE: HCPCS | Performed by: INTERNAL MEDICINE

## 2023-01-19 PROCEDURE — 700102 HCHG RX REV CODE 250 W/ 637 OVERRIDE(OP): Performed by: INTERNAL MEDICINE

## 2023-01-19 PROCEDURE — 51798 US URINE CAPACITY MEASURE: CPT

## 2023-01-19 PROCEDURE — 770020 HCHG ROOM/CARE - TELE (206)

## 2023-01-19 PROCEDURE — 86140 C-REACTIVE PROTEIN: CPT

## 2023-01-19 PROCEDURE — 99233 SBSQ HOSP IP/OBS HIGH 50: CPT | Performed by: INTERNAL MEDICINE

## 2023-01-19 PROCEDURE — 85027 COMPLETE CBC AUTOMATED: CPT

## 2023-01-19 PROCEDURE — 80048 BASIC METABOLIC PNL TOTAL CA: CPT

## 2023-01-19 PROCEDURE — 700102 HCHG RX REV CODE 250 W/ 637 OVERRIDE(OP): Performed by: STUDENT IN AN ORGANIZED HEALTH CARE EDUCATION/TRAINING PROGRAM

## 2023-01-19 PROCEDURE — 84145 PROCALCITONIN (PCT): CPT

## 2023-01-19 PROCEDURE — 97530 THERAPEUTIC ACTIVITIES: CPT | Mod: CO

## 2023-01-19 PROCEDURE — 700111 HCHG RX REV CODE 636 W/ 250 OVERRIDE (IP): Performed by: STUDENT IN AN ORGANIZED HEALTH CARE EDUCATION/TRAINING PROGRAM

## 2023-01-19 PROCEDURE — A9270 NON-COVERED ITEM OR SERVICE: HCPCS | Performed by: STUDENT IN AN ORGANIZED HEALTH CARE EDUCATION/TRAINING PROGRAM

## 2023-01-19 PROCEDURE — 84100 ASSAY OF PHOSPHORUS: CPT

## 2023-01-19 PROCEDURE — 700102 HCHG RX REV CODE 250 W/ 637 OVERRIDE(OP): Performed by: NURSE PRACTITIONER

## 2023-01-19 PROCEDURE — 71045 X-RAY EXAM CHEST 1 VIEW: CPT

## 2023-01-19 PROCEDURE — 83735 ASSAY OF MAGNESIUM: CPT

## 2023-01-19 PROCEDURE — 700105 HCHG RX REV CODE 258: Performed by: INTERNAL MEDICINE

## 2023-01-19 PROCEDURE — A9270 NON-COVERED ITEM OR SERVICE: HCPCS | Performed by: NURSE PRACTITIONER

## 2023-01-19 PROCEDURE — 36415 COLL VENOUS BLD VENIPUNCTURE: CPT

## 2023-01-19 PROCEDURE — 94640 AIRWAY INHALATION TREATMENT: CPT

## 2023-01-19 RX ORDER — AMOXICILLIN 250 MG
1 CAPSULE ORAL 2 TIMES DAILY
Status: DISCONTINUED | OUTPATIENT
Start: 2023-01-19 | End: 2023-01-19

## 2023-01-19 RX ORDER — ACETYLCYSTEINE 200 MG/ML
3 SOLUTION ORAL; RESPIRATORY (INHALATION) EVERY 4 HOURS
Status: DISCONTINUED | OUTPATIENT
Start: 2023-01-19 | End: 2023-01-24

## 2023-01-19 RX ORDER — POLYETHYLENE GLYCOL 3350 17 G/17G
1 POWDER, FOR SOLUTION ORAL 2 TIMES DAILY
Status: DISCONTINUED | OUTPATIENT
Start: 2023-01-19 | End: 2023-01-19

## 2023-01-19 RX ORDER — IPRATROPIUM BROMIDE AND ALBUTEROL SULFATE 2.5; .5 MG/3ML; MG/3ML
3 SOLUTION RESPIRATORY (INHALATION)
Status: DISCONTINUED | OUTPATIENT
Start: 2023-01-19 | End: 2023-02-16 | Stop reason: HOSPADM

## 2023-01-19 RX ORDER — POLYETHYLENE GLYCOL 3350 17 G/17G
1 POWDER, FOR SOLUTION ORAL 2 TIMES DAILY
Status: DISCONTINUED | OUTPATIENT
Start: 2023-01-19 | End: 2023-02-09

## 2023-01-19 RX ORDER — SODIUM CHLORIDE 9 MG/ML
INJECTION, SOLUTION INTRAVENOUS CONTINUOUS
Status: ACTIVE | OUTPATIENT
Start: 2023-01-19 | End: 2023-01-19

## 2023-01-19 RX ORDER — AMOXICILLIN 250 MG
1 CAPSULE ORAL 2 TIMES DAILY
Status: DISCONTINUED | OUTPATIENT
Start: 2023-01-19 | End: 2023-02-09

## 2023-01-19 RX ORDER — SODIUM CHLORIDE FOR INHALATION 3 %
3 VIAL, NEBULIZER (ML) INHALATION ONCE
Status: COMPLETED | OUTPATIENT
Start: 2023-01-19 | End: 2023-01-19

## 2023-01-19 RX ADMIN — SENNOSIDES AND DOCUSATE SODIUM 1 TABLET: 50; 8.6 TABLET ORAL at 17:41

## 2023-01-19 RX ADMIN — AMLODIPINE BESYLATE 5 MG: 5 TABLET ORAL at 04:35

## 2023-01-19 RX ADMIN — HYDRALAZINE HYDROCHLORIDE 20 MG: 20 INJECTION INTRAMUSCULAR; INTRAVENOUS at 00:44

## 2023-01-19 RX ADMIN — IPRATROPIUM BROMIDE AND ALBUTEROL SULFATE 3 ML: .5; 2.5 SOLUTION RESPIRATORY (INHALATION) at 19:51

## 2023-01-19 RX ADMIN — AMPICILLIN AND SULBACTAM 3 G: 1; 2 INJECTION, POWDER, FOR SOLUTION INTRAMUSCULAR; INTRAVENOUS at 23:48

## 2023-01-19 RX ADMIN — Medication 400 MG: at 04:35

## 2023-01-19 RX ADMIN — IPRATROPIUM BROMIDE AND ALBUTEROL SULFATE 3 ML: .5; 2.5 SOLUTION RESPIRATORY (INHALATION) at 22:27

## 2023-01-19 RX ADMIN — ATORVASTATIN CALCIUM 80 MG: 80 TABLET, FILM COATED ORAL at 17:41

## 2023-01-19 RX ADMIN — FUROSEMIDE 20 MG: 10 INJECTION, SOLUTION INTRAMUSCULAR; INTRAVENOUS at 05:09

## 2023-01-19 RX ADMIN — AMPICILLIN AND SULBACTAM 3 G: 1; 2 INJECTION, POWDER, FOR SOLUTION INTRAMUSCULAR; INTRAVENOUS at 13:48

## 2023-01-19 RX ADMIN — ACETYLCYSTEINE 3 ML: 200 SOLUTION ORAL; RESPIRATORY (INHALATION) at 22:28

## 2023-01-19 RX ADMIN — ASPIRIN 81 MG: 81 TABLET, CHEWABLE ORAL at 04:35

## 2023-01-19 RX ADMIN — SODIUM CHLORIDE: 9 INJECTION, SOLUTION INTRAVENOUS at 13:47

## 2023-01-19 RX ADMIN — ENOXAPARIN SODIUM 40 MG: 40 INJECTION SUBCUTANEOUS at 17:42

## 2023-01-19 RX ADMIN — NICOTINE 14 MG: 14 PATCH TRANSDERMAL at 04:35

## 2023-01-19 RX ADMIN — SODIUM CHLORIDE SOLN NEBU 3% 3 ML: 3 NEBU SOLN at 14:17

## 2023-01-19 RX ADMIN — POLYETHYLENE GLYCOL 3350 1 PACKET: 17 POWDER, FOR SOLUTION ORAL at 17:41

## 2023-01-19 RX ADMIN — AMPICILLIN AND SULBACTAM 3 G: 1; 2 INJECTION, POWDER, FOR SOLUTION INTRAMUSCULAR; INTRAVENOUS at 17:45

## 2023-01-19 RX ADMIN — ACETYLCYSTEINE 3 ML: 200 SOLUTION ORAL; RESPIRATORY (INHALATION) at 19:51

## 2023-01-19 RX ADMIN — THIAMINE HCL TAB 100 MG 100 MG: 100 TAB at 04:35

## 2023-01-19 RX ADMIN — ACETYLCYSTEINE 3 ML: 200 SOLUTION ORAL; RESPIRATORY (INHALATION) at 14:18

## 2023-01-19 RX ADMIN — THERA TABS 1 TABLET: TAB at 04:35

## 2023-01-19 RX ADMIN — FOLIC ACID 1 MG: 1 TABLET ORAL at 04:35

## 2023-01-19 RX ADMIN — DOCUSATE SODIUM 50 MG AND SENNOSIDES 8.6 MG 2 TABLET: 8.6; 5 TABLET, FILM COATED ORAL at 04:35

## 2023-01-19 RX ADMIN — POLYETHYLENE GLYCOL 3350 1 PACKET: 17 POWDER, FOR SOLUTION ORAL at 04:35

## 2023-01-19 ASSESSMENT — COGNITIVE AND FUNCTIONAL STATUS - GENERAL
DRESSING REGULAR LOWER BODY CLOTHING: A LOT
PERSONAL GROOMING: A LOT
SUGGESTED CMS G CODE MODIFIER DAILY ACTIVITY: CL
DRESSING REGULAR UPPER BODY CLOTHING: A LOT
TOILETING: A LOT
EATING MEALS: A LOT
DAILY ACTIVITIY SCORE: 11
HELP NEEDED FOR BATHING: TOTAL

## 2023-01-19 ASSESSMENT — PAIN DESCRIPTION - PAIN TYPE: TYPE: ACUTE PAIN

## 2023-01-19 NOTE — ASSESSMENT & PLAN NOTE
Resolved    Aspiration pneumonia (Developed 01/19/23)   Procal low  Sputum cultures neg  Strict aspiration precautions   Has completed 5 days of Abx's  MRSA nares neg  Mucomyst/3% normal saline nebs to maintain pulmonary toilet, RT protocol to continue  Pulmonology evaluated him and made recommendations

## 2023-01-19 NOTE — PROGRESS NOTES
Monitor Summary: SR/ST , IA 0.17, QRS 0.10, QT 0.36, with rare PVCs per strip from monitor room.

## 2023-01-19 NOTE — THERAPY
"Speech Language Pathology  Daily Treatment     Patient Name: Kaiden Quiroz  Age:  62 y.o., Sex:  male  Medical Record #: [id number]  Today's Date: 1/19/2023     Precautions  Precautions: Fall Risk, Nasogastric Tube, Swallow Precautions ( See Comments)  Comments: L hemiparesis; R wrist restraint    HPI: 62 y.o. 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 He is now s/p TICI 3 mechanical thrombectomy on R ICA and M1 clot, and s/p R ICA stent placement. CSE 1/13.     Subjective  Pt agreeable and cooperative with SLP tx tasks. Very lethargic. \"Some nice cold water.\"    Assessment  Pt following simple commands. Oral care provided - moderate amount of dried, mucous secretions removed from lips and anterior tongue. Pt with reflexive bite on suction and oral care swab when attempted to clear posterior tongue. Suction removed scant secretions initially, increased throughout session including removal of moderate amount of new mucous secretions after productive cough. Ice chips x5, tsp TN0 x2. Pt with immediate cough following 3/7 PO trials, which is concerning for airway invasion. Appropriate oral acceptance, single instance of anterior spillage w/o attempt to clear spillage from labial boarder. Swallow appreciated throughout. Pt lethargy increased throughout session, not appropriate participation level for further evaluation at this time.    Clinical Impressions  Pt presents with s/sx of oropharyngeal dysphagia and is at risk for aspiration and related sequela given limited mobility, dependence for oral care, and impaired cognition. Pt will likely need instrumentation prior to initiation of PO; however, participation at this time is not sufficient to complete further evaluation. Continue NPO/TF.    Recommendations  1.  NPO/TF until appropriate for instrumental evaluation  2.  Medication: Non-oral  2.  Swallowing Instructions & Precautions For Ice Chips with RN:  Oral Care PRIOR to " "ice chips: Q4h  Supervision: 1:1 with trained staff  Positioning: HOB at 90*  Strategies: One at a time via tsp, after oral care only !!  3.  SLP will follow for dysphagia management      Plan    Continue current treatment plan.    Discharge Recommendations: Recommend post-acute placement for additional speech therapy services prior to discharge home       Objective   01/19/23 1338   Vitals   Pulse Oximetry 90 %   O2 (LPM) 2   O2 Delivery Device Oxymask   Dysphagia    Diet / Liquid Recommendation NPO;Pre-Feeding Trials with SLP Only   Nutritional Liquid Intake Rating Scale Nothing by mouth   Nutritional Food Intake Rating Scale Nothing by mouth   Recommended Route of Medication Administration   Medication Administration  Via Gastric Tube   Patient / Family Goals   Patient / Family Goal #1 EDITED 1/19 - \"Some nice cold water.\"   Goal #1 Outcome Progressing slower than expected   Short Term Goals   Short Term Goal # 1 Pt will consume prefeeding trials without any overt s/sx of aspiration   Goal Outcome # 1 Progressing slower than expected   Short Term Goal # 2 Pt will complete instrumental evaluation of swallow with SLP to further assess swallow function and determine POC.   Goal Outcome # 2  Goal not met   Education Group   Education Provided Dysphagia   Dysphagia Patient Response Patient;Acceptance;Explanation;Action Demonstration;Verbal Demonstration;Reinforcement Needed;No Learning Evidence   Interdisciplinary Plan of Care Collaboration   IDT Collaboration with  Nursing   Collaboration Comments All verbalized needs met. RN updated.       "

## 2023-01-19 NOTE — DOCUMENTATION QUERY
[facility]                                                                       Query Response Note      PATIENT:               JUANITA PRICE  ACCT #:                  [id number]  MRN:                     [id number]  :                      1960  ADMIT DATE:       2023 8:01 PM  DISCH DATE:          RESPONDING  PROVIDER #:        [id number]           QUERY TEXT:    Delirium, confused, somnolent, and lethargic are all documented in the Medical Record.  Can a more specific diagnosis be provided?    NOTE:  If the appropriate response is not listed below, please respond with a new note.    The patient's Clinical Indicators include:  ED note - somnolent but answers simple questions   PN Harjeet - AOx3 but confused   Neuro PN -  lethargic and drowsy but arousable  1/15 Hosp Consult - Dx Delirium due to medical condition with behavioral disturbance-Post-CVA and confounded by EtOH withdrawal   PN Gerson - back to baseline    Treatment - CT Head, MCA thrombectomy, ICA stent    Risk factors - MCA cerebral vascular accident, carotid artery occlusion    Thank you,  Marion Choudhary BSN  Clinical   Connect via Plored  Options provided:   -- Acute delirium caused by acute encephalopathy due to acute cerebral vascular accident   -- Other explanation, (please specify the other explanation)   -- Unable to determine      Query created by: Marion Choudhary on 2023 1:13 PM    RESPONSE TEXT:    Acute delirium caused by acute encephalopathy due to acute cerebral vascular accident          Electronically signed by:  ELLIE MARIE MD 2023 10:26 AM

## 2023-01-19 NOTE — PROGRESS NOTES
Hospital Medicine Daily Progress Note    Date of Service  1/19/2023    Chief Complaint  Kaiden Quiroz is a 62 y.o. male admitted 1/12/2023 with Possible Stroke (BIBA from home for stroke like symptoms/Wife found pt with slurred speech, L facial droop, and L sided weakness /LKW 1830/Pt had a fall yesterday, + head strike, - thinners/, aox4/Initial NIH of 20 by neurologist, pt taken to CT then red 11)        Hospital Course  62 y.o. male with HTN, T2DM, tobacco use DO who presented 1/12/2023 with Left hemiparesis and facial droop due to Right MCA CVA.     He presented 1/12/22 with Left facial droop and Left-sided weakness. CTH demonstrated hyperdense MCA, for which subsequent CT perfusion demonstrated infarct with penumbra. CTA demonstrated Right ICA and Right MCA occlusion. Neurology was consulted. He was not a candidate for tPA due to hypertension. Neuro-IR performed a Right MCA thrombectomy and Right ICA stent 1/12/23. Subsequent MRI demonstrated Right frontal, basal ganglia, and caudate nucleus infarct. Petechial hemorrhages were noted concerning for hemorrhagic conversion, for which he was not a candidate for DAPT but treated with ASA monotherapy per Neurology recommendation. He was admitted to the ICU post-procedure for neurologic monitoring and nicardipine gtt due to uncontrolled HTN.  During his IR procedure he developed a sinus pause for which CPR was not required prior to spontaneous ROSC. He was administered atropine and glycopyrrolate for sinus bradycardia. He was intubated post-procedure and extubated 1/13/23 to [facility]. CXR demonstrated evolving pulmonary edema. TTE demonstrated normal systolic function and was negative for shunt. He has been diuresed with IV lasix with vast improvement to SpO2 >96% on 4 L/min. On 1/15/23 he began exhibiting signs of alcohol withdrawal. He was initiated on RASS protocol with precedex gtt and librium taper. He has weaned from precedex gtt today. He is  transitioned off nicardipine gtt to amlodipine 5 mg daily.     Interval Problem Update  Seen and evaluated on rounds with patient's bedside RNs Jana and Susan  Upon evaluation patient is somnolent, following commands (squeeze my hand)  Persistent encephalopathy  On auscultation significant rhonchi  Patient with persistent leukocytosis  Elevated BUN, bladder scan negative for retention, stop Lasix, provide fluid hydration for the next 10 hours with NS  Needs placement of PEG tube, IR orders for PEG tube, n.p.o. after midnight, discussed with nursing  RT protocol, mucolytic therapy  Start IV Unasyn for concerns of aspiration pneumonia  Interval CBC and BMP tomorrow    Consultants/Specialty  Neurology  Physiatry     Code Status  Full Code    Disposition  Patient is not medically cleared for discharge (Will be cleared post 24 hours of PEG tube placement).   Anticipate discharge to Acute rehab vs SNF. Orders in place for both. Discussed with CM on rounds.   I have placed the appropriate orders for post-discharge needs.    Review of Systems  Review of Systems   Unable to perform ROS: Mental acuity      Physical Exam  Temp:  [36.8 °C (98.2 °F)-37 °C (98.6 °F)] 36.9 °C (98.4 °F)  Pulse:  [] 84  Resp:  [17-18] 18  BP: (131-166)/(62-84) 149/80  SpO2:  [89 %-96 %] 92 %    Physical Exam  Vitals and nursing note reviewed.   Constitutional:       General: He is not in acute distress.     Appearance: He is obese.      Comments: Older appearing.   HENT:      Head: Normocephalic and atraumatic.      Right Ear: External ear normal.      Left Ear: External ear normal.      Nose:      Comments: NG tube in place     Mouth/Throat:      Mouth: Mucous membranes are moist.   Eyes:      General: No scleral icterus.        Right eye: No discharge.         Left eye: No discharge.      Conjunctiva/sclera: Conjunctivae normal.   Cardiovascular:      Rate and Rhythm: Normal rate and regular rhythm.      Heart sounds: No murmur heard.     No friction rub. No gallop.   Pulmonary:      Effort: Pulmonary effort is normal.      Breath sounds: Rhonchi present.      Comments: Diminished in bases  Poor cough  Significant secretions   Abdominal:      General: Abdomen is flat. Bowel sounds are normal. There is no distension.      Palpations: Abdomen is soft.      Tenderness: There is no abdominal tenderness. There is no guarding.   Musculoskeletal:      Cervical back: Neck supple. No tenderness.      Right lower leg: No edema.   Skin:     General: Skin is warm and dry.   Neurological:      Mental Status: He is alert. Mental status is at baseline.      Motor: Weakness present.      Comments: Stable left hemiplegia.   Psychiatric:         Attention and Perception: Attention normal.         Mood and Affect: Mood normal.         Behavior: Behavior normal. Behavior is cooperative.       Fluids    Intake/Output Summary (Last 24 hours) at 1/19/2023 1327  Last data filed at 1/19/2023 0650  Gross per 24 hour   Intake 90 ml   Output 1500 ml   Net -1410 ml         Laboratory  Recent Labs     01/18/23  0310 01/19/23  1215   WBC 11.9* 12.0*   RBC 4.09* 4.32*   HEMOGLOBIN 13.9* 14.7   HEMATOCRIT 39.9* 43.4   MCV 97.6 100.5*   MCH 34.0* 34.0*   MCHC 34.8 33.9   RDW 43.9 44.5   PLATELETCT 225 276   MPV 10.3 10.5       Recent Labs     01/17/23  0312 01/18/23  0310 01/19/23  0232   SODIUM 138 137 138   POTASSIUM 3.7 4.0 3.6   CHLORIDE 100 99 97   CO2 27 28 28   GLUCOSE 124* 151* 148*   BUN 15 19 35*   CREATININE 0.73 0.70 0.71   CALCIUM 9.3 9.8 9.7                     Imaging  DX-CHEST-PORTABLE (1 VIEW)   Final Result      Ill-defined atelectasis versus consolidation in the left lower lung. Pneumonia can be considered in appropriate clinical setting.         DX-ABDOMEN FOR TUBE PLACEMENT   Final Result         1.  Nonspecific bowel gas pattern in the upper abdomen.   2.  Nasogastric tube tip terminates overlying the expected location of the gastric body.   3.  Cardiomegaly    4.  Hazy linear density left lung base could represent atelectasis or infiltrate.      CT-HEAD W/O   Final Result         1.  Evolving infarct/encephalomalacia in the right MCA distribution.   2.  Areas of hemorrhage in the right basal ganglia and involving the right frontal and anterior temporal sulci, similar compared to MRI January 13, 2023   3.  Atherosclerosis.      DX-CHEST-PORTABLE (1 VIEW)   Final Result         Diffuse interstitial and groundglass opacity throughout both lungs could relate to developing pulmonary edema.      DX-ABDOMEN FOR TUBE PLACEMENT   Final Result         Gastric drainage tube with tip projecting over the expected area of the stomach.      DX-CHEST-PORTABLE (1 VIEW)   Final Result      1.  Interval extubation without pneumothorax.   2.  Stable enlarged cardiac silhouette.   3.  There is bibasilar linear atelectasis.      EC-ECHOCARDIOGRAM COMPLETE W/ CONT   Final Result      MR-BRAIN-W/O   Final Result         Acute infarction involving the right frontal region, right basal ganglia and right caudate nucleus. Minimal punctate infarction involving the right parietal cortex. There is slight mass effect upon the right lateral ventricle with only minimal midline    shift measuring approximately 4 mm.      Petechial hemorrhage is noted within the right frontal cortical infarct. Also noted is petechial hemorrhage in the right basal ganglia and right caudate nucleus but without surrounding vasogenic edema.         DX-PELVIS-1 OR 2 VIEWS   Final Result         1.  No acute traumatic bony injury.      DX-CHEST-PORTABLE (1 VIEW)   Final Result         1.  Left midlung and lower lobe infiltrates, similar to prior study.      WL-[id number]-6 VIEW   Final Result         1.  Nonspecific bowel gas pattern in the upper abdomen.   2.  Cardiomegaly   3.  Hazy left lower lobe infiltrates.   4.  Nasogastric tube tip terminates overlying the expected location of the gastric fundus.      IR-THROMBO MECHANICAL  ARTERY,INIT   Final Result         62-year-old patient who presented with complete occlusion of the right ICA at its origin underwent emergent mechanical thrombectomy. After initial angioplasty,   a right cervical internal carotid artery stent was deployed and right MCA thrombectomy was performed. The final angiographic images show complete restoration of flow into the right ICA and MCA with no evidence of distal intracranial embolization.      Final recanalization score: TICI 3      I, Petra Mobley was physically present and participated during the entire procedure of the IR-THROMBO MECHANICAL ARTERY,INIT.                  DX-CHEST-PORTABLE (1 VIEW)   Final Result      No acute cardiac or pulmonary abnormalities are identified.      CT-CTA NECK WITH & W/O-POST PROCESSING   Final Result      1.  Occlusion of the right internal carotid artery at its origin.      2.  Occlusion of the right vertebral artery at its origin.      Dr. Gongora discussed findings with Dr. Mccarthy.      CT-CTA HEAD WITH & W/O-POST PROCESS   Final Result         1.  Occlusion of the right internal carotid artery and right middle cerebral artery.   2.  Narrowed left M1 segment with severely narrowed and M2 and M3 branches, may represent chronic changes.      These findings were discussed with the patient's clinician, Joseline Mccarthy, on 1/12/2023 8:27 PM.      CT-CEREBRAL PERFUSION ANALYSIS   Final Result         1.  Cerebral blood flow less than 30% likely representing completed infarct = 52 mL.      2.  T Max more than 6 seconds likely representing combination of completed infarct and ischemia = 172 mL.      3.  Mismatched volume likely representing ischemic brain/penumbra = 120 mL      4.  Please note that the cerebral perfusion was performed on the limited brain tissue around the basal ganglia region. Infarct/ischemia outside the CT perfusion sections can be missed in this study.      CT-HEAD W/O   Final Result      1.  No evidence of  intracranial hemorrhage, midline shift or mass effect.      2.  Question of hyperdense right MCA though evaluation limited by oblique positioning.         IR-GASTROSTOMY PLACEMENT    (Results Pending)          Assessment/Plan  * Acute ischemic right middle cerebral artery (MCA) stroke (HCC)- (present on admission)  Assessment & Plan  Right MCA CVA with hemorrhagic conversion  Neurology consulted - ASA without DAPT until 1/23/23 due to hemorrhagic conversion  On High Intensity statin therapy   Risk factor modification including HTN control   S/p right ICA stent and MCA thrombectomy 1/12/23  TTE negative for shunt  Tobacco cessation  PT/OT/SLP - recommending post-acute placement    Toxic metabolic encephalopathy- (present on admission)  Assessment & Plan  Persistent 01/19/23  Secondary to stroke, delirium and now concern for relation to underlying pneumonia  Limit restraints as able  Avoid polypharmacy, sedating medications  Delirium precautions  Management of underlying medical conditions    Pneumonia due to infectious organism- (present on admission)  Assessment & Plan  Aspiration pneumonia (Developed 01/19/23)   Check procalcitonin, sputum culture  Strict aspiration precautions   Start IV Unasyn, de-escalate to oral Augmentin over the next 24 to 48 hours  Mucomyst/3% normal saline nebs to maintain pulmonary toilet, RT protocol to continue    Acute respiratory failure with hypoxia (HCC)- (present on admission)  Assessment & Plan  Persistent 01/19/23  Due to CVA and pulmonary edema - Initially   01/19/23 - Consistent with Aspiration Pneumonia   CPAP for KENDAL    Oropharyngeal dysphagia- (present on admission)  Assessment & Plan  Secondary to acute stroke.    Aspiration precautions   Needs placement of a PEG tube (ordered, 01/19/23)     Acute left hemiparesis (HCC)- (present on admission)  Assessment & Plan  Secondary to right MCA stroke.    Pressure ulcer precautions  Aspiration, Fall precautions  PT and OT    Stroke,  hemorrhagic (HCC)- (present on admission)  Assessment & Plan  Ischemic stroke with hemorrhagic conversion.    Severe obesity (BMI 35.0-35.9 with comorbidity) (HCC)- (present on admission)  Assessment & Plan  Body mass index is 37.39 kg/m².  Co-morbid HTN, T2DM, KENDAL  Outpatient weight loss    Type 2 diabetes mellitus without complication, without long-term current use of insulin (HCC)- (present on admission)  Assessment & Plan  A1c 5.3  No indication for strict BG control - POCT/SSI deferred  Atorvastatin    Alcohol dependence with withdrawal (HCC)- (present on admission)  Assessment & Plan  Resolved.    Tobacco use disorder- (present on admission)  Assessment & Plan  On nicotine patch     Primary hypertension- (present on admission)  Assessment & Plan  Familial and exacerbated by obesity / tobacco use  Amlodipine (increase to 10 mg tomorrow 01/20 if persistently > 130)          VTE prophylaxis: enoxaparin ppx    I have performed a physical exam and reviewed and updated ROS and Plan today (1/19/2023). In review of yesterday's note (1/18/2023), there are no changes except as documented above.

## 2023-01-19 NOTE — DISCHARGE PLANNING
Case Management Discharge Planning    Admission Date: 1/12/2023  GMLOS: 7.3  ALOS: 7    6-Clicks ADL Score: 11  6-Clicks Mobility Score: 8  PT and/or OT Eval ordered: Yes  Post-acute Referrals Ordered: Yes  Post-acute Choice Obtained: Yes  Has referral(s) been sent to post-acute provider:  Yes      Anticipated Discharge Dispo:  Rehab    DME Needed: none    Action(s) Taken: CM spoke with patient's spouse and she stated she would bring [facility] paperwork to be filled out today.    Spouse states she will be moving to a fist floor apartment on the weekend of the 28th and that she gets the keys next week. She states [facility]. Will follow.    Patient to get PEG tomorrow.    Escalations Completed: None    Medically Clear: No    Next Steps: will follow    Barriers to Discharge: Medical clearance    Is the patient up for discharge tomorrow: No

## 2023-01-19 NOTE — PROGRESS NOTES
[place] Catheter removed by day-shift RN at 1430. At 2030, patient has been urinating into urinary drainage bag via condom catheter. Bladder scan revealed 20mL. Urinary Drainage bag at this time contains 400mL. Condom Catheter will stay in place.

## 2023-01-19 NOTE — PROGRESS NOTES
Hospital Medicine Daily Progress Note    Date of Service  1/18/2023    Chief Complaint  Kaiden Quiroz is a 62 y.o. male admitted 1/12/2023 with Possible Stroke (BIBA from home for stroke like symptoms/Wife found pt with slurred speech, L facial droop, and L sided weakness /LKW 1830/Pt had a fall yesterday, + head strike, - thinners/, aox4/Initial NIH of 20 by neurologist, pt taken to CT then red 11)        Hospital Course  No notes on file  62 y.o. male with HTN, T2DM, tobacco use DO who presented 1/12/2023 with Left hemiparesis and facial droop due to Right MCA CVA.     He presented 1/12/22 with Left facial droop and Left-sided weakness. CTH demonstrated hyperdense MCA, for which subsequent CT perfusion demonstrated infarct with penumbra. CTA demonstrated Right ICA and Right MCA occlusion. Neurology was consulted. He was not a candidate for tPA due to hypertension. Neuro-IR performed a Right MCA thrombectomy and Right ICA stent 1/12/23. Subsequent MRI demonstrated Right frontal, basal ganglia, and caudate nucleus infarct. Petechial hemorrhages were noted concerning for hemorrhagic conversion, for which he was not a candidate for DAPT but treated with ASA monotherapy per Neurology recommendation. He was admitted to the ICU post-procedure for neurologic monitoring and nicardipine gtt due to uncontrolled HTN.      During his IR procedure he developed a sinus pause for which CPR was not required prior to spontaneous ROSC. He was administered atropine and glycopyrrolate for sinus bradycardia. He was intubated post-procedure and extubated 1/13/23 to [facility]. CXR demonstrated evolving pulmonary edema. TTE demonstrated normal systolic function and was negative for shunt. He has been diuresed with IV lasix with vast improvement to SpO2 >96% on 4 L/min.      On 1/15/23 he began exhibiting signs of alcohol withdrawal. He was initiated on RASS protocol with precedex gtt and librium taper. He has weaned from precedex  "gtt today. He is transitioned off nicardipine gtt to amlodipine 5 mg daily.     Today he reports nonproductive cough which has caused a headache. His chest feels tight and worsens with deep inspiration. He denies other pain, N/V, F/C, bowel dysfunction. He reports family history of HTN and T2DM. He has not been taking any prescribed medications for his own HTN nor diabetes. He is resolved to quit smoking, MJ, and drinking alcohol.     POC discussed with primary RN and RT. Initiating on CPAP. \"    Dr. Corado 1/17/2023.    Interval Problem Update  1/17. He has mild cognitive deficits but is back to baseline. Family present. - I spoke with Dr. Rivas Neurology and we reviewed the MRI and CT scans. At this time OK to use baby aspirin and no need for stric BP monitoring. Patient will be downgraded to the floor.     1/18: No significant events overnight.  Continues on 4 LPM oxygen via nasal cannula.  Patient is complaining of a headache.  Tolerating tube feeds at 55 mL/h.  Mcclendon catheter removed today.  Answered multiple questions per the patient's wife, who appeared to be overwhelmed.  I also discussed the option of hospice care for the patient, which the wife was not interested in at this time.  Given that the patient has capacity, he will be able to make decisions for himself.     I have discussed this patient's plan of care and discharge plan at IDT rounds today with Case Management, Nursing, Nursing leadership, and other members of the IDT team.    Consultants/Specialty  Neurology    Code Status  Full Code    Disposition  Patient is not medically cleared for discharge.   Anticipate discharge to  TBD .  I have placed the appropriate orders for post-discharge needs.    Review of Systems  Review of Systems   Constitutional:  Negative for chills and fever.   Respiratory:  Negative for cough and shortness of breath.    Cardiovascular:  Negative for chest pain and palpitations.   Gastrointestinal:  Positive for nausea. " Negative for abdominal pain and vomiting.   Genitourinary:          Mcclendon catheter in place   Musculoskeletal:  Negative for joint pain and myalgias.   Neurological:  Positive for weakness.      Physical Exam  Temp:  [36.8 °C (98.2 °F)-37.5 °C (99.5 °F)] 36.8 °C (98.2 °F)  Pulse:  [78-96] 96  Resp:  [16-18] 18  BP: (142-149)/(78-85) 147/78  SpO2:  [90 %-96 %] 96 %    Physical Exam  Vitals and nursing note reviewed.   Constitutional:       General: He is not in acute distress.     Appearance: He is obese.      Comments: Older appearing.   HENT:      Head: Normocephalic and atraumatic.      Right Ear: External ear normal.      Left Ear: External ear normal.      Nose:      Comments: NG tube in place     Mouth/Throat:      Mouth: Mucous membranes are moist.   Eyes:      General: No scleral icterus.        Right eye: No discharge.         Left eye: No discharge.      Conjunctiva/sclera: Conjunctivae normal.   Cardiovascular:      Rate and Rhythm: Normal rate and regular rhythm.      Heart sounds: No murmur heard.    No friction rub. No gallop.   Pulmonary:      Effort: Pulmonary effort is normal.      Breath sounds: Normal breath sounds.   Abdominal:      General: Abdomen is flat. Bowel sounds are normal. There is no distension.      Palpations: Abdomen is soft.      Tenderness: There is no abdominal tenderness. There is no guarding.   Musculoskeletal:      Cervical back: Neck supple. No tenderness.      Right lower leg: No edema.   Skin:     General: Skin is warm and dry.   Neurological:      Mental Status: He is alert. Mental status is at baseline.      Motor: Weakness present.      Comments: Stable left hemiplegia.   Psychiatric:         Attention and Perception: Attention normal.         Mood and Affect: Mood normal.         Behavior: Behavior normal. Behavior is cooperative.       Fluids    Intake/Output Summary (Last 24 hours) at 1/18/2023 2041  Last data filed at 1/18/2023 0800  Gross per 24 hour   Intake 120 ml    Output 2040 ml   Net -1920 ml         Laboratory  Recent Labs     01/16/23  0350 01/18/23  0310   WBC 8.4 11.9*   RBC 3.78* 4.09*   HEMOGLOBIN 12.8* 13.9*   HEMATOCRIT 36.6* 39.9*   MCV 96.8 97.6   MCH 33.9* 34.0*   MCHC 35.0 34.8   RDW 42.4 43.9   PLATELETCT 133* 225   MPV 11.0 10.3       Recent Labs     01/16/23  0350 01/17/23  0312 01/18/23  0310   SODIUM 136 138 137   POTASSIUM 3.6 3.7 4.0   CHLORIDE 101 100 99   CO2 25 27 28   GLUCOSE 117* 124* 151*   BUN 11 15 19   CREATININE 0.67 0.73 0.70   CALCIUM 8.6 9.3 9.8                     Imaging  DX-ABDOMEN FOR TUBE PLACEMENT   Final Result         1.  Nonspecific bowel gas pattern in the upper abdomen.   2.  Nasogastric tube tip terminates overlying the expected location of the gastric body.   3.  Cardiomegaly   4.  Hazy linear density left lung base could represent atelectasis or infiltrate.      CT-HEAD W/O   Final Result         1.  Evolving infarct/encephalomalacia in the right MCA distribution.   2.  Areas of hemorrhage in the right basal ganglia and involving the right frontal and anterior temporal sulci, similar compared to MRI January 13, 2023   3.  Atherosclerosis.      DX-CHEST-PORTABLE (1 VIEW)   Final Result         Diffuse interstitial and groundglass opacity throughout both lungs could relate to developing pulmonary edema.      DX-ABDOMEN FOR TUBE PLACEMENT   Final Result         Gastric drainage tube with tip projecting over the expected area of the stomach.      DX-CHEST-PORTABLE (1 VIEW)   Final Result      1.  Interval extubation without pneumothorax.   2.  Stable enlarged cardiac silhouette.   3.  There is bibasilar linear atelectasis.      EC-ECHOCARDIOGRAM COMPLETE W/ CONT   Final Result      MR-BRAIN-W/O   Final Result         Acute infarction involving the right frontal region, right basal ganglia and right caudate nucleus. Minimal punctate infarction involving the right parietal cortex. There is slight mass effect upon the right lateral  ventricle with only minimal midline    shift measuring approximately 4 mm.      Petechial hemorrhage is noted within the right frontal cortical infarct. Also noted is petechial hemorrhage in the right basal ganglia and right caudate nucleus but without surrounding vasogenic edema.         DX-PELVIS-1 OR 2 VIEWS   Final Result         1.  No acute traumatic bony injury.      DX-CHEST-PORTABLE (1 VIEW)   Final Result         1.  Left midlung and lower lobe infiltrates, similar to prior study.      GR-[id number]-2 VIEW   Final Result         1.  Nonspecific bowel gas pattern in the upper abdomen.   2.  Cardiomegaly   3.  Hazy left lower lobe infiltrates.   4.  Nasogastric tube tip terminates overlying the expected location of the gastric fundus.      IR-THROMBO MECHANICAL ARTERY,INIT   Final Result         62-year-old patient who presented with complete occlusion of the right ICA at its origin underwent emergent mechanical thrombectomy. After initial angioplasty,   a right cervical internal carotid artery stent was deployed and right MCA thrombectomy was performed. The final angiographic images show complete restoration of flow into the right ICA and MCA with no evidence of distal intracranial embolization.      Final recanalization score: TICI 3      I, Petra Mobley was physically present and participated during the entire procedure of the IR-THROMBO MECHANICAL ARTERY,INIT.                  DX-CHEST-PORTABLE (1 VIEW)   Final Result      No acute cardiac or pulmonary abnormalities are identified.      CT-CTA NECK WITH & W/O-POST PROCESSING   Final Result      1.  Occlusion of the right internal carotid artery at its origin.      2.  Occlusion of the right vertebral artery at its origin.      Dr. Gongora discussed findings with Dr. Mccarthy.      CT-CTA HEAD WITH & W/O-POST PROCESS   Final Result         1.  Occlusion of the right internal carotid artery and right middle cerebral artery.   2.  Narrowed left M1 segment  with severely narrowed and M2 and M3 branches, may represent chronic changes.      These findings were discussed with the patient's clinician, Joseline Mccarthy, on 1/12/2023 8:27 PM.      CT-CEREBRAL PERFUSION ANALYSIS   Final Result         1.  Cerebral blood flow less than 30% likely representing completed infarct = 52 mL.      2.  T Max more than 6 seconds likely representing combination of completed infarct and ischemia = 172 mL.      3.  Mismatched volume likely representing ischemic brain/penumbra = 120 mL      4.  Please note that the cerebral perfusion was performed on the limited brain tissue around the basal ganglia region. Infarct/ischemia outside the CT perfusion sections can be missed in this study.      CT-HEAD W/O   Final Result      1.  No evidence of intracranial hemorrhage, midline shift or mass effect.      2.  Question of hyperdense right MCA though evaluation limited by oblique positioning.                Assessment/Plan  * Acute ischemic right middle cerebral artery (MCA) stroke (HCC)- (present on admission)  Assessment & Plan  Right MCA CVA with hemorrhagic conversion  Neurology consulted - ASA without DAPT until 1/23/23 due to hemorrhagic conversion  S/p right ICA stent and MCA thrombectomy 1/12/23  TTE negative for shunt  Tobacco cessation  PT/OT/SLP - recommending post-acute placement  Physiatry referral - pending confirmation of insurance and post-AR disposition    Oropharyngeal dysphagia- (present on admission)  Assessment & Plan  Secondary to stroke.  Patient will need assessment by speech therapy to determine whether his diet can be advanced or whether he needs a PEG.  He is tolerating his tube feeds.    Acute left hemiparesis (HCC)- (present on admission)  Assessment & Plan  Secondary to right MCA stroke.    Pressure ulcer precautions  Fall precautions  PT and OT    Stroke, hemorrhagic (HCC)- (present on admission)  Assessment & Plan  Ischemic stroke with hemorrhagic  conversion.    Alcohol dependence with withdrawal (HCC)- (present on admission)  Assessment & Plan  Resolved.  Discontinue Ativan and Librium.  Discontinue CIWA protocol  Empiric MVN + thiamine + folate    Noncardiogenic pulmonary edema- (present on admission)  Assessment & Plan  CXR for AHRF demonstrated bilateral pulmonary GGOs consistent with edema  Likely due to pHTN from KENDAL/OHS  TTE without systolic dysfunction, unable to estimate RVSP  Continue IV lasix      Alcohol withdrawal syndrome, with delirium ([facility])- (present on admission)  Assessment & Plan  Developed 1/15/23  RASS - lorazepam protocol  Weaned off precedex  Librium taper      Hypomagnesemia  Assessment & Plan  Magnesium of 1.7 this morning.  Magnesium sulfate 4 g IV given.    Delirium due to medical condition with behavioral disturbance- (present on admission)  Assessment & Plan  Improved  Post-CVA and confounded by EtOH withdrawal - see separate plans    Acute respiratory failure with hypoxia ([facility])- (present on admission)  Assessment & Plan  Due to CVA and pulmonary edema - see separate plans  CPAP for KENDAL    1/18: Continues on 4 LPM oxygen via nasal cannula.  Check NT proBNP level.  Decrease furosemide from 40 mg IV twice daily to 20 mg IV daily.    Severe obesity (BMI 35.0-35.9 with comorbidity) ([facility])- (present on admission)  Assessment & Plan  Co-morbid HTN, T2DM, KENDAL  Outpatient weight loss    Type 2 diabetes mellitus without complication, without long-term current use of insulin ([facility])- (present on admission)  Assessment & Plan  A1c 5.3  No indication for strict BG control - POCT/SSI deferred  Atorvastatin    Tobacco use disorder- (present on admission)  Assessment & Plan  Resolved to quit  Discuss MAT / NRT prior to discharge    Primary hypertension- (present on admission)  Assessment & Plan  Familial and exacerbated by obesity / tobacco use  Weaned from nicardapine gtt  Amlodipine  SBP goal 110-140 for now per Neurology, eventually <130          VTE prophylaxis: enoxaparin ppx    I have performed a physical exam and reviewed and updated ROS and Plan today (1/18/2023). In review of yesterday's note (1/17/2023), there are no changes except as documented above.

## 2023-01-19 NOTE — THERAPY
Occupational Therapy  Daily Treatment     Patient Name: Kaiden Quiroz  Age:  62 y.o., Sex:  male  Medical Record #: [id number]  Today's Date: 1/19/2023       Precautions: Fall Risk, Nasogastric Tube, Swallow Precautions ( See Comments)  Comments: L nubia, R wrist restraint    Assessment    Pt seen for OT tx. Continues to be limited by decreased activity tolerance, balance deficits and inattention impacting ability to complete ADLs and ADL transfers independently. Max A supine > sit, once seated EOB requiring assist from therapist to maintain midline. LUE no active or purposeful movement. Eyes were closed throughout requiring cues from therapist to maintain attention but was participatory. Will continue to follow while in house.     Plan    O.T. Treatment Plan: Continue Current Treatment Plan    DC Equipment Recommendations: Unable to determine at this time  Discharge Recommendations: Recommend post-acute placement for additional occupational therapy services prior to discharge home     01/19/23 1031   Balance   Sitting Balance (Static) Trace   Sitting Balance (Dynamic) Dependent   Weight Shift Sitting Poor   Bed Mobility    Supine to Sit Maximal Assist   Sit to Supine Maximal Assist   Activities of Daily Living   Upper Body Dressing Moderate Assist   Lower Body Dressing Maximal Assist   Toileting Maximal Assist   Short Term Goals   Short Term Goal # 1 pt will groom seated EOB w/ setup   Goal Outcome # 1 Goal not met   Short Term Goal # 2 pt will maintain sitting balance unsupported 10 sec in prep for seated ADLs   Goal Outcome # 2 Goal not met   Short Term Goal # 3 pt will demo ADL txfs with Bandar   Goal Outcome # 3 Goal not met   Anticipated Discharge Equipment and Recommendations   DC Equipment Recommendations Unable to determine at this time   Discharge Recommendations Recommend post-acute placement for additional occupational therapy services prior to discharge home

## 2023-01-19 NOTE — ASSESSMENT & PLAN NOTE
Secondary to right MCA stroke.    Pressure ulcer precautions  Aspiration, Fall precautions  PT and OT: SNF placement

## 2023-01-19 NOTE — DIETARY
Nutrition Services: Brief Update    Pt switched to Impact Peptide 1.5 overnight with goal rate 55 ml/hr d/t being out of Replete with Fiber on floor. Discussed with RN, Replete wis in stock. Sending bag of formula to unit and will update orders to Replete with Fiber with goal rate 80 ml/hr, discussed with RN can start at 55 ml/hr with fiber-containing formula and after 3-4 hours of tolerating can increase to goal rate.     RD following.

## 2023-01-19 NOTE — PROGRESS NOTES
0040: BP measured at 166/84 mmHg.   0044: Hydralazine 20mg IVP given.   0130: BP measured at 133/74 mmHg.

## 2023-01-19 NOTE — CARE PLAN
The patient is Stable - Low risk of patient condition declining or worsening    Shift Goals  Clinical Goals: Maintain Skin Integrity/Monitor Neuro Status  Patient Goals: Sleep  Family Goals: RADHA    Progress made toward(s) clinical / shift goals: Assumed care of patient at 1915. Patient is A&Ox1, and unable to reorient. Q4hr neuro checks are being completed. Fall/Seizure precautions are in place. Bed is low and locked, bed alarm is on, call light is within reach, hourly rounding continues.     Problem: Respiratory - Stroke Patient  Goal: Patient will achieve/maintain optimum respiratory rate/effort  Outcome: Progressing  Note: At start of shift patient was utilizing 5L of O2 via oxymask, since then, patient has been able to titrate to 2L of O2, while still maintaining adequate oxygen saturation.      Problem: Skin Integrity  Goal: Skin integrity is maintained or improved  Outcome: Progressing  Note: Waffle overlay placed, TAPS in place, Q2hr turns, L elbow and heel mepilex placed, L heel float boot in place, and barrier paste is being applied to sacrum.      Problem: Safety - Medical Restraint  Goal: Remains free of injury from restraints (Restraint for Interference with Medical Device)  Outcome: Progressing    Patient is not progressing towards the following goals:    Problem: Knowledge Deficit - Stroke Education  Goal: Patient's knowledge of stroke and risk factors will improve  Outcome: Not Progressing     Problem: Psychosocial - Patient Condition  Goal: Patient's ability to verbalize feelings about condition will improve  Outcome: Not Progressing     Problem: Neuro Status  Goal: Neuro status will remain stable or improve  Outcome: Not Progressing  Note: Patient is not able to reorient. A&Ox1, to self only.      Problem: Dysphagia  Goal: Dysphagia will improve  Outcome: Not Progressing  Note: NGT in place.      Problem: Urinary and Bowel Elimination  Goal: Establish and maintain regular urinary output  Outcome:  Not Progressing  Note: Patient is incontinent of bowel and bladder. Condom cath in place.      Problem: Mobility - Stroke  Goal: Patient's capacity to carry out activities will improve  Outcome: Not Progressing  Note: Unable to ambulate patient.      Problem: Self Care  Goal: Patient will have the ability to perform ADLs independently or with assistance (bathe, groom, dress, toilet and feed)  Outcome: Not Progressing     Problem: Knowledge Deficit - Standard  Goal: Patient and family/care givers will demonstrate understanding of plan of care, disease process/condition, diagnostic tests and medications  Outcome: Not Progressing

## 2023-01-19 NOTE — ASSESSMENT & PLAN NOTE
Ischemic stroke with hemorrhagic conversion.  Neuro has OKd for antiplatelet therapy with ASA and plavix given presence of new ICA stent

## 2023-01-20 ENCOUNTER — APPOINTMENT (OUTPATIENT)
Dept: CARDIOLOGY | Facility: MEDICAL CENTER | Age: 63
DRG: 023 | End: 2023-01-20
Attending: INTERNAL MEDICINE
Payer: COMMERCIAL

## 2023-01-20 ENCOUNTER — HOSPITAL ENCOUNTER (OUTPATIENT)
Dept: RADIOLOGY | Facility: MEDICAL CENTER | Age: 63
End: 2023-01-20
Attending: INTERNAL MEDICINE

## 2023-01-20 ENCOUNTER — APPOINTMENT (OUTPATIENT)
Dept: RADIOLOGY | Facility: MEDICAL CENTER | Age: 63
DRG: 023 | End: 2023-01-20
Attending: INTERNAL MEDICINE
Payer: COMMERCIAL

## 2023-01-20 ENCOUNTER — APPOINTMENT (OUTPATIENT)
Dept: RADIOLOGY | Facility: MEDICAL CENTER | Age: 63
DRG: 023 | End: 2023-01-20
Attending: HOSPITALIST
Payer: COMMERCIAL

## 2023-01-20 LAB
ANION GAP SERPL CALC-SCNC: 12 MMOL/L (ref 7–16)
BASE EXCESS BLDA CALC-SCNC: 5 MMOL/L (ref -4–3)
BODY TEMPERATURE: 37 CENTIGRADE
BUN SERPL-MCNC: 33 MG/DL (ref 8–22)
CALCIUM SERPL-MCNC: 9.7 MG/DL (ref 8.5–10.5)
CHLORIDE SERPL-SCNC: 105 MMOL/L (ref 96–112)
CO2 SERPL-SCNC: 29 MMOL/L (ref 20–33)
CREAT SERPL-MCNC: 0.78 MG/DL (ref 0.5–1.4)
ERYTHROCYTE [DISTWIDTH] IN BLOOD BY AUTOMATED COUNT: 44.6 FL (ref 35.9–50)
FLUAV RNA SPEC QL NAA+PROBE: NEGATIVE
FLUBV RNA SPEC QL NAA+PROBE: NEGATIVE
GFR SERPLBLD CREATININE-BSD FMLA CKD-EPI: 101 ML/MIN/1.73 M 2
GLUCOSE BLD STRIP.AUTO-MCNC: 127 MG/DL (ref 65–99)
GLUCOSE SERPL-MCNC: 131 MG/DL (ref 65–99)
HCO3 BLDA-SCNC: 29 MMOL/L (ref 17–25)
HCT VFR BLD AUTO: 41.2 % (ref 42–52)
HGB BLD-MCNC: 14.1 G/DL (ref 14–18)
LV EJECT FRACT  99904: 70
LV EJECT FRACT MOD 2C 99903: 56.09
LV EJECT FRACT MOD 4C 99902: 70.24
LV EJECT FRACT MOD BP 99901: 64.47
MCH RBC QN AUTO: 34.3 PG (ref 27–33)
MCHC RBC AUTO-ENTMCNC: 34.2 G/DL (ref 33.7–35.3)
MCV RBC AUTO: 100.2 FL (ref 81.4–97.8)
PCO2 BLDA: 40.9 MMHG (ref 26–37)
PH BLDA: 7.46 [PH] (ref 7.4–7.5)
PHOSPHATE SERPL-MCNC: 4.7 MG/DL (ref 2.5–4.5)
PLATELET # BLD AUTO: 280 K/UL (ref 164–446)
PMV BLD AUTO: 10.2 FL (ref 9–12.9)
PO2 BLDA: 55.3 MMHG (ref 64–87)
POTASSIUM SERPL-SCNC: 4.1 MMOL/L (ref 3.6–5.5)
RBC # BLD AUTO: 4.11 M/UL (ref 4.7–6.1)
RSV RNA SPEC QL NAA+PROBE: NEGATIVE
SAO2 % BLDA: 88.5 % (ref 93–99)
SARS-COV-2 RNA RESP QL NAA+PROBE: NOTDETECTED
SCCMEC + MECA PNL NOSE NAA+PROBE: NEGATIVE
SODIUM SERPL-SCNC: 146 MMOL/L (ref 135–145)
SPECIMEN SOURCE: NORMAL
TROPONIN T SERPL-MCNC: 11 NG/L (ref 6–19)
TROPONIN T SERPL-MCNC: 14 NG/L (ref 6–19)
TROPONIN T SERPL-MCNC: 15 NG/L (ref 6–19)
WBC # BLD AUTO: 10.6 K/UL (ref 4.8–10.8)

## 2023-01-20 PROCEDURE — 80048 BASIC METABOLIC PNL TOTAL CA: CPT

## 2023-01-20 PROCEDURE — 99233 SBSQ HOSP IP/OBS HIGH 50: CPT | Performed by: INTERNAL MEDICINE

## 2023-01-20 PROCEDURE — 84484 ASSAY OF TROPONIN QUANT: CPT

## 2023-01-20 PROCEDURE — 84100 ASSAY OF PHOSPHORUS: CPT

## 2023-01-20 PROCEDURE — 93325 DOPPLER ECHO COLOR FLOW MAPG: CPT

## 2023-01-20 PROCEDURE — 4A10X4Z MONITORING OF CENTRAL NERVOUS ELECTRICAL ACTIVITY, EXTERNAL APPROACH: ICD-10-PCS | Performed by: PSYCHIATRY & NEUROLOGY

## 2023-01-20 PROCEDURE — 31720 CLEARANCE OF AIRWAYS: CPT

## 2023-01-20 PROCEDURE — 700117 HCHG RX CONTRAST REV CODE 255: Performed by: INTERNAL MEDICINE

## 2023-01-20 PROCEDURE — 700111 HCHG RX REV CODE 636 W/ 250 OVERRIDE (IP): Performed by: INTERNAL MEDICINE

## 2023-01-20 PROCEDURE — 94640 AIRWAY INHALATION TREATMENT: CPT

## 2023-01-20 PROCEDURE — 87205 SMEAR GRAM STAIN: CPT

## 2023-01-20 PROCEDURE — 770000 HCHG ROOM/CARE - INTERMEDIATE ICU *

## 2023-01-20 PROCEDURE — 700102 HCHG RX REV CODE 250 W/ 637 OVERRIDE(OP): Performed by: INTERNAL MEDICINE

## 2023-01-20 PROCEDURE — 82803 BLOOD GASES ANY COMBINATION: CPT

## 2023-01-20 PROCEDURE — 93308 TTE F-UP OR LMTD: CPT | Mod: 26 | Performed by: INTERNAL MEDICINE

## 2023-01-20 PROCEDURE — A9270 NON-COVERED ITEM OR SERVICE: HCPCS | Performed by: INTERNAL MEDICINE

## 2023-01-20 PROCEDURE — 700105 HCHG RX REV CODE 258: Performed by: INTERNAL MEDICINE

## 2023-01-20 PROCEDURE — 87070 CULTURE OTHR SPECIMN AEROBIC: CPT

## 2023-01-20 PROCEDURE — 99291 CRITICAL CARE FIRST HOUR: CPT | Performed by: INTERNAL MEDICINE

## 2023-01-20 PROCEDURE — 95819 EEG AWAKE AND ASLEEP: CPT | Performed by: PSYCHIATRY & NEUROLOGY

## 2023-01-20 PROCEDURE — 0241U HCHG SARS-COV-2 COVID-19 NFCT DS RESP RNA 4 TRGT MIC: CPT

## 2023-01-20 PROCEDURE — 87641 MR-STAPH DNA AMP PROBE: CPT

## 2023-01-20 PROCEDURE — C9803 HOPD COVID-19 SPEC COLLECT: HCPCS | Performed by: INTERNAL MEDICINE

## 2023-01-20 PROCEDURE — 82962 GLUCOSE BLOOD TEST: CPT

## 2023-01-20 PROCEDURE — 74176 CT ABD & PELVIS W/O CONTRAST: CPT

## 2023-01-20 PROCEDURE — 85027 COMPLETE CBC AUTOMATED: CPT

## 2023-01-20 PROCEDURE — 700101 HCHG RX REV CODE 250: Performed by: INTERNAL MEDICINE

## 2023-01-20 PROCEDURE — 93005 ELECTROCARDIOGRAM TRACING: CPT | Performed by: INTERNAL MEDICINE

## 2023-01-20 PROCEDURE — 71045 X-RAY EXAM CHEST 1 VIEW: CPT

## 2023-01-20 PROCEDURE — 95819 EEG AWAKE AND ASLEEP: CPT | Mod: 26 | Performed by: PSYCHIATRY & NEUROLOGY

## 2023-01-20 RX ORDER — AMLODIPINE BESYLATE 5 MG/1
10 TABLET ORAL
Status: DISCONTINUED | OUTPATIENT
Start: 2023-01-21 | End: 2023-02-09

## 2023-01-20 RX ORDER — POLYETHYLENE GLYCOL 3350 17 G/17G
1 POWDER, FOR SOLUTION ORAL
Status: DISCONTINUED | OUTPATIENT
Start: 2023-01-20 | End: 2023-01-22

## 2023-01-20 RX ORDER — AMOXICILLIN 250 MG
2 CAPSULE ORAL 2 TIMES DAILY
Status: DISCONTINUED | OUTPATIENT
Start: 2023-01-20 | End: 2023-01-20

## 2023-01-20 RX ORDER — SODIUM CHLORIDE, SODIUM LACTATE, POTASSIUM CHLORIDE, CALCIUM CHLORIDE 600; 310; 30; 20 MG/100ML; MG/100ML; MG/100ML; MG/100ML
INJECTION, SOLUTION INTRAVENOUS CONTINUOUS
Status: DISCONTINUED | OUTPATIENT
Start: 2023-01-20 | End: 2023-01-24

## 2023-01-20 RX ORDER — POLYETHYLENE GLYCOL 3350 17 G/17G
1 POWDER, FOR SOLUTION ORAL
Status: DISCONTINUED | OUTPATIENT
Start: 2023-01-20 | End: 2023-01-20

## 2023-01-20 RX ORDER — BISACODYL 10 MG
10 SUPPOSITORY, RECTAL RECTAL
Status: DISCONTINUED | OUTPATIENT
Start: 2023-01-20 | End: 2023-01-20

## 2023-01-20 RX ORDER — AMLODIPINE BESYLATE 10 MG/1
5 TABLET ORAL ONCE
Status: COMPLETED | OUTPATIENT
Start: 2023-01-20 | End: 2023-01-20

## 2023-01-20 RX ORDER — BISACODYL 10 MG
10 SUPPOSITORY, RECTAL RECTAL
Status: DISCONTINUED | OUTPATIENT
Start: 2023-01-20 | End: 2023-01-22

## 2023-01-20 RX ORDER — ALBUTEROL SULFATE 2.5 MG/3ML
2.5 SOLUTION RESPIRATORY (INHALATION)
Status: DISCONTINUED | OUTPATIENT
Start: 2023-01-20 | End: 2023-01-24

## 2023-01-20 RX ADMIN — AMLODIPINE BESYLATE 5 MG: 5 TABLET ORAL at 05:25

## 2023-01-20 RX ADMIN — PIPERACILLIN AND TAZOBACTAM 3.38 G: 3; .375 INJECTION, POWDER, LYOPHILIZED, FOR SOLUTION INTRAVENOUS; PARENTERAL at 20:49

## 2023-01-20 RX ADMIN — ACETYLCYSTEINE 3 ML: 200 SOLUTION ORAL; RESPIRATORY (INHALATION) at 03:43

## 2023-01-20 RX ADMIN — ACETYLCYSTEINE 3 ML: 200 SOLUTION ORAL; RESPIRATORY (INHALATION) at 19:03

## 2023-01-20 RX ADMIN — ATORVASTATIN CALCIUM 80 MG: 80 TABLET, FILM COATED ORAL at 17:48

## 2023-01-20 RX ADMIN — ALBUTEROL SULFATE 2.5 MG: 2.5 SOLUTION RESPIRATORY (INHALATION) at 08:37

## 2023-01-20 RX ADMIN — ENOXAPARIN SODIUM 40 MG: 40 INJECTION SUBCUTANEOUS at 17:48

## 2023-01-20 RX ADMIN — ACETYLCYSTEINE 3 ML: 200 SOLUTION ORAL; RESPIRATORY (INHALATION) at 12:39

## 2023-01-20 RX ADMIN — ALBUTEROL SULFATE 2.5 MG: 2.5 SOLUTION RESPIRATORY (INHALATION) at 12:38

## 2023-01-20 RX ADMIN — PIPERACILLIN AND TAZOBACTAM 3.38 G: 3; .375 INJECTION, POWDER, LYOPHILIZED, FOR SOLUTION INTRAVENOUS; PARENTERAL at 15:23

## 2023-01-20 RX ADMIN — ALBUTEROL SULFATE 2.5 MG: 2.5 SOLUTION RESPIRATORY (INHALATION) at 15:48

## 2023-01-20 RX ADMIN — SODIUM CHLORIDE, POTASSIUM CHLORIDE, SODIUM LACTATE AND CALCIUM CHLORIDE: 600; 310; 30; 20 INJECTION, SOLUTION INTRAVENOUS at 20:37

## 2023-01-20 RX ADMIN — SODIUM CHLORIDE, POTASSIUM CHLORIDE, SODIUM LACTATE AND CALCIUM CHLORIDE: 600; 310; 30; 20 INJECTION, SOLUTION INTRAVENOUS at 08:28

## 2023-01-20 RX ADMIN — AMPICILLIN AND SULBACTAM 3 G: 1; 2 INJECTION, POWDER, FOR SOLUTION INTRAMUSCULAR; INTRAVENOUS at 05:33

## 2023-01-20 RX ADMIN — POLYETHYLENE GLYCOL 3350 1 PACKET: 17 POWDER, FOR SOLUTION ORAL at 17:48

## 2023-01-20 RX ADMIN — NICOTINE 14 MG: 14 PATCH TRANSDERMAL at 05:25

## 2023-01-20 RX ADMIN — POLYETHYLENE GLYCOL 3350 1 PACKET: 17 POWDER, FOR SOLUTION ORAL at 05:25

## 2023-01-20 RX ADMIN — SENNOSIDES AND DOCUSATE SODIUM 1 TABLET: 50; 8.6 TABLET ORAL at 05:25

## 2023-01-20 RX ADMIN — SENNOSIDES AND DOCUSATE SODIUM 1 TABLET: 50; 8.6 TABLET ORAL at 17:48

## 2023-01-20 RX ADMIN — ASPIRIN 81 MG: 81 TABLET, CHEWABLE ORAL at 05:25

## 2023-01-20 RX ADMIN — ACETYLCYSTEINE 3 ML: 200 SOLUTION ORAL; RESPIRATORY (INHALATION) at 08:37

## 2023-01-20 RX ADMIN — HUMAN ALBUMIN MICROSPHERES AND PERFLUTREN 3 ML: 10; .22 INJECTION, SOLUTION INTRAVENOUS at 16:39

## 2023-01-20 RX ADMIN — PIPERACILLIN AND TAZOBACTAM 3.38 G: 3; .375 INJECTION, POWDER, LYOPHILIZED, FOR SOLUTION INTRAVENOUS; PARENTERAL at 12:59

## 2023-01-20 RX ADMIN — AMLODIPINE BESYLATE 5 MG: 10 TABLET ORAL at 12:45

## 2023-01-20 RX ADMIN — SODIUM CHLORIDE, POTASSIUM CHLORIDE, SODIUM LACTATE AND CALCIUM CHLORIDE: 600; 310; 30; 20 INJECTION, SOLUTION INTRAVENOUS at 12:32

## 2023-01-20 RX ADMIN — IPRATROPIUM BROMIDE AND ALBUTEROL SULFATE 3 ML: .5; 2.5 SOLUTION RESPIRATORY (INHALATION) at 03:43

## 2023-01-20 RX ADMIN — ALBUTEROL SULFATE 2.5 MG: 2.5 SOLUTION RESPIRATORY (INHALATION) at 19:03

## 2023-01-20 ASSESSMENT — COGNITIVE AND FUNCTIONAL STATUS - GENERAL
MOBILITY SCORE: 8
DRESSING REGULAR LOWER BODY CLOTHING: A LOT
DAILY ACTIVITIY SCORE: 11
STANDING UP FROM CHAIR USING ARMS: TOTAL
DRESSING REGULAR UPPER BODY CLOTHING: A LOT
SUGGESTED CMS G CODE MODIFIER MOBILITY: CM
HELP NEEDED FOR BATHING: TOTAL
WALKING IN HOSPITAL ROOM: TOTAL
TURNING FROM BACK TO SIDE WHILE IN FLAT BAD: A LOT
MOVING FROM LYING ON BACK TO SITTING ON SIDE OF FLAT BED: UNABLE
SUGGESTED CMS G CODE MODIFIER DAILY ACTIVITY: CL
TOILETING: A LOT
CLIMB 3 TO 5 STEPS WITH RAILING: TOTAL
MOVING TO AND FROM BED TO CHAIR: A LOT
EATING MEALS: A LOT
PERSONAL GROOMING: A LOT

## 2023-01-20 ASSESSMENT — PAIN DESCRIPTION - PAIN TYPE: TYPE: ACUTE PAIN

## 2023-01-20 ASSESSMENT — FIBROSIS 4 INDEX
FIB4 SCORE: 1.23
FIB4 SCORE: 1.23

## 2023-01-20 NOTE — PROGRESS NOTES
Change in patient condition due to: Respiratory and Neurologic; increasing oxygen demands, increased lethargy  Rapid Response called at: 0754   Physician Osmany notified at 0756    See Code Blue timeline for rapid response events. Patient Remained on Unit.  RICU rapid to follow.

## 2023-01-20 NOTE — PROGRESS NOTES
Pt non reponsive only to very aggressive sternal rub 02 maintaining at 85 to 88% on 7 liters simple mask called charge then called a rapid. thorough oral care done with suctioning. Thick white secretions.

## 2023-01-20 NOTE — CARE PLAN
The patient is Watcher - Medium risk of patient condition declining or worsening    Shift Goals  Clinical Goals: maintain airway  Patient Goals: RADHA  Family Goals: RADHA    Progress made toward(s) clinical / shift goals:    Problem: Optimal Care of the Stroke Patient  Goal: Optimal emergency care for the stroke patient  Outcome: Progressing     Problem: Knowledge Deficit - Standard  Goal: Patient and family/care givers will demonstrate understanding of plan of care, disease process/condition, diagnostic tests and medications  Outcome: Progressing     Problem: Pain - Standard  Goal: Alleviation of pain or a reduction in pain to the patient’s comfort goal  Outcome: Progressing     Problem: Skin Integrity  Goal: Skin integrity is maintained or improved  Outcome: Progressing     Problem: Fall Risk  Goal: Patient will remain free from falls  Outcome: Progressing       Patient is not progressing towards the following goals:      Problem: Neuro Status  Goal: Neuro status will remain stable or improve  Outcome: Not Progressing     Problem: Self Care  Goal: Patient will have the ability to perform ADLs independently or with assistance (bathe, groom, dress, toilet and feed)  Outcome: Not Progressing

## 2023-01-20 NOTE — CODE DOCUMENTATION
Rapid response called for increasing oxygen demands and lethargy. MD notified by primary RN. Orders placed per protocol.

## 2023-01-20 NOTE — PROGRESS NOTES
Bowel protocol discontinued for G-tube placement today. Unable to give patient Milk of Magnesia. Will advise oncoming shift to continue bowel protocol if patient does not have BM.

## 2023-01-20 NOTE — PROGRESS NOTES
Received telephone report from [employer]. Reviewed chief complaint, course of stay, and plan of care. Reviewed IMCU needs. Will await patient arrival.

## 2023-01-20 NOTE — THERAPY
"Speech Language Therapy Contact Note    Patient Name: Kaiden Quiroz  Age:  62 y.o., Sex:  male  Medical Record #: [id number]  Today's Date: 1/20/2023    Discussed missed therapy with RN. SLP will follow for dysphagia management when pt is appropriate for PO. Continue recommendations as follows:    Recommendations  1.  NPO/TF until appropriate for instrumental evaluation  2.  Medication: Non-oral  2.  Swallowing Instructions & Precautions For Ice Chips with RN:  Oral Care PRIOR to ice chips: Q2h  Supervision: 1:1 with trained staff  Positioning: HOB at 90*  Strategies: One at a time via tsp, after oral care only !!    RN note from 1/20 8:45 AM:  \"Pt non reponsive only to very aggressive sternal rub 02 maintaining at 85 to 88% on 7 liters simple mask called charge then called a rapid. thorough oral care done with suctioning. Thick white secretions.\"       01/20/23 1203   Treatment Variance   Reason For Missed Therapy Medical - Patient Is Not Medically Stable  (Rapid called)   Dysphagia    Diet / Liquid Recommendation NPO;Pre-Feeding Trials with SLP Only   Nutritional Liquid Intake Rating Scale Nothing by mouth   Nutritional Food Intake Rating Scale Nothing by mouth   Recommended Route of Medication Administration   Medication Administration  Via Gastric Tube   Short Term Goals   Short Term Goal # 1 Pt will consume prefeeding trials without any overt s/sx of aspiration   Interdisciplinary Plan of Care Collaboration   IDT Collaboration with  Nursing;Other (See Comments)  (emr)   Collaboration Comments PEr RN - PEG placement held as pt had rapid this AM with AMS and increased O2 needs. SLP will hold dysphagia tx this date and follow up as pt is appropriate to participate.       "

## 2023-01-20 NOTE — PROGRESS NOTES
Verbal sign out provided to Dr Palacios in the IMCU who is assuming care.     Andre Ceron M.D.  01/20/23  1:20 PM

## 2023-01-20 NOTE — PROGRESS NOTES
Hospital Medicine Daily Progress Note    Date of Service  1/20/2023    Chief Complaint  Kaiden Quiroz is a 62 y.o. male admitted 1/12/2023 with Possible Stroke (BIBA from home for stroke like symptoms/Wife found pt with slurred speech, L facial droop, and L sided weakness /LKW 1830/Pt had a fall yesterday, + head strike, - thinners/, aox4/Initial NIH of 20 by neurologist, pt taken to CT then red 11)        Hospital Course  62 y.o. male with HTN, T2DM, tobacco use DO who presented 1/12/2023 with Left hemiparesis and facial droop due to Right MCA CVA.     He presented 1/12/22 with Left facial droop and Left-sided weakness. CTH demonstrated hyperdense MCA, for which subsequent CT perfusion demonstrated infarct with penumbra. CTA demonstrated Right ICA and Right MCA occlusion. Neurology was consulted. He was not a candidate for tPA due to hypertension. Neuro-IR performed a Right MCA thrombectomy and Right ICA stent 1/12/23. Subsequent MRI demonstrated Right frontal, basal ganglia, and caudate nucleus infarct. Petechial hemorrhages were noted concerning for hemorrhagic conversion, for which he was not a candidate for DAPT but treated with ASA monotherapy per Neurology recommendation. He was admitted to the ICU post-procedure for neurologic monitoring and nicardipine gtt due to uncontrolled HTN.  During his IR procedure he developed a sinus pause for which CPR was not required prior to spontaneous ROSC. He was administered atropine and glycopyrrolate for sinus bradycardia. He was intubated post-procedure and extubated 1/13/23 to [facility]. CXR demonstrated evolving pulmonary edema. TTE demonstrated normal systolic function and was negative for shunt. He has been diuresed with IV lasix with vast improvement to SpO2 >96% on 4 L/min. On 1/15/23 he began exhibiting signs of alcohol withdrawal. He was initiated on RASS protocol with precedex gtt and librium taper. He has weaned from precedex gtt today. He is  transitioned off nicardipine gtt to amlodipine 5 mg daily.     Interval Problem Update  Seen and evaluated on rounds with patient's bedside RNs and Rapid Response team this morning   Upon evaluation patient is somnolent, following commands but increasingly lethargic with increasing O2 needs   Persistent encephalopathy  On auscultation significant rhonchi - persistent   Leukocytosis has improved   Elevated BUN, bladder scan negative for retention, Lasix was stopped yesterday. 1L given yesterday. Now with hypernatremia, start LR   Needs placement of PEG tube, IR orders for PEG tube - cancelled given clinical instability   RT protocol, mucolytic therapy  Transfer to [facility]   Interval CBC and BMP tomorrow    Called patient's wife and updated regarding deterioration in clinical condition, updated plan of care, confirmed CODE STATUS is being full code and answered all questions and concerns.    Consultants/Specialty  Neurology  Physiatry     Code Status  Full Code    Disposition  Patient is not medically cleared for discharge (Will be cleared post 24 hours of PEG tube placement).   Anticipate discharge to Acute rehab vs SNF. Orders in place for both. Discussed with CM on rounds.   I have placed the appropriate orders for post-discharge needs.    Review of Systems  Review of Systems   Unable to perform ROS: Mental acuity      Physical Exam  Temp:  [36.9 °C (98.4 °F)-37.5 °C (99.5 °F)] 37 °C (98.6 °F)  Pulse:  [79-93] (P) 84  Resp:  [16-32] (P) 32  BP: (119-166)/(58-85) 147/79  SpO2:  [86 %-97 %] 86 %    Physical Exam  Vitals and nursing note reviewed.   Constitutional:       General: He is not in acute distress.     Appearance: He is obese.      Comments: Older appearing.   HENT:      Head: Normocephalic and atraumatic.      Right Ear: External ear normal.      Left Ear: External ear normal.      Nose:      Comments: NG tube in place     Mouth/Throat:      Mouth: Mucous membranes are moist.   Eyes:      General: No scleral  icterus.        Right eye: No discharge.         Left eye: No discharge.      Conjunctiva/sclera: Conjunctivae normal.   Cardiovascular:      Rate and Rhythm: Normal rate and regular rhythm.      Heart sounds: No murmur heard.    No friction rub. No gallop.   Pulmonary:      Effort: Pulmonary effort is normal.      Breath sounds: Rhonchi present.      Comments: Diminished in bases  Poor cough  Significant secretions   Abdominal:      General: Abdomen is flat. Bowel sounds are normal. There is no distension.      Palpations: Abdomen is soft.      Tenderness: There is no abdominal tenderness. There is no guarding.   Musculoskeletal:      Cervical back: Neck supple. No tenderness.      Right lower leg: No edema.   Skin:     General: Skin is warm and dry.   Neurological:      Mental Status: He is alert. He is disoriented.      Motor: Weakness present.      Comments: Stable left hemiplegia.   Psychiatric:         Attention and Perception: Attention normal.         Mood and Affect: Mood normal.         Behavior: Behavior normal. Behavior is cooperative.       Fluids    Intake/Output Summary (Last 24 hours) at 1/20/2023 1055  Last data filed at 1/20/2023 0619  Gross per 24 hour   Intake 1779.18 ml   Output 1500 ml   Net 279.18 ml         Laboratory  Recent Labs     01/18/23  0310 01/19/23  1215 01/20/23  0239   WBC 11.9* 12.0* 10.6   RBC 4.09* 4.32* 4.11*   HEMOGLOBIN 13.9* 14.7 14.1   HEMATOCRIT 39.9* 43.4 41.2*   MCV 97.6 100.5* 100.2*   MCH 34.0* 34.0* 34.3*   MCHC 34.8 33.9 34.2   RDW 43.9 44.5 44.6   PLATELETCT 225 276 280   MPV 10.3 10.5 10.2       Recent Labs     01/18/23  0310 01/19/23  0232 01/20/23  0239   SODIUM 137 138 146*   POTASSIUM 4.0 3.6 4.1   CHLORIDE 99 97 105   CO2 28 28 29   GLUCOSE 151* 148* 131*   BUN 19 35* 33*   CREATININE 0.70 0.71 0.78   CALCIUM 9.8 9.7 9.7                     Imaging  DX-CHEST-PORTABLE (1 VIEW)   Final Result         1. No significant interval change.      CT-ABDOMEN-PELVIS W/O    Final Result         1.  Nondependent foci of air in the bladder, could represent changes from recent instrumentation or urinary tract infection.   2.  Diverticulosis   3.  Atherosclerosis and atherosclerotic coronary artery disease      DX-CHEST-PORTABLE (1 VIEW)   Final Result      Ill-defined atelectasis versus consolidation in the left lower lung. Pneumonia can be considered in appropriate clinical setting.         DX-ABDOMEN FOR TUBE PLACEMENT   Final Result         1.  Nonspecific bowel gas pattern in the upper abdomen.   2.  Nasogastric tube tip terminates overlying the expected location of the gastric body.   3.  Cardiomegaly   4.  Hazy linear density left lung base could represent atelectasis or infiltrate.      CT-HEAD W/O   Final Result         1.  Evolving infarct/encephalomalacia in the right MCA distribution.   2.  Areas of hemorrhage in the right basal ganglia and involving the right frontal and anterior temporal sulci, similar compared to MRI January 13, 2023   3.  Atherosclerosis.      DX-CHEST-PORTABLE (1 VIEW)   Final Result         Diffuse interstitial and groundglass opacity throughout both lungs could relate to developing pulmonary edema.      DX-ABDOMEN FOR TUBE PLACEMENT   Final Result         Gastric drainage tube with tip projecting over the expected area of the stomach.      DX-CHEST-PORTABLE (1 VIEW)   Final Result      1.  Interval extubation without pneumothorax.   2.  Stable enlarged cardiac silhouette.   3.  There is bibasilar linear atelectasis.      EC-ECHOCARDIOGRAM COMPLETE W/ CONT   Final Result      MR-BRAIN-W/O   Final Result         Acute infarction involving the right frontal region, right basal ganglia and right caudate nucleus. Minimal punctate infarction involving the right parietal cortex. There is slight mass effect upon the right lateral ventricle with only minimal midline    shift measuring approximately 4 mm.      Petechial hemorrhage is noted within the right  frontal cortical infarct. Also noted is petechial hemorrhage in the right basal ganglia and right caudate nucleus but without surrounding vasogenic edema.         DX-PELVIS-1 OR 2 VIEWS   Final Result         1.  No acute traumatic bony injury.      DX-CHEST-PORTABLE (1 VIEW)   Final Result         1.  Left midlung and lower lobe infiltrates, similar to prior study.      HB-SHLOZEI-0 VIEW   Final Result         1.  Nonspecific bowel gas pattern in the upper abdomen.   2.  Cardiomegaly   3.  Hazy left lower lobe infiltrates.   4.  Nasogastric tube tip terminates overlying the expected location of the gastric fundus.      IR-THROMBO MECHANICAL ARTERY,INIT   Final Result         62-year-old patient who presented with complete occlusion of the right ICA at its origin underwent emergent mechanical thrombectomy. After initial angioplasty,   a right cervical internal carotid artery stent was deployed and right MCA thrombectomy was performed. The final angiographic images show complete restoration of flow into the right ICA and MCA with no evidence of distal intracranial embolization.      Final recanalization score: TICI 3      I, Petra Mobley was physically present and participated during the entire procedure of the IR-THROMBO MECHANICAL ARTERY,INIT.                  DX-CHEST-PORTABLE (1 VIEW)   Final Result      No acute cardiac or pulmonary abnormalities are identified.      CT-CTA NECK WITH & W/O-POST PROCESSING   Final Result      1.  Occlusion of the right internal carotid artery at its origin.      2.  Occlusion of the right vertebral artery at its origin.      Dr. Gongora discussed findings with Dr. Mccarthy.      CT-CTA HEAD WITH & W/O-POST PROCESS   Final Result         1.  Occlusion of the right internal carotid artery and right middle cerebral artery.   2.  Narrowed left M1 segment with severely narrowed and M2 and M3 branches, may represent chronic changes.      These findings were discussed with the  patient's clinician, Joseline Mccarthy, on 1/12/2023 8:27 PM.      CT-CEREBRAL PERFUSION ANALYSIS   Final Result         1.  Cerebral blood flow less than 30% likely representing completed infarct = 52 mL.      2.  T Max more than 6 seconds likely representing combination of completed infarct and ischemia = 172 mL.      3.  Mismatched volume likely representing ischemic brain/penumbra = 120 mL      4.  Please note that the cerebral perfusion was performed on the limited brain tissue around the basal ganglia region. Infarct/ischemia outside the CT perfusion sections can be missed in this study.      CT-HEAD W/O   Final Result      1.  No evidence of intracranial hemorrhage, midline shift or mass effect.      2.  Question of hyperdense right MCA though evaluation limited by oblique positioning.         IR-GASTROSTOMY PLACEMENT    (Results Pending)   EC-ECHOCARDIOGRAM COMPLETE W/O CONT    (Results Pending)          Assessment/Plan  * Acute ischemic right middle cerebral artery (MCA) stroke (HCC)- (present on admission)  Assessment & Plan  Right MCA CVA with hemorrhagic conversion  Neurology consulted - ASA without DAPT until 1/23/23 due to hemorrhagic conversion  On High Intensity statin therapy   Risk factor modification including HTN control   S/p right ICA stent and MCA thrombectomy 1/12/23  TTE negative for shunt  Tobacco cessation  PT/OT/SLP - recommending post-acute placement    Toxic metabolic encephalopathy- (present on admission)  Assessment & Plan  Persistent 01/20/23  Secondary to stroke, delirium and now concern for relation to underlying pneumonia  Limit restraints as able  Avoid polypharmacy, sedating medications  Delirium precautions  Management of underlying medical conditions  Obtain EEG 01/20/23 - orders placed.     Acute respiratory failure with hypoxia (HCC)- (present on admission)  Assessment & Plan  Worsening 01/20/23. At bedside and evaluated during Rapid Response.   Significant higher FiO2  requirements compared to prior   01/19/23 CXRAY consistent with Aspiration Pneumonia - Started on IV unasyn    EKG obtained today has been reviewed by me, minimal ST depressions in lead I/aVL.  T wave inversions were noted previously also.    Plan:   Transfer to ICU/IMCU   Differential Dilemmas - Venous thromboembolic disease vs cardiac vs  Infectious vs mucous plugging   Cycle troponins  Obtain echocardiogram to assess for any LV/RV dysfunction.  If concerns for RV dysfunction, consider CTA PE.   Low likelihood of venous thromboembolic disease given patient has been on venous thromboembolic disease prophylaxis.  Continue intravenous Abx. Escalate to IV zosyn. De-escalate as clinically appropriate. Check Nares MRSA.   Check COVID-19 PCR and Flu  Aggressive RT care/pulmonary toilet    High risk of de-escalation including risk of intubation, added comorbidity and risk of mortality. Tired calling Wife, went to . Code status is Full Code.     Pneumonia due to infectious organism- (present on admission)  Assessment & Plan  Aspiration pneumonia (Developed 01/19/23)   Checked procalcitonin, this was low - sputum culture pending   Strict aspiration precautions   Started IV Unasyn 01/19, Escalated to IV zosyn 01/20/23 - de-escalate as clinically appropriate  Check Nares MRCP   Mucomyst/3% normal saline nebs to maintain pulmonary toilet, RT protocol to continue    Oropharyngeal dysphagia- (present on admission)  Assessment & Plan  Secondary to acute stroke.    Aspiration precautions   Needs placement of a PEG tube (ordered, 01/19/23) - Pending once clinical stability achieved     Acute left hemiparesis (HCC)- (present on admission)  Assessment & Plan  Secondary to right MCA stroke.    Pressure ulcer precautions  Aspiration, Fall precautions  PT and OT    Stroke, hemorrhagic (HCC)- (present on admission)  Assessment & Plan  Ischemic stroke with hemorrhagic conversion.    Severe obesity (BMI 35.0-35.9 with comorbidity) (HCC)-  (present on admission)  Assessment & Plan  Body mass index is 37.39 kg/m².  Co-morbid HTN, T2DM, KENDAL  Outpatient weight loss    Type 2 diabetes mellitus without complication, without long-term current use of insulin (HCC)- (present on admission)  Assessment & Plan  A1c 5.3  No indication for strict BG control - POCT/SSI deferred  Atorvastatin    Alcohol dependence with withdrawal (HCC)- (present on admission)  Assessment & Plan  Resolved.    Tobacco use disorder- (present on admission)  Assessment & Plan  On nicotine patch     Primary hypertension- (present on admission)  Assessment & Plan  Amlodipine 10 mg         VTE prophylaxis: enoxaparin ppx    I have performed a physical exam and reviewed and updated ROS and Plan today (1/20/2023). In review of yesterday's note (1/19/2023), there are no changes except as documented above.      This patient is critically ill with a life threatening illness as noted above requiring my direct presence, involvement and maximal preparedness. I have personally spend 40 minutes providing critical care to this patient. Time spend on critical care excludes time spend on procedures.

## 2023-01-20 NOTE — RESPIRATORY CARE
Respiratory Rapid Response Note    Symptoms only responsive to pain    Breath Sounds  RUL Breath Sounds: Clear (01/20/23 0757)  RML Breath Sounds: Clear (01/20/23 0757)  RLL Breath Sounds: Diminished (01/20/23 0757)  NEPTALI Breath Sounds: Clear (01/20/23 0757)  LLL Breath Sounds: Diminished (01/20/23 0757)     O2 (LPM): 10 (01/20/23 0757)       Events/Summary/Plan: Arrived pt room.  Pt satting 90% on 10 lpm oxymask. Will continue to monitor.

## 2023-01-20 NOTE — PROGRESS NOTES
I discussed plan of care with Dr. Ceron regarding this patient current medical condition and plan of care.  I evaluated and examined him at the bedside.  He was very somnolent and he was unable to communicate.  I discussed plan of care with patient's wife at the bedside regarding his current medical condition and plan of care.  I answered all her questions with best of my knowledge.  She requested to fill [facility] paperwork and I obtain [facility] paperwork for her.    I discussed plan of care with bedside RN and IMC charge RN.  Plan is to continue monitor closely in IMCU.  Currently is on high flow oxygen.  Echocardiogram ordered currently its pending.  Chest x-ray this morning did not show any acute normalities.  Continue IV Zosyn and titrate down oxygen as tolerated.  Aggressive breathing treatment by RT.    Plan is to evaluate for patient's RV function on echocardiogram to evaluate for any possible signs for PE.  Patient remains on DVT prophylaxis during his hospitalization.  I also ordered ultrasound DVT for bilateral lower extremities.

## 2023-01-20 NOTE — CARE PLAN
The patient is Stable - Low risk of patient condition declining or worsening    Shift Goals  Clinical Goals: Maintain Skin Integrity/Monitor Neuro Status  Patient Goals: RADHA  Family Goals: G-tube 1/20    Progress made toward(s) clinical / shift goals: Assumed care of patient at 1915. Patient is A&Ox2-3, and Q4hr neuro checks are being completed. Restraints in place. Fall/Seizure precautions in place. Bed is low and locked, bed alarm is on, call light is within reach, hourly rounding continues.     Problem: Psychosocial - Patient Condition  Goal: Patient's ability to verbalize feelings about condition will improve  Outcome: Progressing  Note: Patient is beginning to say more words.      Problem: Neuro Status  Goal: Neuro status will remain stable or improve  Outcome: Progressing  Note: Patient has been A&Ox2-3, consistently not oriented to place. Patient says we are at home.      Problem: Skin Integrity  Goal: Skin integrity is maintained or improved  Outcome: Progressing  Note: Waffle overlay, TAPs, Q2hr turns, barrier paste, elbow/heel mepilex, heel float boot in place.      Patient is not progressing towards the following goals:    Problem: Knowledge Deficit - Stroke Education  Goal: Patient's knowledge of stroke and risk factors will improve  Outcome: Not Progressing     Problem: Dysphagia  Goal: Dysphagia will improve  Outcome: Not Progressing  Note: G-tube placement in the morning.      Problem: Urinary Elimination  Goal: Establish and maintain regular urinary output  Outcome: Not Progressing  Note: Patient is incontinent of bowel and bladder. Condom cath in place.      Problem: Bowel Elimination  Goal: Establish and maintain regular bowel function  Outcome: Not Progressing     Problem: Mobility - Stroke  Goal: Patient's capacity to carry out activities will improve  Outcome: Not Progressing  Note: Unable to ambulate patient.      Problem: Self Care  Goal: Patient will have the ability to perform ADLs  independently or with assistance (bathe, groom, dress, toilet and feed)  Outcome: Not Progressing     Problem: Knowledge Deficit - Standard  Goal: Patient and family/care givers will demonstrate understanding of plan of care, disease process/condition, diagnostic tests and medications  Outcome: Not Progressing

## 2023-01-20 NOTE — DISCHARGE PLANNING
Case Management Discharge Planning    Admission Date: 1/12/2023  GMLOS: 7.3  ALOS: 8    6-Clicks ADL Score: 11  6-Clicks Mobility Score: 8  PT and/or OT Eval ordered: No  Post-acute Referrals Ordered: No  Post-acute Choice Obtained: No  Has referral(s) been sent to post-acute provider:  No      Anticipated Discharge Dispo: Discharge Disposition: Discharged to home/self care (01)    DME Needed: No    Action(s) Taken: Updated Provider/Nurse on Discharge Plan    Escalations Completed: None    Medically Clear: No    Next Steps: RNCM received a referral from bedside nursing r/t FMLA questions.  RNCM reached out to Leticia Schultz via voalte for follow up.  Patient/family has insurance information - PFA to see and update chart.  Patient/family requesting to speak with patient advocacy - referral provided via phone to [employer] who will reach out to patient/family telephonically.      [facility] choice form completed and faxed to Byron GIORDANO.  Message sent to Byron via teams for coordination.             Barriers to Discharge: Medical clearance and Pending Placement

## 2023-01-20 NOTE — EEG PROGRESS NOTE
Went to hook pt up for EEG but pt was going to be moved soon to T607 per pts RN. Will complete pts EEG when pt at [facility].

## 2023-01-20 NOTE — PROGRESS NOTES
4 Eyes Skin Assessment Completed by PINA Stewart and PINA Rodriguez.    Head WDL  Ears WDL  Nose WDL  Mouth WDL  Neck WDL  Breast/Chest WDL  Shoulder Blades WDL  Spine Redness and Blanching  (R) Arm/Elbow/Hand Bruising  (L) Arm/Elbow/Hand Bruising  Abdomen Scar  Groin Bruising ro right groin  Scrotum/Coccyx/Buttocks Redness, Blanching, and Discoloration  (R) Leg WDL  (L) Leg WDL  (R) Heel/Foot/Toe Redness and Blanching  (L) Heel/Foot/Toe Redness and Blanching          Devices In Places ECG, Tele Box, Blood Pressure Cuff, Pulse Ox, SCD's, OG/NG, and Condom Cath non-rebreather      Interventions In Place Sacral Mepilex, Heel Float Boots, Waffle Overlay, Pillows, Q2 Turns, Low Air Loss Mattress, Barrier Cream, and Heels Loaded W/Pillows    Possible Skin Injury Yes    Pictures Uploaded Into Epic Yes  Wound Consult Placed Yes  RN Wound Prevention Protocol Ordered Yes

## 2023-01-20 NOTE — CARE PLAN
Respiratory Update    Treatment modality: Duo & Mucomyst  Frequency: Q4  Pt tolerating current treatments well with no adverse reactions.

## 2023-01-20 NOTE — DISCHARGE PLANNING
Call placed to Cori to inquire about single level dwelling.  She confirmed that she gets the keys this week and will be moving in this weekend.  She states that [employer] is primary and  is secondary.  She will be bringing in the insurance cards today for the CM.  Plan for PEG placement today.  Will discuss this case with Administration this am. Will need the specifics on insurance to submit if appropriate.     0839-Would appreciate updated [place] evals on Sunday if appropriate.

## 2023-01-20 NOTE — CONSULTS
Pulmonary Consultation    Date of consult: 1/20/2023    Referring Physician  Andre Ceron M.D.    Reason for Consultation  Hypoxic resp failure with somnolence    History of Presenting Illness  62 y.o. male who presented 1/12/2023 with Type 2 DM, HTN and tobacco use admitted 1/12/2023 with left hemiparesis   CTA demonstrated Right ICA and Right MCA occlusion  Due to high BP did not get TPA  IR -- >Right MCA thrombectomy and Right ICA stent 1/12/23.  MRI demonstrated Right frontal, basal ganglia, and caudate nucleus infarct. Petechial hemorrhages were noted concerning for hemorrhagic conversion, for which he was not a candidate for DAPT but treated with ASA monotherapy     His encephalopathy has not markedly improved more alert during the day but less in the pm  But today was less alert and concern was his o2 requirement is increasing     Code Status  Full Code    Review of Systems  Review of Systems   Unable to perform ROS: Acuity of condition     Past Medical History   has a past medical history of Diabetes (HCC) and Hypertension.    Surgical History   has no past surgical history on file.    Family History  family history includes Diabetes in his father and mother; Hypertension in his father, mother, and sister.    Social History   reports that he quit smoking 8 days ago. His smoking use included cigarettes. He started smoking about 47 years ago. He has a 23.00 pack-year smoking history. He has never used smokeless tobacco. He reports current alcohol use of about 25.2 oz per week. He reports current drug use. Drug: Inhaled.    Medications  Home Medications       Reviewed by Malik Corado M.D. (Physician) on 01/16/23 at 1853  Med List Status: Complete     Medication Last Dose Status   acetaminophen (TYLENOL) 650 MG CR tablet 1/12/2023 Active   Cobalamin Combinations (NEURIVA PLUS) Cap unk Active   Naproxen Sodium 220 MG Cap unk Active                  Current Facility-Administered Medications   Medication Dose  VIEW ALL Route Frequency Provider Last Rate Last Admin    albuterol (PROVENTIL) 2.5mg/3ml nebulizer solution 2.5 mg  2.5 mg Nebulization Q4HRS (RT) Andre Ceron M.D.   2.5 mg at 01/20/23 0837    lactated ringers infusion   Intravenous Continuous Andre Ceron M.D. 125 mL/hr at 01/20/23 0828 New Bag at 01/20/23 0828    ampicillin/sulbactam (UNASYN) 3 g in  mL IVPB  3 g Intravenous Q6HRS Andre Ceron M.D.   Stopped at 01/20/23 0605    acetylcysteine (MUCOMYST) 20 % solution 3 mL  3 mL Inhalation Q4HRS Andre Ceron M.D.   3 mL at 01/20/23 0837    senna-docusate (PERICOLACE or SENOKOT S) 8.6-50 MG per tablet 1 Tablet  1 Tablet Enteral Tube BID Andre Ceron M.D.   1 Tablet at 01/20/23 0525    polyethylene glycol/lytes (MIRALAX) PACKET 1 Packet  1 Packet Enteral Tube BID Andre Ceron M.D.   1 Packet at 01/20/23 0525    ipratropium-albuterol (DUONEB) nebulizer solution  3 mL Nebulization Q4H PRN (RT) Andre Ceron M.D.   3 mL at 01/20/23 0343    acetaminophen (Tylenol) tablet 650 mg  650 mg Enteral Tube Q6HRS PRN Jessie ANTONY Latkristie   650 mg at 01/16/23 1408    Pharmacy Consult: Enteral tube insertion - review meds/change route/product selection  1 Each Other PHARMACY TO DOSE Nelson Cosby M.D.        amLODIPine (NORVASC) tablet 5 mg  5 mg Enteral Tube Q DAY Nelson Cosby M.D.   5 mg at 01/20/23 0525    atorvastatin (LIPITOR) tablet 80 mg  80 mg Enteral Tube Q EVENING Nelson Cosby M.D.   80 mg at 01/19/23 1741    aspirin (ASA) chewable tab 81 mg  81 mg Enteral Tube DAILY Bob Razo D.O.   81 mg at 01/20/23 0525    enoxaparin (Lovenox) inj 40 mg  40 mg Subcutaneous DAILY AT 1800 Nelson Cosby M.D.   40 mg at 01/19/23 1742    ondansetron (ZOFRAN) syringe/vial injection 4 mg  4 mg Intravenous Q4HRS PRN Nelson Cosby M.D.   4 mg at 01/14/23 1820    Respiratory Therapy Consult   Nebulization Continuous RT Bob Razo D.O.        hydrALAZINE (APRESOLINE) injection 10-20 mg  10-20 mg Intravenous Q6HRS PRN Edwin Nieto,  M.D.   20 mg at 01/19/23 0044    nicotine (NICODERM) 14 MG/24HR 14 mg  14 mg Transdermal Daily-0600 Bob Razo D.O.   14 mg at 01/20/23 0525       Allergies  No Known Allergies    Vital Signs last 24 hours  Temp:  [36.9 °C (98.4 °F)-37.5 °C (99.5 °F)] 37 °C (98.6 °F)  Pulse:  [79-93] 81  Resp:  [16-32] 20  BP: (119-166)/(58-85) 147/79  SpO2:  [86 %-97 %] 86 %    Physical Exam  Physical Exam  Constitutional:       Comments: Somnolent but is arousable  Weak cough   HENT:      Head: Normocephalic and atraumatic.      Mouth/Throat:      Mouth: Mucous membranes are dry.   Cardiovascular:      Rate and Rhythm: Normal rate.   Pulmonary:      Comments: Bronchial BS over ant chest  Abdominal:      General: Bowel sounds are normal. There is distension.      Palpations: Abdomen is soft.   Musculoskeletal:      Right lower leg: Edema present.      Left lower leg: Edema present.   Neurological:      Comments: Somnolent but arousable   Left hemiplegia   Psychiatric:      Comments: Unable to assess too somnolent       Fluids    Intake/Output Summary (Last 24 hours) at 1/20/2023 0951  Last data filed at 1/20/2023 0619  Gross per 24 hour   Intake 1779.18 ml   Output 1500 ml   Net 279.18 ml       Laboratory  Recent Results (from the past 48 hour(s))   Prealbumin    Collection Time: 01/19/23  2:32 AM   Result Value Ref Range    Pre-Albumin 18.1 18.0 - 38.0 mg/dL   Basic Metabolic Panel (BMP)    Collection Time: 01/19/23  2:32 AM   Result Value Ref Range    Sodium 138 135 - 145 mmol/L    Potassium 3.6 3.6 - 5.5 mmol/L    Chloride 97 96 - 112 mmol/L    Co2 28 20 - 33 mmol/L    Glucose 148 (H) 65 - 99 mg/dL    Bun 35 (H) 8 - 22 mg/dL    Creatinine 0.71 0.50 - 1.40 mg/dL    Calcium 9.7 8.5 - 10.5 mg/dL    Anion Gap 13.0 7.0 - 16.0   MAGNESIUM    Collection Time: 01/19/23  2:32 AM   Result Value Ref Range    Magnesium 2.3 1.5 - 2.5 mg/dL   PHOSPHORUS    Collection Time: 01/19/23  2:32 AM   Result Value Ref Range    Phosphorus 3.4 2.5 -  4.5 mg/dL   CRP QUANTITIVE (NON-CARDIAC)    Collection Time: 01/19/23  2:32 AM   Result Value Ref Range    Stat C-Reactive Protein 11.11 (H) 0.00 - 0.75 mg/dL   ESTIMATED GFR    Collection Time: 01/19/23  2:32 AM   Result Value Ref Range    GFR (CKD-EPI) 104 >60 mL/min/1.73 m 2   CBC WITHOUT DIFFERENTIAL    Collection Time: 01/19/23 12:15 PM   Result Value Ref Range    WBC 12.0 (H) 4.8 - 10.8 K/uL    RBC 4.32 (L) 4.70 - 6.10 M/uL    Hemoglobin 14.7 14.0 - 18.0 g/dL    Hematocrit 43.4 42.0 - 52.0 %    .5 (H) 81.4 - 97.8 fL    MCH 34.0 (H) 27.0 - 33.0 pg    MCHC 33.9 33.7 - 35.3 g/dL    RDW 44.5 35.9 - 50.0 fL    Platelet Count 276 164 - 446 K/uL    MPV 10.5 9.0 - 12.9 fL   PROCALCITONIN    Collection Time: 01/19/23 12:15 PM   Result Value Ref Range    Procalcitonin 0.13 <0.25 ng/mL   CBC WITHOUT DIFFERENTIAL    Collection Time: 01/20/23  2:39 AM   Result Value Ref Range    WBC 10.6 4.8 - 10.8 K/uL    RBC 4.11 (L) 4.70 - 6.10 M/uL    Hemoglobin 14.1 14.0 - 18.0 g/dL    Hematocrit 41.2 (L) 42.0 - 52.0 %    .2 (H) 81.4 - 97.8 fL    MCH 34.3 (H) 27.0 - 33.0 pg    MCHC 34.2 33.7 - 35.3 g/dL    RDW 44.6 35.9 - 50.0 fL    Platelet Count 280 164 - 446 K/uL    MPV 10.2 9.0 - 12.9 fL   Basic Metabolic Panel    Collection Time: 01/20/23  2:39 AM   Result Value Ref Range    Sodium 146 (H) 135 - 145 mmol/L    Potassium 4.1 3.6 - 5.5 mmol/L    Chloride 105 96 - 112 mmol/L    Co2 29 20 - 33 mmol/L    Glucose 131 (H) 65 - 99 mg/dL    Bun 33 (H) 8 - 22 mg/dL    Creatinine 0.78 0.50 - 1.40 mg/dL    Calcium 9.7 8.5 - 10.5 mg/dL    Anion Gap 12.0 7.0 - 16.0   ESTIMATED GFR    Collection Time: 01/20/23  2:39 AM   Result Value Ref Range    GFR (CKD-EPI) 101 >60 mL/min/1.73 m 2   POCT glucose device results    Collection Time: 01/20/23  8:02 AM   Result Value Ref Range    POC Glucose, Blood 127 (H) 65 - 99 mg/dL   Arterial Blood Gas    Collection Time: 01/20/23  8:34 AM   Result Value Ref Range    Ph 7.46 7.40 - 7.50    Pco2  40.9 (H) 26.0 - 37.0 mmHg    Po2 55.3 (L) 64.0 - 87.0 mmHg    O2 Saturation 88.5 (L) 93.0 - 99.0 %    Hco3 29 (H) 17 - 25 mmol/L    Base Excess 5 (H) -4 - 3 mmol/L    Body Temp 37.0 Centigrade   Troponin    Collection Time: 23  8:34 AM   Result Value Ref Range    Troponin T 14 6 - 19 ng/L   EKG    Collection Time: 23  8:35 AM   Result Value Ref Range    Report       [facility]    Test Date:  2023  Pt Name:    JUANITA PRICE               Department: [facility]  MRN:        [id number]                      Room:       S176  Gender:     Male                         Technician: ROBERTO  :        1960                   Requested By:DEYANIRA DAWSON  Order #:    [id number]                    Reading MD:    Measurements  Intervals                                Axis  Rate:       82                           P:          -17  AK:         198                          QRS:        -46  QRSD:       97                           T:          130  QT:         372  QTc:        435    Interpretive Statements  Sinus rhythm  Inferior infarct, old  Anterior infarct, old  Lateral leads are also involved  Artifact in lead(s) I,II,III,aVR,aVL,aVF,V1,V2,V3,V4,V5,V6  Compared to ECG 2023 09:40:59  Myocardial infarct finding now present  Sinus bradycardia no longer present  First degree AV block no longer present  T-wave abnormality no longer present  Possible ische farhan no longer present         Imaging  CXR personally viewed Hypoventilated  LLL atelectasis +/- pleural effusion on the left    Assessment/Plan  #Hypoxic resp failure  Secondary to hypoventilation and not clearing his secretions  Transfer to IMCU for IPV and also change O2 to IPV  Consider neurostimulant post stroke if no other acute events    D/w Dr. Panda    Cc time 33 mins

## 2023-01-20 NOTE — DISCHARGE PLANNING
Received Choice form at [phone]  Agency/Facility Name: [facility]  Referral sent per Choice form @ [phone]

## 2023-01-20 NOTE — PROCEDURES
VIDEO ELECTROENCEPHALOGRAM REPORT      Referring provider: Dr. Palacios    DOS: 01/20/23 (total recording of 25 minutes).     INDICATION:  Kaiden Quiroz 62 y.o. male presenting with stroke     CURRENT ANTIEPILEPTIC REGIMEN: none     TECHNIQUE: 30 channel video electroencephalogram (EEG) was performed in accordance with the international 10-20 system. The study was reviewed in bipolar and referential montages. The recording examined the patient during   awake, drowsy and sleep states    DESCRIPTION OF THE RECORD:  During the wakefulness, the background showed low amplitude, asymmetrical 8-9 Hz alpha activity posteriorly with amplitude of 70 mV. Better seen on the left hemisphere.  There was reactivity to eye closure/opening.  A normal anterior-posterior gradient was noted with faster beta frequencies seen anteriorly.  During drowsiness, increased theta/beta frequencies were seen. During the sleep state,symmetrical sleep spindles were seen in the leads over the central regions. No slow wave stage seen.     ACTIVATION PROCEDURES:     hyperventilation was not performed    Intermittent Photic stimulation was performed in a stepwise fashion from 1 to 30 Hz and elicited no photic driving response.     ICTAL AND/OR INTERICTAL FINDINGS:   No focal or generalized epileptiform activity noted.   Intermittent right frontotemporal region delta slowing was seen during this routine study.  No clinical events or seizures were reported or recorded during the study.     EKG: sampling of the EKG recording demonstrated sinus rhythm.     EVENTS: none     INTERPRETATION:    This is an abnormal video EEG recording in the awake, drowsy and sleep states.   The presence of intermittent right frontotemporal slowing and asymmetric PDR are suggestive of focal neuronal dysfunction.   No epileptiform discharge or seizure seen. This does not rule out epilepsy.  If the clinical suspicion remains high for seizures, a prolonged recording to  capture clinical or subclinical events may be helpful.          Elliott Post MD  Diplomate in Neurology&Epilepsy  Office: [phone]  Fax: [phone]

## 2023-01-21 ENCOUNTER — APPOINTMENT (OUTPATIENT)
Dept: RADIOLOGY | Facility: MEDICAL CENTER | Age: 63
DRG: 023 | End: 2023-01-21
Attending: INTERNAL MEDICINE
Payer: COMMERCIAL

## 2023-01-21 PROBLEM — E87.0 HYPERNATREMIA: Status: ACTIVE | Noted: 2023-01-21

## 2023-01-21 LAB
ALBUMIN SERPL BCP-MCNC: 3.7 G/DL (ref 3.2–4.9)
ALBUMIN/GLOB SERPL: 1.1 G/DL
ALP SERPL-CCNC: 105 U/L (ref 30–99)
ALT SERPL-CCNC: 15 U/L (ref 2–50)
ANION GAP SERPL CALC-SCNC: 11 MMOL/L (ref 7–16)
ANION GAP SERPL CALC-SCNC: 9 MMOL/L (ref 7–16)
AST SERPL-CCNC: 19 U/L (ref 12–45)
BASOPHILS # BLD AUTO: 0.5 % (ref 0–1.8)
BASOPHILS # BLD: 0.05 K/UL (ref 0–0.12)
BILIRUB SERPL-MCNC: 0.7 MG/DL (ref 0.1–1.5)
BUN SERPL-MCNC: 21 MG/DL (ref 8–22)
BUN SERPL-MCNC: 29 MG/DL (ref 8–22)
CALCIUM ALBUM COR SERPL-MCNC: 9.9 MG/DL (ref 8.5–10.5)
CALCIUM SERPL-MCNC: 7.6 MG/DL (ref 8.5–10.5)
CALCIUM SERPL-MCNC: 9.7 MG/DL (ref 8.5–10.5)
CHLORIDE SERPL-SCNC: 109 MMOL/L (ref 96–112)
CHLORIDE SERPL-SCNC: 114 MMOL/L (ref 96–112)
CO2 SERPL-SCNC: 23 MMOL/L (ref 20–33)
CO2 SERPL-SCNC: 28 MMOL/L (ref 20–33)
CREAT SERPL-MCNC: 0.62 MG/DL (ref 0.5–1.4)
CREAT SERPL-MCNC: 0.8 MG/DL (ref 0.5–1.4)
EKG IMPRESSION: NORMAL
EOSINOPHIL # BLD AUTO: 0.25 K/UL (ref 0–0.51)
EOSINOPHIL NFR BLD: 2.4 % (ref 0–6.9)
ERYTHROCYTE [DISTWIDTH] IN BLOOD BY AUTOMATED COUNT: 45 FL (ref 35.9–50)
GFR SERPLBLD CREATININE-BSD FMLA CKD-EPI: 100 ML/MIN/1.73 M 2
GFR SERPLBLD CREATININE-BSD FMLA CKD-EPI: 108 ML/MIN/1.73 M 2
GLOBULIN SER CALC-MCNC: 3.3 G/DL (ref 1.9–3.5)
GLUCOSE SERPL-MCNC: 105 MG/DL (ref 65–99)
GLUCOSE SERPL-MCNC: 141 MG/DL (ref 65–99)
GRAM STN SPEC: NORMAL
HCT VFR BLD AUTO: 40.7 % (ref 42–52)
HGB BLD-MCNC: 13.2 G/DL (ref 14–18)
IMM GRANULOCYTES # BLD AUTO: 0.05 K/UL (ref 0–0.11)
IMM GRANULOCYTES NFR BLD AUTO: 0.5 % (ref 0–0.9)
LYMPHOCYTES # BLD AUTO: 2.12 K/UL (ref 1–4.8)
LYMPHOCYTES NFR BLD: 20.7 % (ref 22–41)
MAGNESIUM SERPL-MCNC: 2.1 MG/DL (ref 1.5–2.5)
MCH RBC QN AUTO: 33.2 PG (ref 27–33)
MCHC RBC AUTO-ENTMCNC: 32.4 G/DL (ref 33.7–35.3)
MCV RBC AUTO: 102.5 FL (ref 81.4–97.8)
MONOCYTES # BLD AUTO: 1.1 K/UL (ref 0–0.85)
MONOCYTES NFR BLD AUTO: 10.7 % (ref 0–13.4)
NEUTROPHILS # BLD AUTO: 6.67 K/UL (ref 1.82–7.42)
NEUTROPHILS NFR BLD: 65.2 % (ref 44–72)
NRBC # BLD AUTO: 0 K/UL
NRBC BLD-RTO: 0 /100 WBC
PLATELET # BLD AUTO: 279 K/UL (ref 164–446)
PMV BLD AUTO: 10.7 FL (ref 9–12.9)
POTASSIUM SERPL-SCNC: 3.5 MMOL/L (ref 3.6–5.5)
POTASSIUM SERPL-SCNC: 4.1 MMOL/L (ref 3.6–5.5)
PROCALCITONIN SERPL-MCNC: 0.09 NG/ML
PROT SERPL-MCNC: 7 G/DL (ref 6–8.2)
RBC # BLD AUTO: 3.97 M/UL (ref 4.7–6.1)
SIGNIFICANT IND 70042: NORMAL
SITE SITE: NORMAL
SODIUM SERPL-SCNC: 146 MMOL/L (ref 135–145)
SODIUM SERPL-SCNC: 148 MMOL/L (ref 135–145)
SOURCE SOURCE: NORMAL
WBC # BLD AUTO: 10.2 K/UL (ref 4.8–10.8)

## 2023-01-21 PROCEDURE — 80048 BASIC METABOLIC PNL TOTAL CA: CPT

## 2023-01-21 PROCEDURE — 94640 AIRWAY INHALATION TREATMENT: CPT

## 2023-01-21 PROCEDURE — 99291 CRITICAL CARE FIRST HOUR: CPT | Performed by: INTERNAL MEDICINE

## 2023-01-21 PROCEDURE — A9270 NON-COVERED ITEM OR SERVICE: HCPCS | Performed by: INTERNAL MEDICINE

## 2023-01-21 PROCEDURE — 700102 HCHG RX REV CODE 250 W/ 637 OVERRIDE(OP): Performed by: INTERNAL MEDICINE

## 2023-01-21 PROCEDURE — 700111 HCHG RX REV CODE 636 W/ 250 OVERRIDE (IP): Performed by: INTERNAL MEDICINE

## 2023-01-21 PROCEDURE — 700101 HCHG RX REV CODE 250: Performed by: INTERNAL MEDICINE

## 2023-01-21 PROCEDURE — 99233 SBSQ HOSP IP/OBS HIGH 50: CPT | Performed by: INTERNAL MEDICINE

## 2023-01-21 PROCEDURE — 84145 PROCALCITONIN (PCT): CPT

## 2023-01-21 PROCEDURE — 93010 ELECTROCARDIOGRAM REPORT: CPT | Performed by: INTERNAL MEDICINE

## 2023-01-21 PROCEDURE — 85025 COMPLETE CBC W/AUTO DIFF WBC: CPT

## 2023-01-21 PROCEDURE — 700105 HCHG RX REV CODE 258: Performed by: INTERNAL MEDICINE

## 2023-01-21 PROCEDURE — 93970 EXTREMITY STUDY: CPT

## 2023-01-21 PROCEDURE — 83735 ASSAY OF MAGNESIUM: CPT

## 2023-01-21 PROCEDURE — 770000 HCHG ROOM/CARE - INTERMEDIATE ICU *

## 2023-01-21 PROCEDURE — 80053 COMPREHEN METABOLIC PANEL: CPT

## 2023-01-21 PROCEDURE — 94760 N-INVAS EAR/PLS OXIMETRY 1: CPT

## 2023-01-21 RX ADMIN — PIPERACILLIN AND TAZOBACTAM 3.38 G: 3; .375 INJECTION, POWDER, LYOPHILIZED, FOR SOLUTION INTRAVENOUS; PARENTERAL at 04:50

## 2023-01-21 RX ADMIN — ACETYLCYSTEINE 3 ML: 200 SOLUTION ORAL; RESPIRATORY (INHALATION) at 10:17

## 2023-01-21 RX ADMIN — ENOXAPARIN SODIUM 40 MG: 40 INJECTION SUBCUTANEOUS at 17:31

## 2023-01-21 RX ADMIN — SENNOSIDES AND DOCUSATE SODIUM 1 TABLET: 50; 8.6 TABLET ORAL at 05:38

## 2023-01-21 RX ADMIN — ALBUTEROL SULFATE 2.5 MG: 2.5 SOLUTION RESPIRATORY (INHALATION) at 10:17

## 2023-01-21 RX ADMIN — AMLODIPINE BESYLATE 10 MG: 10 TABLET ORAL at 05:38

## 2023-01-21 RX ADMIN — ALBUTEROL SULFATE 2.5 MG: 2.5 SOLUTION RESPIRATORY (INHALATION) at 20:09

## 2023-01-21 RX ADMIN — POLYETHYLENE GLYCOL 3350 1 PACKET: 17 POWDER, FOR SOLUTION ORAL at 05:38

## 2023-01-21 RX ADMIN — ACETYLCYSTEINE 3 ML: 200 SOLUTION ORAL; RESPIRATORY (INHALATION) at 02:03

## 2023-01-21 RX ADMIN — ASPIRIN 81 MG: 81 TABLET, CHEWABLE ORAL at 05:37

## 2023-01-21 RX ADMIN — PIPERACILLIN AND TAZOBACTAM 3.38 G: 3; .375 INJECTION, POWDER, LYOPHILIZED, FOR SOLUTION INTRAVENOUS; PARENTERAL at 12:53

## 2023-01-21 RX ADMIN — ATORVASTATIN CALCIUM 80 MG: 80 TABLET, FILM COATED ORAL at 17:32

## 2023-01-21 RX ADMIN — SODIUM CHLORIDE, POTASSIUM CHLORIDE, SODIUM LACTATE AND CALCIUM CHLORIDE: 600; 310; 30; 20 INJECTION, SOLUTION INTRAVENOUS at 04:03

## 2023-01-21 RX ADMIN — ACETYLCYSTEINE 3 ML: 200 SOLUTION ORAL; RESPIRATORY (INHALATION) at 13:37

## 2023-01-21 RX ADMIN — ALBUTEROL SULFATE 2.5 MG: 2.5 SOLUTION RESPIRATORY (INHALATION) at 22:11

## 2023-01-21 RX ADMIN — ACETYLCYSTEINE 3 ML: 200 SOLUTION ORAL; RESPIRATORY (INHALATION) at 07:49

## 2023-01-21 RX ADMIN — ALBUTEROL SULFATE 2.5 MG: 2.5 SOLUTION RESPIRATORY (INHALATION) at 07:48

## 2023-01-21 RX ADMIN — NICOTINE 14 MG: 14 PATCH TRANSDERMAL at 05:38

## 2023-01-21 RX ADMIN — PIPERACILLIN AND TAZOBACTAM 3.38 G: 3; .375 INJECTION, POWDER, LYOPHILIZED, FOR SOLUTION INTRAVENOUS; PARENTERAL at 22:10

## 2023-01-21 RX ADMIN — ALBUTEROL SULFATE 2.5 MG: 2.5 SOLUTION RESPIRATORY (INHALATION) at 02:03

## 2023-01-21 RX ADMIN — ACETYLCYSTEINE 3 ML: 200 SOLUTION ORAL; RESPIRATORY (INHALATION) at 20:09

## 2023-01-21 RX ADMIN — ACETYLCYSTEINE 3 ML: 200 SOLUTION ORAL; RESPIRATORY (INHALATION) at 22:11

## 2023-01-21 RX ADMIN — SODIUM CHLORIDE, POTASSIUM CHLORIDE, SODIUM LACTATE AND CALCIUM CHLORIDE: 600; 310; 30; 20 INJECTION, SOLUTION INTRAVENOUS at 12:02

## 2023-01-21 RX ADMIN — SODIUM CHLORIDE, POTASSIUM CHLORIDE, SODIUM LACTATE AND CALCIUM CHLORIDE: 600; 310; 30; 20 INJECTION, SOLUTION INTRAVENOUS at 22:12

## 2023-01-21 RX ADMIN — Medication 10 MG: at 04:02

## 2023-01-21 RX ADMIN — ALBUTEROL SULFATE 2.5 MG: 2.5 SOLUTION RESPIRATORY (INHALATION) at 13:37

## 2023-01-21 ASSESSMENT — ENCOUNTER SYMPTOMS
MEMORY LOSS: 0
VOMITING: 0
FEVER: 0
SHORTNESS OF BREATH: 1
WHEEZING: 0
SORE THROAT: 0
BRUISES/BLEEDS EASILY: 0
NAUSEA: 0
PALPITATIONS: 0
ABDOMINAL PAIN: 1
CHILLS: 0
EYE DISCHARGE: 0
WEIGHT LOSS: 0
EYE REDNESS: 0
MYALGIAS: 0
HEADACHES: 0
COUGH: 0
DIZZINESS: 0

## 2023-01-21 ASSESSMENT — FIBROSIS 4 INDEX: FIB4 SCORE: 1.23

## 2023-01-21 NOTE — CARE PLAN
The patient is Watcher - Medium risk of patient condition declining or worsening    Shift Goals  Clinical Goals: decrease o2, tolerate TF  Patient Goals: RADHA  Family Goals: RADHA    Progress made toward(s) clinical / shift goals:    Problem: Psychosocial - Patient Condition  Goal: Patient's ability to verbalize feelings about condition will improve  Outcome: Progressing     Problem: Respiratory - Stroke Patient  Goal: Patient will achieve/maintain optimum respiratory rate/effort  Outcome: Progressing     Problem: Urinary Elimination  Goal: Establish and maintain regular urinary output  Outcome: Progressing     Problem: Bowel Elimination  Goal: Establish and maintain regular bowel function  Outcome: Progressing     Problem: Safety - Medical Restraint  Goal: Remains free of injury from restraints (Restraint for Interference with Medical Device)  Outcome: Progressing         Problem: Pain - Standard  Goal: Alleviation of pain or a reduction in pain to the patient’s comfort goal  Outcome: Progressing

## 2023-01-21 NOTE — PROGRESS NOTES
Hospital Medicine Daily Progress Note    Date of Service  1/21/2023    Chief Complaint  Kaiden Quiroz is a 62 y.o. male admitted 1/12/2023 with Possible Stroke (BIBA from home for stroke like symptoms/Wife found pt with slurred speech, L facial droop, and L sided weakness /LKW 1830/Pt had a fall yesterday, + head strike, - thinners/, aox4/Initial NIH of 20 by neurologist, pt taken to CT then red 11)        Hospital Course  62 y.o. male with HTN, T2DM, tobacco use DO who presented 1/12/2023 with Left hemiparesis and facial droop due to Right MCA CVA.     He presented 1/12/22 with Left facial droop and Left-sided weakness. CTH demonstrated hyperdense MCA, for which subsequent CT perfusion demonstrated infarct with penumbra. CTA demonstrated Right ICA and Right MCA occlusion. Neurology was consulted. He was not a candidate for tPA due to hypertension. Neuro-IR performed a Right MCA thrombectomy and Right ICA stent 1/12/23. Subsequent MRI demonstrated Right frontal, basal ganglia, and caudate nucleus infarct. Petechial hemorrhages were noted concerning for hemorrhagic conversion, for which he was not a candidate for DAPT but treated with ASA monotherapy per Neurology recommendation. He was admitted to the ICU post-procedure for neurologic monitoring and nicardipine gtt due to uncontrolled HTN.  During his IR procedure he developed a sinus pause for which CPR was not required prior to spontaneous ROSC. He was administered atropine and glycopyrrolate for sinus bradycardia. He was intubated post-procedure and extubated 1/13/23 to [facility]. CXR demonstrated evolving pulmonary edema. TTE demonstrated normal systolic function and was negative for shunt. He has been diuresed with IV lasix with vast improvement to SpO2 >96% on 4 L/min. On 1/15/23 he began exhibiting signs of alcohol withdrawal. He was initiated on RASS protocol with precedex gtt and librium taper. He has weaned from precedex gtt today. He is  transitioned off nicardipine gtt to amlodipine 5 mg daily.    He developed acute respiratory failure with hypoxia and he was placed on high flow nasal cannula.  Patient transferred to [facility] on January 20, 2023 for higher level of care.  Ordered echocardiogram and did not show any acute abnormalities including change in RV function.  I ordered ultrasound bilateral lower extremities for DVT evaluation.    On January 21, 2023 his oxygen requirement has been trending down..  He is more alert and following commands.  I requested consult with pulmonology and discussed finding of echocardiogram and his current medical condition.     Interval Problem Update    I evaluated and examined him at the bedside  He looks significantly more alert and able to answer my questions.  He is following commands.  He received NG tube for feeding overnight and he was started again on tube feed.  Due to his ongoing acute respiratory failure with hypoxia requiring high flow nasal cannula I requested consult with pulmonologist and discussed his current medical condition and findings of echocardiogram.  I discussed plan of care with patient and bedside RN.  Pulmonologist evaluated him and recommended CTA pulmonary to evaluate for pulmonary embolism and to evaluate for lung parenchyma.  I discussed plan of care during multidisciplinary rounds regarding patient's current medical condition and plan of care.    Consultants/Specialty  Neurology  Physiatry     Code Status  Full Code    Disposition  Patient is not medically cleared for discharge (Will be cleared post 24 hours of PEG tube placement).   Anticipate discharge to Acute rehab vs SNF. Orders in place for both. Discussed with CM on rounds.   I have placed the appropriate orders for post-discharge needs.    Review of Systems  Review of Systems   Unable to perform ROS: Mental acuity   Gastrointestinal:  Positive for abdominal pain.    Still I was unable to complete review of symptoms although his  clinical condition improved from yesterday.    Physical Exam  Temp:  [37.1 °C (98.8 °F)-38 °C (100.4 °F)] 37.9 °C (100.2 °F)  Pulse:  [69-80] 74  Resp:  [17-30] 30  BP: (130-176)/(67-93) 150/71  SpO2:  [88 %-98 %] 94 %    Physical Exam  Vitals reviewed.   Constitutional:       General: He is not in acute distress.  HENT:      Head: Normocephalic and atraumatic. No contusion.      Right Ear: External ear normal.      Left Ear: External ear normal.      Nose: Nose normal.      Comments: NG tube     Mouth/Throat:      Pharynx: No oropharyngeal exudate.   Eyes:      General:         Right eye: No discharge.         Left eye: No discharge.      Pupils: Pupils are equal, round, and reactive to light.   Cardiovascular:      Rate and Rhythm: Normal rate and regular rhythm.      Heart sounds: No murmur heard.    No friction rub. No gallop.   Pulmonary:      Effort: Respiratory distress present.      Breath sounds: Rhonchi present. No wheezing.   Abdominal:      General: Bowel sounds are normal. There is no distension.      Palpations: Abdomen is soft.      Tenderness: There is no abdominal tenderness. There is no rebound.   Musculoskeletal:         General: No swelling or tenderness. Normal range of motion.      Cervical back: No rigidity. No muscular tenderness.   Skin:     General: Skin is warm and dry.      Coloration: Skin is not jaundiced.   Neurological:      General: No focal deficit present.      Mental Status: He is alert and oriented to person, place, and time.      Cranial Nerves: Cranial nerve deficit present.      Sensory: No sensory deficit.      Motor: Weakness present.      Comments: Motor 5/5 in right upper and lower extremity.  Motor 2/5 in left upper and lower extremities   Psychiatric:         Mood and Affect: Mood normal.       Fluids    Intake/Output Summary (Last 24 hours) at 1/21/2023 1244  Last data filed at 1/21/2023 1000  Gross per 24 hour   Intake 3082.82 ml   Output 2245 ml   Net 837.82 ml          Laboratory  Recent Labs     01/19/23  1215 01/20/23  0239 01/21/23  0220   WBC 12.0* 10.6 10.2   RBC 4.32* 4.11* 3.97*   HEMOGLOBIN 14.7 14.1 13.2*   HEMATOCRIT 43.4 41.2* 40.7*   .5* 100.2* 102.5*   MCH 34.0* 34.3* 33.2*   MCHC 33.9 34.2 32.4*   RDW 44.5 44.6 45.0   PLATELETCT 276 280 279   MPV 10.5 10.2 10.7       Recent Labs     01/19/23  0232 01/20/23  0239 01/21/23  0220   SODIUM 138 146* 148*   POTASSIUM 3.6 4.1 4.1   CHLORIDE 97 105 109   CO2 28 29 28   GLUCOSE 148* 131* 141*   BUN 35* 33* 29*   CREATININE 0.71 0.78 0.80   CALCIUM 9.7 9.7 9.7                     Imaging  DX-ABDOMEN FOR TUBE PLACEMENT   Final Result      NG tube extends below the diaphragm into the region of the stomach. The tip is not visualized.      EC-ECHOCARDIOGRAM LTD W/ CONT   Final Result      DX-CHEST-PORTABLE (1 VIEW)   Final Result         1. No significant interval change.      CT-ABDOMEN-PELVIS W/O   Final Result         1.  Nondependent foci of air in the bladder, could represent changes from recent instrumentation or urinary tract infection.   2.  Diverticulosis   3.  Atherosclerosis and atherosclerotic coronary artery disease      DX-CHEST-PORTABLE (1 VIEW)   Final Result      Ill-defined atelectasis versus consolidation in the left lower lung. Pneumonia can be considered in appropriate clinical setting.         DX-ABDOMEN FOR TUBE PLACEMENT   Final Result         1.  Nonspecific bowel gas pattern in the upper abdomen.   2.  Nasogastric tube tip terminates overlying the expected location of the gastric body.   3.  Cardiomegaly   4.  Hazy linear density left lung base could represent atelectasis or infiltrate.      CT-HEAD W/O   Final Result         1.  Evolving infarct/encephalomalacia in the right MCA distribution.   2.  Areas of hemorrhage in the right basal ganglia and involving the right frontal and anterior temporal sulci, similar compared to MRI January 13, 2023   3.  Atherosclerosis.      DX-CHEST-PORTABLE  (1 VIEW)   Final Result         Diffuse interstitial and groundglass opacity throughout both lungs could relate to developing pulmonary edema.      DX-ABDOMEN FOR TUBE PLACEMENT   Final Result         Gastric drainage tube with tip projecting over the expected area of the stomach.      DX-CHEST-PORTABLE (1 VIEW)   Final Result      1.  Interval extubation without pneumothorax.   2.  Stable enlarged cardiac silhouette.   3.  There is bibasilar linear atelectasis.      EC-ECHOCARDIOGRAM COMPLETE W/ CONT   Final Result      MR-BRAIN-W/O   Final Result         Acute infarction involving the right frontal region, right basal ganglia and right caudate nucleus. Minimal punctate infarction involving the right parietal cortex. There is slight mass effect upon the right lateral ventricle with only minimal midline    shift measuring approximately 4 mm.      Petechial hemorrhage is noted within the right frontal cortical infarct. Also noted is petechial hemorrhage in the right basal ganglia and right caudate nucleus but without surrounding vasogenic edema.         DX-PELVIS-1 OR 2 VIEWS   Final Result         1.  No acute traumatic bony injury.      DX-CHEST-PORTABLE (1 VIEW)   Final Result         1.  Left midlung and lower lobe infiltrates, similar to prior study.      OP-SZIXAXH-6 VIEW   Final Result         1.  Nonspecific bowel gas pattern in the upper abdomen.   2.  Cardiomegaly   3.  Hazy left lower lobe infiltrates.   4.  Nasogastric tube tip terminates overlying the expected location of the gastric fundus.      IR-THROMBO MECHANICAL ARTERY,INIT   Final Result         62-year-old patient who presented with complete occlusion of the right ICA at its origin underwent emergent mechanical thrombectomy. After initial angioplasty,   a right cervical internal carotid artery stent was deployed and right MCA thrombectomy was performed. The final angiographic images show complete restoration of flow into the right ICA and MCA  with no evidence of distal intracranial embolization.      Final recanalization score: TICI 3      I, Petra Mobley was physically present and participated during the entire procedure of the IR-THROMBO MECHANICAL ARTERY,INIT.                  DX-CHEST-PORTABLE (1 VIEW)   Final Result      No acute cardiac or pulmonary abnormalities are identified.      CT-CTA NECK WITH & W/O-POST PROCESSING   Final Result      1.  Occlusion of the right internal carotid artery at its origin.      2.  Occlusion of the right vertebral artery at its origin.      Dr. Gongora discussed findings with Dr. Mccarthy.      CT-CTA HEAD WITH & W/O-POST PROCESS   Final Result         1.  Occlusion of the right internal carotid artery and right middle cerebral artery.   2.  Narrowed left M1 segment with severely narrowed and M2 and M3 branches, may represent chronic changes.      These findings were discussed with the patient's clinician, Joseline Mccarthy, on 1/12/2023 8:27 PM.      CT-CEREBRAL PERFUSION ANALYSIS   Final Result         1.  Cerebral blood flow less than 30% likely representing completed infarct = 52 mL.      2.  T Max more than 6 seconds likely representing combination of completed infarct and ischemia = 172 mL.      3.  Mismatched volume likely representing ischemic brain/penumbra = 120 mL      4.  Please note that the cerebral perfusion was performed on the limited brain tissue around the basal ganglia region. Infarct/ischemia outside the CT perfusion sections can be missed in this study.      CT-HEAD W/O   Final Result      1.  No evidence of intracranial hemorrhage, midline shift or mass effect.      2.  Question of hyperdense right MCA though evaluation limited by oblique positioning.         IR-GASTROSTOMY PLACEMENT    (Results Pending)   US-EXTREMITY VENOUS LOWER BILAT    (Results Pending)          Assessment/Plan  * Acute ischemic right middle cerebral artery (MCA) stroke (HCC)- (present on admission)  Assessment &  Plan  Right MCA CVA with hemorrhagic conversion  Neurology consulted - ASA without DAPT until 1/23/23 due to hemorrhagic conversion  On High Intensity statin therapy   Risk factor modification including HTN control   S/p right ICA stent and MCA thrombectomy 1/12/23  TTE negative for shunt  Tobacco cessation  PT/OT/SLP - recommending post-acute placement    Hypernatremia  Assessment & Plan  Patient continues to have hyponatremia with elevated sodium level.  I started him on free water flushes.  Continue to monitor.    Pneumonia due to infectious organism- (present on admission)  Assessment & Plan  Aspiration pneumonia (Developed 01/19/23)   Checked procalcitonin, this was low - sputum culture pending   Strict aspiration precautions   Started IV Unasyn 01/19, Escalated to IV zosyn 01/20/23 - de-escalate as clinically appropriate  Check Nares MRCA  Mucomyst/3% normal saline nebs to maintain pulmonary toilet, RT protocol to continue  I requested consult with pulmonology.    Oropharyngeal dysphagia- (present on admission)  Assessment & Plan  Secondary to acute stroke.    Aspiration precautions   Needs placement of a PEG tube (ordered, 01/19/23) - Pending once clinical stability achieved     Acute left hemiparesis (HCC)- (present on admission)  Assessment & Plan  Secondary to right MCA stroke.    Pressure ulcer precautions  Aspiration, Fall precautions  PT and OT    Stroke, hemorrhagic (HCC)- (present on admission)  Assessment & Plan  Ischemic stroke with hemorrhagic conversion.    Toxic metabolic encephalopathy- (present on admission)  Assessment & Plan  Persistent 01/20/23  Secondary to stroke, delirium and now concern for relation to underlying pneumonia  Limit restraints as able  Avoid polypharmacy, sedating medications  Delirium precautions  Management of underlying medical conditions  Obtain EEG 01/20/23 and it did not show seizure activity.  His mentation has been improving.    Acute respiratory failure with  hypoxia (HCC)- (present on admission)  Assessment & Plan  Worsening 01/20/23. At bedside and evaluated during Rapid Response.   Significant higher FiO2 requirements compared to prior   01/19/23 CXRAY consistent with Aspiration Pneumonia - Started on IV unasyn    EKG obtained today has been reviewed by me, minimal ST depressions in lead I/aVL.  T wave inversions were noted previously also.    He continued to have ongoing acute respiratory failure with hypoxia and requiring high flow nasal cannula to maintain oxygen saturation.  I requested consult with pulmonology due to his ongoing respiratory failure.  Repeat echocardiogram did not show any acute changes.  After discussing it with pulmonologist I ordered CTA pulmonary to evaluate for pulmonary embolism and to evaluate for lung parenchyma.    Severe obesity (BMI 35.0-35.9 with comorbidity) ([facility])- (present on admission)  Assessment & Plan  Body mass index is 37.39 kg/m².  Co-morbid HTN, T2DM, KENDAL  Outpatient weight loss    Type 2 diabetes mellitus without complication, without long-term current use of insulin ([facility])- (present on admission)  Assessment & Plan  A1c 5.3  No indication for strict BG control - POCT/SSI deferred  Atorvastatin    Alcohol dependence with withdrawal ([facility])- (present on admission)  Assessment & Plan  Resolved.    Tobacco use disorder- (present on admission)  Assessment & Plan  On nicotine patch     Primary hypertension- (present on admission)  Assessment & Plan  Amlodipine 10 mg      Patient is critically ill.   The patient continues to have: Acute respiratory failure with hypoxia requiring high flow nasal cannula to maintain oxygen saturation  The vital organ system that is affected is the: Pulmonary system  If untreated there is a high chance of deterioration into: Worsening of respiratory failure  And eventually death.   The critical care that I am providing today is: Continue to provide him high flow nasal cannula to maintain his oxygen  saturation.  I discussed with pulmonologist and ordered CTA pulmonary  The critical that has been undertaken is medically complex.   There has been no overlap in critical care time.   Critical Care Time not including procedures: 36       VTE prophylaxis: enoxaparin ppx    I have performed a physical exam and reviewed and updated ROS and Plan today (1/21/2023). In review of yesterday's note (1/20/2023), there are no changes except as documented above.      This patient is critically ill with a life threatening illness as noted above requiring my direct presence, involvement and maximal preparedness. I have personally spend 40 minutes providing critical care to this patient. Time spend on critical care excludes time spend on procedures.

## 2023-01-21 NOTE — CARE PLAN
The patient is Watcher - Medium risk of patient condition declining or worsening  Problem: Psychosocial - Patient Condition  Goal: Patient's ability to verbalize feelings about condition will improve  Outcome: Progressing     Problem: Respiratory - Stroke Patient  Goal: Patient will achieve/maintain optimum respiratory rate/effort  Outcome: Progressing     Problem: Urinary Elimination  Goal: Establish and maintain regular urinary output  Outcome: Progressing     Problem: Bowel Elimination  Goal: Establish and maintain regular bowel function  Outcome: Progressing       Shift Goals  Clinical Goals: decrease o2, tolerate TF  Patient Goals: RADHA  Family Goals: RADHA

## 2023-01-21 NOTE — PROGRESS NOTES
12 hour chart check complete.     Monitoring Summary:  Rhythm: NSR 70-80s bpm  Ectopy: PVC(r)

## 2023-01-21 NOTE — CONSULTS
Pulmonary Consultation    Date of consult: 1/21/2023    Referring Physician  FRENCH Almeida*    Reason for Consultation  Acute hypoxic respiratory failure    History of Presenting Illness  62 y.o. male who presented 1/12/2023 with stroke like symptoms at admitted to the ICU.  Initial symptoms were weakness on left side and slurred speech for what a stroke alert was activated. Patient underwent to thrombectomy by IR and ICA stent placement. Procedure was complicated by bradycardia and lost of pulse with CPR and ROSC, patient was intubated and admitted to the ICU.   He was manged with nicardipine for HTN that later was stopped.   Eventually his cXR showed pulmonary edema and TTE showed normal EF with no shunt in bubble study.   His stay also has been complicated by ETOH withrawal and was started on a precedex with librium. Precedex was stopped yesterday.   A rapid response was called on 1/20/23 due to lethargy, encephalopathy and increase in o2 needs and was transferred to IMCU.  Today continues with increase o2 requirements reason why we were consulted.    Other hx:  pMH includes active drinker of 6 pack/beer per day, HTN, DM and active smoker of 1 ppd.    Code Status  Full Code    Review of Systems  Review of Systems   Constitutional:  Negative for chills, fever and weight loss.   HENT:  Negative for congestion, nosebleeds and sore throat.    Eyes:  Negative for discharge and redness.   Respiratory:  Positive for shortness of breath. Negative for cough and wheezing.    Cardiovascular:  Negative for chest pain, palpitations and leg swelling.   Gastrointestinal:  Negative for nausea and vomiting.   Genitourinary:  Negative for dysuria.   Musculoskeletal:  Negative for myalgias.   Skin:  Negative for rash.   Neurological:  Negative for dizziness and headaches.   Endo/Heme/Allergies:  Does not bruise/bleed easily.   Psychiatric/Behavioral:  Negative for memory loss.    All other systems reviewed and are  negative.    Past Medical History   has a past medical history of Diabetes (HCC) and Hypertension.    Surgical History   has no past surgical history on file.    Family History  family history includes Diabetes in his father and mother; Hypertension in his father, mother, and sister.    Social History   reports that he quit smoking 9 days ago. His smoking use included cigarettes. He started smoking about 47 years ago. He has a 23.00 pack-year smoking history. He has never used smokeless tobacco. He reports current alcohol use of about 25.2 oz per week. He reports current drug use. Drug: Inhaled.    Medications  Home Medications       Reviewed by Malik Corado M.D. (Physician) on 01/16/23 at 1853  Med List Status: Complete     Medication Last Dose Status   acetaminophen (TYLENOL) 650 MG CR tablet 1/12/2023 Active   Cobalamin Combinations (NEURIVA PLUS) Cap unk Active   Naproxen Sodium 220 MG Cap unk Active                  Current Facility-Administered Medications   Medication Dose Route Frequency Provider Last Rate Last Admin    albuterol (PROVENTIL) 2.5mg/3ml nebulizer solution 2.5 mg  2.5 mg Nebulization Q4HRS (RT) Andre Ceron M.D.   2.5 mg at 01/21/23 1017    lactated ringers infusion   Intravenous Continuous Andre Ceron M.D. 125 mL/hr at 01/21/23 1202 New Bag at 01/21/23 1202    amLODIPine (NORVASC) tablet 10 mg  10 mg Enteral Tube Q DAY Andre Ceron M.D.   10 mg at 01/21/23 0538    piperacillin-tazobactam (Zosyn) 3.375 g in  mL IVPB  3.375 g Intravenous Q8HRS Andre Ceron M.D.   Stopped at 01/21/23 0850    polyethylene glycol/lytes (MIRALAX) PACKET 1 Packet  1 Packet Oral QDAY PRN Marsha Palacios M.D.        And    bisacodyl (DULCOLAX) suppository 10 mg  10 mg Rectal QDAY PRN Marsha Palacios M.D.   10 mg at 01/21/23 0402    acetylcysteine (MUCOMYST) 20 % solution 3 mL  3 mL Inhalation Q4HRS Andre Ceron M.D.   3 mL at 01/21/23 1017    senna-docusate (PERICOLACE or SENOKOT S) 8.6-50 MG per  tablet 1 Tablet  1 Tablet Enteral Tube BID Andre Ceron M.D.   1 Tablet at 01/21/23 0538    polyethylene glycol/lytes (MIRALAX) PACKET 1 Packet  1 Packet Enteral Tube BID Andre Ceron M.D.   1 Packet at 01/21/23 0538    ipratropium-albuterol (DUONEB) nebulizer solution  3 mL Nebulization Q4H PRN (RT) Andre Ceron M.D.   3 mL at 01/20/23 0343    acetaminophen (Tylenol) tablet 650 mg  650 mg Enteral Tube Q6HRS PRN Jessie ANTONY Latona   650 mg at 01/16/23 1408    Pharmacy Consult: Enteral tube insertion - review meds/change route/product selection  1 Each Other PHARMACY TO DOSE Nelson Cosyb M.D.        atorvastatin (LIPITOR) tablet 80 mg  80 mg Enteral Tube Q EVENING Nelson Cosby M.D.   80 mg at 01/20/23 1748    aspirin (ASA) chewable tab 81 mg  81 mg Enteral Tube DAILY RUDI MorganOTravis   81 mg at 01/21/23 0537    enoxaparin (Lovenox) inj 40 mg  40 mg Subcutaneous DAILY AT 1800 Nelson Cosby M.D.   40 mg at 01/20/23 1748    ondansetron (ZOFRAN) syringe/vial injection 4 mg  4 mg Intravenous Q4HRS PRN Nelson Cosby M.D.   4 mg at 01/14/23 1820    Respiratory Therapy Consult   Nebulization Continuous RT Bob Razo D.O.        hydrALAZINE (APRESOLINE) injection 10-20 mg  10-20 mg Intravenous Q6HRS PRN Edwin Nieto M.D.   20 mg at 01/19/23 0044    nicotine (NICODERM) 14 MG/24HR 14 mg  14 mg Transdermal Daily-0600 RUDI MorganOTravis   14 mg at 01/21/23 0538       Allergies  No Known Allergies    Vital Signs last 24 hours  Temp:  [37.1 °C (98.8 °F)-38 °C (100.4 °F)] 37.9 °C (100.2 °F)  Pulse:  [69-80] 74  Resp:  [17-30] 30  BP: (130-176)/(67-93) 150/71  SpO2:  [88 %-98 %] 94 %    Physical Exam  Physical Exam  Vitals and nursing note reviewed.   Constitutional:       General: He is not in acute distress.     Appearance: Normal appearance. He is not toxic-appearing.   HENT:      Head: Normocephalic and atraumatic.      Nose: Nose normal.      Mouth/Throat:      Mouth: Mucous membranes are moist.   Eyes:       Extraocular Movements: Extraocular movements intact.   Cardiovascular:      Rate and Rhythm: Normal rate and regular rhythm.      Pulses: Normal pulses.      Heart sounds: No murmur heard.    No friction rub. No gallop.   Pulmonary:      Effort: Pulmonary effort is normal.      Breath sounds: Normal breath sounds.   Abdominal:      General: Bowel sounds are normal.   Musculoskeletal:         General: Normal range of motion.      Cervical back: Normal range of motion. No rigidity.      Right lower leg: No edema.      Left lower leg: No edema.   Skin:     General: Skin is warm.      Capillary Refill: Capillary refill takes less than 2 seconds.      Findings: No rash.   Neurological:      General: No focal deficit present.      Mental Status: He is alert and oriented to person, place, and time.   Psychiatric:         Mood and Affect: Mood normal.       Fluids    Intake/Output Summary (Last 24 hours) at 1/21/2023 1237  Last data filed at 1/21/2023 1000  Gross per 24 hour   Intake 3082.82 ml   Output 2245 ml   Net 837.82 ml       Laboratory  Recent Results (from the past 48 hour(s))   CBC WITHOUT DIFFERENTIAL    Collection Time: 01/20/23  2:39 AM   Result Value Ref Range    WBC 10.6 4.8 - 10.8 K/uL    RBC 4.11 (L) 4.70 - 6.10 M/uL    Hemoglobin 14.1 14.0 - 18.0 g/dL    Hematocrit 41.2 (L) 42.0 - 52.0 %    .2 (H) 81.4 - 97.8 fL    MCH 34.3 (H) 27.0 - 33.0 pg    MCHC 34.2 33.7 - 35.3 g/dL    RDW 44.6 35.9 - 50.0 fL    Platelet Count 280 164 - 446 K/uL    MPV 10.2 9.0 - 12.9 fL   Basic Metabolic Panel    Collection Time: 01/20/23  2:39 AM   Result Value Ref Range    Sodium 146 (H) 135 - 145 mmol/L    Potassium 4.1 3.6 - 5.5 mmol/L    Chloride 105 96 - 112 mmol/L    Co2 29 20 - 33 mmol/L    Glucose 131 (H) 65 - 99 mg/dL    Bun 33 (H) 8 - 22 mg/dL    Creatinine 0.78 0.50 - 1.40 mg/dL    Calcium 9.7 8.5 - 10.5 mg/dL    Anion Gap 12.0 7.0 - 16.0   ESTIMATED GFR    Collection Time: 01/20/23  2:39 AM   Result Value Ref  Range    GFR (CKD-EPI) 101 >60 mL/min/1.73 m 2   CULTURE RESPIRATORY W/ GRM STN    Collection Time: 23  4:05 AM    Specimen: Sputum Induced; Respirate   Result Value Ref Range    Significant Indicator NEG     Source RESP     Site Tracheal Aspirate     Culture Result -     Gram Stain Result       Many WBCs.  Rare epithelial cells.  Moderate Gram positive rods.  Few Gram positive cocci.     GRAM STAIN    Collection Time: 23  4:05 AM    Specimen: Respirate   Result Value Ref Range    Significant Indicator .     Source RESP     Site Tracheal Aspirate     Gram Stain Result       Many WBCs.  Rare epithelial cells.  Moderate Gram positive rods.  Few Gram positive cocci.     POCT glucose device results    Collection Time: 23  8:02 AM   Result Value Ref Range    POC Glucose, Blood 127 (H) 65 - 99 mg/dL   Arterial Blood Gas    Collection Time: 23  8:34 AM   Result Value Ref Range    Ph 7.46 7.40 - 7.50    Pco2 40.9 (H) 26.0 - 37.0 mmHg    Po2 55.3 (L) 64.0 - 87.0 mmHg    O2 Saturation 88.5 (L) 93.0 - 99.0 %    Hco3 29 (H) 17 - 25 mmol/L    Base Excess 5 (H) -4 - 3 mmol/L    Body Temp 37.0 Centigrade   Troponin    Collection Time: 23  8:34 AM   Result Value Ref Range    Troponin T 14 6 - 19 ng/L   EKG    Collection Time: 23  8:35 AM   Result Value Ref Range    Report       [facility]    Test Date:  2023  Pt Name:    JUANITA PRICE               Department: [facility]  MRN:        [id number]                      [place]       [facility] [place]:     Male                         Technician: ALYX  :        1960                   Requested By:DEYANIRA DAWSON  Order #:    [id number]                    Reading MD: Jeb Wade MD    Measurements  Intervals                                Axis  Rate:       82                           P:          -17  GA:         198                          QRS:        -46  QRSD:       97                           T:          130  QT:         372  QTc:         435    Interpretive Statements  Sinus rhythm  Inferior infarct, old  Anterior infarct, old  Compared to ECG 01/13/2023 09:40:59  NO SIGNIFICANT CHANGES  Electronically Signed On 1- 0:33:16 PST by Jeb Wade MD     TROPONIN    Collection Time: 01/20/23 11:11 AM   Result Value Ref Range    Troponin T 11 6 - 19 ng/L   MRSA By PCR (Amp)    Collection Time: 01/20/23 12:05 PM    Specimen: Nares; Respirate   Result Value Ref Range    MRSA by PCR Negative Negative   COV-2, FLU A/B, AND RSV BY PCR (2-4 HOURS CEPHEID): Collect NP swab in VTM    Collection Time: 01/20/23 12:06 PM    Specimen: Nasal; Respirate   Result Value Ref Range    Influenza virus A RNA Negative Negative    Influenza virus B, PCR Negative Negative    RSV, PCR Negative Negative    SARS-CoV-2 by PCR NotDetected     SARS-CoV-2 Source NP Swab    TROPONIN    Collection Time: 01/20/23  1:46 PM   Result Value Ref Range    Troponin T 15 6 - 19 ng/L   PHOSPHORUS    Collection Time: 01/20/23  1:46 PM   Result Value Ref Range    Phosphorus 4.7 (H) 2.5 - 4.5 mg/dL   EC-ECHOCARDIOGRAM LTD W/ CONT    Collection Time: 01/20/23  4:37 PM   Result Value Ref Range    Eject.Frac. MOD BP 64.47     Eject.Frac. MOD 4C 70.24     Eject.Frac. MOD 2C 56.09     Left Ventrical Ejection Fraction 70    CBC WITH DIFFERENTIAL    Collection Time: 01/21/23  2:20 AM   Result Value Ref Range    WBC 10.2 4.8 - 10.8 K/uL    RBC 3.97 (L) 4.70 - 6.10 M/uL    Hemoglobin 13.2 (L) 14.0 - 18.0 g/dL    Hematocrit 40.7 (L) 42.0 - 52.0 %    .5 (H) 81.4 - 97.8 fL    MCH 33.2 (H) 27.0 - 33.0 pg    MCHC 32.4 (L) 33.7 - 35.3 g/dL    RDW 45.0 35.9 - 50.0 fL    Platelet Count 279 164 - 446 K/uL    MPV 10.7 9.0 - 12.9 fL    Neutrophils-Polys 65.20 44.00 - 72.00 %    Lymphocytes 20.70 (L) 22.00 - 41.00 %    Monocytes 10.70 0.00 - 13.40 %    Eosinophils 2.40 0.00 - 6.90 %    Basophils 0.50 0.00 - 1.80 %    Immature Granulocytes 0.50 0.00 - 0.90 %    Nucleated RBC 0.00 /100 WBC     Neutrophils (Absolute) 6.67 1.82 - 7.42 K/uL    Lymphs (Absolute) 2.12 1.00 - 4.80 K/uL    Monos (Absolute) 1.10 (H) 0.00 - 0.85 K/uL    Eos (Absolute) 0.25 0.00 - 0.51 K/uL    Baso (Absolute) 0.05 0.00 - 0.12 K/uL    Immature Granulocytes (abs) 0.05 0.00 - 0.11 K/uL    NRBC (Absolute) 0.00 K/uL   Comp Metabolic Panel    Collection Time: 01/21/23  2:20 AM   Result Value Ref Range    Sodium 148 (H) 135 - 145 mmol/L    Potassium 4.1 3.6 - 5.5 mmol/L    Chloride 109 96 - 112 mmol/L    Co2 28 20 - 33 mmol/L    Anion Gap 11.0 7.0 - 16.0    Glucose 141 (H) 65 - 99 mg/dL    Bun 29 (H) 8 - 22 mg/dL    Creatinine 0.80 0.50 - 1.40 mg/dL    Calcium 9.7 8.5 - 10.5 mg/dL    AST(SGOT) 19 12 - 45 U/L    ALT(SGPT) 15 2 - 50 U/L    Alkaline Phosphatase 105 (H) 30 - 99 U/L    Total Bilirubin 0.7 0.1 - 1.5 mg/dL    Albumin 3.7 3.2 - 4.9 g/dL    Total Protein 7.0 6.0 - 8.2 g/dL    Globulin 3.3 1.9 - 3.5 g/dL    A-G Ratio 1.1 g/dL   MAGNESIUM    Collection Time: 01/21/23  2:20 AM   Result Value Ref Range    Magnesium 2.1 1.5 - 2.5 mg/dL   PROCALCITONIN    Collection Time: 01/21/23  2:20 AM   Result Value Ref Range    Procalcitonin 0.09 <0.25 ng/mL   ESTIMATED GFR    Collection Time: 01/21/23  2:20 AM   Result Value Ref Range    GFR (CKD-EPI) 100 >60 mL/min/1.73 m 2   CORRECTED CALCIUM    Collection Time: 01/21/23  2:20 AM   Result Value Ref Range    Correct Calcium 9.9 8.5 - 10.5 mg/dL       Imaging  DX-ABDOMEN FOR TUBE PLACEMENT   Final Result      NG tube extends below the diaphragm into the region of the stomach. The tip is not visualized.      EC-ECHOCARDIOGRAM LTD W/ CONT   Final Result      DX-CHEST-PORTABLE (1 VIEW)   Final Result         1. No significant interval change.      CT-ABDOMEN-PELVIS W/O   Final Result         1.  Nondependent foci of air in the bladder, could represent changes from recent instrumentation or urinary tract infection.   2.  Diverticulosis   3.  Atherosclerosis and atherosclerotic coronary artery disease       DX-CHEST-PORTABLE (1 VIEW)   Final Result      Ill-defined atelectasis versus consolidation in the left lower lung. Pneumonia can be considered in appropriate clinical setting.         DX-ABDOMEN FOR TUBE PLACEMENT   Final Result         1.  Nonspecific bowel gas pattern in the upper abdomen.   2.  Nasogastric tube tip terminates overlying the expected location of the gastric body.   3.  Cardiomegaly   4.  Hazy linear density left lung base could represent atelectasis or infiltrate.      CT-HEAD W/O   Final Result         1.  Evolving infarct/encephalomalacia in the right MCA distribution.   2.  Areas of hemorrhage in the right basal ganglia and involving the right frontal and anterior temporal sulci, similar compared to MRI January 13, 2023   3.  Atherosclerosis.      DX-CHEST-PORTABLE (1 VIEW)   Final Result         Diffuse interstitial and groundglass opacity throughout both lungs could relate to developing pulmonary edema.      DX-ABDOMEN FOR TUBE PLACEMENT   Final Result         Gastric drainage tube with tip projecting over the expected area of the stomach.      DX-CHEST-PORTABLE (1 VIEW)   Final Result      1.  Interval extubation without pneumothorax.   2.  Stable enlarged cardiac silhouette.   3.  There is bibasilar linear atelectasis.      EC-ECHOCARDIOGRAM COMPLETE W/ CONT   Final Result      MR-BRAIN-W/O   Final Result         Acute infarction involving the right frontal region, right basal ganglia and right caudate nucleus. Minimal punctate infarction involving the right parietal cortex. There is slight mass effect upon the right lateral ventricle with only minimal midline    shift measuring approximately 4 mm.      Petechial hemorrhage is noted within the right frontal cortical infarct. Also noted is petechial hemorrhage in the right basal ganglia and right caudate nucleus but without surrounding vasogenic edema.         DX-PELVIS-1 OR 2 VIEWS   Final Result         1.  No acute traumatic bony  injury.      DX-CHEST-PORTABLE (1 VIEW)   Final Result         1.  Left midlung and lower lobe infiltrates, similar to prior study.      HA-HORSHMO-5 VIEW   Final Result         1.  Nonspecific bowel gas pattern in the upper abdomen.   2.  Cardiomegaly   3.  Hazy left lower lobe infiltrates.   4.  Nasogastric tube tip terminates overlying the expected location of the gastric fundus.      IR-THROMBO MECHANICAL ARTERY,INIT   Final Result         62-year-old patient who presented with complete occlusion of the right ICA at its origin underwent emergent mechanical thrombectomy. After initial angioplasty,   a right cervical internal carotid artery stent was deployed and right MCA thrombectomy was performed. The final angiographic images show complete restoration of flow into the right ICA and MCA with no evidence of distal intracranial embolization.      Final recanalization score: TICI 3      I, Petra Mobley was physically present and participated during the entire procedure of the IR-THROMBO MECHANICAL ARTERY,INIT.                  DX-CHEST-PORTABLE (1 VIEW)   Final Result      No acute cardiac or pulmonary abnormalities are identified.      CT-CTA NECK WITH & W/O-POST PROCESSING   Final Result      1.  Occlusion of the right internal carotid artery at its origin.      2.  Occlusion of the right vertebral artery at its origin.      Dr. Gongora discussed findings with Dr. Mccarthy.      CT-CTA HEAD WITH & W/O-POST PROCESS   Final Result         1.  Occlusion of the right internal carotid artery and right middle cerebral artery.   2.  Narrowed left M1 segment with severely narrowed and M2 and M3 branches, may represent chronic changes.      These findings were discussed with the patient's clinician, Joseline Mccarthy, on 1/12/2023 8:27 PM.      CT-CEREBRAL PERFUSION ANALYSIS   Final Result         1.  Cerebral blood flow less than 30% likely representing completed infarct = 52 mL.      2.  T Max more than 6 seconds  likely representing combination of completed infarct and ischemia = 172 mL.      3.  Mismatched volume likely representing ischemic brain/penumbra = 120 mL      4.  Please note that the cerebral perfusion was performed on the limited brain tissue around the basal ganglia region. Infarct/ischemia outside the CT perfusion sections can be missed in this study.      CT-HEAD W/O   Final Result      1.  No evidence of intracranial hemorrhage, midline shift or mass effect.      2.  Question of hyperdense right MCA though evaluation limited by oblique positioning.         IR-GASTROSTOMY PLACEMENT    (Results Pending)   US-EXTREMITY VENOUS LOWER BILAT    (Results Pending)     Echo 1/20/23  Compared to the images of the prior study 1/13/2023, there has been no significant change.     Normal left ventricular systolic function.  Visually estimated ejection fraction is 70 %.  Normal right ventricular systolic function.    Echo 1/13/23  CONCLUSIONS  No prior study is available for comparison.   Normal left ventricular systolic function.   Agitated saline (bubble) study was performed. No evidence of right to left shunt by agitated saline study.  No evidence of valvular abnormality based on Doppler evaluation.   Normal aortic root for body surface area. The ascending aorta diameter is 3.6 cm.    Assessment/Plan  Acute respiratory failure with hypoxia (HCC)- (present on admission)  Assessment & Plan  -Patient with acute hypoxic respiratory failure and need for HFNC from 2 lt when moved to [facility]. Currently on HFNC FiO2 of 80--> 65%  -suspected the cause is multifactorial, including suspected pneumonia, ETOH withdrawal, among others.  -agree with lower extremity doplers to rule out DVT  -sputum cultures are growing Moderate Gram positive rods, Few Gram positive cocci. Recommend to continue with antibiotics to complete a course.  -recommend CTA chest as able to further evaluate parenchyma and r/o PE.  -Continue titration of oxygen to  keep sats 88-92%, no need to increase more than that  -Continue duonebs prn if needed  -Viral panel negat for influenza, rsv and covid.  -Labs show no acidosis and no peripheral eosinophilia.   -strict I.Os, daily weights           Discussed patient condition and risk of morbidity and/or mortality with Hospitalist, RN, and Patient.    care time = 65 minutes in directly providing and coordinating care and extensive data review.  No time overlap and excludes procedures.      You Sotelo MD FACP  Pulmonary/Critical Care

## 2023-01-21 NOTE — ASSESSMENT & PLAN NOTE
-Patient with acute hypoxic respiratory failure and need for HFNC from 2 lt when moved to [facility]. Currently on HFNC FiO2 of 80--> 65%-->35%-->30%  -suspected the cause is multifactorial, including  pneumonia, ETOH withdrawal, volume overload, among others.  -lower extremity doplers ruled out DVT  -CTA chest with No PE  -sputum cultures with presence of moderate Gram positive rods, Few Gram positive cocci. Recommend to continue with antibiotics to complete a course.  -reviewed CTA chest suspected overinfected atelectasis  -Continue titration of oxygen to keep sats 88-92%, no need to increase more than that  -Continue duonebs prn if needed  -Viral panel negat for influenza, rsv and covid.  -Labs show no acidosis and no peripheral eosinophilia.   -strict I.Os, daily weights  -there is a risk for aspiration given stroke, continues feeding through [place] tube.  -will need speech therapy

## 2023-01-22 ENCOUNTER — APPOINTMENT (OUTPATIENT)
Dept: RADIOLOGY | Facility: MEDICAL CENTER | Age: 63
DRG: 023 | End: 2023-01-22
Attending: INTERNAL MEDICINE
Payer: COMMERCIAL

## 2023-01-22 LAB
ALBUMIN SERPL BCP-MCNC: 3.6 G/DL (ref 3.2–4.9)
ALBUMIN/GLOB SERPL: 1.1 G/DL
ALP SERPL-CCNC: 107 U/L (ref 30–99)
ALT SERPL-CCNC: 16 U/L (ref 2–50)
ANION GAP SERPL CALC-SCNC: 9 MMOL/L (ref 7–16)
AST SERPL-CCNC: 21 U/L (ref 12–45)
BACTERIA SPEC RESP CULT: NORMAL
BILIRUB SERPL-MCNC: 0.6 MG/DL (ref 0.1–1.5)
BUN SERPL-MCNC: 21 MG/DL (ref 8–22)
CALCIUM ALBUM COR SERPL-MCNC: 9.5 MG/DL (ref 8.5–10.5)
CALCIUM SERPL-MCNC: 9.2 MG/DL (ref 8.5–10.5)
CHLORIDE SERPL-SCNC: 108 MMOL/L (ref 96–112)
CO2 SERPL-SCNC: 29 MMOL/L (ref 20–33)
CREAT SERPL-MCNC: 0.79 MG/DL (ref 0.5–1.4)
ERYTHROCYTE [DISTWIDTH] IN BLOOD BY AUTOMATED COUNT: 43.5 FL (ref 35.9–50)
GFR SERPLBLD CREATININE-BSD FMLA CKD-EPI: 101 ML/MIN/1.73 M 2
GLOBULIN SER CALC-MCNC: 3.2 G/DL (ref 1.9–3.5)
GLUCOSE SERPL-MCNC: 115 MG/DL (ref 65–99)
GRAM STN SPEC: NORMAL
HCT VFR BLD AUTO: 37.6 % (ref 42–52)
HGB BLD-MCNC: 12.7 G/DL (ref 14–18)
MCH RBC QN AUTO: 33.5 PG (ref 27–33)
MCHC RBC AUTO-ENTMCNC: 33.8 G/DL (ref 33.7–35.3)
MCV RBC AUTO: 99.2 FL (ref 81.4–97.8)
PLATELET # BLD AUTO: 275 K/UL (ref 164–446)
PMV BLD AUTO: 10.5 FL (ref 9–12.9)
POTASSIUM SERPL-SCNC: 4.2 MMOL/L (ref 3.6–5.5)
PROCALCITONIN SERPL-MCNC: 0.09 NG/ML
PROT SERPL-MCNC: 6.8 G/DL (ref 6–8.2)
RBC # BLD AUTO: 3.79 M/UL (ref 4.7–6.1)
SIGNIFICANT IND 70042: NORMAL
SITE SITE: NORMAL
SODIUM SERPL-SCNC: 146 MMOL/L (ref 135–145)
SOURCE SOURCE: NORMAL
WBC # BLD AUTO: 8.7 K/UL (ref 4.8–10.8)

## 2023-01-22 PROCEDURE — 71275 CT ANGIOGRAPHY CHEST: CPT

## 2023-01-22 PROCEDURE — 94760 N-INVAS EAR/PLS OXIMETRY 1: CPT

## 2023-01-22 PROCEDURE — 700111 HCHG RX REV CODE 636 W/ 250 OVERRIDE (IP): Performed by: INTERNAL MEDICINE

## 2023-01-22 PROCEDURE — 94640 AIRWAY INHALATION TREATMENT: CPT

## 2023-01-22 PROCEDURE — 80053 COMPREHEN METABOLIC PANEL: CPT

## 2023-01-22 PROCEDURE — 99233 SBSQ HOSP IP/OBS HIGH 50: CPT | Performed by: INTERNAL MEDICINE

## 2023-01-22 PROCEDURE — 700102 HCHG RX REV CODE 250 W/ 637 OVERRIDE(OP): Performed by: INTERNAL MEDICINE

## 2023-01-22 PROCEDURE — 700117 HCHG RX CONTRAST REV CODE 255: Performed by: INTERNAL MEDICINE

## 2023-01-22 PROCEDURE — 93970 EXTREMITY STUDY: CPT | Mod: 26 | Performed by: INTERNAL MEDICINE

## 2023-01-22 PROCEDURE — 99291 CRITICAL CARE FIRST HOUR: CPT | Performed by: INTERNAL MEDICINE

## 2023-01-22 PROCEDURE — A9270 NON-COVERED ITEM OR SERVICE: HCPCS | Performed by: INTERNAL MEDICINE

## 2023-01-22 PROCEDURE — 85027 COMPLETE CBC AUTOMATED: CPT

## 2023-01-22 PROCEDURE — 700105 HCHG RX REV CODE 258: Performed by: INTERNAL MEDICINE

## 2023-01-22 PROCEDURE — 700101 HCHG RX REV CODE 250: Performed by: INTERNAL MEDICINE

## 2023-01-22 PROCEDURE — 770000 HCHG ROOM/CARE - INTERMEDIATE ICU *

## 2023-01-22 PROCEDURE — 84145 PROCALCITONIN (PCT): CPT

## 2023-01-22 RX ORDER — BISACODYL 10 MG
10 SUPPOSITORY, RECTAL RECTAL
Status: DISCONTINUED | OUTPATIENT
Start: 2023-01-22 | End: 2023-02-09

## 2023-01-22 RX ORDER — POLYETHYLENE GLYCOL 3350 17 G/17G
1 POWDER, FOR SOLUTION ORAL
Status: DISCONTINUED | OUTPATIENT
Start: 2023-01-22 | End: 2023-02-09

## 2023-01-22 RX ADMIN — IOHEXOL 60 ML: 350 INJECTION, SOLUTION INTRAVENOUS at 06:37

## 2023-01-22 RX ADMIN — ACETYLCYSTEINE 3 ML: 200 SOLUTION ORAL; RESPIRATORY (INHALATION) at 23:21

## 2023-01-22 RX ADMIN — PIPERACILLIN AND TAZOBACTAM 3.38 G: 3; .375 INJECTION, POWDER, LYOPHILIZED, FOR SOLUTION INTRAVENOUS; PARENTERAL at 05:45

## 2023-01-22 RX ADMIN — ACETYLCYSTEINE 3 ML: 200 SOLUTION ORAL; RESPIRATORY (INHALATION) at 19:02

## 2023-01-22 RX ADMIN — ALBUTEROL SULFATE 2.5 MG: 2.5 SOLUTION RESPIRATORY (INHALATION) at 19:03

## 2023-01-22 RX ADMIN — ACETYLCYSTEINE 3 ML: 200 SOLUTION ORAL; RESPIRATORY (INHALATION) at 14:27

## 2023-01-22 RX ADMIN — ALBUTEROL SULFATE 2.5 MG: 2.5 SOLUTION RESPIRATORY (INHALATION) at 06:54

## 2023-01-22 RX ADMIN — PIPERACILLIN AND TAZOBACTAM 3.38 G: 3; .375 INJECTION, POWDER, LYOPHILIZED, FOR SOLUTION INTRAVENOUS; PARENTERAL at 21:50

## 2023-01-22 RX ADMIN — NICOTINE 14 MG: 14 PATCH TRANSDERMAL at 05:44

## 2023-01-22 RX ADMIN — ALBUTEROL SULFATE 2.5 MG: 2.5 SOLUTION RESPIRATORY (INHALATION) at 23:21

## 2023-01-22 RX ADMIN — ATORVASTATIN CALCIUM 80 MG: 80 TABLET, FILM COATED ORAL at 17:48

## 2023-01-22 RX ADMIN — ASPIRIN 81 MG: 81 TABLET, CHEWABLE ORAL at 05:43

## 2023-01-22 RX ADMIN — SODIUM CHLORIDE, POTASSIUM CHLORIDE, SODIUM LACTATE AND CALCIUM CHLORIDE: 600; 310; 30; 20 INJECTION, SOLUTION INTRAVENOUS at 15:37

## 2023-01-22 RX ADMIN — ALBUTEROL SULFATE 2.5 MG: 2.5 SOLUTION RESPIRATORY (INHALATION) at 14:27

## 2023-01-22 RX ADMIN — ACETYLCYSTEINE 3 ML: 200 SOLUTION ORAL; RESPIRATORY (INHALATION) at 06:53

## 2023-01-22 RX ADMIN — PIPERACILLIN AND TAZOBACTAM 3.38 G: 3; .375 INJECTION, POWDER, LYOPHILIZED, FOR SOLUTION INTRAVENOUS; PARENTERAL at 13:33

## 2023-01-22 RX ADMIN — AMLODIPINE BESYLATE 10 MG: 10 TABLET ORAL at 05:43

## 2023-01-22 RX ADMIN — SENNOSIDES AND DOCUSATE SODIUM 1 TABLET: 50; 8.6 TABLET ORAL at 17:48

## 2023-01-22 RX ADMIN — ENOXAPARIN SODIUM 40 MG: 40 INJECTION SUBCUTANEOUS at 17:48

## 2023-01-22 RX ADMIN — ALBUTEROL SULFATE 2.5 MG: 2.5 SOLUTION RESPIRATORY (INHALATION) at 10:00

## 2023-01-22 RX ADMIN — ACETYLCYSTEINE 3 ML: 200 SOLUTION ORAL; RESPIRATORY (INHALATION) at 10:00

## 2023-01-22 ASSESSMENT — ENCOUNTER SYMPTOMS
MYALGIAS: 0
PALPITATIONS: 0
VOMITING: 0
EYE DISCHARGE: 0
WEIGHT LOSS: 0
COUGH: 0
FEVER: 0
BRUISES/BLEEDS EASILY: 0
SHORTNESS OF BREATH: 1
WHEEZING: 0
DIZZINESS: 0
CHILLS: 0
SORE THROAT: 0
NAUSEA: 0
EYE REDNESS: 0
MEMORY LOSS: 0
HEADACHES: 0

## 2023-01-22 ASSESSMENT — FIBROSIS 4 INDEX: FIB4 SCORE: 1.15

## 2023-01-22 ASSESSMENT — PAIN DESCRIPTION - PAIN TYPE: TYPE: CHRONIC PAIN

## 2023-01-22 NOTE — PROGRESS NOTES
Hospital Medicine Daily Progress Note    Date of Service  1/22/2023    Chief Complaint  Kaiden Quiroz is a 62 y.o. male admitted 1/12/2023 with Possible Stroke (BIBA from home for stroke like symptoms/Wife found pt with slurred speech, L facial droop, and L sided weakness /LKW 1830/Pt had a fall yesterday, + head strike, - thinners/, aox4/Initial NIH of 20 by neurologist, pt taken to CT then red 11)        Hospital Course  62 y.o. male with HTN, T2DM, tobacco use DO who presented 1/12/2023 with Left hemiparesis and facial droop due to Right MCA CVA.     He presented 1/12/22 with Left facial droop and Left-sided weakness. CTH demonstrated hyperdense MCA, for which subsequent CT perfusion demonstrated infarct with penumbra. CTA demonstrated Right ICA and Right MCA occlusion. Neurology was consulted. He was not a candidate for tPA due to hypertension. Neuro-IR performed a Right MCA thrombectomy and Right ICA stent 1/12/23. Subsequent MRI demonstrated Right frontal, basal ganglia, and caudate nucleus infarct. Petechial hemorrhages were noted concerning for hemorrhagic conversion, for which he was not a candidate for DAPT but treated with ASA monotherapy per Neurology recommendation. He was admitted to the ICU post-procedure for neurologic monitoring and nicardipine gtt due to uncontrolled HTN.  During his IR procedure he developed a sinus pause for which CPR was not required prior to spontaneous ROSC. He was administered atropine and glycopyrrolate for sinus bradycardia. He was intubated post-procedure and extubated 1/13/23 to [facility]. CXR demonstrated evolving pulmonary edema. TTE demonstrated normal systolic function and was negative for shunt. He has been diuresed with IV lasix with vast improvement to SpO2 >96% on 4 L/min. On 1/15/23 he began exhibiting signs of alcohol withdrawal. He was initiated on RASS protocol with precedex gtt and librium taper. He has weaned from precedex gtt today. He is  transitioned off nicardipine gtt to amlodipine 5 mg daily.    He developed acute respiratory failure with hypoxia and he was placed on high flow nasal cannula.  Patient transferred to [facility] on January 20, 2023 for higher level of care.  Ordered echocardiogram and did not show any acute abnormalities including change in RV function.  I ordered ultrasound bilateral lower extremities for DVT evaluation.    On January 21, 2023 his oxygen requirement has been trending down..  He is more alert and following commands.  I requested consult with pulmonology and discussed finding of echocardiogram and his current medical condition.    On January 21 2023 ordered CT pulmonary it showed linear atelectasis and did not show pulmonary embolism.     Interval Problem Update    01/22/23    I evaluated and examined him at the bedside.  He was very somnolent on evaluation and later he woke up and he was following commands.  He continued to have weakness on his left upper and lower extremity.  He underwent CT pulmonary did not show pulmonary embolism.  I discussed plan of care with patient's wife at the bedside and answered all of her questions.  Plan is to titrate down oxygen as tolerated.      Consultants/Specialty  Neurology  Physiatry     Code Status  Full Code    Disposition  Patient is not medically cleared for discharge (Will be cleared post 24 hours of PEG tube placement).   Anticipate discharge to Acute rehab vs SNF. Orders in place for both. Discussed with CM on rounds.   I have placed the appropriate orders for post-discharge needs.    Review of Systems  Review of Systems   Unable to perform ROS: Mental acuity    Still I was unable to complete review of symptoms although his clinical condition improved from yesterday.    Physical Exam  Temp:  [37.7 °C (99.9 °F)] 37.7 °C (99.9 °F)  Pulse:  [66-75] 68  Resp:  [17-25] 20  BP: (120-165)/(57-78) 136/76  SpO2:  [90 %-99 %] 90 %    Physical Exam  Vitals reviewed.   Constitutional:        General: He is not in acute distress.     Comments: Somnolent   HENT:      Head: Normocephalic and atraumatic. No contusion.      Right Ear: External ear normal.      Left Ear: External ear normal.      Nose: Nose normal.      Comments: NG tube     Mouth/Throat:      Pharynx: No oropharyngeal exudate.   Eyes:      General:         Right eye: No discharge.         Left eye: No discharge.      Pupils: Pupils are equal, round, and reactive to light.   Cardiovascular:      Rate and Rhythm: Normal rate and regular rhythm.      Heart sounds: No murmur heard.    No friction rub. No gallop.   Pulmonary:      Effort: Respiratory distress present.      Breath sounds: Rhonchi present. No wheezing.   Abdominal:      General: Bowel sounds are normal. There is no distension.      Palpations: Abdomen is soft.      Tenderness: There is no abdominal tenderness. There is no rebound.   Musculoskeletal:         General: No swelling or tenderness. Normal range of motion.      Cervical back: No rigidity. No muscular tenderness.   Skin:     General: Skin is warm and dry.      Coloration: Skin is not jaundiced.   Neurological:      General: No focal deficit present.      Mental Status: He is alert and oriented to person, place, and time.      Cranial Nerves: Cranial nerve deficit present.      Sensory: No sensory deficit.      Motor: Weakness present.      Comments: Motor 5/5 in right upper and lower extremity.  Motor 2/5 in left upper and lower extremities   Psychiatric:         Mood and Affect: Mood normal.       Fluids    Intake/Output Summary (Last 24 hours) at 1/22/2023 1558  Last data filed at 1/22/2023 1200  Gross per 24 hour   Intake 3463.53 ml   Output 2370 ml   Net 1093.53 ml         Laboratory  Recent Labs     01/20/23  0239 01/21/23  0220 01/22/23  0920   WBC 10.6 10.2 8.7   RBC 4.11* 3.97* 3.79*   HEMOGLOBIN 14.1 13.2* 12.7*   HEMATOCRIT 41.2* 40.7* 37.6*   .2* 102.5* 99.2*   MCH 34.3* 33.2* 33.5*   MCHC 34.2 32.4*  33.8   RDW 44.6 45.0 43.5   PLATELETCT 280 279 275   MPV 10.2 10.7 10.5       Recent Labs     01/21/23  0220 01/21/23  1448 01/22/23  0305   SODIUM 148* 146* 146*   POTASSIUM 4.1 3.5* 4.2   CHLORIDE 109 114* 108   CO2 28 23 29   GLUCOSE 141* 105* 115*   BUN 29* 21 21   CREATININE 0.80 0.62 0.79   CALCIUM 9.7 7.6* 9.2                     Imaging  CT-CTA CHEST PULMONARY ARTERY W/ RECONS   Final Result         1.  No pulmonary embolus appreciated.   2.  Linear densities the bilateral lung bases suggests changes of atelectasis, component of infiltrate is not excluded.   3.  Atherosclerosis and atherosclerotic coronary artery disease.      US-EXTREMITY VENOUS LOWER BILAT   Final Result      DX-ABDOMEN FOR TUBE PLACEMENT   Final Result      NG tube extends below the diaphragm into the region of the stomach. The tip is not visualized.      EC-ECHOCARDIOGRAM LTD W/ CONT   Final Result      DX-CHEST-PORTABLE (1 VIEW)   Final Result         1. No significant interval change.      CT-ABDOMEN-PELVIS W/O   Final Result         1.  Nondependent foci of air in the bladder, could represent changes from recent instrumentation or urinary tract infection.   2.  Diverticulosis   3.  Atherosclerosis and atherosclerotic coronary artery disease      DX-CHEST-PORTABLE (1 VIEW)   Final Result      Ill-defined atelectasis versus consolidation in the left lower lung. Pneumonia can be considered in appropriate clinical setting.         DX-ABDOMEN FOR TUBE PLACEMENT   Final Result         1.  Nonspecific bowel gas pattern in the upper abdomen.   2.  Nasogastric tube tip terminates overlying the expected location of the gastric body.   3.  Cardiomegaly   4.  Hazy linear density left lung base could represent atelectasis or infiltrate.      CT-HEAD W/O   Final Result         1.  Evolving infarct/encephalomalacia in the right MCA distribution.   2.  Areas of hemorrhage in the right basal ganglia and involving the right frontal and anterior  temporal sulci, similar compared to MRI January 13, 2023   3.  Atherosclerosis.      DX-CHEST-PORTABLE (1 VIEW)   Final Result         Diffuse interstitial and groundglass opacity throughout both lungs could relate to developing pulmonary edema.      DX-ABDOMEN FOR TUBE PLACEMENT   Final Result         Gastric drainage tube with tip projecting over the expected area of the stomach.      DX-CHEST-PORTABLE (1 VIEW)   Final Result      1.  Interval extubation without pneumothorax.   2.  Stable enlarged cardiac silhouette.   3.  There is bibasilar linear atelectasis.      EC-ECHOCARDIOGRAM COMPLETE W/ CONT   Final Result      MR-BRAIN-W/O   Final Result         Acute infarction involving the right frontal region, right basal ganglia and right caudate nucleus. Minimal punctate infarction involving the right parietal cortex. There is slight mass effect upon the right lateral ventricle with only minimal midline    shift measuring approximately 4 mm.      Petechial hemorrhage is noted within the right frontal cortical infarct. Also noted is petechial hemorrhage in the right basal ganglia and right caudate nucleus but without surrounding vasogenic edema.         DX-PELVIS-1 OR 2 VIEWS   Final Result         1.  No acute traumatic bony injury.      DX-CHEST-PORTABLE (1 VIEW)   Final Result         1.  Left midlung and lower lobe infiltrates, similar to prior study.      FT-[id number]-5 VIEW   Final Result         1.  Nonspecific bowel gas pattern in the upper abdomen.   2.  Cardiomegaly   3.  Hazy left lower lobe infiltrates.   4.  Nasogastric tube tip terminates overlying the expected location of the gastric fundus.      IR-THROMBO MECHANICAL ARTERY,INIT   Final Result         62-year-old patient who presented with complete occlusion of the right ICA at its origin underwent emergent mechanical thrombectomy. After initial angioplasty,   a right cervical internal carotid artery stent was deployed and right MCA thrombectomy was  performed. The final angiographic images show complete restoration of flow into the right ICA and MCA with no evidence of distal intracranial embolization.      Final recanalization score: TICI 3      I, Petra Mobley was physically present and participated during the entire procedure of the IR-THROMBO MECHANICAL ARTERY,INIT.                  DX-CHEST-PORTABLE (1 VIEW)   Final Result      No acute cardiac or pulmonary abnormalities are identified.      CT-CTA NECK WITH & W/O-POST PROCESSING   Final Result      1.  Occlusion of the right internal carotid artery at its origin.      2.  Occlusion of the right vertebral artery at its origin.      Dr. Gongora discussed findings with Dr. Mccarthy.      CT-CTA HEAD WITH & W/O-POST PROCESS   Final Result         1.  Occlusion of the right internal carotid artery and right middle cerebral artery.   2.  Narrowed left M1 segment with severely narrowed and M2 and M3 branches, may represent chronic changes.      These findings were discussed with the patient's clinician, Joseline Mccarthy, on 1/12/2023 8:27 PM.      CT-CEREBRAL PERFUSION ANALYSIS   Final Result         1.  Cerebral blood flow less than 30% likely representing completed infarct = 52 mL.      2.  T Max more than 6 seconds likely representing combination of completed infarct and ischemia = 172 mL.      3.  Mismatched volume likely representing ischemic brain/penumbra = 120 mL      4.  Please note that the cerebral perfusion was performed on the limited brain tissue around the basal ganglia region. Infarct/ischemia outside the CT perfusion sections can be missed in this study.      CT-HEAD W/O   Final Result      1.  No evidence of intracranial hemorrhage, midline shift or mass effect.      2.  Question of hyperdense right MCA though evaluation limited by oblique positioning.         IR-GASTROSTOMY PLACEMENT    (Results Pending)          Assessment/Plan  * Acute ischemic right middle cerebral artery (MCA) stroke  (HCC)- (present on admission)  Assessment & Plan  Right MCA CVA with hemorrhagic conversion  Neurology consulted - ASA without DAPT until 1/23/23 due to hemorrhagic conversion  On High Intensity statin therapy   Risk factor modification including HTN control   S/p right ICA stent and MCA thrombectomy 1/12/23  TTE negative for shunt  Tobacco cessation  PT/OT/SLP - recommending post-acute placement    Hypernatremia  Assessment & Plan  Patient continues to have hyponatremia with elevated sodium level.  I started him on free water flushes.  Continue to monitor.    Pneumonia due to infectious organism- (present on admission)  Assessment & Plan  Aspiration pneumonia (Developed 01/19/23)   Checked procalcitonin, this was low - sputum culture pending   Strict aspiration precautions   Started IV Unasyn 01/19, Escalated to IV zosyn 01/20/23 - de-escalate as clinically appropriate  Check Nares MRCA  Mucomyst/3% normal saline nebs to maintain pulmonary toilet, RT protocol to continue  I requested consult with pulmonology.    Oropharyngeal dysphagia- (present on admission)  Assessment & Plan  Secondary to acute stroke.    Aspiration precautions   Needs placement of a PEG tube (ordered, 01/19/23) - Pending once clinical stability achieved     Acute left hemiparesis (HCC)- (present on admission)  Assessment & Plan  Secondary to right MCA stroke.    Pressure ulcer precautions  Aspiration, Fall precautions  PT and OT    Stroke, hemorrhagic (HCC)- (present on admission)  Assessment & Plan  Ischemic stroke with hemorrhagic conversion.    Toxic metabolic encephalopathy- (present on admission)  Assessment & Plan  Persistent 01/20/23  Secondary to stroke, delirium and now concern for relation to underlying pneumonia  Limit restraints as able  Avoid polypharmacy, sedating medications  Delirium precautions  Management of underlying medical conditions  Obtain EEG 01/20/23 and it did not show seizure activity.  His mentation has been  improving.    Acute respiratory failure with hypoxia (HCC)- (present on admission)  Assessment & Plan  Worsening 01/20/23. At bedside and evaluated during Rapid Response.   Significant higher FiO2 requirements compared to prior   01/19/23 CXRAY consistent with Aspiration Pneumonia - Started on IV unasyn    EKG obtained today has been reviewed by me, minimal ST depressions in lead I/aVL.  T wave inversions were noted previously also.    He continued to have ongoing acute respiratory failure with hypoxia and requiring high flow nasal cannula to maintain oxygen saturation.  I requested consult with pulmonology due to his ongoing respiratory failure.  Repeat echocardiogram did not show any acute changes.  After discussing it with pulmonologist I ordered CTA pulmonary to evaluate for pulmonary embolism and to evaluate for lung parenchyma.  CTA pulmonary did not show pulmonary embolism and he found to have atelectasis.  Discussed plan of care with patient's wife regarding CTA findings.    Severe obesity (BMI 35.0-35.9 with comorbidity) ([facility])- (present on admission)  Assessment & Plan  Body mass index is 37.39 kg/m².  Co-morbid HTN, T2DM, KENDAL  Outpatient weight loss    Type 2 diabetes mellitus without complication, without long-term current use of insulin ([facility])- (present on admission)  Assessment & Plan  A1c 5.3  No indication for strict BG control - POCT/SSI deferred  Atorvastatin    Alcohol dependence with withdrawal ([facility])- (present on admission)  Assessment & Plan  Resolved.    Tobacco use disorder- (present on admission)  Assessment & Plan  On nicotine patch     Primary hypertension- (present on admission)  Assessment & Plan  Amlodipine 10 mg      Patient is critically ill.   The patient continues to have: Acute respiratory failure with hypoxia requiring high flow oxygen  The vital organ system that is affected is the: Pulmonary  If untreated there is a high chance of deterioration into: Pulmonary arrest  And  eventually death.   The critical care that I am providing today is: Continue high flow oxygen and monitor oxygen saturation closely in IMCU  The critical that has been undertaken is medically complex.   There has been no overlap in critical care time.   Critical Care Time not including procedures: 37    VTE prophylaxis: enoxaparin ppx    I have performed a physical exam and reviewed and updated ROS and Plan today (1/22/2023). In review of yesterday's note (1/21/2023), there are no changes except as documented above.      This patient is critically ill with a life threatening illness as noted above requiring my direct presence, involvement and maximal preparedness. I have personally spend 40 minutes providing critical care to this patient. Time spend on critical care excludes time spend on procedures.

## 2023-01-22 NOTE — PROGRESS NOTES
1900: report rec'd at bedside, 2 check done with IVF and Pupils  2120: Patient on hiflow satting well, remains restrained right arm, unable to use cpap at this time.

## 2023-01-22 NOTE — PROGRESS NOTES
CT w/contrast consent obtained from patients wife, updates provided, form placed upload tray at [facility]  Awaiting time from CT tech to take patient down for scan

## 2023-01-22 NOTE — CARE PLAN
The patient is Stable - Low risk of patient condition declining or worsening    Shift Goals  Clinical Goals: NV assessment,  Patient Goals: RADHA  Family Goals: RADHA    Progress made toward(s) clinical / shift goals:    Problem: Optimal Care of the Stroke Patient  Goal: Optimal emergency care for the stroke patient  Outcome: Progressing  Goal: Optimal acute care for the stroke patient  Outcome: Progressing     Problem: Psychosocial - Patient Condition  Goal: Patient's ability to verbalize feelings about condition will improve  Outcome: Progressing     Problem: Neuro Status  Goal: Neuro status will remain stable or improve  Outcome: Progressing     Problem: Hemodynamic Monitoring  Goal: Patient's hemodynamics, fluid balance and neurologic status will be stable or improve  Outcome: Progressing     Problem: Respiratory - Stroke Patient  Goal: Patient will achieve/maintain optimum respiratory rate/effort  Outcome: Progressing     Problem: Dysphagia  Goal: Dysphagia will improve  Outcome: Progressing     Problem: Risk for Aspiration  Goal: Patient's risk for aspiration will be absent or decrease  Outcome: Progressing     Problem: Urinary Elimination  Goal: Establish and maintain regular urinary output  Outcome: Progressing     Problem: Bowel Elimination  Goal: Establish and maintain regular bowel function  Outcome: Progressing     Problem: Pain - Standard  Goal: Alleviation of pain or a reduction in pain to the patient’s comfort goal  Outcome: Progressing     Problem: Safety - Medical Restraint  Goal: Remains free of injury from restraints (Restraint for Interference with Medical Device)  Outcome: Progressing       Patient is not progressing towards the following goals:      Problem: Self Care  Goal: Patient will have the ability to perform ADLs independently or with assistance (bathe, groom, dress, toilet and feed)  Outcome: Not Progressing

## 2023-01-22 NOTE — PROGRESS NOTES
Pulmonary Progress Note    Date of admission  1/12/2023    Chief Complaint  62 y.o. male admitted 1/12/2023 with acute hypoxic respiratory failure    Hospital Course  62 y.o. male who presented 1/12/2023 with stroke like symptoms at admitted to the ICU.  Initial symptoms were weakness on left side and slurred speech for what a stroke alert was activated. Patient underwent to thrombectomy by IR and ICA stent placement. Procedure was complicated by bradycardia and lost of pulse with CPR and ROSC, patient was intubated and admitted to the ICU.   He was manged with nicardipine for HTN that later was stopped.   Eventually his cXR showed pulmonary edema and TTE showed normal EF with no shunt in bubble study.   His stay also has been complicated by ETOH withrawal and was started on a precedex with librium. Precedex was stopped yesterday.   A rapid response was called on 1/20/23 due to lethargy, encephalopathy and increase in o2 needs and was transferred to IM.  Today continues with increase o2 requirements reason why we were consulted.     Other hx:  pMH includes active drinker of 6 pack/beer per day, HTN, DM and active smoker of 1 ppd.    Interval Problem Update  Reviewed last 24 hour events:  Patient with mild improvement  CTA chest today ruled out a PE but showed linear atelectasis with possible overinfection   Continues with good urine output  Oxygen requirements are coming down. FiO2 30%, 40 lt, sats 94%  Sputum with Moderate Gram positive rods.  Few Gram positive cocci. On antibiotics zosyn    Review of Systems  Review of Systems   Constitutional:  Positive for malaise/fatigue. Negative for chills, fever and weight loss.   HENT:  Negative for congestion, nosebleeds and sore throat.    Eyes:  Negative for discharge and redness.   Respiratory:  Positive for shortness of breath. Negative for cough and wheezing.    Cardiovascular:  Negative for chest pain, palpitations and leg swelling.   Gastrointestinal:  Negative  for nausea and vomiting.   Genitourinary:  Negative for dysuria.   Musculoskeletal:  Negative for myalgias.   Skin:  Negative for rash.   Neurological:  Negative for dizziness and headaches.   Endo/Heme/Allergies:  Does not bruise/bleed easily.   Psychiatric/Behavioral:  Negative for memory loss.    All other systems reviewed and are negative.     Vital Signs for last 24 hours   Temp:  [37.7 °C (99.9 °F)] 37.7 °C (99.9 °F)  Pulse:  [66-75] 68  Resp:  [17-25] 21  BP: (120-165)/(57-78) 136/76  SpO2:  [90 %-99 %] 94 %    Hemodynamic parameters for last 24 hours       Respiratory Information for the last 24 hours       Physical Exam   Physical Exam  Vitals and nursing note reviewed.   Constitutional:       General: He is not in acute distress.     Appearance: Normal appearance. He is not toxic-appearing.   HENT:      Head: Normocephalic and atraumatic.      Nose: Nose normal.      Mouth/Throat:      Mouth: Mucous membranes are moist.   Eyes:      Extraocular Movements: Extraocular movements intact.   Cardiovascular:      Rate and Rhythm: Normal rate and regular rhythm.      Pulses: Normal pulses.      Heart sounds: No murmur heard.    No friction rub. No gallop.   Pulmonary:      Effort: Pulmonary effort is normal.      Breath sounds: Normal breath sounds.   Abdominal:      General: Bowel sounds are normal.   Musculoskeletal:         General: Normal range of motion.      Cervical back: Normal range of motion. No rigidity.      Right lower leg: No edema.      Left lower leg: No edema.   Skin:     General: Skin is warm.      Capillary Refill: Capillary refill takes less than 2 seconds.      Findings: No rash.   Neurological:      General: No focal deficit present.      Mental Status: He is alert and oriented to person, place, and time.   Psychiatric:         Mood and Affect: Mood normal.       Medications  Current Facility-Administered Medications   Medication Dose Route Frequency Provider Last Rate Last Admin     polyethylene glycol/lytes (MIRALAX) PACKET 1 Packet  1 Packet Enteral Tube QDAY PRN Marsha Palacios M.D.        And    bisacodyl (DULCOLAX) suppository 10 mg  10 mg Rectal QDAY PRN Marsha Palacios M.D.        albuterol (PROVENTIL) 2.5mg/3ml nebulizer solution 2.5 mg  2.5 mg Nebulization Q4HRS (RT) Andre Ceron M.D.   2.5 mg at 01/22/23 1000    lactated ringers infusion   Intravenous Continuous Marsha Palacios M.D. 83 mL/hr at 01/21/23 2212 New Bag at 01/21/23 2212    amLODIPine (NORVASC) tablet 10 mg  10 mg Enteral Tube Q DAY Andre Ceron M.D.   10 mg at 01/22/23 0543    piperacillin-tazobactam (Zosyn) 3.375 g in  mL IVPB  3.375 g Intravenous Q8HRS Marsha Palacios M.D. 25 mL/hr at 01/22/23 1333 3.375 g at 01/22/23 1333    acetylcysteine (MUCOMYST) 20 % solution 3 mL  3 mL Inhalation Q4HRS Andre Ceron M.D.   3 mL at 01/22/23 1000    senna-docusate (PERICOLACE or SENOKOT S) 8.6-50 MG per tablet 1 Tablet  1 Tablet Enteral Tube BID Andre Ceron M.D.   1 Tablet at 01/21/23 0538    polyethylene glycol/lytes (MIRALAX) PACKET 1 Packet  1 Packet Enteral Tube BID Andre Ceron M.D.   1 Packet at 01/21/23 0538    ipratropium-albuterol (DUONEB) nebulizer solution  3 mL Nebulization Q4H PRN (RT) Andre Ceron M.D.   3 mL at 01/20/23 0343    acetaminophen (Tylenol) tablet 650 mg  650 mg Enteral Tube Q6HRS PRN Jessie ANTONY Latona   650 mg at 01/16/23 1408    Pharmacy Consult: Enteral tube insertion - review meds/change route/product selection  1 Each Other PHARMACY TO DOSE Nelson Cosby M.D.        atorvastatin (LIPITOR) tablet 80 mg  80 mg Enteral Tube Q EVENING Nelson Cosby M.D.   80 mg at 01/21/23 1732    aspirin (ASA) chewable tab 81 mg  81 mg Enteral Tube DAILY Bob Razo D.O.   81 mg at 01/22/23 0543    enoxaparin (Lovenox) inj 40 mg  40 mg Subcutaneous DAILY AT 1800 Nelson Cosby M.D.   40 mg at 01/21/23 1731    ondansetron (ZOFRAN) syringe/vial injection 4 mg  4 mg Intravenous Q4HRS PRN  Nelson Cosby M.D.   4 mg at 01/14/23 1820    Respiratory Therapy Consult   Nebulization Continuous RT Bob Razo D.O.        hydrALAZINE (APRESOLINE) injection 10-20 mg  10-20 mg Intravenous Q6HRS PRN Edwin Nieto M.D.   20 mg at 01/19/23 0044    nicotine (NICODERM) 14 MG/24HR 14 mg  14 mg Transdermal Daily-0600 Bob Razo D.O.   14 mg at 01/22/23 0544       Fluids    Intake/Output Summary (Last 24 hours) at 1/22/2023 1407  Last data filed at 1/22/2023 1200  Gross per 24 hour   Intake 3463.53 ml   Output 3320 ml   Net 143.53 ml       Laboratory  Recent Labs     01/20/23  0834   [id number] 7.46   [id number] 40.9*   [id number] 55.3*   [id number] 88.5*   ARTHCO3 29*   ARTBE 5*         Recent Labs     01/20/23  1346 01/21/23  0220 01/21/23  1448 01/22/23  0305   SODIUM  --  148* 146* 146*   POTASSIUM  --  4.1 3.5* 4.2   CHLORIDE  --  109 114* 108   CO2  --  28 23 29   BUN  --  29* 21 21   CREATININE  --  0.80 0.62 0.79   MAGNESIUM  --  2.1  --   --    PHOSPHORUS 4.7*  --   --   --    CALCIUM  --  9.7 7.6* 9.2     Recent Labs     01/21/23  0220 01/21/23  1448 01/22/23  0305   ALTSGPT 15  --  16   ASTSGOT 19  --  21   ALKPHOSPHAT 105*  --  107*   TBILIRUBIN 0.7  --  0.6   GLUCOSE 141* 105* 115*     Recent Labs     01/20/23  0239 01/21/23  0220 01/22/23  0305 01/22/23  0920   WBC 10.6 10.2  --  8.7   NEUTSPOLYS  --  65.20  --   --    LYMPHOCYTES  --  20.70*  --   --    MONOCYTES  --  10.70  --   --    EOSINOPHILS  --  2.40  --   --    BASOPHILS  --  0.50  --   --    ASTSGOT  --  19 21  --    ALTSGPT  --  15 16  --    ALKPHOSPHAT  --  105* 107*  --    TBILIRUBIN  --  0.7 0.6  --      Recent Labs     01/20/23  0239 01/21/23  0220 01/22/23  0920   RBC 4.11* 3.97* 3.79*   HEMOGLOBIN 14.1 13.2* 12.7*   HEMATOCRIT 41.2* 40.7* 37.6*   PLATELETCT 280 279 275       Imaging  CT:    Reviewed  1/22/23  1.  No pulmonary embolus appreciated.  2.  Linear densities the bilateral lung bases suggests changes of atelectasis, component  of infiltrate is not excluded.  3.  Atherosclerosis and atherosclerotic coronary artery disease.      Assessment/Plan  Acute respiratory failure with hypoxia (HCC)- (present on admission)  Assessment & Plan  -Patient with acute hypoxic respiratory failure and need for HFNC from 2 lt when moved to [facility]. Currently on HFNC FiO2 of 80--> 65%-->35%-->30%  -suspected the cause is multifactorial, including  pneumonia, ETOH withdrawal, volume overload, among others.  -lower extremity doplers ruled out DVT  -CTA chest with No PE  -sputum cultures with presence of moderate Gram positive rods, Few Gram positive cocci. Recommend to continue with antibiotics to complete a course.  -reviewed CTA chest suspected overinfected atelectasis  -Continue titration of oxygen to keep sats 88-92%, no need to increase more than that  -Continue duonebs prn if needed  -Viral panel negat for influenza, rsv and covid.  -Labs show no acidosis and no peripheral eosinophilia.   -strict I.Os, daily weights  -there is a risk for aspiration given stroke, continues feeding through NJ tube.  -will need speech therapy          I have performed a physical exam and reviewed and updated ROS and Plan today (1/22/2023). In review of yesterday's note (1/21/2023), there are no changes except as documented above.     Discussed patient condition and risk of morbidity and/or mortality with Hospitalist, RN, and Patient  care time = 50 minutes in directly providing and coordinating care and extensive data review.  No time overlap and excludes procedures.    You Sotelo MD FACP  Pulmonary/Critical Care

## 2023-01-22 NOTE — DISCHARGE PLANNING
consult received 1/20 to assist with Advance Directive, however, due to pt altered mental status, pt unable to complete documents at this time. Please re-consult SW if pt regains capacity.

## 2023-01-23 LAB
ALBUMIN SERPL BCP-MCNC: 3.4 G/DL (ref 3.2–4.9)
ALBUMIN/GLOB SERPL: 1.1 G/DL
ALP SERPL-CCNC: 95 U/L (ref 30–99)
ALT SERPL-CCNC: 15 U/L (ref 2–50)
ANION GAP SERPL CALC-SCNC: 9 MMOL/L (ref 7–16)
AST SERPL-CCNC: 20 U/L (ref 12–45)
BILIRUB SERPL-MCNC: 0.5 MG/DL (ref 0.1–1.5)
BUN SERPL-MCNC: 18 MG/DL (ref 8–22)
CALCIUM ALBUM COR SERPL-MCNC: 9.5 MG/DL (ref 8.5–10.5)
CALCIUM SERPL-MCNC: 9 MG/DL (ref 8.5–10.5)
CHLORIDE SERPL-SCNC: 107 MMOL/L (ref 96–112)
CO2 SERPL-SCNC: 28 MMOL/L (ref 20–33)
CREAT SERPL-MCNC: 0.66 MG/DL (ref 0.5–1.4)
ERYTHROCYTE [DISTWIDTH] IN BLOOD BY AUTOMATED COUNT: 42.5 FL (ref 35.9–50)
GFR SERPLBLD CREATININE-BSD FMLA CKD-EPI: 106 ML/MIN/1.73 M 2
GLOBULIN SER CALC-MCNC: 3.1 G/DL (ref 1.9–3.5)
GLUCOSE SERPL-MCNC: 153 MG/DL (ref 65–99)
HCT VFR BLD AUTO: 36.7 % (ref 42–52)
HGB BLD-MCNC: 12.4 G/DL (ref 14–18)
MAGNESIUM SERPL-MCNC: 1.8 MG/DL (ref 1.5–2.5)
MCH RBC QN AUTO: 33.3 PG (ref 27–33)
MCHC RBC AUTO-ENTMCNC: 33.8 G/DL (ref 33.7–35.3)
MCV RBC AUTO: 98.7 FL (ref 81.4–97.8)
PHOSPHATE SERPL-MCNC: 3 MG/DL (ref 2.5–4.5)
PLATELET # BLD AUTO: 260 K/UL (ref 164–446)
PMV BLD AUTO: 10.4 FL (ref 9–12.9)
POTASSIUM SERPL-SCNC: 3.9 MMOL/L (ref 3.6–5.5)
PREALB SERPL-MCNC: 21 MG/DL (ref 18–38)
PROCALCITONIN SERPL-MCNC: 0.09 NG/ML
PROT SERPL-MCNC: 6.5 G/DL (ref 6–8.2)
RBC # BLD AUTO: 3.72 M/UL (ref 4.7–6.1)
SODIUM SERPL-SCNC: 144 MMOL/L (ref 135–145)
WBC # BLD AUTO: 9.6 K/UL (ref 4.8–10.8)

## 2023-01-23 PROCEDURE — 94640 AIRWAY INHALATION TREATMENT: CPT

## 2023-01-23 PROCEDURE — 83735 ASSAY OF MAGNESIUM: CPT

## 2023-01-23 PROCEDURE — A9270 NON-COVERED ITEM OR SERVICE: HCPCS | Performed by: INTERNAL MEDICINE

## 2023-01-23 PROCEDURE — 99291 CRITICAL CARE FIRST HOUR: CPT | Performed by: INTERNAL MEDICINE

## 2023-01-23 PROCEDURE — 84134 ASSAY OF PREALBUMIN: CPT

## 2023-01-23 PROCEDURE — 700102 HCHG RX REV CODE 250 W/ 637 OVERRIDE(OP): Performed by: INTERNAL MEDICINE

## 2023-01-23 PROCEDURE — 700111 HCHG RX REV CODE 636 W/ 250 OVERRIDE (IP): Performed by: INTERNAL MEDICINE

## 2023-01-23 PROCEDURE — 770000 HCHG ROOM/CARE - INTERMEDIATE ICU *

## 2023-01-23 PROCEDURE — 97112 NEUROMUSCULAR REEDUCATION: CPT

## 2023-01-23 PROCEDURE — 99233 SBSQ HOSP IP/OBS HIGH 50: CPT | Mod: GC | Performed by: INTERNAL MEDICINE

## 2023-01-23 PROCEDURE — 84100 ASSAY OF PHOSPHORUS: CPT

## 2023-01-23 PROCEDURE — 700101 HCHG RX REV CODE 250: Performed by: INTERNAL MEDICINE

## 2023-01-23 PROCEDURE — 700105 HCHG RX REV CODE 258: Performed by: INTERNAL MEDICINE

## 2023-01-23 PROCEDURE — 80053 COMPREHEN METABOLIC PANEL: CPT

## 2023-01-23 PROCEDURE — 97530 THERAPEUTIC ACTIVITIES: CPT

## 2023-01-23 PROCEDURE — 85027 COMPLETE CBC AUTOMATED: CPT

## 2023-01-23 PROCEDURE — 84145 PROCALCITONIN (PCT): CPT

## 2023-01-23 RX ORDER — CETIRIZINE HYDROCHLORIDE 10 MG/1
10 TABLET ORAL DAILY
Status: DISCONTINUED | OUTPATIENT
Start: 2023-01-23 | End: 2023-01-23

## 2023-01-23 RX ORDER — DIPHENHYDRAMINE HCL 25 MG
25 TABLET ORAL ONCE
Status: COMPLETED | OUTPATIENT
Start: 2023-01-23 | End: 2023-01-23

## 2023-01-23 RX ADMIN — ATORVASTATIN CALCIUM 80 MG: 80 TABLET, FILM COATED ORAL at 18:07

## 2023-01-23 RX ADMIN — ALBUTEROL SULFATE 2.5 MG: 2.5 SOLUTION RESPIRATORY (INHALATION) at 06:35

## 2023-01-23 RX ADMIN — SENNOSIDES AND DOCUSATE SODIUM 1 TABLET: 50; 8.6 TABLET ORAL at 18:07

## 2023-01-23 RX ADMIN — ACETYLCYSTEINE 3 ML: 200 SOLUTION ORAL; RESPIRATORY (INHALATION) at 22:19

## 2023-01-23 RX ADMIN — AMLODIPINE BESYLATE 10 MG: 10 TABLET ORAL at 05:50

## 2023-01-23 RX ADMIN — PIPERACILLIN AND TAZOBACTAM 3.38 G: 3; .375 INJECTION, POWDER, LYOPHILIZED, FOR SOLUTION INTRAVENOUS; PARENTERAL at 05:38

## 2023-01-23 RX ADMIN — POLYETHYLENE GLYCOL 3350 1 PACKET: 17 POWDER, FOR SOLUTION ORAL at 05:50

## 2023-01-23 RX ADMIN — AMPICILLIN AND SULBACTAM 3 G: 1; 2 INJECTION, POWDER, FOR SOLUTION INTRAMUSCULAR; INTRAVENOUS at 12:47

## 2023-01-23 RX ADMIN — ALBUTEROL SULFATE 2.5 MG: 2.5 SOLUTION RESPIRATORY (INHALATION) at 02:57

## 2023-01-23 RX ADMIN — DIPHENHYDRAMINE HYDROCHLORIDE 25 MG: 25 TABLET ORAL at 18:07

## 2023-01-23 RX ADMIN — ALBUTEROL SULFATE 2.5 MG: 2.5 SOLUTION RESPIRATORY (INHALATION) at 18:50

## 2023-01-23 RX ADMIN — SODIUM CHLORIDE, POTASSIUM CHLORIDE, SODIUM LACTATE AND CALCIUM CHLORIDE: 600; 310; 30; 20 INJECTION, SOLUTION INTRAVENOUS at 15:09

## 2023-01-23 RX ADMIN — SENNOSIDES AND DOCUSATE SODIUM 1 TABLET: 50; 8.6 TABLET ORAL at 05:50

## 2023-01-23 RX ADMIN — ALBUTEROL SULFATE 2.5 MG: 2.5 SOLUTION RESPIRATORY (INHALATION) at 11:08

## 2023-01-23 RX ADMIN — ACETYLCYSTEINE 3 ML: 200 SOLUTION ORAL; RESPIRATORY (INHALATION) at 15:23

## 2023-01-23 RX ADMIN — ALBUTEROL SULFATE 2.5 MG: 2.5 SOLUTION RESPIRATORY (INHALATION) at 22:19

## 2023-01-23 RX ADMIN — AMPICILLIN AND SULBACTAM 3 G: 1; 2 INJECTION, POWDER, FOR SOLUTION INTRAMUSCULAR; INTRAVENOUS at 18:08

## 2023-01-23 RX ADMIN — ALBUTEROL SULFATE 2.5 MG: 2.5 SOLUTION RESPIRATORY (INHALATION) at 15:25

## 2023-01-23 RX ADMIN — NICOTINE 14 MG: 14 PATCH TRANSDERMAL at 05:49

## 2023-01-23 RX ADMIN — ACETYLCYSTEINE 3 ML: 200 SOLUTION ORAL; RESPIRATORY (INHALATION) at 06:35

## 2023-01-23 RX ADMIN — ACETYLCYSTEINE 3 ML: 200 SOLUTION ORAL; RESPIRATORY (INHALATION) at 02:57

## 2023-01-23 RX ADMIN — ACETYLCYSTEINE 3 ML: 200 SOLUTION ORAL; RESPIRATORY (INHALATION) at 18:50

## 2023-01-23 RX ADMIN — IPRATROPIUM BROMIDE AND ALBUTEROL SULFATE 3 ML: .5; 2.5 SOLUTION RESPIRATORY (INHALATION) at 15:23

## 2023-01-23 RX ADMIN — POLYETHYLENE GLYCOL 3350 1 PACKET: 17 POWDER, FOR SOLUTION ORAL at 18:07

## 2023-01-23 RX ADMIN — ACETYLCYSTEINE 3 ML: 200 SOLUTION ORAL; RESPIRATORY (INHALATION) at 11:08

## 2023-01-23 ASSESSMENT — ENCOUNTER SYMPTOMS
WHEEZING: 0
FEVER: 0
CHILLS: 0
SPUTUM PRODUCTION: 0
DOUBLE VISION: 0
COUGH: 0
SHORTNESS OF BREATH: 1
PALPITATIONS: 0

## 2023-01-23 ASSESSMENT — GAIT ASSESSMENTS: GAIT LEVEL OF ASSIST: UNABLE TO PARTICIPATE

## 2023-01-23 ASSESSMENT — COGNITIVE AND FUNCTIONAL STATUS - GENERAL
TURNING FROM BACK TO SIDE WHILE IN FLAT BAD: UNABLE
MOBILITY SCORE: 6
MOVING TO AND FROM BED TO CHAIR: UNABLE
SUGGESTED CMS G CODE MODIFIER MOBILITY: CN
MOVING FROM LYING ON BACK TO SITTING ON SIDE OF FLAT BED: UNABLE
CLIMB 3 TO 5 STEPS WITH RAILING: TOTAL
STANDING UP FROM CHAIR USING ARMS: TOTAL
WALKING IN HOSPITAL ROOM: TOTAL

## 2023-01-23 ASSESSMENT — PAIN DESCRIPTION - PAIN TYPE: TYPE: ACUTE PAIN;CHRONIC PAIN

## 2023-01-23 NOTE — CARE PLAN
The patient is Stable - Low risk of patient condition declining or worsening    Shift Goals  Clinical Goals: Maintain SPO2 above 92%, good gas exchange  Patient Goals: rest/comfoty  Family Goals: RADHA    Progress made toward(s) clinical / shift goals:    Problem: Optimal Care of the Stroke Patient  Goal: Optimal emergency care for the stroke patient  Outcome: Progressing     Problem: Psychosocial - Patient Condition  Goal: Patient's ability to verbalize feelings about condition will improve  Outcome: Progressing     Problem: Neuro Status  Goal: Neuro status will remain stable or improve  Outcome: Progressing     Problem: Hemodynamic Monitoring  Goal: Patient's hemodynamics, fluid balance and neurologic status will be stable or improve  Outcome: Progressing     Problem: Respiratory - Stroke Patient  Goal: Patient will achieve/maintain optimum respiratory rate/effort  Outcome: Progressing     Problem: Dysphagia  Goal: Dysphagia will improve  Outcome: Progressing     Problem: Risk for Aspiration  Goal: Patient's risk for aspiration will be absent or decrease  Outcome: Progressing     Problem: Urinary Elimination  Goal: Establish and maintain regular urinary output  Outcome: Progressing     Problem: Bowel Elimination  Goal: Establish and maintain regular bowel function  Outcome: Progressing     Problem: Mobility - Stroke  Goal: Patient's capacity to carry out activities will improve  Outcome: Progressing     Problem: Pain - Standard  Goal: Alleviation of pain or a reduction in pain to the patient’s comfort goal  Outcome: Progressing       Patient is not progressing towards the following goals:      Problem: Self Care  Goal: Patient will have the ability to perform ADLs independently or with assistance (bathe, groom, dress, toilet and feed)  Outcome: Not Progressing     Problem: Safety - Medical Restraint  Goal: Free from restraint(s) (Restraint for Interference with Medical Device)  Outcome: Not Progressing

## 2023-01-23 NOTE — PROGRESS NOTES
Critical Care/Pulmonary Consultation    Date of Service: 1/23/2023    Date of Admission:  1/12/2023  8:01 PM    Consulting Physician: FRENCH Almeida*    Chief Complaint:  Possible Stroke (BIBA from home for stroke like symptoms/Wife found pt with slurred speech, L facial droop, and L sided weakness /LKW 1830/Pt had a fall yesterday, + head strike, - thinners/, aox4/Initial NIH of 20 by neurologist, pt taken to CT then red 11)      Patient ID:   62 y.o. male with a past medical history of tobacco use, type 2 diabetes mellitus who presented with left-sided hemiparesis and facial droop with right MCA.  Neurology was previously consulted and the patient was not a candidate for tPA with right MCA thrombectomy and right ICA stent 1/2/2023, was admitted to the ICU for nicardipine drip for strict blood pressure control.  During IR procedure, patient had a sinus pause and CPR was not required to have spontaneous ROSC and was administered atropine and glycopyrrolate for sinus bradycardia.  Patient was intubated postprocedure and extubated 1/30/2023 to hyponasal cannula with chest x-ray demonstrating pulmonary edema.  Patient was diuresed with IV Lasix with improvement in oxygenation, however 1/50/23 exhibited signs of alcohol withdrawal was initiated on RASS protocol and Precedex drip with Librium taper.  Pulmonology was consulted for the acute hypoxic respiratory failure.    Interval Update:  -Patient currently on high flow saturating at 90% on 30 L nasal cannula at 50% FiO2  - Patient subjectively feels better, however still has some shortness of breath intermittently  - Left sided hemiparesis still present  - Speech therapy to do formal evaluation, given prior stroke will likely need  - Echocardiogram with 70% EF  - Presence of rales in the right lower lobe    Review of Systems   Constitutional:  Negative for chills and fever.   HENT:  Negative for ear discharge.    Eyes:  Negative for double vision.    Respiratory:  Positive for shortness of breath. Negative for cough, sputum production and wheezing.    Cardiovascular:  Negative for chest pain, palpitations and leg swelling.     Home Medications       Reviewed by Malik Corado M.D. (Physician) on 23 at 1853  Med List Status: Complete     Medication Last Dose Status   acetaminophen (TYLENOL) 650 MG CR tablet 2023 Active   Cobalamin Combinations (NEURIVA PLUS) Cap unk Active   Naproxen Sodium 220 MG Cap unk Active                    Social History     Tobacco Use    Smoking status: Former     Packs/day: 0.50     Years: 46.00     Pack years: 23.00     Types: Cigarettes     Start date:      Quit date: 2023     Years since quittin.0    Smokeless tobacco: Never   Vaping Use    Vaping Use: Some days    Substances: Nicotine   Substance Use Topics    Alcohol use: Yes     Alcohol/week: 25.2 oz     Types: 42 Cans of beer per week    Drug use: Yes     Types: Inhaled     Comment: JUDI        Past Medical History:   Diagnosis Date    Diabetes (HCC)     Hypertension        History reviewed. No pertinent surgical history.    Allergies: Patient has no known allergies.    Family History   Problem Relation Age of Onset    Hypertension Mother     Diabetes Mother     Hypertension Father     Diabetes Father     Hypertension Sister     Stroke Neg Hx        Temp:  [36.9 °C (98.4 °F)] 36.9 °C (98.4 °F)  Pulse:  [58-72] 69  Resp:  [18-25] 18  BP: (112-151)/(57-76) 129/63  SpO2:  [90 %-97 %] 90 %    Physical Examination  Physical Exam  Vitals and nursing note reviewed.   Constitutional:       General: He is not in acute distress.  HENT:      Mouth/Throat:      Mouth: Mucous membranes are moist.   Eyes:      General: No scleral icterus.     Extraocular Movements: Extraocular movements intact.   Cardiovascular:      Rate and Rhythm: Normal rate and regular rhythm.      Heart sounds: No murmur heard.    No gallop.   Pulmonary:      Effort: Pulmonary effort is normal.  No respiratory distress.      Breath sounds: Rales present. No wheezing or rhonchi.   Abdominal:      General: Abdomen is flat. Bowel sounds are normal. There is no distension.      Palpations: Abdomen is soft.      Tenderness: There is no abdominal tenderness.   Skin:     General: Skin is warm.   Neurological:      Mental Status: He is alert.      Motor: Weakness present.      Comments: Left sided hemiparesis   Psychiatric:         Mood and Affect: Mood normal.         Intake/Output Summary (Last 24 hours) at 1/23/2023 1008  Last data filed at 1/23/2023 0600  Gross per 24 hour   Intake 2356.39 ml   Output 1645 ml   Net 711.39 ml       Recent Labs     01/21/23  0220 01/22/23  0305 01/22/23  0920 01/23/23  0544   WBC 10.2  --  8.7 9.6   NEUTSPOLYS 65.20  --   --   --    LYMPHOCYTES 20.70*  --   --   --    MONOCYTES 10.70  --   --   --    EOSINOPHILS 2.40  --   --   --    BASOPHILS 0.50  --   --   --    ASTSGOT 19 21  --  20   ALTSGPT 15 16  --  15   ALKPHOSPHAT 105* 107*  --  95   TBILIRUBIN 0.7 0.6  --  0.5     Recent Labs     01/20/23  1346 01/21/23  0220 01/21/23  0220 01/21/23  1448 01/22/23  0305 01/23/23  0544   SODIUM  --  148*   < > 146* 146* 144   POTASSIUM  --  4.1   < > 3.5* 4.2 3.9   CHLORIDE  --  109   < > 114* 108 107   CO2  --  28   < > 23 29 28   BUN  --  29*   < > 21 21 18   CREATININE  --  0.80   < > 0.62 0.79 0.66   MAGNESIUM  --  2.1  --   --   --  1.8   PHOSPHORUS 4.7*  --   --   --   --  3.0   CALCIUM  --  9.7   < > 7.6* 9.2 9.0    < > = values in this interval not displayed.     Recent Labs     01/21/23  0220 01/21/23  1448 01/22/23  0305 01/23/23  0544   ALTSGPT 15  --  16 15   ASTSGOT 19  --  21 20   ALKPHOSPHAT 105*  --  107* 95   TBILIRUBIN 0.7  --  0.6 0.5   GLUCOSE 141* 105* 115* 153*         CT-CTA CHEST PULMONARY ARTERY W/ RECONS   Final Result         1.  No pulmonary embolus appreciated.   2.  Linear densities the bilateral lung bases suggests changes of atelectasis, component of  infiltrate is not excluded.   3.  Atherosclerosis and atherosclerotic coronary artery disease.      US-EXTREMITY VENOUS LOWER BILAT   Final Result      DX-ABDOMEN FOR TUBE PLACEMENT   Final Result      NG tube extends below the diaphragm into the region of the stomach. The tip is not visualized.      EC-ECHOCARDIOGRAM LTD W/ CONT   Final Result      DX-CHEST-PORTABLE (1 VIEW)   Final Result         1. No significant interval change.      CT-ABDOMEN-PELVIS W/O   Final Result         1.  Nondependent foci of air in the bladder, could represent changes from recent instrumentation or urinary tract infection.   2.  Diverticulosis   3.  Atherosclerosis and atherosclerotic coronary artery disease      DX-CHEST-PORTABLE (1 VIEW)   Final Result      Ill-defined atelectasis versus consolidation in the left lower lung. Pneumonia can be considered in appropriate clinical setting.         DX-ABDOMEN FOR TUBE PLACEMENT   Final Result         1.  Nonspecific bowel gas pattern in the upper abdomen.   2.  Nasogastric tube tip terminates overlying the expected location of the gastric body.   3.  Cardiomegaly   4.  Hazy linear density left lung base could represent atelectasis or infiltrate.      CT-HEAD W/O   Final Result         1.  Evolving infarct/encephalomalacia in the right MCA distribution.   2.  Areas of hemorrhage in the right basal ganglia and involving the right frontal and anterior temporal sulci, similar compared to MRI January 13, 2023   3.  Atherosclerosis.      DX-CHEST-PORTABLE (1 VIEW)   Final Result         Diffuse interstitial and groundglass opacity throughout both lungs could relate to developing pulmonary edema.      DX-ABDOMEN FOR TUBE PLACEMENT   Final Result         Gastric drainage tube with tip projecting over the expected area of the stomach.      DX-CHEST-PORTABLE (1 VIEW)   Final Result      1.  Interval extubation without pneumothorax.   2.  Stable enlarged cardiac silhouette.   3.  There is bibasilar  linear atelectasis.      EC-ECHOCARDIOGRAM COMPLETE W/ CONT   Final Result      MR-BRAIN-W/O   Final Result         Acute infarction involving the right frontal region, right basal ganglia and right caudate nucleus. Minimal punctate infarction involving the right parietal cortex. There is slight mass effect upon the right lateral ventricle with only minimal midline    shift measuring approximately 4 mm.      Petechial hemorrhage is noted within the right frontal cortical infarct. Also noted is petechial hemorrhage in the right basal ganglia and right caudate nucleus but without surrounding vasogenic edema.         DX-PELVIS-1 OR 2 VIEWS   Final Result         1.  No acute traumatic bony injury.      DX-CHEST-PORTABLE (1 VIEW)   Final Result         1.  Left midlung and lower lobe infiltrates, similar to prior study.      [id number] VIEW   Final Result         1.  Nonspecific bowel gas pattern in the upper abdomen.   2.  Cardiomegaly   3.  Hazy left lower lobe infiltrates.   4.  Nasogastric tube tip terminates overlying the expected location of the gastric fundus.      IR-THROMBO MECHANICAL ARTERY,INIT   Final Result         62-year-old patient who presented with complete occlusion of the right ICA at its origin underwent emergent mechanical thrombectomy. After initial angioplasty,   a right cervical internal carotid artery stent was deployed and right MCA thrombectomy was performed. The final angiographic images show complete restoration of flow into the right ICA and MCA with no evidence of distal intracranial embolization.      Final recanalization score: TICI 3      I, Petra Mobley was physically present and participated during the entire procedure of the IR-THROMBO MECHANICAL ARTERY,INIT.                  DX-CHEST-PORTABLE (1 VIEW)   Final Result      No acute cardiac or pulmonary abnormalities are identified.      CT-CTA NECK WITH & W/O-POST PROCESSING   Final Result      1.  Occlusion of the right  internal carotid artery at its origin.      2.  Occlusion of the right vertebral artery at its origin.      Dr. Gongora discussed findings with Dr. Mccarthy.      CT-CTA HEAD WITH & W/O-POST PROCESS   Final Result         1.  Occlusion of the right internal carotid artery and right middle cerebral artery.   2.  Narrowed left M1 segment with severely narrowed and M2 and M3 branches, may represent chronic changes.      These findings were discussed with the patient's clinician, Joseline Mccarthy, on 1/12/2023 8:27 PM.      CT-CEREBRAL PERFUSION ANALYSIS   Final Result         1.  Cerebral blood flow less than 30% likely representing completed infarct = 52 mL.      2.  T Max more than 6 seconds likely representing combination of completed infarct and ischemia = 172 mL.      3.  Mismatched volume likely representing ischemic brain/penumbra = 120 mL      4.  Please note that the cerebral perfusion was performed on the limited brain tissue around the basal ganglia region. Infarct/ischemia outside the CT perfusion sections can be missed in this study.      CT-HEAD W/O   Final Result      1.  No evidence of intracranial hemorrhage, midline shift or mass effect.      2.  Question of hyperdense right MCA though evaluation limited by oblique positioning.         IR-GASTROSTOMY PLACEMENT    (Results Pending)       Patient Active Problem List   Diagnosis    Acute ischemic right middle cerebral artery (MCA) stroke (HCC)    Primary hypertension    Tobacco use disorder    Alcohol dependence with withdrawal (HCC)    Type 2 diabetes mellitus without complication, without long-term current use of insulin (HCC)    Severe obesity (BMI 35.0-35.9 with comorbidity) (HCC)    Acute respiratory failure with hypoxia (HCC)    Toxic metabolic encephalopathy    Stroke, hemorrhagic (HCC)    Acute left hemiparesis (HCC)    Oropharyngeal dysphagia    Pneumonia due to infectious organism    Hypernatremia       Assessment and Plan:    #Acute hypoxic  Respiratory Failure  -Likely multifactorial secondary to aspiration/secretions versus volume overload pulmonary vasculature versus pneumonia  - Previous Dopplers ruled out DVT, CTA chest without pulmonary embolism, sputum cultures with moderate gram-positive rods and normal respiratory gustavo  - Continue antibiotics, currently on Zosyn, been on antibiotics since 1/19, Unasyn 1/19, transition to Zosyn 1/20  - Viral panel previously negative  - Strict I's and O's  - Speech therapy  - Titrate oxygen to 88 to 92%  - Baseline oxygen room air

## 2023-01-23 NOTE — DIETARY
Nutrition support weekly update:  Day 11 of admit.  Kaiden Quiroz is a 62 y.o. male with admitting DX of Acute CVA (cerebrovascular accident) (HCC).      Tube feeding initiated on 1/15. Current TF via NG tube is Replete Fiber, goal rate 80 ml/hr, providing 1920 kcals, 123 grams protein, 1594 mL free water.    Assessment:  Weight 101.2 kg on 1/22.  Re-estimate of nutritional needs not indicated.     Evaluation:   Pt's TF currently at 40 mL/hr. Spoke with RN, RN stated Tf was shut off for possible procedure but is advancing back to goal.  Anticipated d/c 24 hrs after PEG placement to Acute rehab vs SNF.  Labs: Glucose 153 (H).  Meds: Unasyn. BM protocol.   Last BM: 1/21  Standard formula remains appropriate.      Malnutrition risk: No new criteria identified.    Recommendations/Plan:  Continue Replete with Fiber at a goal rate of 80 mL/hr. Replete Fiber, goal rate 80 ml/hr, providing 1920 kcals, 123 grams protein, 1594 mL free water.  Fluids per MD  Monitor weight.    RD following.

## 2023-01-23 NOTE — PROGRESS NOTES
Hospital Medicine Daily Progress Note    Date of Service  1/23/2023    Chief Complaint  Kaiden Quiroz is a 62 y.o. male admitted 1/12/2023 with Possible Stroke (BIBA from home for stroke like symptoms/Wife found pt with slurred speech, L facial droop, and L sided weakness /LKW 1830/Pt had a fall yesterday, + head strike, - thinners/, aox4/Initial NIH of 20 by neurologist, pt taken to CT then red 11)        Hospital Course  62 y.o. male with HTN, T2DM, tobacco use DO who presented 1/12/2023 with Left hemiparesis and facial droop due to Right MCA CVA.     He presented 1/12/22 with Left facial droop and Left-sided weakness. CTH demonstrated hyperdense MCA, for which subsequent CT perfusion demonstrated infarct with penumbra. CTA demonstrated Right ICA and Right MCA occlusion. Neurology was consulted. He was not a candidate for tPA due to hypertension. Neuro-IR performed a Right MCA thrombectomy and Right ICA stent 1/12/23. Subsequent MRI demonstrated Right frontal, basal ganglia, and caudate nucleus infarct. Petechial hemorrhages were noted concerning for hemorrhagic conversion, for which he was not a candidate for DAPT but treated with ASA monotherapy per Neurology recommendation. He was admitted to the ICU post-procedure for neurologic monitoring and nicardipine gtt due to uncontrolled HTN.  During his IR procedure he developed a sinus pause for which CPR was not required prior to spontaneous ROSC. He was administered atropine and glycopyrrolate for sinus bradycardia. He was intubated post-procedure and extubated 1/13/23 to [facility]. CXR demonstrated evolving pulmonary edema. TTE demonstrated normal systolic function and was negative for shunt. He has been diuresed with IV lasix with vast improvement to SpO2 >96% on 4 L/min. On 1/15/23 he began exhibiting signs of alcohol withdrawal. He was initiated on RASS protocol with precedex gtt and librium taper. He has weaned from precedex gtt today. He is  transitioned off nicardipine gtt to amlodipine 5 mg daily.    He developed acute respiratory failure with hypoxia and he was placed on high flow nasal cannula.  Patient transferred to [facility] on January 20, 2023 for higher level of care.  Ordered echocardiogram and did not show any acute abnormalities including change in RV function.  I ordered ultrasound bilateral lower extremities for DVT evaluation.    On January 21, 2023 his oxygen requirement has been trending down..  He is more alert and following commands.  I requested consult with pulmonology and discussed finding of echocardiogram and his current medical condition.    On January 21 2023 ordered CT pulmonary it showed linear atelectasis and did not show pulmonary embolism.     Interval Problem Update    01/23/23    I evaluated and examined him at the bedside.  He looks significantly more alert this morning.  He is following commands and he knows that he is in the hospital.  Plan is to continue antibiotic for 5 days  I discussed with hospitalist coordinator regarding patient's wife RIDGE.  Pulmonology evaluated him and made recommendations.  I discussed plan of care with bedside RN.    Consultants/Specialty  Neurology  Physiatry     Code Status  Full Code    Disposition  Patient is not medically cleared for discharge (Will be cleared post 24 hours of PEG tube placement).   Anticipate discharge to Acute rehab vs SNF. Orders in place for both. Discussed with CM on rounds.   I have placed the appropriate orders for post-discharge needs.    Review of Systems  Review of Systems   Unable to perform ROS: Mental acuity    He was unable to provide me detailed review of systems.    Physical Exam  Temp:  [36.9 °C (98.4 °F)-37.9 °C (100.2 °F)] 37.5 °C (99.5 °F)  Pulse:  [67-76] 76  Resp:  [18-34] 34  BP: (112-151)/(57-77) 127/77  SpO2:  [90 %-97 %] 93 %    Physical Exam  Vitals reviewed.   Constitutional:       General: He is not in acute distress.     Comments: This morning  he was sitting in recliner and he was more alert.   HENT:      Head: Normocephalic and atraumatic. No contusion.      Right Ear: External ear normal.      Left Ear: External ear normal.      Nose: Nose normal.      Comments: NG tube  High flow nasal cannula     Mouth/Throat:      Pharynx: No oropharyngeal exudate.   Eyes:      General:         Right eye: No discharge.         Left eye: No discharge.      Pupils: Pupils are equal, round, and reactive to light.   Cardiovascular:      Rate and Rhythm: Normal rate and regular rhythm.      Heart sounds: No murmur heard.    No friction rub. No gallop.   Pulmonary:      Effort: Respiratory distress present.      Breath sounds: Rhonchi present. No wheezing.   Abdominal:      General: Bowel sounds are normal. There is no distension.      Palpations: Abdomen is soft.      Tenderness: There is no abdominal tenderness. There is no rebound.   Musculoskeletal:         General: No swelling or tenderness. Normal range of motion.      Cervical back: No rigidity. No muscular tenderness.   Skin:     General: Skin is warm and dry.      Coloration: Skin is not jaundiced.   Neurological:      General: No focal deficit present.      Mental Status: He is alert and oriented to person, place, and time.      Cranial Nerves: Cranial nerve deficit present.      Sensory: No sensory deficit.      Motor: Weakness present.      Comments: Motor 5/5 in right upper and lower extremity.  Motor 2/5 in left upper and lower extremities   Psychiatric:         Mood and Affect: Mood normal.       Fluids    Intake/Output Summary (Last 24 hours) at 1/23/2023 1519  Last data filed at 1/23/2023 1342  Gross per 24 hour   Intake 3096.82 ml   Output 1385 ml   Net 1711.82 ml         Laboratory  Recent Labs     01/21/23  0220 01/22/23  0920 01/23/23  0544   WBC 10.2 8.7 9.6   RBC 3.97* 3.79* 3.72*   HEMOGLOBIN 13.2* 12.7* 12.4*   HEMATOCRIT 40.7* 37.6* 36.7*   .5* 99.2* 98.7*   MCH 33.2* 33.5* 33.3*   MCHC  32.4* 33.8 33.8   RDW 45.0 43.5 42.5   PLATELETCT 279 275 260   MPV 10.7 10.5 10.4       Recent Labs     01/21/23  1448 01/22/23  0305 01/23/23  0544   SODIUM 146* 146* 144   POTASSIUM 3.5* 4.2 3.9   CHLORIDE 114* 108 107   CO2 23 29 28   GLUCOSE 105* 115* 153*   BUN 21 21 18   CREATININE 0.62 0.79 0.66   CALCIUM 7.6* 9.2 9.0                     Imaging  CT-CTA CHEST PULMONARY ARTERY W/ RECONS   Final Result         1.  No pulmonary embolus appreciated.   2.  Linear densities the bilateral lung bases suggests changes of atelectasis, component of infiltrate is not excluded.   3.  Atherosclerosis and atherosclerotic coronary artery disease.      US-EXTREMITY VENOUS LOWER BILAT   Final Result      DX-ABDOMEN FOR TUBE PLACEMENT   Final Result      NG tube extends below the diaphragm into the region of the stomach. The tip is not visualized.      EC-ECHOCARDIOGRAM LTD W/ CONT   Final Result      DX-CHEST-PORTABLE (1 VIEW)   Final Result         1. No significant interval change.      CT-ABDOMEN-PELVIS W/O   Final Result         1.  Nondependent foci of air in the bladder, could represent changes from recent instrumentation or urinary tract infection.   2.  Diverticulosis   3.  Atherosclerosis and atherosclerotic coronary artery disease      DX-CHEST-PORTABLE (1 VIEW)   Final Result      Ill-defined atelectasis versus consolidation in the left lower lung. Pneumonia can be considered in appropriate clinical setting.         DX-ABDOMEN FOR TUBE PLACEMENT   Final Result         1.  Nonspecific bowel gas pattern in the upper abdomen.   2.  Nasogastric tube tip terminates overlying the expected location of the gastric body.   3.  Cardiomegaly   4.  Hazy linear density left lung base could represent atelectasis or infiltrate.      CT-HEAD W/O   Final Result         1.  Evolving infarct/encephalomalacia in the right MCA distribution.   2.  Areas of hemorrhage in the right basal ganglia and involving the right frontal and  anterior temporal sulci, similar compared to MRI January 13, 2023   3.  Atherosclerosis.      DX-CHEST-PORTABLE (1 VIEW)   Final Result         Diffuse interstitial and groundglass opacity throughout both lungs could relate to developing pulmonary edema.      DX-ABDOMEN FOR TUBE PLACEMENT   Final Result         Gastric drainage tube with tip projecting over the expected area of the stomach.      DX-CHEST-PORTABLE (1 VIEW)   Final Result      1.  Interval extubation without pneumothorax.   2.  Stable enlarged cardiac silhouette.   3.  There is bibasilar linear atelectasis.      EC-ECHOCARDIOGRAM COMPLETE W/ CONT   Final Result      MR-BRAIN-W/O   Final Result         Acute infarction involving the right frontal region, right basal ganglia and right caudate nucleus. Minimal punctate infarction involving the right parietal cortex. There is slight mass effect upon the right lateral ventricle with only minimal midline    shift measuring approximately 4 mm.      Petechial hemorrhage is noted within the right frontal cortical infarct. Also noted is petechial hemorrhage in the right basal ganglia and right caudate nucleus but without surrounding vasogenic edema.         DX-PELVIS-1 OR 2 VIEWS   Final Result         1.  No acute traumatic bony injury.      DX-CHEST-PORTABLE (1 VIEW)   Final Result         1.  Left midlung and lower lobe infiltrates, similar to prior study.      [id number] VIEW   Final Result         1.  Nonspecific bowel gas pattern in the upper abdomen.   2.  Cardiomegaly   3.  Hazy left lower lobe infiltrates.   4.  Nasogastric tube tip terminates overlying the expected location of the gastric fundus.      IR-THROMBO MECHANICAL ARTERY,INIT   Final Result         62-year-old patient who presented with complete occlusion of the right ICA at its origin underwent emergent mechanical thrombectomy. After initial angioplasty,   a right cervical internal carotid artery stent was deployed and right MCA  thrombectomy was performed. The final angiographic images show complete restoration of flow into the right ICA and MCA with no evidence of distal intracranial embolization.      Final recanalization score: TICI 3      I, Petra Mobley was physically present and participated during the entire procedure of the IR-THROMBO MECHANICAL ARTERY,INIT.                  DX-CHEST-PORTABLE (1 VIEW)   Final Result      No acute cardiac or pulmonary abnormalities are identified.      CT-CTA NECK WITH & W/O-POST PROCESSING   Final Result      1.  Occlusion of the right internal carotid artery at its origin.      2.  Occlusion of the right vertebral artery at its origin.      Dr. Gongora discussed findings with Dr. Mccarthy.      CT-CTA HEAD WITH & W/O-POST PROCESS   Final Result         1.  Occlusion of the right internal carotid artery and right middle cerebral artery.   2.  Narrowed left M1 segment with severely narrowed and M2 and M3 branches, may represent chronic changes.      These findings were discussed with the patient's clinician, Joseline Mccarthy, on 1/12/2023 8:27 PM.      CT-CEREBRAL PERFUSION ANALYSIS   Final Result         1.  Cerebral blood flow less than 30% likely representing completed infarct = 52 mL.      2.  T Max more than 6 seconds likely representing combination of completed infarct and ischemia = 172 mL.      3.  Mismatched volume likely representing ischemic brain/penumbra = 120 mL      4.  Please note that the cerebral perfusion was performed on the limited brain tissue around the basal ganglia region. Infarct/ischemia outside the CT perfusion sections can be missed in this study.      CT-HEAD W/O   Final Result      1.  No evidence of intracranial hemorrhage, midline shift or mass effect.      2.  Question of hyperdense right MCA though evaluation limited by oblique positioning.         IR-GASTROSTOMY PLACEMENT    (Results Pending)          Assessment/Plan  * Acute ischemic right middle cerebral  artery (MCA) stroke (HCC)- (present on admission)  Assessment & Plan  Right MCA CVA with hemorrhagic conversion  Neurology consulted - ASA without DAPT until 1/23/23 due to hemorrhagic conversion  On High Intensity statin therapy   Risk factor modification including HTN control   S/p right ICA stent and MCA thrombectomy 1/12/23  TTE negative for shunt  Tobacco cessation  PT/OT/SLP - recommending post-acute placement    Hypernatremia  Assessment & Plan  Patient continues to have hyponatremia with elevated sodium level.  I started him on free water flushes.  Continue to monitor.  Now resolved    Pneumonia due to infectious organism- (present on admission)  Assessment & Plan  Aspiration pneumonia (Developed 01/19/23)   Checked procalcitonin, this was low - sputum culture pending   Strict aspiration precautions   Started IV Unasyn 01/19, Escalated to IV zosyn 01/20/23 - de-escalate as clinically appropriate  Check Nares MRCA  Mucomyst/3% normal saline nebs to maintain pulmonary toilet, RT protocol to continue  Pulmonology evaluated him and made recommendations   Continue antibiotics       Oropharyngeal dysphagia- (present on admission)  Assessment & Plan  Secondary to acute stroke.    Aspiration precautions   Needs placement of a PEG tube (ordered, 01/19/23) - Pending once clinical stability achieved     Acute left hemiparesis (HCC)- (present on admission)  Assessment & Plan  Secondary to right MCA stroke.    Pressure ulcer precautions  Aspiration, Fall precautions  PT and OT    Stroke, hemorrhagic (HCC)- (present on admission)  Assessment & Plan  Ischemic stroke with hemorrhagic conversion.    Toxic metabolic encephalopathy- (present on admission)  Assessment & Plan  Persistent 01/20/23  Secondary to stroke, delirium and now concern for relation to underlying pneumonia  Limit restraints as able  Avoid polypharmacy, sedating medications  Delirium precautions  Management of underlying medical conditions  Obtain EEG  01/20/23 and it did not show seizure activity.  His mentation has been improving.  He was able to answer my questions this morning.  Plan is to make him n.p.o. from midnight and possible G tube placement by IR tomorrow.    Acute respiratory failure with hypoxia (HCC)- (present on admission)  Assessment & Plan  Worsening 01/20/23. At bedside and evaluated during Rapid Response.   Significant higher FiO2 requirements compared to prior   01/19/23 CXRAY consistent with Aspiration Pneumonia - Started on IV unasyn    EKG obtained today has been reviewed by me, minimal ST depressions in lead I/aVL.  T wave inversions were noted previously also.    He continued to have ongoing acute respiratory failure with hypoxia and requiring high flow nasal cannula to maintain oxygen saturation.  I requested consult with pulmonology due to his ongoing respiratory failure.  Repeat echocardiogram did not show any acute changes.  After discussing it with pulmonologist I ordered CTA pulmonary to evaluate for pulmonary embolism and to evaluate for lung parenchyma.  CTA pulmonary did not show pulmonary embolism and he found to have atelectasis.  Discussed plan of care with patient's wife regarding CTA findings.  Continue to to provide him oxygen with high flow nasal cannula with target oxygen saturation of greater than 88%.  Pulmonology evaluated him and made recommendations.  Continue monitor him closely in IMCU due to his acute respiratory failure with hypoxia.    Severe obesity (BMI 35.0-35.9 with comorbidity) ([facility])- (present on admission)  Assessment & Plan  Body mass index is 37.39 kg/m².  Co-morbid HTN, T2DM, KENDAL  Outpatient weight loss    Type 2 diabetes mellitus without complication, without long-term current use of insulin ([facility])- (present on admission)  Assessment & Plan  A1c 5.3  No indication for strict BG control - POCT/SSI deferred  Atorvastatin    Alcohol dependence with withdrawal ([facility])- (present on admission)  Assessment &  Plan  Resolved.    Tobacco use disorder- (present on admission)  Assessment & Plan  On nicotine patch     Primary hypertension- (present on admission)  Assessment & Plan  Amlodipine 10 mg      I discussed plan of care during multidisciplinary rounds regarding patient's current medical condition and plan of care.    I discussed with hospitalist coordinator for patient's wife RIDGE paperwork.      Patient is critically ill.   The patient continues to have: Acute respiratory failure with hypoxia requiring high flow oxygen  The vital organ system that is affected is the: Pulmonary  If untreated there is a high chance of deterioration into: Pulmonary arrest  And eventually death.   The critical care that I am providing today is: Continue high flow oxygen and monitor oxygen saturation closely in IMCU  The critical that has been undertaken is medically complex.   There has been no overlap in critical care time.   Critical Care Time not including procedures: 35    VTE prophylaxis: enoxaparin ppx    I have performed a physical exam and reviewed and updated ROS and Plan today (1/23/2023). In review of yesterday's note (1/22/2023), there are no changes except as documented above.      This patient is critically ill with a life threatening illness as noted above requiring my direct presence, involvement and maximal preparedness. I have personally spend 40 minutes providing critical care to this patient. Time spend on critical care excludes time spend on procedures.

## 2023-01-23 NOTE — THERAPY
Physical Therapy   Daily Treatment     Patient Name: Kaiden Quiroz  Age:  62 y.o., Sex:  male  Medical Record #: [id number]  Today's Date: 1/23/2023    Precautions: Fall Risk;Nasogastric Tube  Comments: L hemiparesis    Assessment  Pt demos improvement today, was able to maintain balance sitting EOB for incr time, needs cues to find and maintain midline but is able to weight shift and reach midline on his own. Encouraged cervical ROM given R head tilt preference and incr use of RUE and RLE to assist with mobility. Assisted RN with supine slide to cardiac chair post-tx. PT will follow.     Plan  Continue Current Treatment Plan  DC Equipment Recommendations: Unable to determine at this time  Discharge Recommendations: Recommend post-acute placement for additional physical therapy services prior to discharge home     01/23/23 0922   Cognition    Speech/ Communication Delayed Responses;Slurred   Level of Consciousness Alert   Ability To Follow Commands 1 Step   Attention Impaired   Sequencing Impaired   Initiation Impaired   Comments pt follows simple commands and gives good effort, does require repeated cueing and does well with visual demo. pt with slurred and dysarthric speech, is difficult to understand   Neuro-Muscular Treatments   Neuro-Muscular Treatments Anterior weight shift;Compensatory Strategies;Facilitation;Postural Changes;Postural Facilitation;Sequencing;Tactile Cuing;Verbal Cuing;Weight Shift Right;Weight Shift Left   Comments sitting EOB 10 min, able to maintain balance intermittently for extended periods of time, needs cues to find midline, demos L lean and R head tilt, is able to bring head midline on his own with VCs   Balance   Sitting Balance (Static) Poor +   Sitting Balance (Dynamic) Poor   Weight Shift Sitting Poor   Skilled Intervention Verbal Cuing;Tactile Cuing;Sequencing;Postural Facilitation   Bed Mobility    Supine to Sit Maximal Assist   Sit to Supine Maximal Assist   Scooting Maximal  Assist   Skilled Intervention Verbal Cuing;Tactile Cuing;Sequencing;Compensatory Strategies   Comments with VCs pt initiates roll to the L and uses RLE and RUE to assist, still requirs max A. pt also helps boost in bed, needs max A to scoot at EOB   Gait Analysis   Gait Level Of Assist Unable to Participate   Functional Mobility   Sit to Stand Unable to Participate   Bed, Chair, Wheelchair Transfer Total Assist   Transfer Method supine slide board   Mobility bed > cardiac chair with nurse after EOB activity   Short Term Goals    Short Term Goal # 1 Pt will transition from supine to EOB w/ Bandar in 6 visits to improve independence in bed mobility   Goal Outcome # 1 goal not met   Short Term Goal # 2 Pt will transfer from EOB to chair w/ FWW w/ ModA in 6 visits to improve OOB mobility   Goal Outcome # 2 Goal not met   Short Term Goal # 3 Pt will perform a STS w/ FWW w/ Bandar in 6 visits to improve OOB mobility   Goal Outcome # 3 Goal not met   Short Term Goal # 4 Pt will be able to ambulate 15 ft w/ FWW w/ ModA in 6 visits to access household distances   Goal Outcome # 4 Goal not met   Short Term Goal # 5 Pt will be able to complete 4 steps w/ FWW w/Mod A in 6 visits to access household   Goal Outcome # 5 Goal not met

## 2023-01-24 ENCOUNTER — ANESTHESIA (OUTPATIENT)
Dept: RADIOLOGY | Facility: MEDICAL CENTER | Age: 63
DRG: 023 | End: 2023-01-24
Payer: COMMERCIAL

## 2023-01-24 ENCOUNTER — APPOINTMENT (OUTPATIENT)
Dept: RADIOLOGY | Facility: MEDICAL CENTER | Age: 63
DRG: 023 | End: 2023-01-24
Attending: INTERNAL MEDICINE
Payer: COMMERCIAL

## 2023-01-24 ENCOUNTER — ANESTHESIA EVENT (OUTPATIENT)
Dept: RADIOLOGY | Facility: MEDICAL CENTER | Age: 63
DRG: 023 | End: 2023-01-24
Payer: COMMERCIAL

## 2023-01-24 PROBLEM — Z95.828 INTERNAL CAROTID ARTERY STENT PRESENT: Status: ACTIVE | Noted: 2023-01-24

## 2023-01-24 LAB
ALBUMIN SERPL BCP-MCNC: 3.5 G/DL (ref 3.2–4.9)
ALBUMIN/GLOB SERPL: 1.1 G/DL
ALP SERPL-CCNC: 98 U/L (ref 30–99)
ALT SERPL-CCNC: 16 U/L (ref 2–50)
ANION GAP SERPL CALC-SCNC: 10 MMOL/L (ref 7–16)
AST SERPL-CCNC: 23 U/L (ref 12–45)
BILIRUB SERPL-MCNC: 0.6 MG/DL (ref 0.1–1.5)
BUN SERPL-MCNC: 17 MG/DL (ref 8–22)
CALCIUM ALBUM COR SERPL-MCNC: 9.3 MG/DL (ref 8.5–10.5)
CALCIUM SERPL-MCNC: 8.9 MG/DL (ref 8.5–10.5)
CHLORIDE SERPL-SCNC: 107 MMOL/L (ref 96–112)
CO2 SERPL-SCNC: 25 MMOL/L (ref 20–33)
CREAT SERPL-MCNC: 0.55 MG/DL (ref 0.5–1.4)
ERYTHROCYTE [DISTWIDTH] IN BLOOD BY AUTOMATED COUNT: 41.7 FL (ref 35.9–50)
GFR SERPLBLD CREATININE-BSD FMLA CKD-EPI: 112 ML/MIN/1.73 M 2
GLOBULIN SER CALC-MCNC: 3.2 G/DL (ref 1.9–3.5)
GLUCOSE SERPL-MCNC: 105 MG/DL (ref 65–99)
HCT VFR BLD AUTO: 37.4 % (ref 42–52)
HGB BLD-MCNC: 12.8 G/DL (ref 14–18)
MAGNESIUM SERPL-MCNC: 1.8 MG/DL (ref 1.5–2.5)
MCH RBC QN AUTO: 33.1 PG (ref 27–33)
MCHC RBC AUTO-ENTMCNC: 34.2 G/DL (ref 33.7–35.3)
MCV RBC AUTO: 96.6 FL (ref 81.4–97.8)
PHOSPHATE SERPL-MCNC: 3.3 MG/DL (ref 2.5–4.5)
PLATELET # BLD AUTO: 247 K/UL (ref 164–446)
PMV BLD AUTO: 10.4 FL (ref 9–12.9)
POTASSIUM SERPL-SCNC: 3.9 MMOL/L (ref 3.6–5.5)
PROCALCITONIN SERPL-MCNC: 0.1 NG/ML
PROT SERPL-MCNC: 6.7 G/DL (ref 6–8.2)
RBC # BLD AUTO: 3.87 M/UL (ref 4.7–6.1)
SODIUM SERPL-SCNC: 142 MMOL/L (ref 135–145)
WBC # BLD AUTO: 10.2 K/UL (ref 4.8–10.8)

## 2023-01-24 PROCEDURE — 80053 COMPREHEN METABOLIC PANEL: CPT

## 2023-01-24 PROCEDURE — 700102 HCHG RX REV CODE 250 W/ 637 OVERRIDE(OP): Performed by: INTERNAL MEDICINE

## 2023-01-24 PROCEDURE — 700111 HCHG RX REV CODE 636 W/ 250 OVERRIDE (IP): Performed by: ANESTHESIOLOGY

## 2023-01-24 PROCEDURE — 700101 HCHG RX REV CODE 250: Performed by: INTERNAL MEDICINE

## 2023-01-24 PROCEDURE — 700117 HCHG RX CONTRAST REV CODE 255: Performed by: RADIOLOGY

## 2023-01-24 PROCEDURE — 700111 HCHG RX REV CODE 636 W/ 250 OVERRIDE (IP): Performed by: INTERNAL MEDICINE

## 2023-01-24 PROCEDURE — 700101 HCHG RX REV CODE 250: Performed by: ANESTHESIOLOGY

## 2023-01-24 PROCEDURE — 700101 HCHG RX REV CODE 250

## 2023-01-24 PROCEDURE — 94640 AIRWAY INHALATION TREATMENT: CPT

## 2023-01-24 PROCEDURE — 700102 HCHG RX REV CODE 250 W/ 637 OVERRIDE(OP): Performed by: HOSPITALIST

## 2023-01-24 PROCEDURE — 160035 HCHG PACU - 1ST 60 MINS PHASE I

## 2023-01-24 PROCEDURE — 84145 PROCALCITONIN (PCT): CPT

## 2023-01-24 PROCEDURE — 700105 HCHG RX REV CODE 258: Performed by: INTERNAL MEDICINE

## 2023-01-24 PROCEDURE — 770000 HCHG ROOM/CARE - INTERMEDIATE ICU *

## 2023-01-24 PROCEDURE — 85027 COMPLETE CBC AUTOMATED: CPT

## 2023-01-24 PROCEDURE — A9270 NON-COVERED ITEM OR SERVICE: HCPCS | Performed by: HOSPITALIST

## 2023-01-24 PROCEDURE — A9270 NON-COVERED ITEM OR SERVICE: HCPCS | Performed by: INTERNAL MEDICINE

## 2023-01-24 PROCEDURE — 4410588 IR-GASTROSTOMY PLACEMENT

## 2023-01-24 PROCEDURE — 84100 ASSAY OF PHOSPHORUS: CPT

## 2023-01-24 PROCEDURE — 83735 ASSAY OF MAGNESIUM: CPT

## 2023-01-24 PROCEDURE — 99233 SBSQ HOSP IP/OBS HIGH 50: CPT | Performed by: HOSPITALIST

## 2023-01-24 PROCEDURE — 160002 HCHG RECOVERY MINUTES (STAT)

## 2023-01-24 PROCEDURE — 00700 ANES PX UPR ANT ABD WALL NOS: CPT | Performed by: ANESTHESIOLOGY

## 2023-01-24 PROCEDURE — 3E0G76Z INTRODUCTION OF NUTRITIONAL SUBSTANCE INTO UPPER GI, VIA NATURAL OR ARTIFICIAL OPENING: ICD-10-PCS | Performed by: RADIOLOGY

## 2023-01-24 PROCEDURE — 0DH63UZ INSERTION OF FEEDING DEVICE INTO STOMACH, PERCUTANEOUS APPROACH: ICD-10-PCS | Performed by: RADIOLOGY

## 2023-01-24 RX ORDER — DEXAMETHASONE SODIUM PHOSPHATE 4 MG/ML
INJECTION, SOLUTION INTRA-ARTICULAR; INTRALESIONAL; INTRAMUSCULAR; INTRAVENOUS; SOFT TISSUE PRN
Status: DISCONTINUED | OUTPATIENT
Start: 2023-01-24 | End: 2023-01-24 | Stop reason: SURG

## 2023-01-24 RX ORDER — LABETALOL HYDROCHLORIDE 5 MG/ML
5 INJECTION, SOLUTION INTRAVENOUS
Status: DISCONTINUED | OUTPATIENT
Start: 2023-01-24 | End: 2023-01-24 | Stop reason: HOSPADM

## 2023-01-24 RX ORDER — ONDANSETRON 2 MG/ML
INJECTION INTRAMUSCULAR; INTRAVENOUS PRN
Status: DISCONTINUED | OUTPATIENT
Start: 2023-01-24 | End: 2023-01-24 | Stop reason: SURG

## 2023-01-24 RX ORDER — MEPERIDINE HYDROCHLORIDE 25 MG/ML
12.5 INJECTION INTRAMUSCULAR; INTRAVENOUS; SUBCUTANEOUS
Status: DISCONTINUED | OUTPATIENT
Start: 2023-01-24 | End: 2023-01-24 | Stop reason: HOSPADM

## 2023-01-24 RX ORDER — OXYCODONE HCL 5 MG/5 ML
5 SOLUTION, ORAL ORAL
Status: DISCONTINUED | OUTPATIENT
Start: 2023-01-24 | End: 2023-01-24 | Stop reason: HOSPADM

## 2023-01-24 RX ORDER — HYDRALAZINE HYDROCHLORIDE 20 MG/ML
5 INJECTION INTRAMUSCULAR; INTRAVENOUS
Status: DISCONTINUED | OUTPATIENT
Start: 2023-01-24 | End: 2023-01-24 | Stop reason: HOSPADM

## 2023-01-24 RX ORDER — HYDROMORPHONE HYDROCHLORIDE 1 MG/ML
0.4 INJECTION, SOLUTION INTRAMUSCULAR; INTRAVENOUS; SUBCUTANEOUS
Status: DISCONTINUED | OUTPATIENT
Start: 2023-01-24 | End: 2023-01-24 | Stop reason: HOSPADM

## 2023-01-24 RX ORDER — HYDROMORPHONE HYDROCHLORIDE 1 MG/ML
0.1 INJECTION, SOLUTION INTRAMUSCULAR; INTRAVENOUS; SUBCUTANEOUS
Status: DISCONTINUED | OUTPATIENT
Start: 2023-01-24 | End: 2023-01-24 | Stop reason: HOSPADM

## 2023-01-24 RX ORDER — CLOPIDOGREL BISULFATE 75 MG/1
75 TABLET ORAL DAILY
Status: DISCONTINUED | OUTPATIENT
Start: 2023-01-24 | End: 2023-02-09

## 2023-01-24 RX ORDER — CEFAZOLIN SODIUM 1 G/3ML
INJECTION, POWDER, FOR SOLUTION INTRAMUSCULAR; INTRAVENOUS PRN
Status: DISCONTINUED | OUTPATIENT
Start: 2023-01-24 | End: 2023-01-24 | Stop reason: SURG

## 2023-01-24 RX ORDER — HYDROMORPHONE HYDROCHLORIDE 1 MG/ML
0.2 INJECTION, SOLUTION INTRAMUSCULAR; INTRAVENOUS; SUBCUTANEOUS
Status: DISCONTINUED | OUTPATIENT
Start: 2023-01-24 | End: 2023-01-24 | Stop reason: HOSPADM

## 2023-01-24 RX ORDER — ONDANSETRON 2 MG/ML
4 INJECTION INTRAMUSCULAR; INTRAVENOUS
Status: DISCONTINUED | OUTPATIENT
Start: 2023-01-24 | End: 2023-01-24 | Stop reason: HOSPADM

## 2023-01-24 RX ORDER — CLOPIDOGREL BISULFATE 75 MG/1
75 TABLET ORAL DAILY
Status: DISCONTINUED | OUTPATIENT
Start: 2023-01-24 | End: 2023-01-24

## 2023-01-24 RX ORDER — DIPHENHYDRAMINE HCL 25 MG
25 TABLET ORAL EVERY 6 HOURS PRN
Status: DISCONTINUED | OUTPATIENT
Start: 2023-01-24 | End: 2023-01-24

## 2023-01-24 RX ORDER — LIDOCAINE HYDROCHLORIDE 20 MG/ML
INJECTION, SOLUTION EPIDURAL; INFILTRATION; INTRACAUDAL; PERINEURAL PRN
Status: DISCONTINUED | OUTPATIENT
Start: 2023-01-24 | End: 2023-01-24 | Stop reason: SURG

## 2023-01-24 RX ORDER — DIPHENHYDRAMINE HYDROCHLORIDE 50 MG/ML
12.5 INJECTION INTRAMUSCULAR; INTRAVENOUS
Status: DISCONTINUED | OUTPATIENT
Start: 2023-01-24 | End: 2023-01-24 | Stop reason: HOSPADM

## 2023-01-24 RX ORDER — HALOPERIDOL 5 MG/ML
1 INJECTION INTRAMUSCULAR
Status: DISCONTINUED | OUTPATIENT
Start: 2023-01-24 | End: 2023-01-24 | Stop reason: HOSPADM

## 2023-01-24 RX ORDER — OXYCODONE HCL 5 MG/5 ML
10 SOLUTION, ORAL ORAL
Status: DISCONTINUED | OUTPATIENT
Start: 2023-01-24 | End: 2023-01-24 | Stop reason: HOSPADM

## 2023-01-24 RX ORDER — DIPHENHYDRAMINE HYDROCHLORIDE, ZINC ACETATE 2; .1 G/100G; G/100G
CREAM TOPICAL 3 TIMES DAILY PRN
Status: DISCONTINUED | OUTPATIENT
Start: 2023-01-24 | End: 2023-02-16 | Stop reason: HOSPADM

## 2023-01-24 RX ORDER — DIPHENHYDRAMINE HCL 25 MG
25 TABLET ORAL EVERY 6 HOURS PRN
Status: DISCONTINUED | OUTPATIENT
Start: 2023-01-24 | End: 2023-01-25

## 2023-01-24 RX ORDER — LIDOCAINE HYDROCHLORIDE 10 MG/ML
INJECTION, SOLUTION INFILTRATION; PERINEURAL
Status: COMPLETED
Start: 2023-01-24 | End: 2023-01-24

## 2023-01-24 RX ORDER — IPRATROPIUM BROMIDE AND ALBUTEROL SULFATE 2.5; .5 MG/3ML; MG/3ML
3 SOLUTION RESPIRATORY (INHALATION)
Status: DISCONTINUED | OUTPATIENT
Start: 2023-01-24 | End: 2023-01-24 | Stop reason: HOSPADM

## 2023-01-24 RX ORDER — SODIUM CHLORIDE, SODIUM LACTATE, POTASSIUM CHLORIDE, CALCIUM CHLORIDE 600; 310; 30; 20 MG/100ML; MG/100ML; MG/100ML; MG/100ML
INJECTION, SOLUTION INTRAVENOUS CONTINUOUS
Status: DISCONTINUED | OUTPATIENT
Start: 2023-01-24 | End: 2023-01-24 | Stop reason: HOSPADM

## 2023-01-24 RX ADMIN — CLOPIDOGREL BISULFATE 75 MG: 75 TABLET ORAL at 18:33

## 2023-01-24 RX ADMIN — ONDANSETRON 4 MG: 2 INJECTION INTRAMUSCULAR; INTRAVENOUS at 11:15

## 2023-01-24 RX ADMIN — SENNOSIDES AND DOCUSATE SODIUM 1 TABLET: 50; 8.6 TABLET ORAL at 18:32

## 2023-01-24 RX ADMIN — ACETYLCYSTEINE 3 ML: 200 SOLUTION ORAL; RESPIRATORY (INHALATION) at 02:23

## 2023-01-24 RX ADMIN — LIDOCAINE HYDROCHLORIDE 80 MG: 20 INJECTION, SOLUTION EPIDURAL; INFILTRATION; INTRACAUDAL at 10:36

## 2023-01-24 RX ADMIN — DEXAMETHASONE SODIUM PHOSPHATE 8 MG: 4 INJECTION, SOLUTION INTRA-ARTICULAR; INTRALESIONAL; INTRAMUSCULAR; INTRAVENOUS; SOFT TISSUE at 10:48

## 2023-01-24 RX ADMIN — ALBUTEROL SULFATE 2.5 MG: 2.5 SOLUTION RESPIRATORY (INHALATION) at 02:23

## 2023-01-24 RX ADMIN — AMPICILLIN AND SULBACTAM 3 G: 1; 2 INJECTION, POWDER, FOR SOLUTION INTRAMUSCULAR; INTRAVENOUS at 00:21

## 2023-01-24 RX ADMIN — CEFAZOLIN 2 G: 330 INJECTION, POWDER, FOR SOLUTION INTRAMUSCULAR; INTRAVENOUS at 10:36

## 2023-01-24 RX ADMIN — POLYETHYLENE GLYCOL 3350 1 PACKET: 17 POWDER, FOR SOLUTION ORAL at 18:33

## 2023-01-24 RX ADMIN — AMLODIPINE BESYLATE 10 MG: 10 TABLET ORAL at 05:43

## 2023-01-24 RX ADMIN — ATORVASTATIN CALCIUM 80 MG: 80 TABLET, FILM COATED ORAL at 18:32

## 2023-01-24 RX ADMIN — ENOXAPARIN SODIUM 40 MG: 40 INJECTION SUBCUTANEOUS at 18:33

## 2023-01-24 RX ADMIN — SODIUM CHLORIDE, POTASSIUM CHLORIDE, SODIUM LACTATE AND CALCIUM CHLORIDE: 600; 310; 30; 20 INJECTION, SOLUTION INTRAVENOUS at 03:14

## 2023-01-24 RX ADMIN — Medication 70 MG: at 10:36

## 2023-01-24 RX ADMIN — ROCURONIUM BROMIDE 40 MG: 10 INJECTION, SOLUTION INTRAVENOUS at 10:44

## 2023-01-24 RX ADMIN — NICOTINE 14 MG: 14 PATCH TRANSDERMAL at 05:43

## 2023-01-24 RX ADMIN — DIPHENHYDRAMINE HYDROCHLORIDE 25 MG: 25 TABLET ORAL at 18:33

## 2023-01-24 RX ADMIN — LIDOCAINE HYDROCHLORIDE: 10 INJECTION, SOLUTION INFILTRATION; PERINEURAL at 10:30

## 2023-01-24 RX ADMIN — PROPOFOL 150 MG: 10 INJECTION, EMULSION INTRAVENOUS at 10:36

## 2023-01-24 RX ADMIN — SUGAMMADEX 200 MG: 100 INJECTION, SOLUTION INTRAVENOUS at 11:15

## 2023-01-24 RX ADMIN — IOHEXOL 20 ML: 300 INJECTION, SOLUTION INTRAVENOUS at 12:00

## 2023-01-24 ASSESSMENT — PAIN SCALES - GENERAL: PAIN_LEVEL: 0

## 2023-01-24 ASSESSMENT — ENCOUNTER SYMPTOMS
FOCAL WEAKNESS: 1
SPEECH CHANGE: 1

## 2023-01-24 ASSESSMENT — PAIN DESCRIPTION - PAIN TYPE: TYPE: ACUTE PAIN

## 2023-01-24 ASSESSMENT — FIBROSIS 4 INDEX: FIB4 SCORE: 1.42

## 2023-01-24 NOTE — PROGRESS NOTES
Pt received oriented x4 but occassionally forgetful and impulsive. 30L 40% HHFNC. Per nightshift RN, tube feeds held at midnight for IR G-tube placement. Transferred to IR around 10:00 on NRB mask and monitor for procedure in NAD. Care endorsed to Fatmata HELLER.

## 2023-01-24 NOTE — PROGRESS NOTES
Patient underwent a Gastrotomy tube placement by Dr. Johnston. Time out performed at 1102. Procedure site marked by MD and verified using imaging guidance. Pt placed in supine position for procedure, pressure points checked, padded, and monitored appropriately. General anesthesia performed with Dr. Shay and vitals monitored continuously (see anesthesia notes and  vitals documentation.) A split gauze and medipore dressing placed over surgical site, CDI. Report called to PACU, RN. Pt transported via gurney with RN, anesthesiologist, and AT's on monitor to PACU.     Avanos ELISE Bolus Gastrostomy Feeding tube placed  18 Fr 7-10 ml balloon  REF: [id number]  LOT: [id number]  Exp: 08/16/2025

## 2023-01-24 NOTE — ANESTHESIA PREPROCEDURE EVALUATION
Date/Time: 01/24/23 1000    Scheduled providers: David Johnston M.D.; Xenia Baker M.D.    Procedure: IR-GASTROSTOMY PLACEMENT    Indications: Dysphagia    Location: [facility]          Relevant Problems   PULMONARY   (positive) Pneumonia due to infectious organism      NEURO   (positive) Acute ischemic right middle cerebral artery (MCA) stroke (HCC)      CARDIAC   (positive) Primary hypertension      ENDO   (positive) Type 2 diabetes mellitus without complication, without long-term current use of insulin (HCC)      Other   (positive) Acute left hemiparesis (HCC)   (positive) Acute respiratory failure with hypoxia (HCC)   (positive) Alcohol dependence with withdrawal (HCC)   (positive) Severe obesity (BMI 35.0-35.9 with comorbidity) (HCC)   (positive) Stroke, hemorrhagic (HCC)   (positive) Tobacco use disorder   (positive) Toxic metabolic encephalopathy       Physical Exam    Airway   Mallampati: II  TM distance: >3 FB  Neck ROM: full       Cardiovascular - normal exam  Rhythm: regular  Rate: normal  (-) murmur     Dental - normal exam           Pulmonary - normal exam  Breath sounds clear to auscultation     Abdominal    Neurological - normal exam                 Anesthesia Plan    ASA 3   ASA physical status 3 criteria: diabetes - poorly controlled and CVA or TIA - history (> 3 months)    Plan - general       Airway plan will be ETT          Induction: intravenous    Postoperative Plan: Postoperative administration of opioids is intended.    Pertinent diagnostic labs and testing reviewed    Informed Consent:    Anesthetic plan and risks discussed with patient.    Use of blood products discussed with: patient whom consented to blood products.

## 2023-01-24 NOTE — DISCHARGE PLANNING
[facility] Coordination    Follow up for Rehab.  30L high flow nasal cannula.  PEG pending clinical stability.   TCC monitoring.

## 2023-01-24 NOTE — ANESTHESIA TIME REPORT
Anesthesia Start and Stop Event Times     Date Time Event    1/24/2023 1023 Anesthesia Start     1154 Anesthesia Stop        Responsible Staff  01/24/23    Name Role Begin End    Xenia Baker M.D. Anesth 1023 1154        Overtime Reason:  no overtime (within assigned shift)    Comments:

## 2023-01-24 NOTE — CARE PLAN
The patient is Stable - Low risk of patient condition declining or worsening    Shift Goals  Clinical Goals: Maintain Spo2 92% or greater, pressure ulcer prevention  Patient Goals: comfort  Family Goals: RADHA    Progress made toward(s) clinical / shift goals:    Problem: Knowledge Deficit - Stroke Education  Goal: Patient's knowledge of stroke and risk factors will improve  1/24/2023 0426 by Florentino Chadwick, R.N.  Outcome: Not Progressing  1/24/2023 0425 by Florentino Chadwick, R.N.  Outcome: Not Progressing     Problem: Self Care  Goal: Patient will have the ability to perform ADLs independently or with assistance (bathe, groom, dress, toilet and feed)  1/24/2023 0426 by Florentino Chadwick, R.N.  Outcome: Not Progressing  1/24/2023 0425 by Florentino Chadwick, R.N.  Outcome: Not Progressing       Patient is not progressing towards the following goals:      Problem: Knowledge Deficit - Stroke Education  Goal: Patient's knowledge of stroke and risk factors will improve  1/24/2023 0426 by Florentino Chadwick, R.N.  Outcome: Not Progressing  1/24/2023 0425 by Florentino Chadwick, R.N.  Outcome: Not Progressing     Problem: Dysphagia  Goal: Dysphagia will improve  1/24/2023 0426 by Florentino Chadwick, R.N.  Outcome: Progressing  1/24/2023 0425 by Florentino Chadwick, R.N.  Outcome: Not Progressing     Problem: Self Care  Goal: Patient will have the ability to perform ADLs independently or with assistance (bathe, groom, dress, toilet and feed)  1/24/2023 0426 by Florentino Chadwick, R.N.  Outcome: Not Progressing  1/24/2023 0425 by Florentino Chadwick, R.N.  Outcome: Not Progressing     Problem: Safety - Medical Restraint  Goal: Free from restraint(s) (Restraint for Interference with Medical Device)  1/24/2023 0426 by Florentino Chadwick, R.N.  Outcome: Progressing  1/24/2023 0425 by Florentino Chadwick, R.N.  Outcome: Not Progressing

## 2023-01-24 NOTE — ANESTHESIA POSTPROCEDURE EVALUATION
Patient: Kaiden Quiroz    Procedure Summary     Date: 01/24/23 Room / Location: [facility]    Anesthesia Start: 1023 Anesthesia Stop: 1154    Procedure: IR-GASTROSTOMY PLACEMENT Diagnosis: (Dysphagia)    Scheduled Providers: David Johnston M.D.; Xenia Baker M.D. Responsible Provider: Xenia Baker M.D.    Anesthesia Type: general ASA Status: 3          Final Anesthesia Type: general  Last vitals  BP   Blood Pressure: 120/65    Temp   36.4 °C (97.6 °F)    Pulse   70   Resp   (!) 22    SpO2   97 %      Anesthesia Post Evaluation    Patient location during evaluation: PACU  Patient participation: complete - patient participated  Level of consciousness: awake and alert  Pain score: 0    Airway patency: patent  Anesthetic complications: no  Cardiovascular status: hemodynamically stable  Respiratory status: acceptable  Hydration status: euvolemic    PONV: none          No notable events documented.     Nurse Pain Score: 0 (NPRS)

## 2023-01-24 NOTE — ANESTHESIA PROCEDURE NOTES
Airway    Date/Time: 1/24/2023 10:41 AM  Performed by: Xenia Baker M.D.  Authorized by: Xenia Baker M.D.     Location:  OR  Urgency:  Elective  Difficult Airway: No    Indications for Airway Management:  Anesthesia      Spontaneous Ventilation: absent    Sedation Level:  Deep  Preoxygenated: Yes    Patient Position:  Sniffing  Mask Difficulty Assessment:  1 - vent by mask  Final Airway Type:  Endotracheal airway  Final Endotracheal Airway:  ETT  Cuffed: Yes    Technique Used for Successful ETT Placement:  Direct laryngoscopy    Insertion Site:  Oral  Blade Type:  Romero  Laryngoscope Blade/Videolaryngoscope Blade Size:  4  ETT Size (mm):  7.5  Measured from:  Teeth  ETT to Teeth (cm):  23  Placement Verified by: auscultation and capnometry    Cormack-Lehane Classification:  Grade IIb - view of arytenoids or posterior of glottis only  Number of Attempts at Approach:  1

## 2023-01-24 NOTE — OR SURGEON
Immediate Post- Operative Note        PostOp Diagnosis: STROKE, TUBE FEEDS REQUIRED FOR NUTRITION      Procedure(s): PERCUTANEOUS GASTROSTOMY TUBE PLACEMENT WITH US AND FLUOROSCOPIC GUIDANCE    SUTURE ANCHORS PLACED X 2    18F ELISE BALLOON G-TUBE PLACED UNDER GENL ANESTHESIA    BALLOON FILLED WITH 10 ML STERILE SALINE      Estimated Blood Loss: Less than 1 ml      FINDINGS: TUBE INJECTION CONFIRMS INTRA-GASTRIC POSITION. NO EXTRAVASATION        Complications: None            1/24/2023  11:18 AM  David Johnston M.D.

## 2023-01-24 NOTE — PROGRESS NOTES
4 Eyes Skin Assessment Completed by PINA Buck and PINA Crain.    Head WDL  Ears WDL  Nose Non-Blanching and Discoloration  Mouth WDL  Neck WDL  Breast/Chest   Shoulder Blades Redness (rash present to flank)  Spine WDL  (R) Arm/Elbow/Hand WDL  (L) Arm/Elbow/Hand WDL  Abdomen Incision (new G tube placed)  Groin WDL  Scrotum/Coccyx/Buttocks Excoriation, Discoloration, and Scar  (R) Leg WDL  (L) Leg WDL  (R) Heel/Foot/Toe WDL  (L) Heel/Foot/Toe WDL          Devices In Places ECG, Blood Pressure Cuff, Pulse Ox, Mcclendon, SCD's, OG/NG, HFNC, and G Tube      Interventions In Place Heel Mepilex, Sacral Mepilex, Heel Float Boots, Pillows, Q2 Turns, and Low Air Loss Mattress    Possible Skin Injury Yes    Pictures Uploaded Into Epic Yes  Wound Consult Placed Yes  RN Wound Prevention Protocol Ordered Yes

## 2023-01-24 NOTE — OR NURSING
"1150 Pt arrived to PACU with Anesthesiologist and IR RN. OPA in place. Even, unlabored respirations. VSS. Mid abdominal dressing CDI.     1151 OPA d/c'd. AAOx0, mumbles.  Shakes head \"no\" in pain. Denies nausea.     1205 Pt had medium bowel movement. Full linen change complete.     1230 Pt meets IMCU criteria. Report called to PINA Buck.     1235 Pt transported back to [facility] with RN. Chart, IMCU transport tele monitor, and full oxygen tank with patient.       "

## 2023-01-25 LAB
ALBUMIN SERPL BCP-MCNC: 3.5 G/DL (ref 3.2–4.9)
ALBUMIN/GLOB SERPL: 1.1 G/DL
ALP SERPL-CCNC: 105 U/L (ref 30–99)
ALT SERPL-CCNC: 17 U/L (ref 2–50)
ANION GAP SERPL CALC-SCNC: 8 MMOL/L (ref 7–16)
AST SERPL-CCNC: 23 U/L (ref 12–45)
BILIRUB SERPL-MCNC: 0.6 MG/DL (ref 0.1–1.5)
BUN SERPL-MCNC: 19 MG/DL (ref 8–22)
CALCIUM ALBUM COR SERPL-MCNC: 9.2 MG/DL (ref 8.5–10.5)
CALCIUM SERPL-MCNC: 8.8 MG/DL (ref 8.5–10.5)
CHLORIDE SERPL-SCNC: 102 MMOL/L (ref 96–112)
CO2 SERPL-SCNC: 26 MMOL/L (ref 20–33)
CREAT SERPL-MCNC: 0.6 MG/DL (ref 0.5–1.4)
GFR SERPLBLD CREATININE-BSD FMLA CKD-EPI: 109 ML/MIN/1.73 M 2
GLOBULIN SER CALC-MCNC: 3.2 G/DL (ref 1.9–3.5)
GLUCOSE SERPL-MCNC: 140 MG/DL (ref 65–99)
POTASSIUM SERPL-SCNC: 4 MMOL/L (ref 3.6–5.5)
PROT SERPL-MCNC: 6.7 G/DL (ref 6–8.2)
SODIUM SERPL-SCNC: 136 MMOL/L (ref 135–145)

## 2023-01-25 PROCEDURE — A9270 NON-COVERED ITEM OR SERVICE: HCPCS | Performed by: HOSPITALIST

## 2023-01-25 PROCEDURE — 700102 HCHG RX REV CODE 250 W/ 637 OVERRIDE(OP): Performed by: INTERNAL MEDICINE

## 2023-01-25 PROCEDURE — 99233 SBSQ HOSP IP/OBS HIGH 50: CPT | Mod: GC | Performed by: INTERNAL MEDICINE

## 2023-01-25 PROCEDURE — A9270 NON-COVERED ITEM OR SERVICE: HCPCS | Performed by: INTERNAL MEDICINE

## 2023-01-25 PROCEDURE — 770001 HCHG ROOM/CARE - MED/SURG/GYN PRIV*

## 2023-01-25 PROCEDURE — 700102 HCHG RX REV CODE 250 W/ 637 OVERRIDE(OP): Performed by: HOSPITALIST

## 2023-01-25 PROCEDURE — 80053 COMPREHEN METABOLIC PANEL: CPT

## 2023-01-25 PROCEDURE — 700111 HCHG RX REV CODE 636 W/ 250 OVERRIDE (IP): Performed by: INTERNAL MEDICINE

## 2023-01-25 PROCEDURE — 99232 SBSQ HOSP IP/OBS MODERATE 35: CPT | Performed by: HOSPITALIST

## 2023-01-25 PROCEDURE — 97112 NEUROMUSCULAR REEDUCATION: CPT

## 2023-01-25 PROCEDURE — 94640 AIRWAY INHALATION TREATMENT: CPT

## 2023-01-25 RX ORDER — DIPHENHYDRAMINE HCL 25 MG
25 TABLET ORAL EVERY 6 HOURS PRN
Status: DISCONTINUED | OUTPATIENT
Start: 2023-01-25 | End: 2023-02-09

## 2023-01-25 RX ADMIN — SENNOSIDES AND DOCUSATE SODIUM 1 TABLET: 50; 8.6 TABLET ORAL at 05:38

## 2023-01-25 RX ADMIN — CLOPIDOGREL BISULFATE 75 MG: 75 TABLET ORAL at 05:38

## 2023-01-25 RX ADMIN — ENOXAPARIN SODIUM 40 MG: 40 INJECTION SUBCUTANEOUS at 17:28

## 2023-01-25 RX ADMIN — ASPIRIN 81 MG: 81 TABLET, CHEWABLE ORAL at 05:39

## 2023-01-25 RX ADMIN — DIPHENHYDRAMINE HYDROCHLORIDE, ZINC ACETATE: 2; .1 CREAM TOPICAL at 08:07

## 2023-01-25 RX ADMIN — POLYETHYLENE GLYCOL 3350 1 PACKET: 17 POWDER, FOR SOLUTION ORAL at 05:37

## 2023-01-25 RX ADMIN — ATORVASTATIN CALCIUM 80 MG: 80 TABLET, FILM COATED ORAL at 17:28

## 2023-01-25 ASSESSMENT — ENCOUNTER SYMPTOMS
FOCAL WEAKNESS: 1
CHILLS: 0
COUGH: 0
DOUBLE VISION: 0
WHEEZING: 0
SPEECH CHANGE: 1
PALPITATIONS: 0
SHORTNESS OF BREATH: 1
SPUTUM PRODUCTION: 0
FEVER: 0

## 2023-01-25 ASSESSMENT — COGNITIVE AND FUNCTIONAL STATUS - GENERAL
EATING MEALS: TOTAL
HELP NEEDED FOR BATHING: A LOT
SUGGESTED CMS G CODE MODIFIER DAILY ACTIVITY: CL
DRESSING REGULAR LOWER BODY CLOTHING: A LOT
PERSONAL GROOMING: A LOT
DRESSING REGULAR UPPER BODY CLOTHING: A LOT
DAILY ACTIVITIY SCORE: 11
TOILETING: A LOT

## 2023-01-25 ASSESSMENT — FIBROSIS 4 INDEX: FIB4 SCORE: 1.38

## 2023-01-25 ASSESSMENT — PAIN DESCRIPTION - PAIN TYPE
TYPE: ACUTE PAIN

## 2023-01-25 NOTE — PROGRESS NOTES
Critical Care/Pulmonary Consultation    Date of Service: 1/23/2023    Date of Admission:  1/12/2023  8:01 PM    Consulting Physician: FRENCH Almeida*    Chief Complaint:  Possible Stroke (BIBA from home for stroke like symptoms/Wife found pt with slurred speech, L facial droop, and L sided weakness /LKW 1830/Pt had a fall yesterday, + head strike, - thinners/, aox4/Initial NIH of 20 by neurologist, pt taken to CT then red 11)      Patient ID:   62 y.o. male with a past medical history of tobacco use, type 2 diabetes mellitus who presented with left-sided hemiparesis and facial droop with right MCA.  Neurology was previously consulted and the patient was not a candidate for tPA with right MCA thrombectomy and right ICA stent 1/2/2023, was admitted to the ICU for nicardipine drip for strict blood pressure control.  During IR procedure, patient had a sinus pause and CPR was not required to have spontaneous ROSC and was administered atropine and glycopyrrolate for sinus bradycardia.  Patient was intubated postprocedure and extubated 1/30/2023 to hyponasal cannula with chest x-ray demonstrating pulmonary edema.  Patient was diuresed with IV Lasix with improvement in oxygenation, however 1/50/23 exhibited signs of alcohol withdrawal was initiated on RASS protocol and Precedex drip with Librium taper.  Pulmonology was consulted for the acute hypoxic respiratory failure.    Interval Update:  - Patient slowly improving with decreasing FiO2 requirements.  Patient received G-tube yesterday for feeds.  IV fluids discontinued, continue diuresis further negative balance.     Review of Systems   Constitutional:  Negative for chills and fever.   HENT:  Negative for ear discharge.    Eyes:  Negative for double vision.   Respiratory:  Positive for shortness of breath. Negative for cough, sputum production and wheezing.    Cardiovascular:  Negative for chest pain, palpitations and leg swelling.     Home Medications        Reviewed by Malik Corado M.D. (Physician) on 23 at 1853  Med List Status: Complete     Medication Last Dose Status   acetaminophen (TYLENOL) 650 MG CR tablet 2023 Active   Cobalamin Combinations (NEURIVA PLUS) Cap unk Active   Naproxen Sodium 220 MG Cap unk Active                    Social History     Tobacco Use    Smoking status: Former     Packs/day: 0.50     Years: 46.00     Pack years: 23.00     Types: Cigarettes     Start date:      Quit date: 2023     Years since quittin.0    Smokeless tobacco: Never   Vaping Use    Vaping Use: Some days    Substances: Nicotine   Substance Use Topics    Alcohol use: Yes     Alcohol/week: 25.2 oz     Types: 42 Cans of beer per week    Drug use: Yes     Types: Inhaled     Comment: JUDI        Past Medical History:   Diagnosis Date    Diabetes (HCC)     Hypertension        History reviewed. No pertinent surgical history.    Allergies: Patient has no known allergies.    Family History   Problem Relation Age of Onset    Hypertension Mother     Diabetes Mother     Hypertension Father     Diabetes Father     Hypertension Sister     Stroke Neg Hx        Pulse:  [27-76] 64  Resp:  [15-53] 19  BP: (107-148)/(56-90) 143/83  SpO2:  [86 %-98 %] 97 %    Physical Examination  Physical Exam  Vitals and nursing note reviewed.   Constitutional:       General: He is not in acute distress.  HENT:      Mouth/Throat:      Mouth: Mucous membranes are moist.   Eyes:      General: No scleral icterus.     Extraocular Movements: Extraocular movements intact.   Cardiovascular:      Rate and Rhythm: Normal rate and regular rhythm.      Heart sounds: No murmur heard.    No gallop.   Pulmonary:      Effort: Pulmonary effort is normal. No respiratory distress.      Breath sounds: Rales present. No wheezing or rhonchi.   Abdominal:      General: Abdomen is flat. Bowel sounds are normal. There is no distension.      Palpations: Abdomen is soft.      Tenderness: There is no  abdominal tenderness.   Skin:     General: Skin is warm.   Neurological:      Mental Status: He is alert.      Motor: Weakness present.      Comments: Left sided hemiparesis   Psychiatric:         Mood and Affect: Mood normal.         Intake/Output Summary (Last 24 hours) at 1/23/2023 1008  Last data filed at 1/23/2023 0600  Gross per 24 hour   Intake 2356.39 ml   Output 1645 ml   Net 711.39 ml       Recent Labs     01/23/23  0544 01/24/23  0443 01/25/23  0335   WBC 9.6 10.2  --    ASTSGOT 20 23 23   ALTSGPT 15 16 17   ALKPHOSPHAT 95 98 105*   TBILIRUBIN 0.5 0.6 0.6       Recent Labs     01/23/23  0544 01/24/23  0443 01/25/23  0335   SODIUM 144 142 136   POTASSIUM 3.9 3.9 4.0   CHLORIDE 107 107 102   CO2 28 25 26   BUN 18 17 19   CREATININE 0.66 0.55 0.60   MAGNESIUM 1.8 1.8  --    PHOSPHORUS 3.0 3.3  --    CALCIUM 9.0 8.9 8.8       Recent Labs     01/23/23  0544 01/23/23  1843 01/24/23  0443 01/25/23  0335   ALTSGPT 15  --  16 17   ASTSGOT 20  --  23 23   ALKPHOSPHAT 95  --  98 105*   TBILIRUBIN 0.5  --  0.6 0.6   PREALBUMIN  --  21.0  --   --    GLUCOSE 153*  --  105* 140*           IR-GASTROSTOMY PLACEMENT   Final Result      1.  Fluoroscopic guided percutaneous gastrostomy with placement of an 18-[place] ELISE balloon gastrostomy catheter.      2. The gastrostomy tube may be used for tube feeds 18 hours after placement. Thereafter, liquid diet orders per the referring physician. Saline flush daily as per protocol.      3. In 10-14 days, the anchor button suture or sutures should be cut, releasing the anchor or anchors into the stomach. The suture anchors will then pass through the GI tract as desired. This can be performed in the interventional radiology department if    desired. Call [phone] any weekday [phone] hours to make an appointment.      CT-CTA CHEST PULMONARY ARTERY W/ RECONS   Final Result         1.  No pulmonary embolus appreciated.   2.  Linear densities the bilateral lung bases suggests changes of  atelectasis, component of infiltrate is not excluded.   3.  Atherosclerosis and atherosclerotic coronary artery disease.      US-EXTREMITY VENOUS LOWER BILAT   Final Result      DX-ABDOMEN FOR TUBE PLACEMENT   Final Result      NG tube extends below the diaphragm into the region of the stomach. The tip is not visualized.      EC-ECHOCARDIOGRAM LTD W/ CONT   Final Result      DX-CHEST-PORTABLE (1 VIEW)   Final Result         1. No significant interval change.      CT-ABDOMEN-PELVIS W/O   Final Result         1.  Nondependent foci of air in the bladder, could represent changes from recent instrumentation or urinary tract infection.   2.  Diverticulosis   3.  Atherosclerosis and atherosclerotic coronary artery disease      DX-CHEST-PORTABLE (1 VIEW)   Final Result      Ill-defined atelectasis versus consolidation in the left lower lung. Pneumonia can be considered in appropriate clinical setting.         DX-ABDOMEN FOR TUBE PLACEMENT   Final Result         1.  Nonspecific bowel gas pattern in the upper abdomen.   2.  Nasogastric tube tip terminates overlying the expected location of the gastric body.   3.  Cardiomegaly   4.  Hazy linear density left lung base could represent atelectasis or infiltrate.      CT-HEAD W/O   Final Result         1.  Evolving infarct/encephalomalacia in the right MCA distribution.   2.  Areas of hemorrhage in the right basal ganglia and involving the right frontal and anterior temporal sulci, similar compared to MRI January 13, 2023   3.  Atherosclerosis.      DX-CHEST-PORTABLE (1 VIEW)   Final Result         Diffuse interstitial and groundglass opacity throughout both lungs could relate to developing pulmonary edema.      DX-ABDOMEN FOR TUBE PLACEMENT   Final Result         Gastric drainage tube with tip projecting over the expected area of the stomach.      DX-CHEST-PORTABLE (1 VIEW)   Final Result      1.  Interval extubation without pneumothorax.   2.  Stable enlarged cardiac silhouette.    3.  There is bibasilar linear atelectasis.      EC-ECHOCARDIOGRAM COMPLETE W/ CONT   Final Result      MR-BRAIN-W/O   Final Result         Acute infarction involving the right frontal region, right basal ganglia and right caudate nucleus. Minimal punctate infarction involving the right parietal cortex. There is slight mass effect upon the right lateral ventricle with only minimal midline    shift measuring approximately 4 mm.      Petechial hemorrhage is noted within the right frontal cortical infarct. Also noted is petechial hemorrhage in the right basal ganglia and right caudate nucleus but without surrounding vasogenic edema.         DX-PELVIS-1 OR 2 VIEWS   Final Result         1.  No acute traumatic bony injury.      DX-CHEST-PORTABLE (1 VIEW)   Final Result         1.  Left midlung and lower lobe infiltrates, similar to prior study.      [id number] VIEW   Final Result         1.  Nonspecific bowel gas pattern in the upper abdomen.   2.  Cardiomegaly   3.  Hazy left lower lobe infiltrates.   4.  Nasogastric tube tip terminates overlying the expected location of the gastric fundus.      IR-THROMBO MECHANICAL ARTERY,INIT   Final Result         62-year-old patient who presented with complete occlusion of the right ICA at its origin underwent emergent mechanical thrombectomy. After initial angioplasty,   a right cervical internal carotid artery stent was deployed and right MCA thrombectomy was performed. The final angiographic images show complete restoration of flow into the right ICA and MCA with no evidence of distal intracranial embolization.      Final recanalization score: TICI 3      I, Petra Mobley was physically present and participated during the entire procedure of the IR-THROMBO MECHANICAL ARTERY,INIT.                  DX-CHEST-PORTABLE (1 VIEW)   Final Result      No acute cardiac or pulmonary abnormalities are identified.      CT-CTA NECK WITH & W/O-POST PROCESSING   Final Result      1.   Occlusion of the right internal carotid artery at its origin.      2.  Occlusion of the right vertebral artery at its origin.      Dr. Gongora discussed findings with Dr. Mccarthy.      CT-CTA HEAD WITH & W/O-POST PROCESS   Final Result         1.  Occlusion of the right internal carotid artery and right middle cerebral artery.   2.  Narrowed left M1 segment with severely narrowed and M2 and M3 branches, may represent chronic changes.      These findings were discussed with the patient's clinician, Joseline Mccarthy, on 1/12/2023 8:27 PM.      CT-CEREBRAL PERFUSION ANALYSIS   Final Result         1.  Cerebral blood flow less than 30% likely representing completed infarct = 52 mL.      2.  T Max more than 6 seconds likely representing combination of completed infarct and ischemia = 172 mL.      3.  Mismatched volume likely representing ischemic brain/penumbra = 120 mL      4.  Please note that the cerebral perfusion was performed on the limited brain tissue around the basal ganglia region. Infarct/ischemia outside the CT perfusion sections can be missed in this study.      CT-HEAD W/O   Final Result      1.  No evidence of intracranial hemorrhage, midline shift or mass effect.      2.  Question of hyperdense right MCA though evaluation limited by oblique positioning.             Patient Active Problem List   Diagnosis    Acute ischemic right middle cerebral artery (MCA) stroke (HCC)    Primary hypertension    Tobacco use disorder    Alcohol dependence with withdrawal (HCC)    Type 2 diabetes mellitus without complication, without long-term current use of insulin (HCC)    Severe obesity (BMI 35.0-35.9 with comorbidity) (HCC)    Acute respiratory failure with hypoxia (HCC)    Toxic metabolic encephalopathy    Stroke, hemorrhagic (HCC)    Acute left hemiparesis (HCC)    Oropharyngeal dysphagia    Pneumonia due to infectious organism    Hypernatremia    Internal carotid artery stent present       Assessment and  Plan:    #Acute hypoxic Respiratory Failure  -Likely multifactorial secondary to aspiration/secretions versus volume overload pulmonary vasculature versus pneumonia  - Previous Dopplers ruled out DVT, CTA chest without pulmonary embolism, sputum cultures with moderate gram-positive rods and normal respiratory gustavo  - Previously on Zosyn, been on antibiotics since 1/19, Unasyn 1/19, transition to Zosyn 1/20, abx discontinued 1/24  - Viral panel previously negative  - Strict I's and O's  - Speech therapy  - Titrate oxygen to 88 to 92%  - Baseline oxygen room air  - Pulmonology will be signing off, please reconsult us for any other further questions about patient care

## 2023-01-25 NOTE — THERAPY
Occupational Therapy   Treament plan     Patient Name: Kaiden Quiroz  Age:  62 y.o., Sex:  male  Medical Record #: [id number]  Today's Date: 1/25/2023     Precautions  Precautions: (P) Fall Risk, Nasogastric Tube  Comments: (P) L sided hemiparesis    Assessment  Pt seen for OT tx session, noted pt able to initiate scapular retraction on L side when cued, otherwise no functional movement of L UE noted. Facilitated WBing through L UE, and pt performed cervical side bending to R, able to hold head in midline for 1-2 min. Pt continues to require max A for bed mobility, mod A for UB dressing. Will continue to follow.     Plan    Occupational Therapy Initial Treatment Plan   Treatment Interventions: Self Care / Activities of Daily Living, Adaptive Equipment, Neuro Re-Education / Balance, Therapeutic Exercises, Therapeutic Activity  Treatment Frequency: 4 Times per Week  Duration: Until Therapy Goals Met    DC Equipment Recommendations: (P) Unable to determine at this time  Discharge Recommendations: (P) Recommend post-acute placement for additional occupational therapy services prior to discharge home          Objective       01/25/23 1016   Treatment Charges   OT Neuromuscular Re-education / Balance (Units) 2   Total Time Spent   OT Neuromuscular Re-education / Balance (Minutes) 25   Precautions   Precautions Fall Risk;Nasogastric Tube   Comments L sided hemiparesis   Cognition    Cognition / Consciousness X   Speech/ Communication Delayed Responses;Slurred   Level of Consciousness Responds to voice   Ability To Follow Commands 1 Step   Safety Awareness Impaired  (sometimes requires tactile cues to initiate)   New Learning Impaired   Attention Impaired   Sequencing Impaired   Initiation Impaired   Comments increased processing time, motivated, cooperative   Active ROM Upper Body   Comments Pt able to initiate L scapular rectraction when cued, otherwise no functional movement   Neuro-Muscular Treatments    Neuro-Muscular Treatments Anterior weight shift;Sensory Stimuli;Weight Shift Right;Weight Shift Left;Verbal Cuing;Tactile Cuing   Comments required tactile cues to attend L UE, able to identify joints of UE, initially demo'ing on R side and then pointing on L   Other Treatments   Other Treatments Provided cervical side bending to the R, holding stretch for 10 seconds, 2x, cued pt to turn head to the R, maintained midline for 1-2 seconds w/ head at a time   Balance   Sitting Balance (Static) Poor   Sitting Balance (Dynamic) Poor -   Weight Shift Sitting Poor   Skilled Intervention Tactile Cuing;Verbal Cuing;Facilitation   Bed Mobility    Supine to Sit Maximal Assist   Sit to Supine Maximal Assist   Scooting Maximal Assist   Skilled Intervention Verbal Cuing;Tactile Cuing;Facilitation   Activities of Daily Living   Upper Body Dressing Moderate Assist   Skilled Intervention Tactile Cuing;Verbal Cuing;Facilitation   How much help from another person does the patient currently need...   Putting on and taking off regular lower body clothing? 2   Bathing (including washing, rinsing, and drying)? 2   Toileting, which includes using a toilet, bedpan, or urinal? 2   Putting on and taking off regular upper body clothing? 2   Taking care of personal grooming such as brushing teeth? 2   Eating meals? 1   6 Clicks Daily Activity Score 11   Functional Mobility   Sit to Stand Unable to Participate   Mobility sat EOB for ~20 min   Skilled Intervention Tactile Cuing;Verbal Cuing;Facilitation   Activity Tolerance   Sitting Edge of Bed 20 min   Patient / Family Goals   Patient / Family Goal #1 to go home   Goal #1 Outcome Goal not met   Short Term Goals   Short Term Goal # 1 pt will groom seated EOB w/ setup   Goal Outcome # 1 Goal not met   Short Term Goal # 2 pt will maintain sitting balance unsupported 10 sec in prep for seated ADLs   Goal Outcome # 2 Goal not met   Short Term Goal # 3 pt will demo ADL txfs with Bandar   Goal  Outcome # 3 Goal not met   Education Group   Role of Occupational Therapist Patient Response Patient;Acceptance;Explanation;Demonstration;Verbal Demonstration;Action Demonstration   Occupational Therapy Treatment Plan   O.T. Treatment Plan Continue Current Treatment Plan   Anticipated Discharge Equipment and Recommendations   DC Equipment Recommendations Unable to determine at this time   Discharge Recommendations Recommend post-acute placement for additional occupational therapy services prior to discharge home   Interdisciplinary Plan of Care Collaboration   IDT Collaboration with  Nursing;Therapy Tech   Patient Position at End of Therapy Call Light within Reach;Tray Table within Reach;Phone within Reach;In Bed;Bed Alarm On   Collaboration Comments report given   Session Information   Date / Session Number  1/25, 3 (1/4, 1/29)

## 2023-01-25 NOTE — CARE PLAN
The patient is Watcher - Medium risk of patient condition declining or worsening    Shift Goals  Clinical Goals: improve respiratory status, wean O2, get procedure  Patient Goals: comfort  Family Goals: RADHA    Progress made toward(s) clinical / shift goals:    Problem: Safety - Medical Restraint  Goal: Remains free of injury from restraints (Restraint for Interference with Medical Device)  Outcome: Progressing  Goal: Free from restraint(s) (Restraint for Interference with Medical Device)  Outcome: Progressing

## 2023-01-25 NOTE — DISCHARGE PLANNING
[facility] Transitional Care Coordination    Rehab following for post acute services.  Current oxygen requirement 30L via high flow nasal canula exceeds inpatient rehab threshold at this time.  Continuing to monitor.

## 2023-01-25 NOTE — PROGRESS NOTES
Known rash noted to be spreading from back to L leg. Rash warm to touch. Pt denies itching.Treating with topical benadryl at this time. Will continue to monitor.       R flank      R flank          L flank      L inner thigh      L knee

## 2023-01-25 NOTE — CARE PLAN
Respiratory Update    Treatment modality: HHF 30L@40%  Pt tolerating current treatments well with no adverse reactions.

## 2023-01-25 NOTE — PROGRESS NOTES
"Hospital Medicine Daily Progress Note    Date of Service  1/25/2023    Chief Complaint  Kaiden Quiroz is a 62 y.o. male admitted 1/12/2023 with L side weakness    Hospital Course  63yo PMHx HTN, T2DM, ongoing tobacco use, BMI 36, ETOH.  Presenting with L side weakness on 1/12.  [place] not to be TNK candidate.  Taken to IR for MCA trhombectomy with TICI 3 post and R ICA stent.  Had 10 sec pause intra procedure but resolved before CPR was initiated.  Was intubated.  In ICU required Precedex for ETOH withdrawal.  PEG placed 1/24    Interval Problem Update  ROS limited by pt dysarthria.  \"I'm OK\".  Pt denies pain or N.    Up with therapy  NGT out      AFebrile  Sinus 60s  SBP 1230-140s  On 5 LNC    I have discussed this patient's plan of care and discharge plan at IDT rounds today with Case Management, Nursing, Nursing leadership, and other members of the IDT team.    Consultants/Specialty  neurology    Code Status  Full Code    Disposition  Patient is not medically cleared for discharge.   Anticipate discharge to to an inpatient rehabilitation hospital.  I have placed the appropriate orders for post-discharge needs.    Review of Systems  Review of Systems   Neurological:  Positive for speech change and focal weakness.      Physical Exam  Temp:  [36.3 °C (97.3 °F)-36.4 °C (97.6 °F)] 36.4 °C (97.6 °F)  Pulse:  [63-76] 63  Resp:  [15-53] 20  BP: (107-142)/(56-90) 107/56  SpO2:  [86 %-99 %] 96 %    Physical Exam  Constitutional:       General: He is not in acute distress.     Appearance: Normal appearance. He is well-developed. He is not diaphoretic.   HENT:      Head: Normocephalic and atraumatic.   Eyes:      General: No scleral icterus.     Conjunctiva/sclera: Conjunctivae normal.   Neck:      Vascular: No JVD.   Cardiovascular:      Rate and Rhythm: Normal rate.      Heart sounds: No murmur heard.    No gallop.   Pulmonary:      Effort: Pulmonary effort is normal. No respiratory distress.      Breath sounds: No " stridor. No wheezing or rales.   Abdominal:      Palpations: Abdomen is soft.      Tenderness: There is no abdominal tenderness. There is no guarding or rebound.   Musculoskeletal:         General: No tenderness.      Right lower leg: No edema.      Left lower leg: No edema.   Skin:     General: Skin is warm and dry.      Capillary Refill: Capillary refill takes less than 2 seconds.      Coloration: Skin is not jaundiced or pale.      Findings: No rash.   Neurological:      Mental Status: He is alert and oriented to person, place, and time.      Comments: Pt does not attend to L side even with prompting and stim  Speech is dysarthric but ineligible with patience.  Content logical  +L side facial droop  L arm and leg flacid  Purposeful and 5/5 R upper and lower   Psychiatric:         Mood and Affect: Mood normal. Affect is flat.         Behavior: Behavior normal.         Thought Content: Thought content normal.       Fluids    Intake/Output Summary (Last 24 hours) at 1/25/2023 0620  Last data filed at 1/25/2023 0400  Gross per 24 hour   Intake 560 ml   Output 955 ml   Net -395 ml         Laboratory  Recent Labs     01/22/23  0920 01/23/23  0544 01/24/23  0443   WBC 8.7 9.6 10.2   RBC 3.79* 3.72* 3.87*   HEMOGLOBIN 12.7* 12.4* 12.8*   HEMATOCRIT 37.6* 36.7* 37.4*   MCV 99.2* 98.7* 96.6   MCH 33.5* 33.3* 33.1*   MCHC 33.8 33.8 34.2   RDW 43.5 42.5 41.7   PLATELETCT 275 260 247   MPV 10.5 10.4 10.4       Recent Labs     01/23/23  0544 01/24/23  0443 01/25/23  0335   SODIUM 144 142 136   POTASSIUM 3.9 3.9 4.0   CHLORIDE 107 107 102   CO2 28 25 26   GLUCOSE 153* 105* 140*   BUN 18 17 19   CREATININE 0.66 0.55 0.60   CALCIUM 9.0 8.9 8.8                     Imaging  IR-GASTROSTOMY PLACEMENT   Final Result      1.  Fluoroscopic guided percutaneous gastrostomy with placement of an 18-Arabic ELISE balloon gastrostomy catheter.      2. The gastrostomy tube may be used for tube feeds 18 hours after placement. Thereafter, liquid  diet orders per the referring physician. Saline flush daily as per protocol.      3. In 10-14 days, the anchor button suture or sutures should be cut, releasing the anchor or anchors into the stomach. The suture anchors will then pass through the GI tract as desired. This can be performed in the interventional radiology department if    desired. Call [phone] any weekday [phone] hours to make an appointment.      CT-CTA CHEST PULMONARY ARTERY W/ RECONS   Final Result         1.  No pulmonary embolus appreciated.   2.  Linear densities the bilateral lung bases suggests changes of atelectasis, component of infiltrate is not excluded.   3.  Atherosclerosis and atherosclerotic coronary artery disease.      US-EXTREMITY VENOUS LOWER BILAT   Final Result      DX-ABDOMEN FOR TUBE PLACEMENT   Final Result      NG tube extends below the diaphragm into the region of the stomach. The tip is not visualized.      EC-ECHOCARDIOGRAM LTD W/ CONT   Final Result      DX-CHEST-PORTABLE (1 VIEW)   Final Result         1. No significant interval change.      CT-ABDOMEN-PELVIS W/O   Final Result         1.  Nondependent foci of air in the bladder, could represent changes from recent instrumentation or urinary tract infection.   2.  Diverticulosis   3.  Atherosclerosis and atherosclerotic coronary artery disease      DX-CHEST-PORTABLE (1 VIEW)   Final Result      Ill-defined atelectasis versus consolidation in the left lower lung. Pneumonia can be considered in appropriate clinical setting.         DX-ABDOMEN FOR TUBE PLACEMENT   Final Result         1.  Nonspecific bowel gas pattern in the upper abdomen.   2.  Nasogastric tube tip terminates overlying the expected location of the gastric body.   3.  Cardiomegaly   4.  Hazy linear density left lung base could represent atelectasis or infiltrate.      CT-HEAD W/O   Final Result         1.  Evolving infarct/encephalomalacia in the right MCA distribution.   2.  Areas of hemorrhage in the  right basal ganglia and involving the right frontal and anterior temporal sulci, similar compared to MRI January 13, 2023   3.  Atherosclerosis.      DX-CHEST-PORTABLE (1 VIEW)   Final Result         Diffuse interstitial and groundglass opacity throughout both lungs could relate to developing pulmonary edema.      DX-ABDOMEN FOR TUBE PLACEMENT   Final Result         Gastric drainage tube with tip projecting over the expected area of the stomach.      DX-CHEST-PORTABLE (1 VIEW)   Final Result      1.  Interval extubation without pneumothorax.   2.  Stable enlarged cardiac silhouette.   3.  There is bibasilar linear atelectasis.      EC-ECHOCARDIOGRAM COMPLETE W/ CONT   Final Result      MR-BRAIN-W/O   Final Result         Acute infarction involving the right frontal region, right basal ganglia and right caudate nucleus. Minimal punctate infarction involving the right parietal cortex. There is slight mass effect upon the right lateral ventricle with only minimal midline    shift measuring approximately 4 mm.      Petechial hemorrhage is noted within the right frontal cortical infarct. Also noted is petechial hemorrhage in the right basal ganglia and right caudate nucleus but without surrounding vasogenic edema.         DX-PELVIS-1 OR 2 VIEWS   Final Result         1.  No acute traumatic bony injury.      DX-CHEST-PORTABLE (1 VIEW)   Final Result         1.  Left midlung and lower lobe infiltrates, similar to prior study.      EH-ZFQLOHO-2 VIEW   Final Result         1.  Nonspecific bowel gas pattern in the upper abdomen.   2.  Cardiomegaly   3.  Hazy left lower lobe infiltrates.   4.  Nasogastric tube tip terminates overlying the expected location of the gastric fundus.      IR-THROMBO MECHANICAL ARTERY,INIT   Final Result         62-year-old patient who presented with complete occlusion of the right ICA at its origin underwent emergent mechanical thrombectomy. After initial angioplasty,   a right cervical internal  carotid artery stent was deployed and right MCA thrombectomy was performed. The final angiographic images show complete restoration of flow into the right ICA and MCA with no evidence of distal intracranial embolization.      Final recanalization score: TICI 3      I, Petra Mobley was physically present and participated during the entire procedure of the IR-THROMBO MECHANICAL ARTERY,INIT.                  DX-CHEST-PORTABLE (1 VIEW)   Final Result      No acute cardiac or pulmonary abnormalities are identified.      CT-CTA NECK WITH & W/O-POST PROCESSING   Final Result      1.  Occlusion of the right internal carotid artery at its origin.      2.  Occlusion of the right vertebral artery at its origin.      Dr. Gongora discussed findings with Dr. Mccarthy.      CT-CTA HEAD WITH & W/O-POST PROCESS   Final Result         1.  Occlusion of the right internal carotid artery and right middle cerebral artery.   2.  Narrowed left M1 segment with severely narrowed and M2 and M3 branches, may represent chronic changes.      These findings were discussed with the patient's clinician, Joseline Mccarthy, on 1/12/2023 8:27 PM.      CT-CEREBRAL PERFUSION ANALYSIS   Final Result         1.  Cerebral blood flow less than 30% likely representing completed infarct = 52 mL.      2.  T Max more than 6 seconds likely representing combination of completed infarct and ischemia = 172 mL.      3.  Mismatched volume likely representing ischemic brain/penumbra = 120 mL      4.  Please note that the cerebral perfusion was performed on the limited brain tissue around the basal ganglia region. Infarct/ischemia outside the CT perfusion sections can be missed in this study.      CT-HEAD W/O   Final Result      1.  No evidence of intracranial hemorrhage, midline shift or mass effect.      2.  Question of hyperdense right MCA though evaluation limited by oblique positioning.                Assessment/Plan  * Acute ischemic right middle cerebral  artery (MCA) stroke (HCC)- (present on admission)  Assessment & Plan  Right MCA CVA with hemorrhagic conversion  Neurology consulted   High Intensity statin therapy   BP control  S/p right ICA stent and MCA thrombectomy 1/12/23  DAPT  TTE negative for shunt  Tobacco cessation  PT/OT/SLP - recommending post-acute placement  PEG placed 1/24     Internal carotid artery stent present  Assessment & Plan  Placed 1/12  Per Neuro OK to start DAPT: ASA & Plavix for 6 wks    Hypernatremia  Assessment & Plan  Patient continues to have hyponatremia with elevated sodium level.  Resolved with Free H2O 200ml q6  Cont to follow daily    Pneumonia due to infectious organism- (present on admission)  Assessment & Plan  Aspiration pneumonia (Developed 01/19/23)   Procal low  Sputum cultures neg  Strict aspiration precautions   Has completed 5 days of Abx's  MRSA nares neg  Mucomyst/3% normal saline nebs to maintain pulmonary toilet, RT protocol to continue  Pulmonology evaluated him and made recommendations          Oropharyngeal dysphagia- (present on admission)  Assessment & Plan  Secondary to acute stroke.    Aspiration precautions   PEG 1/24  Nutrition following    Acute left hemiparesis (HCC)- (present on admission)  Assessment & Plan  Secondary to right MCA stroke.    Pressure ulcer precautions  Aspiration, Fall precautions  PT and OT  Working toward Rehab DC    Stroke, hemorrhagic (HCC)- (present on admission)  Assessment & Plan  Ischemic stroke with hemorrhagic conversion.  Neuro has OKd for antiplatelet therapy with ASA and plavix given presence of new ICA stent    Toxic metabolic encephalopathy- (present on admission)  Assessment & Plan  Persistent 01/20/23  Secondary to stroke, delirium and now concern for relation to underlying pneumonia  Limit restraints as able  Avoid polypharmacy, sedating medications  Delirium precautions  Management of underlying medical conditions  Obtain EEG 01/20/23 and it did not show seizure  activity.  cont's to improve daily though examination is limited by dysarthria at times    Acute respiratory failure with hypoxia (HCC)- (present on admission)  Assessment & Plan       01/19/23 CXRAY consistent with Aspiration Pneumonia - Started on IV unasyn  Subsequently trnasitioned to [facility].  At present off Abx's  MRSA nares neg  trachael aspirate cultures neg  CTA chest neg for PE  TTE LVEF 70%, no signficant valvular or structural abnormalities  O2 demand decreasing: now off HFNC and on 6 LNC    Severe obesity (BMI 35.0-35.9 with comorbidity) (HCC)- (present on admission)  Assessment & Plan  Body mass index is 37.39 kg/m².  Co-morbid HTN, T2DM, KENDAL  Outpatient weight loss    Type 2 diabetes mellitus without complication, without long-term current use of insulin ([facility])- (present on admission)  Assessment & Plan  A1c 5.3  No indication for strict BG control - POCT/SSI deferred  Atorvastatin    Alcohol dependence with withdrawal ([facility])- (present on admission)  Assessment & Plan  Resolved.    Tobacco use disorder- (present on admission)  Assessment & Plan  On nicotine patch     Primary hypertension- (present on admission)  Assessment & Plan  Amlodipine 10 mg         VTE prophylaxis: enoxaparin ppx    I have performed a physical exam and reviewed and updated ROS and Plan today (1/25/2023). In review of yesterday's note (1/24/2023), there are no changes except as documented above.

## 2023-01-25 NOTE — CARE PLAN
The patient is Watcher - Medium risk of patient condition declining or worsening    Shift Goals  Clinical Goals: improve respiratory status, wean O2, get procedure  Patient Goals: comfort  Family Goals: RADHA    Progress made toward(s) clinical / shift goals:    Problem: Knowledge Deficit - Stroke Education  Goal: Patient's knowledge of stroke and risk factors will improve  Outcome: Progressing     Problem: Pain - Standard  Goal: Alleviation of pain or a reduction in pain to the patient’s comfort goal  Outcome: Progressing       Patient is not progressing towards the following goals:

## 2023-01-25 NOTE — PROGRESS NOTES
This RN participated in hot rounds. This RN questioned whether patient still needs thacker catheter. Per Dr. Flores, patient is unable to get up and walk, therefor the thacker catheter can be removed and a condom catheter placed. If it is not effective for the patient then the thacker catheter can be replaced. Orders verified and implemented.

## 2023-01-25 NOTE — DISCHARGE PLANNING
Case Management Discharge Planning    Admission Date: 1/12/2023  GMLOS: 7.3  ALOS: 13    6-Clicks ADL Score: 11  6-Clicks Mobility Score: 6  PT and/or OT Eval ordered: Yes  Post-acute Referrals Ordered: Yes  Post-acute Choice Obtained: Yes  Has referral(s) been sent to post-acute provider:  Yes      Anticipated Discharge Dispo: Discharge Disposition: D/T to SNF with Medicare cert in anticipation of skilled care (03)    DME Needed: No    Action(s) Taken: Updated Provider/Nurse on Discharge Plan    Escalations Completed: None    Medically Clear: No    Next Steps: RNCM participated in IDR and conducted a chart review.  Per provider, patient is not yet medically ready to transfer to next level of care r/t HFNC 30L/min with O2 sats around 95%.      SNFs: [facility] is review  IRF: [facility] is following    No formal acceptance from either facilities yet.  Case management will continue to follow for discharge planning needs.      Barriers to Discharge: Medical clearance

## 2023-01-26 LAB
ALBUMIN SERPL BCP-MCNC: 3.6 G/DL (ref 3.2–4.9)
ALBUMIN/GLOB SERPL: 1.1 G/DL
ALP SERPL-CCNC: 108 U/L (ref 30–99)
ALT SERPL-CCNC: 22 U/L (ref 2–50)
ANION GAP SERPL CALC-SCNC: 9 MMOL/L (ref 7–16)
AST SERPL-CCNC: 23 U/L (ref 12–45)
BILIRUB SERPL-MCNC: 0.6 MG/DL (ref 0.1–1.5)
BUN SERPL-MCNC: 21 MG/DL (ref 8–22)
CALCIUM ALBUM COR SERPL-MCNC: 9.6 MG/DL (ref 8.5–10.5)
CALCIUM SERPL-MCNC: 9.3 MG/DL (ref 8.5–10.5)
CHLORIDE SERPL-SCNC: 103 MMOL/L (ref 96–112)
CO2 SERPL-SCNC: 28 MMOL/L (ref 20–33)
CREAT SERPL-MCNC: 0.63 MG/DL (ref 0.5–1.4)
GFR SERPLBLD CREATININE-BSD FMLA CKD-EPI: 108 ML/MIN/1.73 M 2
GLOBULIN SER CALC-MCNC: 3.2 G/DL (ref 1.9–3.5)
GLUCOSE SERPL-MCNC: 145 MG/DL (ref 65–99)
POTASSIUM SERPL-SCNC: 3.6 MMOL/L (ref 3.6–5.5)
PROT SERPL-MCNC: 6.8 G/DL (ref 6–8.2)
SODIUM SERPL-SCNC: 140 MMOL/L (ref 135–145)

## 2023-01-26 PROCEDURE — 700102 HCHG RX REV CODE 250 W/ 637 OVERRIDE(OP): Performed by: INTERNAL MEDICINE

## 2023-01-26 PROCEDURE — A9270 NON-COVERED ITEM OR SERVICE: HCPCS | Performed by: INTERNAL MEDICINE

## 2023-01-26 PROCEDURE — A9270 NON-COVERED ITEM OR SERVICE: HCPCS | Performed by: NURSE PRACTITIONER

## 2023-01-26 PROCEDURE — A9270 NON-COVERED ITEM OR SERVICE: HCPCS | Performed by: HOSPITALIST

## 2023-01-26 PROCEDURE — 80053 COMPREHEN METABOLIC PANEL: CPT

## 2023-01-26 PROCEDURE — 770001 HCHG ROOM/CARE - MED/SURG/GYN PRIV*

## 2023-01-26 PROCEDURE — 99232 SBSQ HOSP IP/OBS MODERATE 35: CPT | Performed by: INTERNAL MEDICINE

## 2023-01-26 PROCEDURE — 92526 ORAL FUNCTION THERAPY: CPT

## 2023-01-26 PROCEDURE — 36415 COLL VENOUS BLD VENIPUNCTURE: CPT

## 2023-01-26 PROCEDURE — 700102 HCHG RX REV CODE 250 W/ 637 OVERRIDE(OP): Performed by: NURSE PRACTITIONER

## 2023-01-26 PROCEDURE — 700111 HCHG RX REV CODE 636 W/ 250 OVERRIDE (IP): Performed by: INTERNAL MEDICINE

## 2023-01-26 PROCEDURE — 700102 HCHG RX REV CODE 250 W/ 637 OVERRIDE(OP): Performed by: HOSPITALIST

## 2023-01-26 RX ADMIN — ASPIRIN 81 MG: 81 TABLET, CHEWABLE ORAL at 05:16

## 2023-01-26 RX ADMIN — ENOXAPARIN SODIUM 40 MG: 40 INJECTION SUBCUTANEOUS at 17:04

## 2023-01-26 RX ADMIN — CLOPIDOGREL BISULFATE 75 MG: 75 TABLET ORAL at 05:16

## 2023-01-26 RX ADMIN — ACETAMINOPHEN 650 MG: 325 TABLET ORAL at 15:25

## 2023-01-26 RX ADMIN — ATORVASTATIN CALCIUM 80 MG: 80 TABLET, FILM COATED ORAL at 17:04

## 2023-01-26 RX ADMIN — AMLODIPINE BESYLATE 10 MG: 10 TABLET ORAL at 05:16

## 2023-01-26 ASSESSMENT — COGNITIVE AND FUNCTIONAL STATUS - GENERAL
DRESSING REGULAR LOWER BODY CLOTHING: A LOT
WALKING IN HOSPITAL ROOM: TOTAL
MOBILITY SCORE: 8
MOVING FROM LYING ON BACK TO SITTING ON SIDE OF FLAT BED: UNABLE
TOILETING: TOTAL
SUGGESTED CMS G CODE MODIFIER DAILY ACTIVITY: CL
EATING MEALS: A LOT
MOVING TO AND FROM BED TO CHAIR: A LOT
DAILY ACTIVITIY SCORE: 10
SUGGESTED CMS G CODE MODIFIER MOBILITY: CM
CLIMB 3 TO 5 STEPS WITH RAILING: TOTAL
TURNING FROM BACK TO SIDE WHILE IN FLAT BAD: A LOT
HELP NEEDED FOR BATHING: TOTAL
DRESSING REGULAR UPPER BODY CLOTHING: A LOT
PERSONAL GROOMING: A LOT
STANDING UP FROM CHAIR USING ARMS: TOTAL

## 2023-01-26 ASSESSMENT — ENCOUNTER SYMPTOMS
SPEECH CHANGE: 1
COUGH: 1
FOCAL WEAKNESS: 1
SHORTNESS OF BREATH: 0

## 2023-01-26 ASSESSMENT — PAIN DESCRIPTION - PAIN TYPE
TYPE: ACUTE PAIN

## 2023-01-26 NOTE — PROGRESS NOTES
Hospital Medicine Daily Progress Note    Date of Service  1/26/2023    Chief Complaint  Kaiden Quirzo is a 62 y.o. male admitted 1/12/2023 with L side weakness    Hospital Course  61yo PMHx HTN, T2DM, ongoing tobacco use, BMI 36, ETOH.  Presenting with L side weakness on 1/12.  [place] not to be TNK candidate.  Taken to IR for MCA trhombectomy with TICI 3 post and R ICA stent.  Had 10 sec pause intra procedure but resolved before CPR was initiated.  Was intubated.  In ICU required Precedex for ETOH withdrawal.  PEG placed 1/24.  In 10-14 days, the anchor button suture or sutures should be cut, releasing the anchor or anchors into the stomach. The suture anchors will then pass through the GI tract as desired. This can be performed in the interventional radiology department if   desired. Call [phone] any weekday [phone] hours to make an appointment.    Interval Problem Update    Gtube placement yesterday, denies pain  Working on dc planning  On 4L NC, still has cough but denies SOB    I have discussed this patient's plan of care and discharge plan at IDT rounds today with Case Management, Nursing, Nursing leadership, and other members of the IDT team.    Consultants/Specialty  neurology    Code Status  Full Code    Disposition  Patient is not medically cleared for discharge.   Anticipate discharge to to an inpatient rehabilitation hospital.  I have placed the appropriate orders for post-discharge needs.    Review of Systems  Review of Systems   Respiratory:  Positive for cough. Negative for shortness of breath.    Neurological:  Positive for speech change and focal weakness.      Physical Exam  Temp:  [36.2 °C (97.2 °F)-36.7 °C (98 °F)] 36.2 °C (97.2 °F)  Pulse:  [27-68] 68  Resp:  [18-43] 18  BP: ()/(54-83) 127/72  SpO2:  [95 %-98 %] 96 %    Physical Exam  Constitutional:       General: He is not in acute distress.     Appearance: Normal appearance. He is well-developed. He is not diaphoretic.   HENT:       Head: Normocephalic and atraumatic.   Eyes:      General: No scleral icterus.     Conjunctiva/sclera: Conjunctivae normal.   Neck:      Vascular: No JVD.   Cardiovascular:      Rate and Rhythm: Normal rate.      Heart sounds: No murmur heard.    No gallop.   Pulmonary:      Effort: Pulmonary effort is normal. No respiratory distress.      Breath sounds: No stridor. No wheezing or rales.   Abdominal:      Palpations: Abdomen is soft.      Tenderness: There is no abdominal tenderness. There is no guarding or rebound.   Musculoskeletal:         General: No tenderness.      Right lower leg: No edema.      Left lower leg: No edema.   Skin:     General: Skin is warm and dry.      Capillary Refill: Capillary refill takes less than 2 seconds.      Coloration: Skin is not jaundiced or pale.      Findings: No rash.   Neurological:      Mental Status: He is alert and oriented to person, place, and time.      Comments: Pt does not attend to L side even with prompting and stim  Speech is dysarthric but ineligible with patience.  Content logical  +L side facial droop  L arm and leg flacid  Purposeful and 5/5 R upper and lower   Psychiatric:         Mood and Affect: Mood normal. Affect is flat.         Behavior: Behavior normal.         Thought Content: Thought content normal.       Fluids    Intake/Output Summary (Last 24 hours) at 1/26/2023 0722  Last data filed at 1/26/2023 0500  Gross per 24 hour   Intake 1180 ml   Output 1250 ml   Net -70 ml         Laboratory  Recent Labs     01/24/23  0443   WBC 10.2   RBC 3.87*   HEMOGLOBIN 12.8*   HEMATOCRIT 37.4*   MCV 96.6   MCH 33.1*   MCHC 34.2   RDW 41.7   PLATELETCT 247   MPV 10.4       Recent Labs     01/24/23  0443 01/25/23  0335   SODIUM 142 136   POTASSIUM 3.9 4.0   CHLORIDE 107 102   CO2 25 26   GLUCOSE 105* 140*   BUN 17 19   CREATININE 0.55 0.60   CALCIUM 8.9 8.8                     Imaging  IR-GASTROSTOMY PLACEMENT   Final Result      1.  Fluoroscopic guided percutaneous  gastrostomy with placement of an 18-[place] ELISE balloon gastrostomy catheter.      2. The gastrostomy tube may be used for tube feeds 18 hours after placement. Thereafter, liquid diet orders per the referring physician. Saline flush daily as per protocol.      3. In 10-14 days, the anchor button suture or sutures should be cut, releasing the anchor or anchors into the stomach. The suture anchors will then pass through the GI tract as desired. This can be performed in the interventional radiology department if    desired. Call [phone] any weekday [phone] hours to make an appointment.      CT-CTA CHEST PULMONARY ARTERY W/ RECONS   Final Result         1.  No pulmonary embolus appreciated.   2.  Linear densities the bilateral lung bases suggests changes of atelectasis, component of infiltrate is not excluded.   3.  Atherosclerosis and atherosclerotic coronary artery disease.      US-EXTREMITY VENOUS LOWER BILAT   Final Result      DX-ABDOMEN FOR TUBE PLACEMENT   Final Result      NG tube extends below the diaphragm into the region of the stomach. The tip is not visualized.      EC-ECHOCARDIOGRAM LTD W/ CONT   Final Result      DX-CHEST-PORTABLE (1 VIEW)   Final Result         1. No significant interval change.      CT-ABDOMEN-PELVIS W/O   Final Result         1.  Nondependent foci of air in the bladder, could represent changes from recent instrumentation or urinary tract infection.   2.  Diverticulosis   3.  Atherosclerosis and atherosclerotic coronary artery disease      DX-CHEST-PORTABLE (1 VIEW)   Final Result      Ill-defined atelectasis versus consolidation in the left lower lung. Pneumonia can be considered in appropriate clinical setting.         DX-ABDOMEN FOR TUBE PLACEMENT   Final Result         1.  Nonspecific bowel gas pattern in the upper abdomen.   2.  Nasogastric tube tip terminates overlying the expected location of the gastric body.   3.  Cardiomegaly   4.  Hazy linear density left lung base could  represent atelectasis or infiltrate.      CT-HEAD W/O   Final Result         1.  Evolving infarct/encephalomalacia in the right MCA distribution.   2.  Areas of hemorrhage in the right basal ganglia and involving the right frontal and anterior temporal sulci, similar compared to MRI January 13, 2023   3.  Atherosclerosis.      DX-CHEST-PORTABLE (1 VIEW)   Final Result         Diffuse interstitial and groundglass opacity throughout both lungs could relate to developing pulmonary edema.      DX-ABDOMEN FOR TUBE PLACEMENT   Final Result         Gastric drainage tube with tip projecting over the expected area of the stomach.      DX-CHEST-PORTABLE (1 VIEW)   Final Result      1.  Interval extubation without pneumothorax.   2.  Stable enlarged cardiac silhouette.   3.  There is bibasilar linear atelectasis.      EC-ECHOCARDIOGRAM COMPLETE W/ CONT   Final Result      MR-BRAIN-W/O   Final Result         Acute infarction involving the right frontal region, right basal ganglia and right caudate nucleus. Minimal punctate infarction involving the right parietal cortex. There is slight mass effect upon the right lateral ventricle with only minimal midline    shift measuring approximately 4 mm.      Petechial hemorrhage is noted within the right frontal cortical infarct. Also noted is petechial hemorrhage in the right basal ganglia and right caudate nucleus but without surrounding vasogenic edema.         DX-PELVIS-1 OR 2 VIEWS   Final Result         1.  No acute traumatic bony injury.      DX-CHEST-PORTABLE (1 VIEW)   Final Result         1.  Left midlung and lower lobe infiltrates, similar to prior study.      [id number] VIEW   Final Result         1.  Nonspecific bowel gas pattern in the upper abdomen.   2.  Cardiomegaly   3.  Hazy left lower lobe infiltrates.   4.  Nasogastric tube tip terminates overlying the expected location of the gastric fundus.      IR-THROMBO MECHANICAL ARTERY,INIT   Final Result         62-year-old  patient who presented with complete occlusion of the right ICA at its origin underwent emergent mechanical thrombectomy. After initial angioplasty,   a right cervical internal carotid artery stent was deployed and right MCA thrombectomy was performed. The final angiographic images show complete restoration of flow into the right ICA and MCA with no evidence of distal intracranial embolization.      Final recanalization score: TICI 3      I, Petra Mobley was physically present and participated during the entire procedure of the IR-THROMBO MECHANICAL ARTERY,INIT.                  DX-CHEST-PORTABLE (1 VIEW)   Final Result      No acute cardiac or pulmonary abnormalities are identified.      CT-CTA NECK WITH & W/O-POST PROCESSING   Final Result      1.  Occlusion of the right internal carotid artery at its origin.      2.  Occlusion of the right vertebral artery at its origin.      Dr. Gongora discussed findings with Dr. Mccarthy.      CT-CTA HEAD WITH & W/O-POST PROCESS   Final Result         1.  Occlusion of the right internal carotid artery and right middle cerebral artery.   2.  Narrowed left M1 segment with severely narrowed and M2 and M3 branches, may represent chronic changes.      These findings were discussed with the patient's clinician, Joseline Mccarthy, on 1/12/2023 8:27 PM.      CT-CEREBRAL PERFUSION ANALYSIS   Final Result         1.  Cerebral blood flow less than 30% likely representing completed infarct = 52 mL.      2.  T Max more than 6 seconds likely representing combination of completed infarct and ischemia = 172 mL.      3.  Mismatched volume likely representing ischemic brain/penumbra = 120 mL      4.  Please note that the cerebral perfusion was performed on the limited brain tissue around the basal ganglia region. Infarct/ischemia outside the CT perfusion sections can be missed in this study.      CT-HEAD W/O   Final Result      1.  No evidence of intracranial hemorrhage, midline shift or mass  effect.      2.  Question of hyperdense right MCA though evaluation limited by oblique positioning.                Assessment/Plan  * Acute ischemic right middle cerebral artery (MCA) stroke (HCC)- (present on admission)  Assessment & Plan  Right MCA CVA with hemorrhagic conversion  Neurology consulted   High Intensity statin therapy   BP control  S/p right ICA stent and MCA thrombectomy 1/12/23  DAPT  TTE negative for shunt  Tobacco cessation  PT/OT/SLP - recommending post-acute placement  PEG placed 1/24     Internal carotid artery stent present  Assessment & Plan  Placed 1/12  Per Neuro OK to start DAPT: ASA & Plavix for 6 wks    Hypernatremia  Assessment & Plan  Patient continues to have hyponatremia with elevated sodium level.  Resolved with Free H2O 200ml q6  Cont to follow daily    Pneumonia due to infectious organism- (present on admission)  Assessment & Plan  Aspiration pneumonia (Developed 01/19/23)   Procal low  Sputum cultures neg  Strict aspiration precautions   Has completed 5 days of Abx's  MRSA nares neg  Mucomyst/3% normal saline nebs to maintain pulmonary toilet, RT protocol to continue  Pulmonology evaluated him and made recommendations          Oropharyngeal dysphagia- (present on admission)  Assessment & Plan  Secondary to acute stroke.    Aspiration precautions   PEG 1/24  Nutrition following    Acute left hemiparesis (HCC)- (present on admission)  Assessment & Plan  Secondary to right MCA stroke.    Pressure ulcer precautions  Aspiration, Fall precautions  PT and OT  Working toward Rehab DC    Stroke, hemorrhagic (HCC)- (present on admission)  Assessment & Plan  Ischemic stroke with hemorrhagic conversion.  Neuro has OKd for antiplatelet therapy with ASA and plavix given presence of new ICA stent    Toxic metabolic encephalopathy- (present on admission)  Assessment & Plan  Persistent 01/20/23  Secondary to stroke, delirium and now concern for relation to underlying pneumonia  Limit restraints  as able  Avoid polypharmacy, sedating medications  Delirium precautions  Management of underlying medical conditions  Obtain EEG 01/20/23 and it did not show seizure activity.  cont's to improve daily though examination is limited by dysarthria at times    Acute respiratory failure with hypoxia (HCC)- (present on admission)  Assessment & Plan       01/19/23 CXRAY consistent with Aspiration Pneumonia - Started on IV unasyn  Subsequently trnasitioned to [facility].  At present off Abx's  MRSA nares neg  trachael aspirate cultures neg  CTA chest neg for PE  TTE LVEF 70%, no signficant valvular or structural abnormalities  O2 demand decreasing: now off HFNC and on 4 LNC    Severe obesity (BMI 35.0-35.9 with comorbidity) (HCC)- (present on admission)  Assessment & Plan  Body mass index is 37.39 kg/m².  Co-morbid HTN, T2DM, KENDAL  Outpatient weight loss    Type 2 diabetes mellitus without complication, without long-term current use of insulin (HCC)- (present on admission)  Assessment & Plan  A1c 5.3  No indication for strict BG control - POCT/SSI deferred  Atorvastatin    Alcohol dependence with withdrawal (HCC)- (present on admission)  Assessment & Plan  Resolved.    Tobacco use disorder- (present on admission)  Assessment & Plan  On nicotine patch     Primary hypertension- (present on admission)  Assessment & Plan  Amlodipine 10 mg         VTE prophylaxis: enoxaparin ppx    I have performed a physical exam and reviewed and updated ROS and Plan today (1/26/2023). In review of yesterday's note (1/25/2023), there are no changes except as documented above.

## 2023-01-26 NOTE — CARE PLAN
The patient is Watcher - Medium risk of patient condition declining or worsening    Shift Goals  Clinical Goals: wean oxygen, remove NGT  Patient Goals: comfort  Family Goals: RADHA    Progress made toward(s) clinical / shift goals:    Problem: Knowledge Deficit - Stroke Education  Goal: Patient's knowledge of stroke and risk factors will improve  Outcome: Progressing     Problem: Pain - Standard  Goal: Alleviation of pain or a reduction in pain to the patient’s comfort goal  Outcome: Progressing       Patient is not progressing towards the following goals:

## 2023-01-26 NOTE — CARE PLAN
The patient is Stable - Low risk of patient condition declining or worsening    Shift Goals  Clinical Goals: wean oxygen, remove NGT  Patient Goals: comfort  Family Goals: RADHA    Progress made toward(s) clinical / shift goals: Pt weaned off HHFNC to 4L NC. NGT removed and tube feeds placed to G tube. Mcclendon removed at 13:00. Bladder scan at 18:00 350, encouraging pt to void at this time. Condom cath in place. Will continue to monitor closely. Endorsed to oncoming PINA Santiago.       Problem: Optimal Care of the Stroke Patient  Goal: Optimal acute care for the stroke patient  Outcome: Progressing     Problem: Respiratory - Stroke Patient  Goal: Patient will achieve/maintain optimum respiratory rate/effort  Outcome: Progressing     Problem: Risk for Aspiration  Goal: Patient's risk for aspiration will be absent or decrease  Outcome: Progressing     Problem: Urinary Elimination  Goal: Establish and maintain regular urinary output  Outcome: Progressing     Problem: Bowel Elimination  Goal: Establish and maintain regular bowel function  Outcome: Progressing     Problem: Mobility - Stroke  Goal: Patient's capacity to carry out activities will improve  Outcome: Progressing  Goal: Spasticity will be prevented or improved  Outcome: Progressing  Goal: Subluxation will be prevented or improved  Outcome: Progressing     Problem: Knowledge Deficit - Standard  Goal: Patient and family/care givers will demonstrate understanding of plan of care, disease process/condition, diagnostic tests and medications  Outcome: Progressing     Problem: Pain - Standard  Goal: Alleviation of pain or a reduction in pain to the patient’s comfort goal  Outcome: Progressing     Problem: Skin Integrity  Goal: Skin integrity is maintained or improved  Outcome: Progressing

## 2023-01-26 NOTE — DISCHARGE PLANNING
Agency/Facility Name: [facility]  Spoke To: Nohemy  Outcome: DPA asked facility to look over pts referral and insurance. Per Nohemy, she will need pts SS# to check insurance. RN MYNOR to get SS# from pt and call Nohemy back     RN CM notified

## 2023-01-26 NOTE — DISCHARGE PLANNING
Call placed to Cori, spouse to f/u regarding single level dwelling as well as D/C resources/support.  Will discuss this case with Administration this am.    4389-Kaiden has made little improvement with TX.  He will need to be able to take a few steps to further considered for Acute Rehab.  TCC will no longer follow.  Please reach out to myself with any interval changes/questions.

## 2023-01-26 NOTE — DISCHARGE PLANNING
Case Management Discharge Planning    Admission Date: 1/12/2023  GMLOS: 7.3  ALOS: 14    6-Clicks ADL Score: 10  6-Clicks Mobility Score: 8  PT and/or OT Eval ordered: Yes  Post-acute Referrals Ordered: Yes  Post-acute Choice Obtained: Yes  Has referral(s) been sent to post-acute provider:  Yes      Anticipated Discharge Dispo: Discharge Disposition: D/T to SNF with Medicare cert in anticipation of skilled care (03)    DME Needed: No    Action(s) Taken: SNF referral is pending with [facility], all other [place] SNFs have declined.  Most [facility] SNFs have declined due to insurance.  [facility] needs the patient's SSN to run the insurance.  RN CM met with the patient at bedside, patient reported that his SSN is .  PINA LOWE called Luz at [facility] and provided the SSN, Luz stated that she will call back after running the insurance.  RN CM sent an updated referral to [facility] as well (they had previously declined due to care exceeding capacity).    Escalations Completed: Pending Discharge Destination    Medically Clear: No    Next Steps: Care coordination to follow up with SNF referrals.    Barriers to Discharge: Medical clearance and Pending Placement    Is the patient up for discharge tomorrow: No

## 2023-01-26 NOTE — THERAPY
"Speech Language Pathology  Daily Treatment     Patient Name: Kaiden Quiroz  Age:  62 y.o., Sex:  male  Medical Record #: [id number]  Today's Date: 1/26/2023     Precautions  Precautions: Fall Risk, PEG Tube, Swallow Precautions ( See Comments)  Comments: L sided hemiparesis    HPI: 62 y.o. 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 He is now s/p TICI 3 mechanical thrombectomy on R ICA and M1 clot, and s/p R ICA stent placement. CSE 1/13.  PEG 1/24.     Subjective  Pt agreeable and cooperative with SLP tx tasks, more awake than previously noted by SLP, participation more consistent. \"Yeah I have this tube now. They told me how to flush it.\"    Assessment  Education provided concerning elevated risk for aspiration and related sequela. Pt verbalized agreement and understanding. Oral care provided; pt stated he has been receiving oral care w/ RN. PO administered: LQ3 via 1/2 tsp x2, TN0 via tsp x2, ice chips x10. Pt with mildly impaired oral acceptance, improved throughout session. No anterior spillage this date. Incomplete AP transfer and bolus formation with LQ3 given trace-min diffuse lingual residue, cleared with subsequent swallows. Throat clear x1 with ice chips; no other overt s/sx of aspiration (pt is at high risk for dysphagia, this cannot r/o silent aspiration). Suspected delay of swallow given oral excursion and speech with no observable bolus in oral cavity before appreciable swallow x2. Swallow was appreciated throughout. Pt educated on need for good oral care and approved for ice after oral care w/ RN. PT verbalized understanding and agreement.     Clinical Impressions  Pt presents with improvements in clinical presentation but continues to be at high risk for dysphagia and related sequela given limited mobility, dependence for oral care, fluctuating respiratory needs, and impaired cognition. Pt will likely need instrumentation prior to initiation of PO and " "may be appropriate for instrumentation in coming days. SLP will follow.      Recommendations  1.  NPO/PEG   2.  Medication: Non-oral  2.  Swallowing Instructions & Precautions For Ice Chips with RN:  Oral Care PRIOR to ice chips: Q4h  Supervision: 1:1 with trained staff  Positioning: HOB at 90*  Strategies: One at a time via tsp, after oral care only !!  3.  SLP will follow for dysphagia management and to complete dx evaluation and cognitive-linguistic as indicated.         Plan    Treatment plan modified to 3 times per week until therapy goals are met for the following treatments:  Dysphagia Training and Patient / Family / Caregiver Education.    Discharge Recommendations: Recommend post-acute placement for additional speech therapy services prior to discharge home       Objective   01/26/23 1140   Vitals   Pulse Oximetry 96 %   O2 (LPM) 4   O2 Delivery Device Silicone Nasal Cannula   Cognitive-Linguistic   Level of Consciousness Alert   Dysphagia    Diet / Liquid Recommendation NPO;Pre-Feeding Trials with SLP Only   Nutritional Liquid Intake Rating Scale Nothing by mouth   Nutritional Food Intake Rating Scale Nothing by mouth   Nursing Communication Swallow Precaution Sign Posted at Head of Bed   Recommended Route of Medication Administration   Medication Administration  Via Gastric Tube   Patient / Family Goals   Patient / Family Goal #1 EDITED 1/19 - \"Some nice cold water.\"   Goal #1 Outcome Progressing slower than expected   Short Term Goals   Short Term Goal # 1 Pt will consume prefeeding trials without any overt s/sx of aspiration   Goal Outcome # 1 Progressing as expected   Short Term Goal # 2 Pt will complete instrumental evaluation of swallow with SLP to further assess swallow function and determine POC.   Goal Outcome # 2  Goal not met   Education Group   Education Provided Dysphagia   Dysphagia Patient Response Patient;Acceptance;Explanation;Verbal Demonstration;Action Demonstration;Reinforcement Needed "   Interdisciplinary Plan of Care Collaboration   IDT Collaboration with  Nursing   Collaboration Comments RN updated. Swallow precautions hung

## 2023-01-26 NOTE — PROGRESS NOTES
4 Eyes Skin Assessment Completed by PINA Cao and PINA Everett.    Head WDL  Ears WDL  Nose Redness  Mouth WDL  Neck WDL  Breast/Chest WDL  Shoulder Blades Redness  Spine WDL  (R) Arm/Elbow/Hand WDL  (L) Arm/Elbow/Hand Redness, with white spots (picture below)  Abdomen Scar  Groin WDL  Scrotum/Coccyx/Buttocks Redness, Blanching, and Scab  (R) Leg Scab  (L) Leg Scab  (R) Heel/Foot/Toe Redness and Blanching  (L) Heel/Foot/Toe Redness and Blanching          Devices In Places Blood Pressure Cuff, Pulse Ox, Mcclendon, SCD's, Nasal Cannula, and G Tube      Interventions In Place Gray Ear Foams, Heel Mepilex, Heel Float Boots, Waffle Overlay, TAP System, Pillows, Elbow Mepilex, Q2 Turns, Barrier Cream, Heels Loaded W/Pillows, and Pressure Redistribution Mattress    Possible Skin Injury No    Pictures Uploaded Into Epic Yes  Wound Consult Placed N/A  RN Wound Prevention Protocol Ordered Yes

## 2023-01-26 NOTE — CARE PLAN
Problem: Dysphagia  Goal: Dysphagia will improve  Outcome: Not Met     Problem: Optimal Care of the Stroke Patient  Goal: Optimal acute care for the stroke patient  Outcome: Progressing     Problem: Knowledge Deficit - Stroke Education  Goal: Patient's knowledge of stroke and risk factors will improve  Outcome: Progressing     Problem: Psychosocial - Patient Condition  Goal: Patient's ability to verbalize feelings about condition will improve  Outcome: Progressing     Problem: Discharge Planning - Stroke  Goal: Ensure Stroke Core Measures are met prior to discharge  Outcome: Progressing     Problem: Neuro Status  Goal: Neuro status will remain stable or improve  Outcome: Progressing     Problem: Respiratory - Stroke Patient  Goal: Patient will achieve/maintain optimum respiratory rate/effort  Outcome: Progressing   The patient is Stable - Low risk of patient condition declining or worsening    Shift Goals  Clinical Goals: (P) Maintain skin integrity  Patient Goals: (P) RADHA  Family Goals: (P) RADHA    Progress made toward(s) clinical / shift goals:  understands poc. Stable neuro staus    Patient is not progressing towards the following goals: continued dysphagia        Problem: Dysphagia  Goal: Dysphagia will improve  Outcome: Not Met

## 2023-01-27 LAB
ALBUMIN SERPL BCP-MCNC: 3.5 G/DL (ref 3.2–4.9)
ALBUMIN/GLOB SERPL: 1 G/DL
ALP SERPL-CCNC: 109 U/L (ref 30–99)
ALT SERPL-CCNC: 27 U/L (ref 2–50)
ANION GAP SERPL CALC-SCNC: 11 MMOL/L (ref 7–16)
AST SERPL-CCNC: 26 U/L (ref 12–45)
BILIRUB SERPL-MCNC: 0.5 MG/DL (ref 0.1–1.5)
BUN SERPL-MCNC: 22 MG/DL (ref 8–22)
CALCIUM ALBUM COR SERPL-MCNC: 9.7 MG/DL (ref 8.5–10.5)
CALCIUM SERPL-MCNC: 9.3 MG/DL (ref 8.5–10.5)
CHLORIDE SERPL-SCNC: 102 MMOL/L (ref 96–112)
CO2 SERPL-SCNC: 24 MMOL/L (ref 20–33)
CREAT SERPL-MCNC: 0.51 MG/DL (ref 0.5–1.4)
GFR SERPLBLD CREATININE-BSD FMLA CKD-EPI: 115 ML/MIN/1.73 M 2
GLOBULIN SER CALC-MCNC: 3.5 G/DL (ref 1.9–3.5)
GLUCOSE SERPL-MCNC: 138 MG/DL (ref 65–99)
POTASSIUM SERPL-SCNC: 3.8 MMOL/L (ref 3.6–5.5)
PROT SERPL-MCNC: 7 G/DL (ref 6–8.2)
SODIUM SERPL-SCNC: 137 MMOL/L (ref 135–145)

## 2023-01-27 PROCEDURE — A9270 NON-COVERED ITEM OR SERVICE: HCPCS | Performed by: INTERNAL MEDICINE

## 2023-01-27 PROCEDURE — 36415 COLL VENOUS BLD VENIPUNCTURE: CPT

## 2023-01-27 PROCEDURE — 700102 HCHG RX REV CODE 250 W/ 637 OVERRIDE(OP): Performed by: INTERNAL MEDICINE

## 2023-01-27 PROCEDURE — 97530 THERAPEUTIC ACTIVITIES: CPT | Mod: CO

## 2023-01-27 PROCEDURE — 700102 HCHG RX REV CODE 250 W/ 637 OVERRIDE(OP): Performed by: HOSPITALIST

## 2023-01-27 PROCEDURE — 80053 COMPREHEN METABOLIC PANEL: CPT

## 2023-01-27 PROCEDURE — A9270 NON-COVERED ITEM OR SERVICE: HCPCS | Performed by: HOSPITALIST

## 2023-01-27 PROCEDURE — 99232 SBSQ HOSP IP/OBS MODERATE 35: CPT | Performed by: INTERNAL MEDICINE

## 2023-01-27 PROCEDURE — 97530 THERAPEUTIC ACTIVITIES: CPT

## 2023-01-27 PROCEDURE — 770006 HCHG ROOM/CARE - MED/SURG/GYN SEMI*

## 2023-01-27 PROCEDURE — 97112 NEUROMUSCULAR REEDUCATION: CPT | Mod: CO

## 2023-01-27 PROCEDURE — 700111 HCHG RX REV CODE 636 W/ 250 OVERRIDE (IP): Performed by: INTERNAL MEDICINE

## 2023-01-27 RX ORDER — ACETAMINOPHEN 325 MG/1
650 TABLET ORAL EVERY 6 HOURS PRN
Status: DISCONTINUED | OUTPATIENT
Start: 2023-01-27 | End: 2023-02-09

## 2023-01-27 RX ADMIN — ENOXAPARIN SODIUM 40 MG: 40 INJECTION SUBCUTANEOUS at 17:25

## 2023-01-27 RX ADMIN — AMLODIPINE BESYLATE 10 MG: 10 TABLET ORAL at 04:56

## 2023-01-27 RX ADMIN — ACETAMINOPHEN 650 MG: 325 TABLET, FILM COATED ORAL at 12:21

## 2023-01-27 RX ADMIN — ASPIRIN 81 MG: 81 TABLET, CHEWABLE ORAL at 04:56

## 2023-01-27 RX ADMIN — CLOPIDOGREL BISULFATE 75 MG: 75 TABLET ORAL at 04:56

## 2023-01-27 RX ADMIN — SENNOSIDES AND DOCUSATE SODIUM 1 TABLET: 50; 8.6 TABLET ORAL at 17:25

## 2023-01-27 RX ADMIN — ATORVASTATIN CALCIUM 80 MG: 80 TABLET, FILM COATED ORAL at 17:25

## 2023-01-27 RX ADMIN — POLYETHYLENE GLYCOL 3350 1 PACKET: 17 POWDER, FOR SOLUTION ORAL at 17:25

## 2023-01-27 ASSESSMENT — COGNITIVE AND FUNCTIONAL STATUS - GENERAL
MOBILITY SCORE: 6
SUGGESTED CMS G CODE MODIFIER DAILY ACTIVITY: CL
STANDING UP FROM CHAIR USING ARMS: TOTAL
HELP NEEDED FOR BATHING: TOTAL
MOVING TO AND FROM BED TO CHAIR: UNABLE
DRESSING REGULAR UPPER BODY CLOTHING: A LOT
CLIMB 3 TO 5 STEPS WITH RAILING: TOTAL
PERSONAL GROOMING: A LOT
WALKING IN HOSPITAL ROOM: TOTAL
DAILY ACTIVITIY SCORE: 10
SUGGESTED CMS G CODE MODIFIER MOBILITY: CN
TOILETING: TOTAL
MOVING FROM LYING ON BACK TO SITTING ON SIDE OF FLAT BED: UNABLE
DRESSING REGULAR LOWER BODY CLOTHING: A LOT
TURNING FROM BACK TO SIDE WHILE IN FLAT BAD: UNABLE
EATING MEALS: A LOT

## 2023-01-27 ASSESSMENT — ENCOUNTER SYMPTOMS
COUGH: 1
FOCAL WEAKNESS: 1
SHORTNESS OF BREATH: 0
SPEECH CHANGE: 1

## 2023-01-27 ASSESSMENT — PAIN DESCRIPTION - PAIN TYPE
TYPE: ACUTE PAIN

## 2023-01-27 ASSESSMENT — GAIT ASSESSMENTS: GAIT LEVEL OF ASSIST: UNABLE TO PARTICIPATE

## 2023-01-27 NOTE — THERAPY
"Physical Therapy   Daily Treatment     Patient Name: Kaiden Quiroz  Age:  62 y.o., Sex:  male  Medical Record #: [id number]  Today's Date: 1/27/2023     Precautions  Precautions: (P) Fall Risk  Comments: (P) L side hemiparesis    Assessment    Pt tolerates treatment session fairly well; reports abdominal pain in sitting and at rest following session. Pt requires max assist for supine to/from sit EOB and sit to stand at EOB with B assist. Pt with increased anxiety with standing, on initial stand reports \"back down back down,\" on second stand tolerates 5 seconds of static stand. Pt will benefit from continued PT services in acute setting, as well as in post acute setting.       Plan    Physical Therapy Treatment Plan  Physical Therapy Treatment Plan: (P) Continue Current Treatment Plan    DC Equipment Recommendations: (P) Unable to determine at this time  Discharge Recommendations: (P) Recommend post-acute placement for additional physical therapy services prior to discharge home      Subjective    \"People can just break into your place easier when you live on the first floor.'     Objective       01/27/23 1037   Charge Group   Charges  Yes   PT Therapeutic Activities (Units) 2   Total Time Spent   PT Total Time Yes   PT Therapeutic Activities Time Spent (Mins) 27   Precautions   Precautions Fall Risk   Comments L side hemiparesis   Vitals   O2 (LPM) 2   O2 Delivery Device Silicone Nasal Cannula   Pain 0 - 10 Group   Location Abdomen   Therapist Pain Assessment Post Activity Pain Same as Prior to Activity   Cognition    Cognition / Consciousness X   Speech/ Communication Delayed Responses   Level of Consciousness Alert   Ability To Follow Commands 1 Step   Safety Awareness Impaired   New Learning Impaired   Attention Impaired   Sequencing Impaired   Initiation Impaired   Neurological Concerns   Neurological Concerns Yes   Sitting Posture During ADL's Lateral Lean Left;Posterior Lean   Balance   Sitting Balance " (Static) Poor   Sitting Balance (Dynamic) Poor -   Standing Balance (Static) Trace +   Standing Balance (Dynamic) Trace   Weight Shift Sitting Poor   Weight Shift Standing Poor   Skilled Intervention Verbal Cuing;Tactile Cuing;Sequencing;Compensatory Strategies;Facilitation   Bed Mobility    Supine to Sit Maximal Assist   Sit to Supine Maximal Assist   Scooting Maximal Assist   Skilled Intervention Verbal Cuing;Tactile Cuing;Compensatory Strategies;Facilitation   Gait Analysis   Gait Level Of Assist Unable to Participate   Functional Mobility   Sit to Stand Maximal Assist   Mobility sitting EOB 20 minutes, 2 sit to stand attempts   Skilled Intervention Verbal Cuing;Sequencing;Tactile Cuing;Compensatory Strategies;Facilitation   How much difficulty does the patient currently have...   Turning over in bed (including adjusting bedclothes, sheets and blankets)? 1   Sitting down on and standing up from a chair with arms (e.g., wheelchair, bedside commode, etc.) 1   Moving from lying on back to sitting on the side of the bed? 1   How much help from another person does the patient currently need...   Moving to and from a bed to a chair (including a wheelchair)? 1   Need to walk in a hospital room? 1   Climbing 3-5 steps with a railing? 1   6 clicks Mobility Score 6   Activity Tolerance   Sitting in Chair NT   Sitting Edge of Bed 20 min   Standing 15 seconds   Short Term Goals    Short Term Goal # 1 Pt will transition from supine to EOB w/ Bandar in 6 visits to improve independence in bed mobility   Goal Outcome # 1 Progressing slower than expected   Short Term Goal # 2 Pt will transfer from EOB to chair w/ FWW w/ ModA in 6 visits to improve OOB mobility   Goal Outcome # 2 Progressing slower than expected   Short Term Goal # 3 Pt will perform a STS w/ FWW w/ Bandar in 6 visits to improve OOB mobility   Goal Outcome # 3 Progressing slower than expected   Short Term Goal # 4 Pt will be able to ambulate 15 ft w/ FWW w/ ModA in 6  visits to access household distances   Goal Outcome # 4 Goal not met   Short Term Goal # 5 Pt will be able to complete 4 steps w/ FWW w/Mod A in 6 visits to access household   Goal Outcome # 5 Goal not met   Education Group   Education Provided Transfer Status   Transfer Status Patient Response Patient;Acceptance;Explanation;Verbal Demonstration;Action Demonstration;Reinforcement Needed   Physical Therapy Treatment Plan   Physical Therapy Treatment Plan Continue Current Treatment Plan   Anticipated Discharge Equipment and Recommendations   DC Equipment Recommendations Unable to determine at this time   Discharge Recommendations Recommend post-acute placement for additional physical therapy services prior to discharge home   Interdisciplinary Plan of Care Collaboration   IDT Collaboration with  Nursing;Certified O.T. Assistant  (SANCHEZ)   Patient Position at End of Therapy In Bed;Call Light within Reach;Tray Table within Reach;Phone within Reach   Collaboration Comments RN updated.   Session Information   Date / Session Number  1/27 -3  (2/4; 1/29)     Aurelia Nesbitt DPT

## 2023-01-27 NOTE — WOUND TEAM
[facility]  Inpatient Services  Initial Wound and Skin Care Evaluation    Admission Date: 2023     Last order of IP CONSULT TO WOUND CARE was found on 2023 from Hospital Encounter on 2023     HPI, PMH, SH: Reviewed    History reviewed. No pertinent surgical history.  Social History     Tobacco Use    Smoking status: Former     Packs/day: 0.50     Years: 46.00     Pack years: 23.00     Types: Cigarettes     Start date:      Quit date: 2023     Years since quittin.0    Smokeless tobacco: Never   Substance Use Topics    Alcohol use: Yes     Alcohol/week: 25.2 oz     Types: 42 Cans of beer per week     Chief Complaint   Patient presents with    Possible Stroke     BIBA from home for stroke like symptoms  Wife found pt with slurred speech, L facial droop, and L sided weakness   LKW 1830  Pt had a fall yesterday, + head strike, - thinners  , aox4  Initial NIH of 20 by neurologist, pt taken to CT then red 11     Diagnosis: Acute CVA (cerebrovascular accident) (HCC) [I63.9]    Unit where seen by Wound Team: S176/     WOUND CONSULT/FOLLOW UP RELATED TO:  R nare     WOUND HISTORY:  Pt previously had NGT in place    WOUND ASSESSMENT/LDA       Wound 23 Pressure Injury Nose Right R nare Indeterminable (Active)   Wound Image    23 1200   Site Assessment Red 23 1200   Periwound Assessment Pink;Intact 23 1200   Margins Attached edges;Defined edges 23 1200   Closure Open to air 23 1200   Drainage Amount None 23 1200   Drainage Description Sanguineous 23 0500   Treatments Cleansed;Site care;Offloading 23 1200   Wound Cleansing Normal Saline Irrigation 23 1200   Dressing Options Open to Air 23 1200   Dressing Status Open to Air 23 0500   Dressing Change/Treatment Frequency Every Shift, and As Needed 23 1200   NEXT Dressing Change/Treatment Date 23 1200   NEXT Weekly Photo (Inpatient Only)  02/02/23 01/26/23 1200   WOUND NURSE ONLY - Pressure Injury Stage Indeterminab 01/26/23 1200   Wound Length (cm) 0.2 cm 01/26/23 1200   Wound Width (cm) 0.5 cm 01/26/23 1200   Wound Surface Area (cm^2) 0.1 cm^2 01/26/23 1200   Shape Oval 01/26/23 1200   Wound Odor None 01/26/23 1200   Exposed Structures None 01/26/23 1200   WOUND NURSE ONLY - Time Spent with Patient (mins) 30 01/26/23 1200     Vascular:    CHEN:   No results found.    Lab Values:    Lab Results   Component Value Date/Time    WBC 10.2 01/24/2023 04:43 AM    RBC 3.87 (L) 01/24/2023 04:43 AM    HEMOGLOBIN 12.8 (L) 01/24/2023 04:43 AM    HEMATOCRIT 37.4 (L) 01/24/2023 04:43 AM    CREACTPROT 11.11 (H) 01/19/2023 02:32 AM    HBA1C 5.3 01/12/2023 08:02 PM        Culture Results show:  No results found for this or any previous visit (from the past 720 hour(s)).    Pain Level/Medicated:  Denies pain     INTERVENTIONS BY WOUND TEAM:  Chart and images reviewed. Discussed with bedside RN. All areas of concern (based on picture review, LDA review and discussion with bedside RN) have been thoroughly assessed. Documentation of areas based on significant findings. This RN in to assess patient. Performed standard wound care which includes appropriate positioning, dressing removal and non-selective debridement. Pictures and measurements obtained weekly if/when required.  Preparation for Dressing removal: NA CAIO  Non-selectively Debrided with:  Normal Saline and Gauze   Sharp debridement: NA  Radha wound: Cleansed with Normal Saline and Gauze,   Primary Dressing: CAIO, offloading  Secondary (Outer) Dressing:     Number of Clinicians necessary to complete wound care 1  Length of time for dressing change 15 min. (This does not include chart review, pre-medication time, set up, clean up or time spent charting.)    Interdisciplinary consultation: Patient, Bedside RN, Mary SHORT (Wound RN) and Nickie MERAZ (Wound RN),     EVALUATION / RATIONALE FOR TREATMENT:  Most Recent  "Date:  1/26/23: Patient right nare with indeterminable pressure injury likely from previous NG tube. NGT no longer in place. Continue to offload area. Patient sacrum also assessed and found to have scar tissue with surrounding blanchable erythema. Patient states he had a previous injury by \"tripping on a curbside and falling on tailbone.\"     Goals: Steady decrease in wound area and depth weekly.    WOUND TEAM PLAN OF CARE ([X] for frequency of wound follow up,):  Nursing to follow dressing orders written for wound care. Contact wound team if area fails to progress, deteriorates or with any questions/concerns if something comes up before next scheduled follow up (See below as to whether wound is following and frequency of wound follow up)  Dressing changes by wound team:                   Follow up 3 times weekly:                NPWT change 3 times weekly:     Follow up 1-2 times weekly:    X weekly  Follow up Bi-Monthly:           Follow up Monthly (High Risk):                        Follow up as needed:     Other (explain):     NURSING PLAN OF CARE ORDERS (X):  Dressing changes: See Dressing Care orders:   Skin care: See Skin Care orders:   RN Prevention Protocol: X  Rectal tube care: See Rectal Tube Care orders:   Other orders:    RSKIN:   CURRENTLY IN PLACE (X), APPLIED THIS VISIT (A), ORDERED (O):   Q shift Lucius:  X  Q shift pressure point assessments:  X    Surface/Positioning   Pressure redistribution mattress            Low Airloss          ICU Low Airloss   Bariatric NUNO     Waffle cushion        Waffle Overlay        X  Reposition q 2 hours      TAPs Turning system     Z Kaveh Pillow     Offloading/Redistribution   Sacral Mepilex (Silicone dressing)   A  Heel Mepilex (Silicone dressing)         Heel float boots (Prevalon boot)             Float Heels off Bed with Pillows           Respiratory   Silicone O2 tubing     X    Gray Foam Ear protectors     Cannula fixation Device (Tender )          High flow " offloading Clip    Elastic head band offloading device      Anchorfast                                                         Trach with Optifoam split foam             Containment/Moisture Prevention   Rectal tube or BMS    Purwick/Condom Cath        Mcclendon Catheter  X  Barrier wipes           Barrier paste       Antifungal tx      Interdry        Mobilization not assessed    Up to chair        Ambulate      PT/OT      Nutrition      Dietician        Diabetes Education      PO     TF X    TPN     NPO   # days     Other        Anticipated discharge plans: TBD  LTACH:        SNF/Rehab:                  Home Health Care:           Outpatient Wound Center:            Self/Family Care:        Other:                  Vac Discharge Needs:   Vac Discharge plan is purely a recommendation from wound team and not a requirement for discharge unless otherwise stated by physician.  Not Applicable Pt not on a wound vac: X      Regular Vac while inpatient, alternative dressing at DC:        Regular Vac in use and continued at DC:            Reg. Vac w/ Skin Sub/Biologic in use. Will need to be changed 2x wkly:      Veraflo Vac while inpatient, ok to transition to Regular Vac on Discharge (Bedside RN to Clamp small instillation tubing at time of DC):           Veraflo Vac while inpatient, would benefit from remaining on Veraflo Vac upon discharge:

## 2023-01-27 NOTE — CARE PLAN
The patient is Watcher - Medium risk of patient condition declining or worsening    Shift Goals  Clinical Goals: neuro exam  Patient Goals: [place]  Family Goals: richmond    Progress made toward(s) clinical / shift goals:    Problem: Neuro Status  Goal: Neuro status will remain stable or improve  Outcome: Progressing     Problem: Fall Risk  Goal: Patient will remain free from falls  Outcome: Progressing       Patient is not progressing towards the following goals:

## 2023-01-27 NOTE — DISCHARGE PLANNING
Agency/Facility Name: [facility]  Outcome: DPA received vmail time stamped 1625 stating pt is being kept at pending as pt is a total max assist and has a NG tube       0942    Agency/Facility Name: [facility]  Spoke To: Nohemy  Outcome: PASQUALE called facility to check and see if Nohemy was able to find any information on pts insurance. Nohemy is still working on it as she has not been able to find anything as of yet      RN CM notified       1032    Agency/Facility Name: [facility]  Spoke To: Nohemy  Outcome: DPA received call back from facility. Per Nohemy, pt does have both insurances and [employer] is primary. She is going to call and see if pt has any SNF days and if pt has a co-pay.    RN CM notified

## 2023-01-27 NOTE — DISCHARGE PLANNING
Case Management Discharge Planning    Admission Date: 1/12/2023  GMLOS: 7.3  ALOS: 15    6-Clicks ADL Score: 10  6-Clicks Mobility Score: 6  PT and/or OT Eval ordered: Yes  Post-acute Referrals Ordered: Yes  Post-acute Choice Obtained: Yes  Has referral(s) been sent to post-acute provider:  Yes      Anticipated Discharge Dispo: Discharge Disposition: D/T to SNF with Medicare cert in anticipation of skilled care (03) Awaiting insurance auth at [facility] .    DME Needed: No    Action(s) Taken: Updated Provider/Nurse on Discharge Plan and Authorization Sent    Escalations Completed: None    Medically Clear: No    Next Steps: Awaiting auth     Barriers to Discharge: Pending Placement and Pending Insurance Authorization    Is the patient up for discharge tomorrow: No

## 2023-01-27 NOTE — PROGRESS NOTES
Hospital Medicine Daily Progress Note    Date of Service  1/27/2023    Chief Complaint  Kaiden Quiroz is a 62 y.o. male admitted 1/12/2023 with L side weakness    Hospital Course  63yo PMHx HTN, T2DM, ongoing tobacco use, BMI 36, ETOH.  Presenting with L side weakness on 1/12.  Kiln not to be TNK candidate.  Taken to IR for MCA trhombectomy with TICI 3 post and R ICA stent.  Had 10 sec pause intra procedure but resolved before CPR was initiated.  Was intubated.  In ICU required Precedex for ETOH withdrawal.  PEG placed 1/24.  In 10-14 days, the anchor button suture or sutures should be cut, releasing the anchor or anchors into the stomach. The suture anchors will then pass through the GI tract as desired. This can be performed in the interventional radiology department if   desired. Call [phone] any weekday [phone] hours to make an appointment.    Interval Problem Update    Weaning oxygen  Has some abd soreness improved with binder  Has some congestion  More awake and talkative today      I have discussed this patient's plan of care and discharge plan at IDT rounds today with Case Management, Nursing, Nursing leadership, and other members of the IDT team.    Consultants/Specialty  neurology    Code Status  Full Code    Disposition  Patient is not medically cleared for discharge.   Anticipate discharge to to an inpatient rehabilitation hospital.  I have placed the appropriate orders for post-discharge needs.    Review of Systems  Review of Systems   HENT:  Positive for congestion.    Respiratory:  Positive for cough. Negative for shortness of breath.    Neurological:  Positive for speech change and focal weakness.      Physical Exam  Temp:  [35.9 °C (96.7 °F)-36.3 °C (97.3 °F)] 36.3 °C (97.3 °F)  Pulse:  [61-66] 66  Resp:  [18] 18  BP: (128-156)/(70-80) 149/73  SpO2:  [95 %-96 %] 95 %    Physical Exam  Constitutional:       General: He is not in acute distress.     Appearance: Normal appearance. He is  well-developed. He is not diaphoretic.   HENT:      Head: Normocephalic and atraumatic.   Eyes:      General: No scleral icterus.     Conjunctiva/sclera: Conjunctivae normal.   Neck:      Vascular: No JVD.   Cardiovascular:      Rate and Rhythm: Normal rate.      Heart sounds: No murmur heard.    No gallop.   Pulmonary:      Effort: Pulmonary effort is normal. No respiratory distress.      Breath sounds: No stridor. No wheezing or rales.   Abdominal:      Palpations: Abdomen is soft.      Tenderness: There is no abdominal tenderness. There is no guarding or rebound.   Musculoskeletal:         General: No tenderness.      Right lower leg: No edema.      Left lower leg: No edema.   Skin:     General: Skin is warm and dry.      Capillary Refill: Capillary refill takes less than 2 seconds.      Coloration: Skin is not jaundiced or pale.      Findings: No rash.   Neurological:      Mental Status: He is alert and oriented to person, place, and time.      Comments: Pt does not attend to L side even with prompting and stim  Speech improved  +L side facial droop  L arm and leg flacid  Purposeful and 5/5 R upper and lower   Psychiatric:         Mood and Affect: Mood and affect normal.         Behavior: Behavior normal.         Thought Content: Thought content normal.       Fluids    Intake/Output Summary (Last 24 hours) at 1/27/2023 1019  Last data filed at 1/27/2023 0800  Gross per 24 hour   Intake 520 ml   Output 1800 ml   Net -1280 ml         Laboratory        Recent Labs     01/25/23  0335 01/26/23  1247 01/27/23  0357   SODIUM 136 140 137   POTASSIUM 4.0 3.6 3.8   CHLORIDE 102 103 102   CO2 26 28 24   GLUCOSE 140* 145* 138*   BUN 19 21 22   CREATININE 0.60 0.63 0.51   CALCIUM 8.8 9.3 9.3                     Imaging  IR-GASTROSTOMY PLACEMENT   Final Result      1.  Fluoroscopic guided percutaneous gastrostomy with placement of an 18-Hebrew ELISE balloon gastrostomy catheter.      2. The gastrostomy tube may be used for tube  feeds 18 hours after placement. Thereafter, liquid diet orders per the referring physician. Saline flush daily as per protocol.      3. In 10-14 days, the anchor button suture or sutures should be cut, releasing the anchor or anchors into the stomach. The suture anchors will then pass through the GI tract as desired. This can be performed in the interventional radiology department if    desired. Call [phone] any weekday [phone] hours to make an appointment.      CT-CTA CHEST PULMONARY ARTERY W/ RECONS   Final Result         1.  No pulmonary embolus appreciated.   2.  Linear densities the bilateral lung bases suggests changes of atelectasis, component of infiltrate is not excluded.   3.  Atherosclerosis and atherosclerotic coronary artery disease.      US-EXTREMITY VENOUS LOWER BILAT   Final Result      DX-ABDOMEN FOR TUBE PLACEMENT   Final Result      NG tube extends below the diaphragm into the region of the stomach. The tip is not visualized.      EC-ECHOCARDIOGRAM LTD W/ CONT   Final Result      DX-CHEST-PORTABLE (1 VIEW)   Final Result         1. No significant interval change.      CT-ABDOMEN-PELVIS W/O   Final Result         1.  Nondependent foci of air in the bladder, could represent changes from recent instrumentation or urinary tract infection.   2.  Diverticulosis   3.  Atherosclerosis and atherosclerotic coronary artery disease      DX-CHEST-PORTABLE (1 VIEW)   Final Result      Ill-defined atelectasis versus consolidation in the left lower lung. Pneumonia can be considered in appropriate clinical setting.         DX-ABDOMEN FOR TUBE PLACEMENT   Final Result         1.  Nonspecific bowel gas pattern in the upper abdomen.   2.  Nasogastric tube tip terminates overlying the expected location of the gastric body.   3.  Cardiomegaly   4.  Hazy linear density left lung base could represent atelectasis or infiltrate.      CT-HEAD W/O   Final Result         1.  Evolving infarct/encephalomalacia in the right  MCA distribution.   2.  Areas of hemorrhage in the right basal ganglia and involving the right frontal and anterior temporal sulci, similar compared to MRI January 13, 2023   3.  Atherosclerosis.      DX-CHEST-PORTABLE (1 VIEW)   Final Result         Diffuse interstitial and groundglass opacity throughout both lungs could relate to developing pulmonary edema.      DX-ABDOMEN FOR TUBE PLACEMENT   Final Result         Gastric drainage tube with tip projecting over the expected area of the stomach.      DX-CHEST-PORTABLE (1 VIEW)   Final Result      1.  Interval extubation without pneumothorax.   2.  Stable enlarged cardiac silhouette.   3.  There is bibasilar linear atelectasis.      EC-ECHOCARDIOGRAM COMPLETE W/ CONT   Final Result      MR-BRAIN-W/O   Final Result         Acute infarction involving the right frontal region, right basal ganglia and right caudate nucleus. Minimal punctate infarction involving the right parietal cortex. There is slight mass effect upon the right lateral ventricle with only minimal midline    shift measuring approximately 4 mm.      Petechial hemorrhage is noted within the right frontal cortical infarct. Also noted is petechial hemorrhage in the right basal ganglia and right caudate nucleus but without surrounding vasogenic edema.         DX-PELVIS-1 OR 2 VIEWS   Final Result         1.  No acute traumatic bony injury.      DX-CHEST-PORTABLE (1 VIEW)   Final Result         1.  Left midlung and lower lobe infiltrates, similar to prior study.      EA-BHAEUYG-7 VIEW   Final Result         1.  Nonspecific bowel gas pattern in the upper abdomen.   2.  Cardiomegaly   3.  Hazy left lower lobe infiltrates.   4.  Nasogastric tube tip terminates overlying the expected location of the gastric fundus.      IR-THROMBO MECHANICAL ARTERY,INIT   Final Result         62-year-old patient who presented with complete occlusion of the right ICA at its origin underwent emergent mechanical thrombectomy. After  initial angioplasty,   a right cervical internal carotid artery stent was deployed and right MCA thrombectomy was performed. The final angiographic images show complete restoration of flow into the right ICA and MCA with no evidence of distal intracranial embolization.      Final recanalization score: TICI 3      I, Petra Mobley was physically present and participated during the entire procedure of the IR-THROMBO MECHANICAL ARTERY,INIT.                  DX-CHEST-PORTABLE (1 VIEW)   Final Result      No acute cardiac or pulmonary abnormalities are identified.      CT-CTA NECK WITH & W/O-POST PROCESSING   Final Result      1.  Occlusion of the right internal carotid artery at its origin.      2.  Occlusion of the right vertebral artery at its origin.      Dr. Gongora discussed findings with Dr. Mccarthy.      CT-CTA HEAD WITH & W/O-POST PROCESS   Final Result         1.  Occlusion of the right internal carotid artery and right middle cerebral artery.   2.  Narrowed left M1 segment with severely narrowed and M2 and M3 branches, may represent chronic changes.      These findings were discussed with the patient's clinician, Joseline Mccarthy, on 1/12/2023 8:27 PM.      CT-CEREBRAL PERFUSION ANALYSIS   Final Result         1.  Cerebral blood flow less than 30% likely representing completed infarct = 52 mL.      2.  T Max more than 6 seconds likely representing combination of completed infarct and ischemia = 172 mL.      3.  Mismatched volume likely representing ischemic brain/penumbra = 120 mL      4.  Please note that the cerebral perfusion was performed on the limited brain tissue around the basal ganglia region. Infarct/ischemia outside the CT perfusion sections can be missed in this study.      CT-HEAD W/O   Final Result      1.  No evidence of intracranial hemorrhage, midline shift or mass effect.      2.  Question of hyperdense right MCA though evaluation limited by oblique positioning.                 Assessment/Plan  * Acute ischemic right middle cerebral artery (MCA) stroke (HCC)- (present on admission)  Assessment & Plan  Right MCA CVA with hemorrhagic conversion  Neurology consulted   High Intensity statin therapy   BP control  S/p right ICA stent and MCA thrombectomy 1/12/23  DAPT  TTE negative for shunt  Tobacco cessation  PT/OT/SLP - recommending post-acute placement  PEG placed 1/24     Internal carotid artery stent present  Assessment & Plan  Placed 1/12  Per Neuro OK to start DAPT: ASA & Plavix for 6 wks    Hypernatremia  Assessment & Plan  Patient continues to have hyponatremia with elevated sodium level.  Resolved with Free H2O 200ml q6  Cont to follow daily    Pneumonia due to infectious organism- (present on admission)  Assessment & Plan  Aspiration pneumonia (Developed 01/19/23)   Procal low  Sputum cultures neg  Strict aspiration precautions   Has completed 5 days of Abx's  MRSA nares neg  Mucomyst/3% normal saline nebs to maintain pulmonary toilet, RT protocol to continue  Pulmonology evaluated him and made recommendations          Oropharyngeal dysphagia- (present on admission)  Assessment & Plan  Secondary to acute stroke.    Aspiration precautions   PEG 1/24  Nutrition following    Acute left hemiparesis (HCC)- (present on admission)  Assessment & Plan  Secondary to right MCA stroke.    Pressure ulcer precautions  Aspiration, Fall precautions  PT and OT  Working toward Rehab DC    Stroke, hemorrhagic (HCC)- (present on admission)  Assessment & Plan  Ischemic stroke with hemorrhagic conversion.  Neuro has OKd for antiplatelet therapy with ASA and plavix given presence of new ICA stent    Toxic metabolic encephalopathy- (present on admission)  Assessment & Plan  Persistent 01/20/23  Secondary to stroke, delirium and now concern for relation to underlying pneumonia  Limit restraints as able  Avoid polypharmacy, sedating medications  Delirium precautions  Management of underlying medical  conditions  Obtain EEG 01/20/23 and it did not show seizure activity.  cont's to improve daily though examination is limited by dysarthria at times    Acute respiratory failure with hypoxia (HCC)- (present on admission)  Assessment & Plan       01/19/23 CXRAY consistent with Aspiration Pneumonia - Started on IV unasyn  Subsequently trnasitioned to [facility].  At present off Abx's  MRSA nares neg  trachael aspirate cultures neg  CTA chest neg for PE  TTE LVEF 70%, no signficant valvular or structural abnormalities  O2 demand decreasing: now off HFNC and on 2 LNC    Severe obesity (BMI 35.0-35.9 with comorbidity) ([facility])- (present on admission)  Assessment & Plan  Body mass index is 37.39 kg/m².  Co-morbid HTN, T2DM, KENDAL  Outpatient weight loss    Type 2 diabetes mellitus without complication, without long-term current use of insulin ([facility])- (present on admission)  Assessment & Plan  A1c 5.3  No indication for strict BG control - POCT/SSI deferred  Atorvastatin    Alcohol dependence with withdrawal ([facility])- (present on admission)  Assessment & Plan  Resolved.    Tobacco use disorder- (present on admission)  Assessment & Plan  On nicotine patch     Primary hypertension- (present on admission)  Assessment & Plan  Amlodipine 10 mg         VTE prophylaxis: enoxaparin ppx    I have performed a physical exam and reviewed and updated ROS and Plan today (1/27/2023). In review of yesterday's note (1/26/2023), there are no changes except as documented above.

## 2023-01-27 NOTE — THERAPY
Occupational Therapy  Daily Treatment     Patient Name: Kaiden Quiroz  Age:  62 y.o., Sex:  male  Medical Record #: [id number]  Today's Date: 1/27/2023       Precautions: Fall Risk, PEG Tube, Swallow Precautions ( See Comments)  Comments: L nubia    Assessment    Pt seen for OT tx. Continues to be limited by decreased activity tolerance, balance deficits and LUE weakness impacting ability to complete ADLs and ADL transfers independently. Pt hyperverbose w/ max cues for redirection back to ADL task. L lateral lean in sitting EOB w/ max cues from therapist to maintain upright posture. LUE w/ no active or purposeful movement proximally, trace finger movement in thumb and index finger. Will continue to follow while in house.     Plan    O.T. Treatment Plan: Continue Current Treatment Plan    DC Equipment Recommendations: Unable to determine at this time  Discharge Recommendations: Recommend post-acute placement for additional occupational therapy services prior to discharge home       01/27/23 0931   Cognition    Cognition / Consciousness X   Safety Awareness Impaired   New Learning Impaired   Attention Impaired   Sequencing Impaired   Initiation Impaired   Comments hyperverbose, intermittently following commands, pleasant and cooperative   Active ROM Upper Body   Active ROM Upper Body  X   Comments trace finger movement but no functional movement proximally   Strength Upper Body   Upper Body Strength  X   Comments LUE grossly weak w/ no functional movement   Balance   Sitting Balance (Static) Poor   Sitting Balance (Dynamic) Poor -   Standing Balance (Static) Dependent   Standing Balance (Dynamic) Dependent   Weight Shift Sitting Poor   Weight Shift Standing Absent   Bed Mobility    Supine to Sit Maximal Assist   Sit to Supine Maximal Assist   Activities of Daily Living   Grooming Minimal Assist;Seated   Upper Body Dressing Moderate Assist   Lower Body Dressing Maximal Assist   Toileting Maximal Assist   Functional  Mobility   Sit to Stand Maximal Assist   Short Term Goals   Short Term Goal # 1 pt will groom seated EOB w/ setup   Goal Outcome # 1 Goal not met   Short Term Goal # 2 pt will maintain sitting balance unsupported 10 sec in prep for seated ADLs   Goal Outcome # 2 Goal not met   Short Term Goal # 3 pt will demo ADL txfs with Bandar   Goal Outcome # 3 Goal not met   Anticipated Discharge Equipment and Recommendations   DC Equipment Recommendations Unable to determine at this time   Discharge Recommendations Recommend post-acute placement for additional occupational therapy services prior to discharge home

## 2023-01-28 PROBLEM — R33.9 URINARY RETENTION: Status: ACTIVE | Noted: 2023-01-28

## 2023-01-28 LAB
ALBUMIN SERPL BCP-MCNC: 3.8 G/DL (ref 3.2–4.9)
ALBUMIN/GLOB SERPL: 1.2 G/DL
ALP SERPL-CCNC: 114 U/L (ref 30–99)
ALT SERPL-CCNC: 34 U/L (ref 2–50)
ANION GAP SERPL CALC-SCNC: 12 MMOL/L (ref 7–16)
AST SERPL-CCNC: 29 U/L (ref 12–45)
BILIRUB SERPL-MCNC: 0.6 MG/DL (ref 0.1–1.5)
BUN SERPL-MCNC: 21 MG/DL (ref 8–22)
CALCIUM ALBUM COR SERPL-MCNC: 9.7 MG/DL (ref 8.5–10.5)
CALCIUM SERPL-MCNC: 9.5 MG/DL (ref 8.5–10.5)
CHLORIDE SERPL-SCNC: 101 MMOL/L (ref 96–112)
CO2 SERPL-SCNC: 25 MMOL/L (ref 20–33)
CREAT SERPL-MCNC: 0.53 MG/DL (ref 0.5–1.4)
GFR SERPLBLD CREATININE-BSD FMLA CKD-EPI: 114 ML/MIN/1.73 M 2
GLOBULIN SER CALC-MCNC: 3.1 G/DL (ref 1.9–3.5)
GLUCOSE SERPL-MCNC: 127 MG/DL (ref 65–99)
POTASSIUM SERPL-SCNC: 4 MMOL/L (ref 3.6–5.5)
PROT SERPL-MCNC: 6.9 G/DL (ref 6–8.2)
SODIUM SERPL-SCNC: 138 MMOL/L (ref 135–145)

## 2023-01-28 PROCEDURE — 700102 HCHG RX REV CODE 250 W/ 637 OVERRIDE(OP): Performed by: INTERNAL MEDICINE

## 2023-01-28 PROCEDURE — 99232 SBSQ HOSP IP/OBS MODERATE 35: CPT | Performed by: INTERNAL MEDICINE

## 2023-01-28 PROCEDURE — A9270 NON-COVERED ITEM OR SERVICE: HCPCS | Performed by: INTERNAL MEDICINE

## 2023-01-28 PROCEDURE — 770006 HCHG ROOM/CARE - MED/SURG/GYN SEMI*

## 2023-01-28 PROCEDURE — 80053 COMPREHEN METABOLIC PANEL: CPT

## 2023-01-28 PROCEDURE — 700111 HCHG RX REV CODE 636 W/ 250 OVERRIDE (IP): Performed by: INTERNAL MEDICINE

## 2023-01-28 PROCEDURE — 700102 HCHG RX REV CODE 250 W/ 637 OVERRIDE(OP): Performed by: HOSPITALIST

## 2023-01-28 PROCEDURE — A9270 NON-COVERED ITEM OR SERVICE: HCPCS | Performed by: HOSPITALIST

## 2023-01-28 PROCEDURE — 36415 COLL VENOUS BLD VENIPUNCTURE: CPT

## 2023-01-28 RX ORDER — TERAZOSIN 1 MG/1
1 CAPSULE ORAL EVERY EVENING
Status: DISCONTINUED | OUTPATIENT
Start: 2023-01-28 | End: 2023-02-09

## 2023-01-28 RX ADMIN — ACETAMINOPHEN 650 MG: 325 TABLET, FILM COATED ORAL at 00:51

## 2023-01-28 RX ADMIN — ATORVASTATIN CALCIUM 80 MG: 80 TABLET, FILM COATED ORAL at 17:32

## 2023-01-28 RX ADMIN — POLYETHYLENE GLYCOL 3350 1 PACKET: 17 POWDER, FOR SOLUTION ORAL at 04:11

## 2023-01-28 RX ADMIN — ACETAMINOPHEN 650 MG: 325 TABLET, FILM COATED ORAL at 10:45

## 2023-01-28 RX ADMIN — CLOPIDOGREL BISULFATE 75 MG: 75 TABLET ORAL at 04:12

## 2023-01-28 RX ADMIN — ENOXAPARIN SODIUM 40 MG: 40 INJECTION SUBCUTANEOUS at 17:32

## 2023-01-28 RX ADMIN — TERAZOSIN 1 MG: 1 CAPSULE ORAL at 17:32

## 2023-01-28 RX ADMIN — SENNOSIDES AND DOCUSATE SODIUM 1 TABLET: 50; 8.6 TABLET ORAL at 04:11

## 2023-01-28 RX ADMIN — ASPIRIN 81 MG: 81 TABLET, CHEWABLE ORAL at 04:11

## 2023-01-28 RX ADMIN — AMLODIPINE BESYLATE 10 MG: 10 TABLET ORAL at 04:13

## 2023-01-28 ASSESSMENT — ENCOUNTER SYMPTOMS
COUGH: 1
FOCAL WEAKNESS: 1
SPEECH CHANGE: 1
SHORTNESS OF BREATH: 0

## 2023-01-28 ASSESSMENT — PAIN DESCRIPTION - PAIN TYPE
TYPE: SURGICAL PAIN
TYPE: ACUTE PAIN
TYPE: ACUTE PAIN
TYPE: ACUTE PAIN;SURGICAL PAIN

## 2023-01-28 ASSESSMENT — FIBROSIS 4 INDEX: FIB4 SCORE: 1.24

## 2023-01-28 ASSESSMENT — PATIENT HEALTH QUESTIONNAIRE - PHQ9
2. FEELING DOWN, DEPRESSED, IRRITABLE, OR HOPELESS: NOT AT ALL
SUM OF ALL RESPONSES TO PHQ9 QUESTIONS 1 AND 2: 0
1. LITTLE INTEREST OR PLEASURE IN DOING THINGS: NOT AT ALL

## 2023-01-28 NOTE — CARE PLAN
The patient is Stable - Low risk of patient condition declining or worsening    Shift Goals  Clinical Goals: Stable neuro status, Maintain skin integrity  Patient Goals: Discharge  Family Goals: [place]    Progress made toward(s) clinical / shift goals:    Problem: Knowledge Deficit - Stroke Education  Goal: Patient's knowledge of stroke and risk factors will improve  Outcome: Progressing  Note: Pt able to verbalize some understanding of plan of care.     Problem: Neuro Status  Goal: Neuro status will remain stable or improve  Outcome: Progressing  Note: Pt's neuro status remains stable, was able to move two fingers on L arm today.      Problem: Skin Integrity  Goal: Skin integrity is maintained or improved  Outcome: Progressing  Note: Pt on waffle overlay, TAPS system in use, q2 turns implemented.

## 2023-01-28 NOTE — PROGRESS NOTES
Hospital Medicine Daily Progress Note    Date of Service  1/28/2023    Chief Complaint  Kaiden Quiroz is a 62 y.o. male admitted 1/12/2023 with L side weakness    Hospital Course  61yo PMHx HTN, T2DM, ongoing tobacco use, BMI 36, ETOH.  Presenting with L side weakness on 1/12.  Ulysses not to be TNK candidate.  Taken to IR for MCA trhombectomy with TICI 3 post and R ICA stent.  On DAPT x 6 weeks. Had 10 sec pause intra procedure but resolved before CPR was initiated.  Was intubated.  In ICU required Precedex for ETOH withdrawal.  PEG placed 1/24.  In 10-14 days, the anchor button suture or sutures should be cut, releasing the anchor or anchors into the stomach. The suture anchors will then pass through the GI tract as desired. This can be performed in the interventional radiology department if   desired. Call [phone] any weekday [phone] hours to make an appointment.    Patient started on atorvastatin. Echo unremarkable.  Hospital course complicated by urinary retention requiring Mcclendon (on Terazosin), pneumonia and respiratory failure on high flow nasal cannula (now weaned down to 2 L nasal cannula).    Interval Problem Update    Sleepy today  No complaints  Working on dc planning      I have discussed this patient's plan of care and discharge plan at IDT rounds today with Case Management, Nursing, Nursing leadership, and other members of the IDT team.    Consultants/Specialty  neurology    Code Status  Full Code    Disposition  Patient is not medically cleared for discharge.   Anticipate discharge to to an inpatient rehabilitation hospital.  I have placed the appropriate orders for post-discharge needs.    Review of Systems  Review of Systems   Unable to perform ROS: Mental acuity   HENT:  Positive for congestion.    Respiratory:  Positive for cough. Negative for shortness of breath.    Neurological:  Positive for speech change and focal weakness.      Physical Exam  Temp:  [36.1 °C (96.9 °F)-36.9 °C (98.5  °F)] 36.9 °C (98.5 °F)  Pulse:  [68-70] 70  Resp:  [18] 18  BP: (131-139)/(71-79) 131/79  SpO2:  [90 %-95 %] 93 %    Physical Exam  Constitutional:       General: He is not in acute distress.     Appearance: Normal appearance. He is well-developed. He is not diaphoretic.      Comments: Sleepy but wakes to voice   HENT:      Head: Normocephalic and atraumatic.   Eyes:      General: No scleral icterus.     Conjunctiva/sclera: Conjunctivae normal.   Neck:      Vascular: No JVD.   Cardiovascular:      Rate and Rhythm: Normal rate.      Heart sounds: No murmur heard.    No gallop.   Pulmonary:      Effort: Pulmonary effort is normal. No respiratory distress.      Breath sounds: No stridor. No wheezing or rales.   Abdominal:      Palpations: Abdomen is soft.      Tenderness: There is no abdominal tenderness. There is no guarding or rebound.   Genitourinary:     Comments: Mcclendon in place  Musculoskeletal:         General: No tenderness.      Right lower leg: No edema.      Left lower leg: No edema.   Skin:     General: Skin is warm and dry.      Capillary Refill: Capillary refill takes less than 2 seconds.      Coloration: Skin is not jaundiced or pale.      Findings: No rash.   Neurological:      Comments: Here for reference: sleeping did not perform full neuro exam 1/28  Pt does not attend to L side even with prompting and stim  Speech improved  +L side facial droop  L arm and leg flacid  Purposeful and 5/5 R upper and lower   Psychiatric:         Mood and Affect: Affect normal.       Fluids    Intake/Output Summary (Last 24 hours) at 1/28/2023 1313  Last data filed at 1/28/2023 1139  Gross per 24 hour   Intake 820 ml   Output 2120 ml   Net -1300 ml         Laboratory        Recent Labs     01/26/23  1247 01/27/23  0357 01/28/23  0800   SODIUM 140 137 138   POTASSIUM 3.6 3.8 4.0   CHLORIDE 103 102 101   CO2 28 24 25   GLUCOSE 145* 138* 127*   BUN 21 22 21   CREATININE 0.63 0.51 0.53   CALCIUM 9.3 9.3 9.5                      Imaging  IR-GASTROSTOMY PLACEMENT   Final Result      1.  Fluoroscopic guided percutaneous gastrostomy with placement of an 18-La[place] ELISE balloon gastrostomy catheter.      2. The gastrostomy tube may be used for tube feeds 18 hours after placement. Thereafter, liquid diet orders per the referring physician. Saline flush daily as per protocol.      3. In 10-14 days, the anchor button suture or sutures should be cut, releasing the anchor or anchors into the stomach. The suture anchors will then pass through the GI tract as desired. This can be performed in the interventional radiology department if    desired. Call [phone] any weekday [phone] hours to make an appointment.      CT-CTA CHEST PULMONARY ARTERY W/ RECONS   Final Result         1.  No pulmonary embolus appreciated.   2.  Linear densities the bilateral lung bases suggests changes of atelectasis, component of infiltrate is not excluded.   3.  Atherosclerosis and atherosclerotic coronary artery disease.      US-EXTREMITY VENOUS LOWER BILAT   Final Result      DX-ABDOMEN FOR TUBE PLACEMENT   Final Result      NG tube extends below the diaphragm into the region of the stomach. The tip is not visualized.      EC-ECHOCARDIOGRAM LTD W/ CONT   Final Result      DX-CHEST-PORTABLE (1 VIEW)   Final Result         1. No significant interval change.      CT-ABDOMEN-PELVIS W/O   Final Result         1.  Nondependent foci of air in the bladder, could represent changes from recent instrumentation or urinary tract infection.   2.  Diverticulosis   3.  Atherosclerosis and atherosclerotic coronary artery disease      DX-CHEST-PORTABLE (1 VIEW)   Final Result      Ill-defined atelectasis versus consolidation in the left lower lung. Pneumonia can be considered in appropriate clinical setting.         DX-ABDOMEN FOR TUBE PLACEMENT   Final Result         1.  Nonspecific bowel gas pattern in the upper abdomen.   2.  Nasogastric tube tip terminates overlying the expected  location of the gastric body.   3.  Cardiomegaly   4.  Hazy linear density left lung base could represent atelectasis or infiltrate.      CT-HEAD W/O   Final Result         1.  Evolving infarct/encephalomalacia in the right MCA distribution.   2.  Areas of hemorrhage in the right basal ganglia and involving the right frontal and anterior temporal sulci, similar compared to MRI January 13, 2023   3.  Atherosclerosis.      DX-CHEST-PORTABLE (1 VIEW)   Final Result         Diffuse interstitial and groundglass opacity throughout both lungs could relate to developing pulmonary edema.      DX-ABDOMEN FOR TUBE PLACEMENT   Final Result         Gastric drainage tube with tip projecting over the expected area of the stomach.      DX-CHEST-PORTABLE (1 VIEW)   Final Result      1.  Interval extubation without pneumothorax.   2.  Stable enlarged cardiac silhouette.   3.  There is bibasilar linear atelectasis.      EC-ECHOCARDIOGRAM COMPLETE W/ CONT   Final Result      MR-BRAIN-W/O   Final Result         Acute infarction involving the right frontal region, right basal ganglia and right caudate nucleus. Minimal punctate infarction involving the right parietal cortex. There is slight mass effect upon the right lateral ventricle with only minimal midline    shift measuring approximately 4 mm.      Petechial hemorrhage is noted within the right frontal cortical infarct. Also noted is petechial hemorrhage in the right basal ganglia and right caudate nucleus but without surrounding vasogenic edema.         DX-PELVIS-1 OR 2 VIEWS   Final Result         1.  No acute traumatic bony injury.      DX-CHEST-PORTABLE (1 VIEW)   Final Result         1.  Left midlung and lower lobe infiltrates, similar to prior study.      FP-PHYZLVF-8 VIEW   Final Result         1.  Nonspecific bowel gas pattern in the upper abdomen.   2.  Cardiomegaly   3.  Hazy left lower lobe infiltrates.   4.  Nasogastric tube tip terminates overlying the expected location  of the gastric fundus.      IR-THROMBO MECHANICAL ARTERY,INIT   Final Result         62-year-old patient who presented with complete occlusion of the right ICA at its origin underwent emergent mechanical thrombectomy. After initial angioplasty,   a right cervical internal carotid artery stent was deployed and right MCA thrombectomy was performed. The final angiographic images show complete restoration of flow into the right ICA and MCA with no evidence of distal intracranial embolization.      Final recanalization score: TICI 3      I, Petra Mobley was physically present and participated during the entire procedure of the IR-THROMBO MECHANICAL ARTERY,INIT.                  DX-CHEST-PORTABLE (1 VIEW)   Final Result      No acute cardiac or pulmonary abnormalities are identified.      CT-CTA NECK WITH & W/O-POST PROCESSING   Final Result      1.  Occlusion of the right internal carotid artery at its origin.      2.  Occlusion of the right vertebral artery at its origin.      Dr. Gongora discussed findings with Dr. Mccarthy.      CT-CTA HEAD WITH & W/O-POST PROCESS   Final Result         1.  Occlusion of the right internal carotid artery and right middle cerebral artery.   2.  Narrowed left M1 segment with severely narrowed and M2 and M3 branches, may represent chronic changes.      These findings were discussed with the patient's clinician, Joseline Mccarthy, on 1/12/2023 8:27 PM.      CT-CEREBRAL PERFUSION ANALYSIS   Final Result         1.  Cerebral blood flow less than 30% likely representing completed infarct = 52 mL.      2.  T Max more than 6 seconds likely representing combination of completed infarct and ischemia = 172 mL.      3.  Mismatched volume likely representing ischemic brain/penumbra = 120 mL      4.  Please note that the cerebral perfusion was performed on the limited brain tissue around the basal ganglia region. Infarct/ischemia outside the CT perfusion sections can be missed in this study.       CT-HEAD W/O   Final Result      1.  No evidence of intracranial hemorrhage, midline shift or mass effect.      2.  Question of hyperdense right MCA though evaluation limited by oblique positioning.                Assessment/Plan  * Acute ischemic right middle cerebral artery (MCA) stroke (HCC)- (present on admission)  Assessment & Plan  Right MCA CVA with hemorrhagic conversion  Neurology consulted   High Intensity statin therapy   BP control  S/p right ICA stent and MCA thrombectomy 1/12/23  DAPT  TTE negative for shunt  Tobacco cessation  PT/OT/SLP - recommending post-acute placement  PEG placed 1/24     Urinary retention  Assessment & Plan  Likely in the setting of BPH  Tried removing thacker 1/25, had to replace  Starting terazosin 1/28, can try removing in a few days or in rehab    Internal carotid artery stent present  Assessment & Plan  Placed 1/12  Per Neuro OK to start DAPT: ASA & Plavix for 6 wks    Hypernatremia  Assessment & Plan  Patient continues to have hyponatremia with elevated sodium level.  Resolved with Free H2O 200ml q6  Cont to follow daily    Pneumonia due to infectious organism- (present on admission)  Assessment & Plan  Resolved    Aspiration pneumonia (Developed 01/19/23)   Procal low  Sputum cultures neg  Strict aspiration precautions   Has completed 5 days of Abx's  MRSA nares neg  Mucomyst/3% normal saline nebs to maintain pulmonary toilet, RT protocol to continue  Pulmonology evaluated him and made recommendations          Oropharyngeal dysphagia- (present on admission)  Assessment & Plan  Secondary to acute stroke.    Aspiration precautions   PEG 1/24  Nutrition following    Acute left hemiparesis (HCC)- (present on admission)  Assessment & Plan  Secondary to right MCA stroke.    Pressure ulcer precautions  Aspiration, Fall precautions  PT and OT  Working toward Rehab DC    Stroke, hemorrhagic (HCC)- (present on admission)  Assessment & Plan  Ischemic stroke with hemorrhagic  conversion.  Neuro has OKd for antiplatelet therapy with ASA and plavix given presence of new ICA stent    Toxic metabolic encephalopathy- (present on admission)  Assessment & Plan  Improved    Persistent 01/20/23  Secondary to stroke, delirium and now concern for relation to underlying pneumonia  Limit restraints as able  Avoid polypharmacy, sedating medications  Delirium precautions  Management of underlying medical conditions  Obtain EEG 01/20/23 and it did not show seizure activity.   cont's to improve daily though examination is limited by dysarthria at times    Acute respiratory failure with hypoxia (HCC)- (present on admission)  Assessment & Plan  Improved     01/19/23 CXRAY consistent with Aspiration Pneumonia - Started on IV unasyn  Subsequently trnasitioned to [facility].  At present off Abx's  MRSA nares neg  trachael aspirate cultures neg  CTA chest neg for PE  TTE LVEF 70%, no signficant valvular or structural abnormalities  O2 demand decreasing: now off HFNC and on 2 LNC    Severe obesity (BMI 35.0-35.9 with comorbidity) ([facility])- (present on admission)  Assessment & Plan  Body mass index is 37.39 kg/m².  Co-morbid HTN, T2DM, KENDAL  Outpatient weight loss    Type 2 diabetes mellitus without complication, without long-term current use of insulin ([facility])- (present on admission)  Assessment & Plan  A1c 5.3  No indication for strict BG control - POCT/SSI deferred  Atorvastatin    Alcohol dependence with withdrawal ([facility])- (present on admission)  Assessment & Plan  Resolved.    Tobacco use disorder- (present on admission)  Assessment & Plan  On nicotine patch     Primary hypertension- (present on admission)  Assessment & Plan  Amlodipine 10 mg         VTE prophylaxis: enoxaparin ppx    I have performed a physical exam and reviewed and updated ROS and Plan today (1/28/2023). In review of yesterday's note (1/27/2023), there are no changes except as documented above.

## 2023-01-28 NOTE — CARE PLAN
The patient is Stable - Low risk of patient condition declining or worsening    Shift Goals  Clinical Goals: q4h neuro checks, BP management, Q2h turns  Patient Goals: Sleep  Family Goals: RADHA    Progress made toward(s) clinical / shift goals:      Problem: Knowledge Deficit - Stroke Education  Goal: Patient's knowledge of stroke and risk factors will improve  Outcome: Progressing   Pt is being educated on stroke and stroke risk factors.     Problem: Neuro Status  Goal: Neuro status will remain stable or improve  Outcome: Progressing  Pt is Q4h neuro checks. No acute changes in neuro status noted at this time.      Problem: Respiratory - Stroke Patient  Goal: Patient will achieve/maintain optimum respiratory rate/effort  Outcome: Progressing  Pt's oxygen saturation is WDL.     Problem: Risk for Aspiration  Goal: Patient's risk for aspiration will be absent or decrease  Outcome: Progressing   Pt on an approved diet by SLP. Pt does not show any signs of aspiration while eating.      Problem: Pain - Standard  Goal: Alleviation of pain or a reduction in pain to the patient’s comfort goal  Outcome: Progressing    Frequent pain assessments throughout shift. PRN pain medications provided per MAR and pt request. Pharmacological and non-pharmacological interventions utilized.  Pt being medicated to have comfort goal for pain to sleep comfortably.       Patient is not progressing towards the following goals:

## 2023-01-28 NOTE — ASSESSMENT & PLAN NOTE
Likely in the setting of BPH  Tried voiding trial 1/25 and 1/31, failed replaced 2/3  Starting terazosin 1/28  Continue Mcclendon catheter and outpatient urology evaluation

## 2023-01-29 LAB
ALBUMIN SERPL BCP-MCNC: 3.9 G/DL (ref 3.2–4.9)
ALBUMIN/GLOB SERPL: 1.1 G/DL
ALP SERPL-CCNC: 121 U/L (ref 30–99)
ALT SERPL-CCNC: 40 U/L (ref 2–50)
AMORPH CRY #/AREA URNS HPF: PRESENT /HPF
ANION GAP SERPL CALC-SCNC: 12 MMOL/L (ref 7–16)
APPEARANCE UR: ABNORMAL
AST SERPL-CCNC: 31 U/L (ref 12–45)
BACTERIA #/AREA URNS HPF: ABNORMAL /HPF
BASOPHILS # BLD AUTO: 0.4 % (ref 0–1.8)
BASOPHILS # BLD: 0.04 K/UL (ref 0–0.12)
BILIRUB SERPL-MCNC: 0.6 MG/DL (ref 0.1–1.5)
BILIRUB UR QL STRIP.AUTO: NEGATIVE
BUN SERPL-MCNC: 20 MG/DL (ref 8–22)
CALCIUM ALBUM COR SERPL-MCNC: 9.6 MG/DL (ref 8.5–10.5)
CALCIUM SERPL-MCNC: 9.5 MG/DL (ref 8.5–10.5)
CHLORIDE SERPL-SCNC: 101 MMOL/L (ref 96–112)
CO2 SERPL-SCNC: 25 MMOL/L (ref 20–33)
COLOR UR: YELLOW
CREAT SERPL-MCNC: 0.46 MG/DL (ref 0.5–1.4)
EOSINOPHIL # BLD AUTO: 0.37 K/UL (ref 0–0.51)
EOSINOPHIL NFR BLD: 4 % (ref 0–6.9)
EPI CELLS #/AREA URNS HPF: NEGATIVE /HPF
ERYTHROCYTE [DISTWIDTH] IN BLOOD BY AUTOMATED COUNT: 41.1 FL (ref 35.9–50)
GFR SERPLBLD CREATININE-BSD FMLA CKD-EPI: 119 ML/MIN/1.73 M 2
GLOBULIN SER CALC-MCNC: 3.6 G/DL (ref 1.9–3.5)
GLUCOSE SERPL-MCNC: 129 MG/DL (ref 65–99)
GLUCOSE UR STRIP.AUTO-MCNC: NEGATIVE MG/DL
HCT VFR BLD AUTO: 39.4 % (ref 42–52)
HGB BLD-MCNC: 13.9 G/DL (ref 14–18)
HYALINE CASTS #/AREA URNS LPF: ABNORMAL /LPF
IMM GRANULOCYTES # BLD AUTO: 0.05 K/UL (ref 0–0.11)
IMM GRANULOCYTES NFR BLD AUTO: 0.5 % (ref 0–0.9)
KETONES UR STRIP.AUTO-MCNC: NEGATIVE MG/DL
LEUKOCYTE ESTERASE UR QL STRIP.AUTO: ABNORMAL
LYMPHOCYTES # BLD AUTO: 1.97 K/UL (ref 1–4.8)
LYMPHOCYTES NFR BLD: 21.3 % (ref 22–41)
MCH RBC QN AUTO: 33.3 PG (ref 27–33)
MCHC RBC AUTO-ENTMCNC: 35.3 G/DL (ref 33.7–35.3)
MCV RBC AUTO: 94.5 FL (ref 81.4–97.8)
MICRO URNS: ABNORMAL
MONOCYTES # BLD AUTO: 0.66 K/UL (ref 0–0.85)
MONOCYTES NFR BLD AUTO: 7.1 % (ref 0–13.4)
NEUTROPHILS # BLD AUTO: 6.18 K/UL (ref 1.82–7.42)
NEUTROPHILS NFR BLD: 66.7 % (ref 44–72)
NITRITE UR QL STRIP.AUTO: NEGATIVE
NRBC # BLD AUTO: 0 K/UL
NRBC BLD-RTO: 0 /100 WBC
PH UR STRIP.AUTO: 7 [PH] (ref 5–8)
PLATELET # BLD AUTO: 271 K/UL (ref 164–446)
PMV BLD AUTO: 11.2 FL (ref 9–12.9)
POTASSIUM SERPL-SCNC: 3.9 MMOL/L (ref 3.6–5.5)
PROT SERPL-MCNC: 7.5 G/DL (ref 6–8.2)
PROT UR QL STRIP: NEGATIVE MG/DL
RBC # BLD AUTO: 4.17 M/UL (ref 4.7–6.1)
RBC # URNS HPF: ABNORMAL /HPF
RBC UR QL AUTO: ABNORMAL
SODIUM SERPL-SCNC: 138 MMOL/L (ref 135–145)
SP GR UR STRIP.AUTO: 1.02
UROBILINOGEN UR STRIP.AUTO-MCNC: 2 MG/DL
WBC # BLD AUTO: 9.3 K/UL (ref 4.8–10.8)
WBC #/AREA URNS HPF: ABNORMAL /HPF

## 2023-01-29 PROCEDURE — 700102 HCHG RX REV CODE 250 W/ 637 OVERRIDE(OP): Performed by: INTERNAL MEDICINE

## 2023-01-29 PROCEDURE — A9270 NON-COVERED ITEM OR SERVICE: HCPCS | Performed by: INTERNAL MEDICINE

## 2023-01-29 PROCEDURE — 770006 HCHG ROOM/CARE - MED/SURG/GYN SEMI*

## 2023-01-29 PROCEDURE — 99232 SBSQ HOSP IP/OBS MODERATE 35: CPT | Performed by: INTERNAL MEDICINE

## 2023-01-29 PROCEDURE — 80053 COMPREHEN METABOLIC PANEL: CPT

## 2023-01-29 PROCEDURE — 700111 HCHG RX REV CODE 636 W/ 250 OVERRIDE (IP): Performed by: INTERNAL MEDICINE

## 2023-01-29 PROCEDURE — 36415 COLL VENOUS BLD VENIPUNCTURE: CPT

## 2023-01-29 PROCEDURE — 85025 COMPLETE CBC W/AUTO DIFF WBC: CPT

## 2023-01-29 PROCEDURE — 700102 HCHG RX REV CODE 250 W/ 637 OVERRIDE(OP): Performed by: HOSPITALIST

## 2023-01-29 PROCEDURE — 81001 URINALYSIS AUTO W/SCOPE: CPT

## 2023-01-29 PROCEDURE — 97602 WOUND(S) CARE NON-SELECTIVE: CPT

## 2023-01-29 PROCEDURE — A9270 NON-COVERED ITEM OR SERVICE: HCPCS | Performed by: HOSPITALIST

## 2023-01-29 RX ORDER — CHOLECALCIFEROL (VITAMIN D3) 125 MCG
5 CAPSULE ORAL NIGHTLY
Status: DISCONTINUED | OUTPATIENT
Start: 2023-01-29 | End: 2023-02-09

## 2023-01-29 RX ADMIN — TERAZOSIN 1 MG: 1 CAPSULE ORAL at 16:45

## 2023-01-29 RX ADMIN — Medication 5 MG: at 20:43

## 2023-01-29 RX ADMIN — POLYETHYLENE GLYCOL 3350 1 PACKET: 17 POWDER, FOR SOLUTION ORAL at 04:18

## 2023-01-29 RX ADMIN — ATORVASTATIN CALCIUM 80 MG: 80 TABLET, FILM COATED ORAL at 16:45

## 2023-01-29 RX ADMIN — ASPIRIN 81 MG: 81 TABLET, CHEWABLE ORAL at 04:18

## 2023-01-29 RX ADMIN — ENOXAPARIN SODIUM 40 MG: 40 INJECTION SUBCUTANEOUS at 16:45

## 2023-01-29 RX ADMIN — CLOPIDOGREL BISULFATE 75 MG: 75 TABLET ORAL at 04:18

## 2023-01-29 RX ADMIN — AMLODIPINE BESYLATE 10 MG: 10 TABLET ORAL at 04:18

## 2023-01-29 RX ADMIN — SENNOSIDES AND DOCUSATE SODIUM 1 TABLET: 50; 8.6 TABLET ORAL at 04:18

## 2023-01-29 ASSESSMENT — PAIN DESCRIPTION - PAIN TYPE
TYPE: ACUTE PAIN
TYPE: ACUTE PAIN

## 2023-01-29 ASSESSMENT — ENCOUNTER SYMPTOMS
FOCAL WEAKNESS: 1
SPEECH CHANGE: 1
SHORTNESS OF BREATH: 0

## 2023-01-29 ASSESSMENT — PATIENT HEALTH QUESTIONNAIRE - PHQ9
SUM OF ALL RESPONSES TO PHQ9 QUESTIONS 1 AND 2: 0
1. LITTLE INTEREST OR PLEASURE IN DOING THINGS: NOT AT ALL
2. FEELING DOWN, DEPRESSED, IRRITABLE, OR HOPELESS: NOT AT ALL

## 2023-01-29 NOTE — PROGRESS NOTES
Placed a wound consult to come evaluate pt's sacrum and around their thacker catheter. LDAs opened. Pt has some discoloration on their mid-upper sacrum with a small opening. Pt also has some redness with sanguinous ecaudate surrounding their thacker catheter. Wounds were cleansed with soap and water, and a sacral mepilex was placed.    Messaged on-call hospitalist, Joseline Underwood at 0228 regarding getting a UA, as pt has some sediment in their urine. Orders for a UA placed.     At 0530, Joseline Underwood made aware that UA came back positive, and a CBC was ordered at this time.

## 2023-01-29 NOTE — PROGRESS NOTES
Hospital Medicine Daily Progress Note    Date of Service  1/29/2023    Chief Complaint  Kaiden Quiroz is a 62 y.o. male admitted 1/12/2023 with L side weakness    Hospital Course  61yo PMHx HTN, T2DM, ongoing tobacco use, BMI 36, ETOH.  Presenting with L side weakness on 1/12.  [place] not to be TNK candidate.  Taken to IR for MCA trhombectomy with TICI 3 post and R ICA stent.  On DAPT x 6 weeks. Had 10 sec pause intra procedure but resolved before CPR was initiated.  Was intubated.  In ICU required Precedex for ETOH withdrawal.  PEG placed 1/24.  In 10-14 days, the anchor button suture or sutures should be cut, releasing the anchor or anchors into the stomach. The suture anchors will then pass through the GI tract as desired. This can be performed in the interventional radiology department if   desired. Call [phone] any weekday [phone] hours to make an appointment.    Patient started on atorvastatin. Echo unremarkable.  Hospital course complicated by urinary retention requiring Mcclendon (on Terazosin), pneumonia and respiratory failure on high flow nasal cannula (now weaned down to 2 L nasal cannula).    Interval Problem Update    - Sleepy again this am, says he didn't sleep much last night  - RN overnight concerned about sediment in Mcclendon, ordered UA from Mcclendon which did show some RBCs however no significant hematuria noted this morning, CBC unremarkable, patient afebrile      I have discussed this patient's plan of care and discharge plan at IDT rounds today with Case Management, Nursing, Nursing leadership, and other members of the IDT team.    Consultants/Specialty  neurology    Code Status  Full Code    Disposition  Patient is not medically cleared for discharge.   Anticipate discharge to to an inpatient rehabilitation hospital.  I have placed the appropriate orders for post-discharge needs.    Review of Systems  Review of Systems   Unable to perform ROS: Mental acuity   Respiratory:  Negative for  shortness of breath.    Genitourinary:  Negative for hematuria.   Neurological:  Positive for speech change and focal weakness.      Physical Exam  Temp:  [36.2 °C (97.1 °F)-36.6 °C (97.8 °F)] 36.2 °C (97.2 °F)  Pulse:  [61-71] 70  Resp:  [16-19] 16  BP: (125-127)/(71-73) 126/71  SpO2:  [92 %-95 %] 95 %    Physical Exam  Constitutional:       General: He is not in acute distress.     Appearance: Normal appearance. He is well-developed. He is not diaphoretic.      Comments: Sleepy but wakes to voice   HENT:      Head: Normocephalic and atraumatic.      Nose: Nose normal.      Mouth/Throat:      Mouth: Mucous membranes are moist.   Eyes:      General: No scleral icterus.     Conjunctiva/sclera: Conjunctivae normal.   Neck:      Vascular: No JVD.   Cardiovascular:      Rate and Rhythm: Normal rate.      Heart sounds: No murmur heard.    No gallop.   Pulmonary:      Effort: Pulmonary effort is normal. No respiratory distress.      Breath sounds: No stridor. No wheezing or rales.   Abdominal:      Palpations: Abdomen is soft.      Tenderness: There is no abdominal tenderness. There is no guarding or rebound.      Comments: Gtube in place c/d/i   Genitourinary:     Comments: Mcclendon in place  Musculoskeletal:         General: No tenderness.      Right lower leg: No edema.      Left lower leg: No edema.   Skin:     General: Skin is warm and dry.      Capillary Refill: Capillary refill takes less than 2 seconds.      Coloration: Skin is not jaundiced or pale.      Findings: No rash.   Neurological:      Comments: Here for reference: sleeping did not perform full neuro exam 1/29  Pt does not attend to L side even with prompting and stim  Speech improved  +L side facial droop  L arm and leg flacid  Purposeful and 5/5 R upper and lower   Psychiatric:         Mood and Affect: Affect normal.       Fluids    Intake/Output Summary (Last 24 hours) at 1/29/2023 0888  Last data filed at 1/29/2023 0742  Gross per 24 hour   Intake 880 ml    Output 1975 ml   Net -1095 ml         Laboratory  Recent Labs     01/29/23  0702   WBC 9.3   RBC 4.17*   HEMOGLOBIN 13.9*   HEMATOCRIT 39.4*   MCV 94.5   MCH 33.3*   MCHC 35.3   RDW 41.1   PLATELETCT 271   MPV 11.2       Recent Labs     01/27/23  0357 01/28/23  0800 01/29/23  0203   SODIUM 137 138 138   POTASSIUM 3.8 4.0 3.9   CHLORIDE 102 101 101   CO2 24 25 25   GLUCOSE 138* 127* 129*   BUN 22 21 20   CREATININE 0.51 0.53 0.46*   CALCIUM 9.3 9.5 9.5                     Imaging  IR-GASTROSTOMY PLACEMENT   Final Result      1.  Fluoroscopic guided percutaneous gastrostomy with placement of an 18-Kisw[place]ili ELISE balloon gastrostomy catheter.      2. The gastrostomy tube may be used for tube feeds 18 hours after placement. Thereafter, liquid diet orders per the referring physician. Saline flush daily as per protocol.      3. In 10-14 days, the anchor button suture or sutures should be cut, releasing the anchor or anchors into the stomach. The suture anchors will then pass through the GI tract as desired. This can be performed in the interventional radiology department if    desired. Call [phone] any weekday [phone] hours to make an appointment.      CT-CTA CHEST PULMONARY ARTERY W/ RECONS   Final Result         1.  No pulmonary embolus appreciated.   2.  Linear densities the bilateral lung bases suggests changes of atelectasis, component of infiltrate is not excluded.   3.  Atherosclerosis and atherosclerotic coronary artery disease.      US-EXTREMITY VENOUS LOWER BILAT   Final Result      DX-ABDOMEN FOR TUBE PLACEMENT   Final Result      NG tube extends below the diaphragm into the region of the stomach. The tip is not visualized.      EC-ECHOCARDIOGRAM LTD W/ CONT   Final Result      DX-CHEST-PORTABLE (1 VIEW)   Final Result         1. No significant interval change.      CT-ABDOMEN-PELVIS W/O   Final Result         1.  Nondependent foci of air in the bladder, could represent changes from recent instrumentation or  urinary tract infection.   2.  Diverticulosis   3.  Atherosclerosis and atherosclerotic coronary artery disease      DX-CHEST-PORTABLE (1 VIEW)   Final Result      Ill-defined atelectasis versus consolidation in the left lower lung. Pneumonia can be considered in appropriate clinical setting.         DX-ABDOMEN FOR TUBE PLACEMENT   Final Result         1.  Nonspecific bowel gas pattern in the upper abdomen.   2.  Nasogastric tube tip terminates overlying the expected location of the gastric body.   3.  Cardiomegaly   4.  Hazy linear density left lung base could represent atelectasis or infiltrate.      CT-HEAD W/O   Final Result         1.  Evolving infarct/encephalomalacia in the right MCA distribution.   2.  Areas of hemorrhage in the right basal ganglia and involving the right frontal and anterior temporal sulci, similar compared to MRI January 13, 2023   3.  Atherosclerosis.      DX-CHEST-PORTABLE (1 VIEW)   Final Result         Diffuse interstitial and groundglass opacity throughout both lungs could relate to developing pulmonary edema.      DX-ABDOMEN FOR TUBE PLACEMENT   Final Result         Gastric drainage tube with tip projecting over the expected area of the stomach.      DX-CHEST-PORTABLE (1 VIEW)   Final Result      1.  Interval extubation without pneumothorax.   2.  Stable enlarged cardiac silhouette.   3.  There is bibasilar linear atelectasis.      EC-ECHOCARDIOGRAM COMPLETE W/ CONT   Final Result      MR-BRAIN-W/O   Final Result         Acute infarction involving the right frontal region, right basal ganglia and right caudate nucleus. Minimal punctate infarction involving the right parietal cortex. There is slight mass effect upon the right lateral ventricle with only minimal midline    shift measuring approximately 4 mm.      Petechial hemorrhage is noted within the right frontal cortical infarct. Also noted is petechial hemorrhage in the right basal ganglia and right caudate nucleus but without  surrounding vasogenic edema.         DX-PELVIS-1 OR 2 VIEWS   Final Result         1.  No acute traumatic bony injury.      DX-CHEST-PORTABLE (1 VIEW)   Final Result         1.  Left midlung and lower lobe infiltrates, similar to prior study.      [id number] VIEW   Final Result         1.  Nonspecific bowel gas pattern in the upper abdomen.   2.  Cardiomegaly   3.  Hazy left lower lobe infiltrates.   4.  Nasogastric tube tip terminates overlying the expected location of the gastric fundus.      IR-THROMBO MECHANICAL ARTERY,INIT   Final Result         62-year-old patient who presented with complete occlusion of the right ICA at its origin underwent emergent mechanical thrombectomy. After initial angioplasty,   a right cervical internal carotid artery stent was deployed and right MCA thrombectomy was performed. The final angiographic images show complete restoration of flow into the right ICA and MCA with no evidence of distal intracranial embolization.      Final recanalization score: TICI 3      I, Petra Mobley was physically present and participated during the entire procedure of the IR-THROMBO MECHANICAL ARTERY,INIT.                  DX-CHEST-PORTABLE (1 VIEW)   Final Result      No acute cardiac or pulmonary abnormalities are identified.      CT-CTA NECK WITH & W/O-POST PROCESSING   Final Result      1.  Occlusion of the right internal carotid artery at its origin.      2.  Occlusion of the right vertebral artery at its origin.      Dr. Gongora discussed findings with Dr. Mccarthy.      CT-CTA HEAD WITH & W/O-POST PROCESS   Final Result         1.  Occlusion of the right internal carotid artery and right middle cerebral artery.   2.  Narrowed left M1 segment with severely narrowed and M2 and M3 branches, may represent chronic changes.      These findings were discussed with the patient's clinician, Joseline Mccarthy, on 1/12/2023 8:27 PM.      CT-CEREBRAL PERFUSION ANALYSIS   Final Result         1.  Cerebral  blood flow less than 30% likely representing completed infarct = 52 mL.      2.  T Max more than 6 seconds likely representing combination of completed infarct and ischemia = 172 mL.      3.  Mismatched volume likely representing ischemic brain/penumbra = 120 mL      4.  Please note that the cerebral perfusion was performed on the limited brain tissue around the basal ganglia region. Infarct/ischemia outside the CT perfusion sections can be missed in this study.      CT-HEAD W/O   Final Result      1.  No evidence of intracranial hemorrhage, midline shift or mass effect.      2.  Question of hyperdense right MCA though evaluation limited by oblique positioning.                Assessment/Plan  * Acute ischemic right middle cerebral artery (MCA) stroke (HCC)- (present on admission)  Assessment & Plan  Right MCA CVA with hemorrhagic conversion  Neurology consulted   High Intensity statin therapy   BP control  S/p right ICA stent and MCA thrombectomy 1/12/23  DAPT  TTE negative for shunt  Tobacco cessation  PT/OT/SLP - recommending post-acute placement  PEG placed 1/24     Urinary retention  Assessment & Plan  Likely in the setting of BPH  Tried removing thacker 1/25, had to replace  Starting terazosin 1/28, can try removing in a few days or in rehab    Small amount of sediment in the tubing, however no significant hematuria, UA obtained which did show RBCs, CBC normal and afebrile  Will try removing Thacker on 1/30    Internal carotid artery stent present  Assessment & Plan  Placed 1/12  Per Neuro OK to start DAPT: ASA & Plavix for 6 wks    Hypernatremia  Assessment & Plan  Patient continues to have hyponatremia with elevated sodium level.  Resolved with Free H2O 200ml q6  Cont to follow daily    Pneumonia due to infectious organism- (present on admission)  Assessment & Plan  Resolved    Aspiration pneumonia (Developed 01/19/23)   Procal low  Sputum cultures neg  Strict aspiration precautions   Has completed 5 days of  Abx's  MRSA nares neg  Mucomyst/3% normal saline nebs to maintain pulmonary toilet, RT protocol to continue  Pulmonology evaluated him and made recommendations          Oropharyngeal dysphagia- (present on admission)  Assessment & Plan  Secondary to acute stroke.    Aspiration precautions   PEG 1/24  Nutrition following    Acute left hemiparesis ([facility])- (present on admission)  Assessment & Plan  Secondary to right MCA stroke.    Pressure ulcer precautions  Aspiration, Fall precautions  PT and OT  Working toward Rehab DC    Stroke, hemorrhagic ([facility])- (present on admission)  Assessment & Plan  Ischemic stroke with hemorrhagic conversion.  Neuro has OKd for antiplatelet therapy with ASA and plavix given presence of new ICA stent    Toxic metabolic encephalopathy- (present on admission)  Assessment & Plan  Improved    Persistent 01/20/23  Secondary to stroke, delirium and now concern for relation to underlying pneumonia  Limit restraints as able  Avoid polypharmacy, sedating medications  Delirium precautions  Management of underlying medical conditions  Obtain EEG 01/20/23 and it did not show seizure activity.   cont's to improve daily though examination is limited by dysarthria at times    Acute respiratory failure with hypoxia ([facility])- (present on admission)  Assessment & Plan  Improved     01/19/23 CXRAY consistent with Aspiration Pneumonia - Started on IV unasyn  Subsequently trnasitioned to [facility].  At present off Abx's  MRSA nares neg  trachael aspirate cultures neg  CTA chest neg for PE  TTE LVEF 70%, no signficant valvular or structural abnormalities  O2 demand decreasing: now off HFNC and on 2 LNC    Severe obesity (BMI 35.0-35.9 with comorbidity) ([facility])- (present on admission)  Assessment & Plan  Body mass index is 37.39 kg/m².  Co-morbid HTN, T2DM, KENDAL  Outpatient weight loss    Type 2 diabetes mellitus without complication, without long-term current use of insulin ([facility])- (present on admission)  Assessment &  Plan  A1c 5.3  No indication for strict BG control - POCT/SSI deferred  Atorvastatin    Alcohol dependence with withdrawal (HCC)- (present on admission)  Assessment & Plan  Resolved.    Tobacco use disorder- (present on admission)  Assessment & Plan  On nicotine patch     Primary hypertension- (present on admission)  Assessment & Plan  Amlodipine 10 mg         VTE prophylaxis: enoxaparin ppx    I have performed a physical exam and reviewed and updated ROS and Plan today (1/29/2023). In review of yesterday's note (1/28/2023), there are no changes except as documented above.

## 2023-01-29 NOTE — CARE PLAN
The patient is Stable - Low risk of patient condition declining or worsening    Shift Goals  Clinical Goals: q2 turns, q4 neuro checks  Patient Goals: Pain management  Family Goals: RADHA - family not present    Progress made toward(s) clinical / shift goals:    Problem: Optimal Care of the Stroke Patient  Goal: Optimal emergency care for the stroke patient  Outcome: Progressing  Goal: Optimal acute care for the stroke patient  Outcome: Progressing     Problem: Knowledge Deficit - Stroke Education  Goal: Patient's knowledge of stroke and risk factors will improve  Outcome: Progressing     Problem: Psychosocial - Patient Condition  Goal: Patient's ability to verbalize feelings about condition will improve  Outcome: Progressing  Goal: Patient's ability to re-evaluate and adapt role responsibilities will improve  Outcome: Progressing     Problem: Discharge Planning - Stroke  Goal: Ensure Stroke Core Measures are met prior to discharge  Outcome: Progressing  Goal: Patient’s continuum of care needs will be met  Outcome: Progressing     Problem: Neuro Status  Goal: Neuro status will remain stable or improve  Outcome: Progressing     Problem: Hemodynamic Monitoring  Goal: Patient's hemodynamics, fluid balance and neurologic status will be stable or improve  Outcome: Progressing     Problem: Respiratory - Stroke Patient  Goal: Patient will achieve/maintain optimum respiratory rate/effort  Outcome: Progressing     Problem: Dysphagia  Goal: Dysphagia will improve  Outcome: Progressing     Problem: Risk for Aspiration  Goal: Patient's risk for aspiration will be absent or decrease  Outcome: Progressing     Problem: Urinary Elimination  Goal: Establish and maintain regular urinary output  Outcome: Progressing     Problem: Bowel Elimination  Goal: Establish and maintain regular bowel function  Outcome: Progressing     Problem: Mobility - Stroke  Goal: Patient's capacity to carry out activities will improve  Outcome:  Progressing  Goal: Spasticity will be prevented or improved  Outcome: Progressing  Goal: Subluxation will be prevented or improved  Outcome: Progressing     Problem: Self Care  Goal: Patient will have the ability to perform ADLs independently or with assistance (bathe, groom, dress, toilet and feed)  Outcome: Progressing     Problem: Knowledge Deficit - Standard  Goal: Patient and family/care givers will demonstrate understanding of plan of care, disease process/condition, diagnostic tests and medications  Outcome: Progressing     Problem: Pain - Standard  Goal: Alleviation of pain or a reduction in pain to the patient’s comfort goal  Outcome: Progressing     Problem: Skin Integrity  Goal: Skin integrity is maintained or improved  Outcome: Progressing     Problem: Fall Risk  Goal: Patient will remain free from falls  Outcome: Progressing     Problem: Optimal Care for Alcohol Withdrawal  Goal: Optimal Care for the alcohol withdrawal patient  Outcome: Progressing       Patient is not progressing towards the following goals:

## 2023-01-29 NOTE — WOUND TEAM
[facility]  Inpatient Services  Wound and Skin Care Evaluation    Admission Date: 2023     Last order of IP CONSULT TO WOUND CARE was found on 2023 from Hospital Encounter on 2023     HPI, PMH, SH: Reviewed    History reviewed. No pertinent surgical history.  Social History     Tobacco Use    Smoking status: Former     Packs/day: 0.50     Years: 46.00     Pack years: 23.00     Types: Cigarettes     Start date:      Quit date: 2023     Years since quittin.0    Smokeless tobacco: Never   Substance Use Topics    Alcohol use: Yes     Alcohol/week: 25.2 oz     Types: 42 Cans of beer per week     Chief Complaint   Patient presents with    Possible Stroke     BIBA from home for stroke like symptoms  Wife found pt with slurred speech, L facial droop, and L sided weakness   LKW 1830  Pt had a fall yesterday, + head strike, - thinners  , aox4  Initial NIH of 20 by neurologist, pt taken to CT then red 11     Diagnosis: Acute CVA (cerebrovascular accident) (HCC) [I63.9]    Unit where seen by Wound Team: S176/     WOUND CONSULT/FOLLOW UP RELATED TO:  Sacrum     WOUND HISTORY:  Pt previously had NGT in place.  Came in for MCA stroke and has left sided deficits.      WOUND ASSESSMENT/LDA      Wound 23 Traumatic Sacrum Per patient, he fell on a curb PTA, (Active)   Wound Image   23 143   Site Assessment Pink;Red;Intact 23   Periwound Assessment Pink;Scar tissue;Intact 23 1431   Margins Attached edges;Defined edges 23 1431   Closure Adhesive bandage 23 1431   Drainage Amount None 23 1431   Treatments Cleansed;Site care;Offloading 23 1431   Wound Cleansing Foam Cleanser/Washcloth 23 1431   Periwound Protectant Barrier Paste 23 1431   Dressing Cleansing/Solutions Not Applicable 23 1431   Dressing Options Mepilex 23 1431   Dressing Changed New 23 1431   Dressing Status Clean;Dry;Intact 23 1431    Dressing Change/Treatment Frequency Every 72 hrs, and As Needed 01/29/23 1431   NEXT Dressing Change/Treatment Date 02/01/23 01/29/23 1431   NEXT Weekly Photo (Inpatient Only) 02/05/23 01/29/23 1431   Non-staged Wound Description Not applicable 01/29/23 1431   Shape fissure 01/29/23 1431   Wound Odor None 01/29/23 1431   Pulses N/A 01/29/23 1431   Exposed Structures None 01/29/23 1431   WOUND NURSE ONLY - Time Spent with Patient (mins) 60 01/29/23 1431            Vascular:    CHEN:   No results found.  Lab Values:    Lab Results   Component Value Date/Time    WBC 9.3 01/29/2023 07:02 AM    RBC 4.17 (L) 01/29/2023 07:02 AM    HEMOGLOBIN 13.9 (L) 01/29/2023 07:02 AM    HEMATOCRIT 39.4 (L) 01/29/2023 07:02 AM    CREACTPROT 11.11 (H) 01/19/2023 02:32 AM    HBA1C 5.3 01/12/2023 08:02 PM        Culture Results show:  No results found for this or any previous visit (from the past 720 hour(s)).    Pain Level/Medicated:  Denies pain     INTERVENTIONS BY WOUND TEAM:  Chart and images reviewed. Discussed with bedside RN. All areas of concern (based on picture review, LDA review and discussion with bedside RN) have been thoroughly assessed. Documentation of areas based on significant findings. This RN in to assess patient. Performed standard wound care which includes appropriate positioning, dressing removal and non-selective debridement. Pictures and measurements obtained weekly if/when required.   R. Nose-not assessed 1/29/  Preparation for Dressing removal: NA CAIO  Non-selectively Debrided with:  Normal Saline and Gauze   Sharp debridement: NA  Radha wound: Cleansed with Normal Saline and Gauze,   Primary Dressing: CAIO, offloading  Secondary (Outer) Dressing:     Sacrum   Non-selective debrided with foam cleanser, no sharp debridement periwound prepped with barrier paste applied to fissure and placed a mepilex higher up over sacrum and hips.            Interdisciplinary consultation: Patient, Bedside RN,     EVALUATION /  "RATIONALE FOR TREATMENT:  Most Recent Date:  1/29/23: re-consult for patient's sacrum, similar in previous assessment. Previous moisture fissure scab, applied barrier paste as patient is incontinent of stool and mepilex applied up higher to cover scar tissue.  Penis assessed and patient just needed thacker care.  Repositioned stat lock to give tubing more slack.  Patient on TAPs, but cloth jose alfredo, multiple dri-eduard pads-> removed extra layers and placed micro climate pad   1/26/23: Patient right nare with indeterminable pressure injury likely from previous NG tube. NGT no longer in place. Continue to offload area. Patient sacrum also assessed and found to have scar tissue with surrounding blanchable erythema. Patient states he had a previous injury by \"tripping on a curbside and falling on tailbone.\"     Goals: Steady decrease in wound area and depth weekly.    WOUND TEAM PLAN OF CARE ([X] for frequency of wound follow up,):  Nursing to follow dressing orders written for wound care. Contact wound team if area fails to progress, deteriorates or with any questions/concerns if something comes up before next scheduled follow up (See below as to whether wound is following and frequency of wound follow up)  Dressing changes by wound team:                   Follow up 3 times weekly:                NPWT change 3 times weekly:     Follow up 1-2 times weekly:    X weekly for nose  Follow up Bi-Monthly:           Follow up Monthly (High Risk):                        Follow up as needed:   for sacrum  Other (explain):     NURSING PLAN OF CARE ORDERS (X):  Dressing changes: See Dressing Care orders:   Skin care: See Skin Care orders:   RN Prevention Protocol: X  Rectal tube care: See Rectal Tube Care orders:   Other orders:    RSKIN:   CURRENTLY IN PLACE (X), APPLIED THIS VISIT (A), ORDERED (O):   Q shift Lucius:  X  Q shift pressure point assessments:  X    Surface/Positioning   Pressure redistribution mattress            Low " Airloss          ICU Low Airloss   Bariatric NUNO     Waffle cushion        Waffle Overlay        X  Reposition q 2 hours      TAPs Turning system   x  Z Kaveh Pillow     Offloading/Redistribution   Sacral Mepilex (Silicone dressing)   A  Heel Mepilex (Silicone dressing)         Heel float boots (Prevalon boot)             Float Heels off Bed with Pillows           Respiratory   Silicone O2 tubing     X    Gray Foam Ear protectors     Cannula fixation Device (Tender )          High flow offloading Clip    Elastic head band offloading device      Anchorfast                                                         Trach with Optifoam split foam             Containment/Moisture Prevention   Rectal tube or BMS    Purwick/Condom Cath        Mcclendon Catheter  X  Barrier wipes           Barrier paste a      Antifungal tx      Interdry        Mobilization not assessed    Up to chair        Ambulate      PT/OT      Nutrition      Dietician        Diabetes Education      PO     TF X    TPN     NPO   # days     Other        Anticipated discharge plans: TBD  LTACH:        SNF/Rehab:                  Home Health Care:           Outpatient Wound Center:            Self/Family Care:        Other:                  Vac Discharge Needs:   Vac Discharge plan is purely a recommendation from wound team and not a requirement for discharge unless otherwise stated by physician.  Not Applicable Pt not on a wound vac: X      Regular Vac while inpatient, alternative dressing at DC:        Regular Vac in use and continued at DC:            Reg. Vac w/ Skin Sub/Biologic in use. Will need to be changed 2x wkly:      Veraflo Vac while inpatient, ok to transition to Regular Vac on Discharge (Bedside RN to Clamp small instillation tubing at time of DC):           Veraflo Vac while inpatient, would benefit from remaining on Veraflo Vac upon discharge:

## 2023-01-30 LAB
ALBUMIN SERPL BCP-MCNC: 3.9 G/DL (ref 3.2–4.9)
ALBUMIN/GLOB SERPL: 1.1 G/DL
ALP SERPL-CCNC: 124 U/L (ref 30–99)
ALT SERPL-CCNC: 39 U/L (ref 2–50)
ANION GAP SERPL CALC-SCNC: 10 MMOL/L (ref 7–16)
AST SERPL-CCNC: 30 U/L (ref 12–45)
BILIRUB SERPL-MCNC: 0.7 MG/DL (ref 0.1–1.5)
BUN SERPL-MCNC: 20 MG/DL (ref 8–22)
CALCIUM ALBUM COR SERPL-MCNC: 9.7 MG/DL (ref 8.5–10.5)
CALCIUM SERPL-MCNC: 9.6 MG/DL (ref 8.5–10.5)
CHLORIDE SERPL-SCNC: 101 MMOL/L (ref 96–112)
CO2 SERPL-SCNC: 27 MMOL/L (ref 20–33)
CREAT SERPL-MCNC: 0.54 MG/DL (ref 0.5–1.4)
CRP SERPL HS-MCNC: 0.32 MG/DL (ref 0–0.75)
GFR SERPLBLD CREATININE-BSD FMLA CKD-EPI: 113 ML/MIN/1.73 M 2
GLOBULIN SER CALC-MCNC: 3.7 G/DL (ref 1.9–3.5)
GLUCOSE SERPL-MCNC: 116 MG/DL (ref 65–99)
POTASSIUM SERPL-SCNC: 4.1 MMOL/L (ref 3.6–5.5)
PREALB SERPL-MCNC: 29.7 MG/DL (ref 18–38)
PROT SERPL-MCNC: 7.6 G/DL (ref 6–8.2)
SODIUM SERPL-SCNC: 138 MMOL/L (ref 135–145)

## 2023-01-30 PROCEDURE — A9270 NON-COVERED ITEM OR SERVICE: HCPCS | Performed by: INTERNAL MEDICINE

## 2023-01-30 PROCEDURE — 700111 HCHG RX REV CODE 636 W/ 250 OVERRIDE (IP): Performed by: INTERNAL MEDICINE

## 2023-01-30 PROCEDURE — 700102 HCHG RX REV CODE 250 W/ 637 OVERRIDE(OP): Performed by: INTERNAL MEDICINE

## 2023-01-30 PROCEDURE — A9270 NON-COVERED ITEM OR SERVICE: HCPCS | Performed by: HOSPITALIST

## 2023-01-30 PROCEDURE — 80053 COMPREHEN METABOLIC PANEL: CPT

## 2023-01-30 PROCEDURE — 36415 COLL VENOUS BLD VENIPUNCTURE: CPT

## 2023-01-30 PROCEDURE — 770006 HCHG ROOM/CARE - MED/SURG/GYN SEMI*

## 2023-01-30 PROCEDURE — 99232 SBSQ HOSP IP/OBS MODERATE 35: CPT | Performed by: INTERNAL MEDICINE

## 2023-01-30 PROCEDURE — 84134 ASSAY OF PREALBUMIN: CPT

## 2023-01-30 PROCEDURE — 700102 HCHG RX REV CODE 250 W/ 637 OVERRIDE(OP): Performed by: HOSPITALIST

## 2023-01-30 PROCEDURE — 86140 C-REACTIVE PROTEIN: CPT

## 2023-01-30 RX ADMIN — ASPIRIN 81 MG: 81 TABLET, CHEWABLE ORAL at 04:18

## 2023-01-30 RX ADMIN — SENNOSIDES AND DOCUSATE SODIUM 1 TABLET: 50; 8.6 TABLET ORAL at 17:25

## 2023-01-30 RX ADMIN — Medication 5 MG: at 22:11

## 2023-01-30 RX ADMIN — AMLODIPINE BESYLATE 10 MG: 10 TABLET ORAL at 04:19

## 2023-01-30 RX ADMIN — ENOXAPARIN SODIUM 40 MG: 40 INJECTION SUBCUTANEOUS at 17:24

## 2023-01-30 RX ADMIN — TERAZOSIN 1 MG: 1 CAPSULE ORAL at 18:22

## 2023-01-30 RX ADMIN — CLOPIDOGREL BISULFATE 75 MG: 75 TABLET ORAL at 04:18

## 2023-01-30 RX ADMIN — ATORVASTATIN CALCIUM 80 MG: 80 TABLET, FILM COATED ORAL at 17:24

## 2023-01-30 ASSESSMENT — PAIN DESCRIPTION - PAIN TYPE
TYPE: ACUTE PAIN
TYPE: ACUTE PAIN

## 2023-01-30 ASSESSMENT — ENCOUNTER SYMPTOMS
SPEECH CHANGE: 1
SHORTNESS OF BREATH: 0
FOCAL WEAKNESS: 1

## 2023-01-30 NOTE — DIETARY
Nutrition support weekly update:  Day 18 of admit.  Kaiden Quiroz is a 62 y.o. male with admitting DX of Acute CVA.    Tube feeding initiated on 1/15. Current TF via PEG is Replete with Fiber @ 80 mL/hr providing 1920 kcals, 123 g protein, and 1594 mL of free water in 24 hrs.    Assessment:  Weight 108 kg. Estimated needs based on weight of 105 kg.   Re-estimate of nutritional needs is not indicated at this time.      Calculation/Equation: REE= 1815 kcals   Total calories/day: 1785- 1995 kcals  (17- 19 kcals/kg)  Total Grams Protein / day: 126.1 - 137 g  (Grams Protein / k.2 - 1.4)    Evaluation:   Pt currently receiving and tolerating TF at goal. Pt s/p PEG tube placed   Current clinical picture and MD progress notes reviewed   Labs and Meds reviewed   Skin: PI noted to right nare of nose, wound to left upper arm, wound to sacrum per pt. Generalized edema noted  +BM  per pt  Current feeds remain appropriate. D/t PEG placement consider transitioning to bolus feeds to meet estimated needs if appropriate. Recommend Isosource 1.5 with goal of 6 cartons per day (2 cartons @ breakfast, 2 cartons @ lunch and 2 cartons @ dinner) providing 2250 kcals, 102 grams protein and 1140 mL free water.       Malnutrition risk: No new criteria identified     Recommendations/Plan:  Continue Replete with Fiber @ current goal rate of 80 mL/hr   Fluids per MD   Monitor weights     RD following

## 2023-01-30 NOTE — PROGRESS NOTES
Hospital Medicine Daily Progress Note    Date of Service  1/30/2023    Chief Complaint  Kaiden Quiroz is a 62 y.o. male admitted 1/12/2023 with L side weakness    Hospital Course  61yo PMHx HTN, T2DM, ongoing tobacco use, BMI 36, ETOH.  Presenting with L side weakness on 1/12.  [place] not to be TNK candidate.  Taken to IR for MCA trhombectomy with TICI 3 post and R ICA stent.  On DAPT x 6 weeks. Had 10 sec pause intra procedure but resolved before CPR was initiated.  Was intubated.  In ICU required Precedex for ETOH withdrawal.  PEG placed 1/24.  In 10-14 days, the anchor button suture or sutures should be cut, releasing the anchor or anchors into the stomach. The suture anchors will then pass through the GI tract as desired. This can be performed in the interventional radiology department if   desired. Call [phone] any weekday [phone] hours to make an appointment.    Patient started on atorvastatin. Echo unremarkable.  Hospital course complicated by urinary retention requiring Mcclendon (on Terazosin), pneumonia and respiratory failure on high flow nasal cannula (now weaned down to 2 L nasal cannula).    Interval Problem Update    - No acute overnight events  - only complaint today was dry mouth, asking for ice chips  - working on dc planning      I have discussed this patient's plan of care and discharge plan at IDT rounds today with Case Management, Nursing, Nursing leadership, and other members of the IDT team.    Consultants/Specialty  neurology    Code Status  Full Code    Disposition  Patient is medically cleared for discharge.   Anticipate discharge to to skilled nursing facility.  I have placed the appropriate orders for post-discharge needs.    Review of Systems  Review of Systems   HENT:          Dry mouth   Respiratory:  Negative for shortness of breath.    Genitourinary:  Negative for hematuria.   Neurological:  Positive for speech change and focal weakness.      Physical Exam  Temp:  [36.2 °C (97.1  °F)-36.4 °C (97.6 °F)] 36.2 °C (97.1 °F)  Pulse:  [65-78] 65  Resp:  [16-18] 18  BP: (106-148)/(64-84) 132/74  SpO2:  [90 %-95 %] 90 %    Physical Exam  Constitutional:       General: He is not in acute distress.     Appearance: Normal appearance. He is well-developed. He is not diaphoretic.      Comments: Sleepy but wakes to voice   HENT:      Head: Normocephalic and atraumatic.      Nose: Nose normal.      Mouth/Throat:      Mouth: Mucous membranes are moist.   Eyes:      General: No scleral icterus.     Conjunctiva/sclera: Conjunctivae normal.   Neck:      Vascular: No JVD.   Cardiovascular:      Rate and Rhythm: Normal rate.      Heart sounds: No murmur heard.    No gallop.   Pulmonary:      Effort: Pulmonary effort is normal. No respiratory distress.      Breath sounds: No stridor. No wheezing or rales.   Abdominal:      Palpations: Abdomen is soft.      Tenderness: There is no abdominal tenderness. There is no guarding or rebound.      Comments: Gtube in place c/d/i   Genitourinary:     Comments: Mcclendon in place  Musculoskeletal:         General: No tenderness.      Right lower leg: No edema.      Left lower leg: No edema.   Skin:     General: Skin is warm and dry.      Capillary Refill: Capillary refill takes less than 2 seconds.      Coloration: Skin is not jaundiced or pale.      Findings: No rash.   Neurological:      Comments:   Pt does not attend to L side even with prompting and stim  Speech improved  +L side facial droop  L arm and leg flacid  Purposeful and 5/5 R upper and lower   Psychiatric:         Mood and Affect: Mood and affect normal.         Behavior: Behavior normal.       Fluids    Intake/Output Summary (Last 24 hours) at 1/30/2023 1557  Last data filed at 1/30/2023 0404  Gross per 24 hour   Intake 420 ml   Output 2700 ml   Net -2280 ml         Laboratory  Recent Labs     01/29/23  0702   WBC 9.3   RBC 4.17*   HEMOGLOBIN 13.9*   HEMATOCRIT 39.4*   MCV 94.5   MCH 33.3*   MCHC 35.3   RDW 41.1    PLATELETCT 271   MPV 11.2       Recent Labs     01/28/23  0800 01/29/23  0203 01/30/23  0334   SODIUM 138 138 138   POTASSIUM 4.0 3.9 4.1   CHLORIDE 101 101 101   CO2 25 25 27   GLUCOSE 127* 129* 116*   BUN 21 20 20   CREATININE 0.53 0.46* 0.54   CALCIUM 9.5 9.5 9.6                     Imaging  IR-GASTROSTOMY PLACEMENT   Final Result      1.  Fluoroscopic guided percutaneous gastrostomy with placement of an 18-[place] ELISE balloon gastrostomy catheter.      2. The gastrostomy tube may be used for tube feeds 18 hours after placement. Thereafter, liquid diet orders per the referring physician. Saline flush daily as per protocol.      3. In 10-14 days, the anchor button suture or sutures should be cut, releasing the anchor or anchors into the stomach. The suture anchors will then pass through the GI tract as desired. This can be performed in the interventional radiology department if    desired. Call [phone] any weekday [phone] hours to make an appointment.      CT-CTA CHEST PULMONARY ARTERY W/ RECONS   Final Result         1.  No pulmonary embolus appreciated.   2.  Linear densities the bilateral lung bases suggests changes of atelectasis, component of infiltrate is not excluded.   3.  Atherosclerosis and atherosclerotic coronary artery disease.      US-EXTREMITY VENOUS LOWER BILAT   Final Result      DX-ABDOMEN FOR TUBE PLACEMENT   Final Result      NG tube extends below the diaphragm into the region of the stomach. The tip is not visualized.      EC-ECHOCARDIOGRAM LTD W/ CONT   Final Result      DX-CHEST-PORTABLE (1 VIEW)   Final Result         1. No significant interval change.      CT-ABDOMEN-PELVIS W/O   Final Result         1.  Nondependent foci of air in the bladder, could represent changes from recent instrumentation or urinary tract infection.   2.  Diverticulosis   3.  Atherosclerosis and atherosclerotic coronary artery disease      DX-CHEST-PORTABLE (1 VIEW)   Final Result      Ill-defined atelectasis  versus consolidation in the left lower lung. Pneumonia can be considered in appropriate clinical setting.         DX-ABDOMEN FOR TUBE PLACEMENT   Final Result         1.  Nonspecific bowel gas pattern in the upper abdomen.   2.  Nasogastric tube tip terminates overlying the expected location of the gastric body.   3.  Cardiomegaly   4.  Hazy linear density left lung base could represent atelectasis or infiltrate.      CT-HEAD W/O   Final Result         1.  Evolving infarct/encephalomalacia in the right MCA distribution.   2.  Areas of hemorrhage in the right basal ganglia and involving the right frontal and anterior temporal sulci, similar compared to MRI January 13, 2023   3.  Atherosclerosis.      DX-CHEST-PORTABLE (1 VIEW)   Final Result         Diffuse interstitial and groundglass opacity throughout both lungs could relate to developing pulmonary edema.      DX-ABDOMEN FOR TUBE PLACEMENT   Final Result         Gastric drainage tube with tip projecting over the expected area of the stomach.      DX-CHEST-PORTABLE (1 VIEW)   Final Result      1.  Interval extubation without pneumothorax.   2.  Stable enlarged cardiac silhouette.   3.  There is bibasilar linear atelectasis.      EC-ECHOCARDIOGRAM COMPLETE W/ CONT   Final Result      MR-BRAIN-W/O   Final Result         Acute infarction involving the right frontal region, right basal ganglia and right caudate nucleus. Minimal punctate infarction involving the right parietal cortex. There is slight mass effect upon the right lateral ventricle with only minimal midline    shift measuring approximately 4 mm.      Petechial hemorrhage is noted within the right frontal cortical infarct. Also noted is petechial hemorrhage in the right basal ganglia and right caudate nucleus but without surrounding vasogenic edema.         DX-PELVIS-1 OR 2 VIEWS   Final Result         1.  No acute traumatic bony injury.      DX-CHEST-PORTABLE (1 VIEW)   Final Result         1.  Left midlung  and lower lobe infiltrates, similar to prior study.      [id number] VIEW   Final Result         1.  Nonspecific bowel gas pattern in the upper abdomen.   2.  Cardiomegaly   3.  Hazy left lower lobe infiltrates.   4.  Nasogastric tube tip terminates overlying the expected location of the gastric fundus.      IR-THROMBO MECHANICAL ARTERY,INIT   Final Result         62-year-old patient who presented with complete occlusion of the right ICA at its origin underwent emergent mechanical thrombectomy. After initial angioplasty,   a right cervical internal carotid artery stent was deployed and right MCA thrombectomy was performed. The final angiographic images show complete restoration of flow into the right ICA and MCA with no evidence of distal intracranial embolization.      Final recanalization score: TICI 3      I, Petra Mobley was physically present and participated during the entire procedure of the IR-THROMBO MECHANICAL ARTERY,INIT.                  DX-CHEST-PORTABLE (1 VIEW)   Final Result      No acute cardiac or pulmonary abnormalities are identified.      CT-CTA NECK WITH & W/O-POST PROCESSING   Final Result      1.  Occlusion of the right internal carotid artery at its origin.      2.  Occlusion of the right vertebral artery at its origin.      Dr. Gongora discussed findings with Dr. Mccarthy.      CT-CTA HEAD WITH & W/O-POST PROCESS   Final Result         1.  Occlusion of the right internal carotid artery and right middle cerebral artery.   2.  Narrowed left M1 segment with severely narrowed and M2 and M3 branches, may represent chronic changes.      These findings were discussed with the patient's clinician, Joseline Mccarthy, on 1/12/2023 8:27 PM.      CT-CEREBRAL PERFUSION ANALYSIS   Final Result         1.  Cerebral blood flow less than 30% likely representing completed infarct = 52 mL.      2.  T Max more than 6 seconds likely representing combination of completed infarct and ischemia = 172 mL.      3.   Mismatched volume likely representing ischemic brain/penumbra = 120 mL      4.  Please note that the cerebral perfusion was performed on the limited brain tissue around the basal ganglia region. Infarct/ischemia outside the CT perfusion sections can be missed in this study.      CT-HEAD W/O   Final Result      1.  No evidence of intracranial hemorrhage, midline shift or mass effect.      2.  Question of hyperdense right MCA though evaluation limited by oblique positioning.                Assessment/Plan  * Acute ischemic right middle cerebral artery (MCA) stroke (HCC)- (present on admission)  Assessment & Plan  Right MCA CVA with hemorrhagic conversion  Neurology consulted   High Intensity statin therapy   BP control  S/p right ICA stent and MCA thrombectomy 1/12/23  DAPT  TTE negative for shunt  Tobacco cessation  PT/OT/SLP - recommending post-acute placement  PEG placed 1/24     Urinary retention  Assessment & Plan  Likely in the setting of BPH  Tried removing thacker 1/25, had to replace  Starting terazosin 1/28    Small amount of sediment in the tubing, however no significant hematuria, UA obtained which did show RBCs, CBC normal and afebrile  Will try removing Thacker in next few days or at [facility]    Internal carotid artery stent present  Assessment & Plan  Placed 1/12  Per Neuro OK to start DAPT: ASA & Plavix for 6 wks    Hypernatremia  Assessment & Plan  Patient continues to have hyponatremia with elevated sodium level.  Resolved with Free H2O 200ml q6  Cont to follow daily    Pneumonia due to infectious organism- (present on admission)  Assessment & Plan  Resolved    Aspiration pneumonia (Developed 01/19/23)   Procal low  Sputum cultures neg  Strict aspiration precautions   Has completed 5 days of Abx's  MRSA nares neg  Mucomyst/3% normal saline nebs to maintain pulmonary toilet, RT protocol to continue  Pulmonology evaluated him and made recommendations          Oropharyngeal dysphagia- (present on  admission)  Assessment & Plan  Secondary to acute stroke.    Aspiration precautions   PEG 1/24  Nutrition following    Acute left hemiparesis (HCC)- (present on admission)  Assessment & Plan  Secondary to right MCA stroke.    Pressure ulcer precautions  Aspiration, Fall precautions  PT and OT  Working toward Rehab DC    Stroke, hemorrhagic (HCC)- (present on admission)  Assessment & Plan  Ischemic stroke with hemorrhagic conversion.  Neuro has OKd for antiplatelet therapy with ASA and plavix given presence of new ICA stent    Toxic metabolic encephalopathy- (present on admission)  Assessment & Plan  Improved    Persistent 01/20/23  Secondary to stroke, delirium and now concern for relation to underlying pneumonia  Limit restraints as able  Avoid polypharmacy, sedating medications  Delirium precautions  Management of underlying medical conditions  Obtain EEG 01/20/23 and it did not show seizure activity.   cont's to improve daily though examination is limited by dysarthria at times    Acute respiratory failure with hypoxia ([facility])- (present on admission)  Assessment & Plan  Improved     01/19/23 CXRAY consistent with Aspiration Pneumonia - Started on IV unasyn  Subsequently trnasitioned to [facility].  At present off Abx's  MRSA nares neg  trachael aspirate cultures neg  CTA chest neg for PE  TTE LVEF 70%, no signficant valvular or structural abnormalities  O2 demand decreasing: now off HFNC and on 2 LNC    Severe obesity (BMI 35.0-35.9 with comorbidity) ([facility])- (present on admission)  Assessment & Plan  Body mass index is 37.39 kg/m².  Co-morbid HTN, T2DM, KENDAL  Outpatient weight loss    Type 2 diabetes mellitus without complication, without long-term current use of insulin ([facility])- (present on admission)  Assessment & Plan  A1c 5.3  No indication for strict BG control - POCT/SSI deferred  Atorvastatin    Alcohol dependence with withdrawal ([facility])- (present on admission)  Assessment & Plan  Resolved.    Tobacco use disorder-  (present on admission)  Assessment & Plan  On nicotine patch     Primary hypertension- (present on admission)  Assessment & Plan  Amlodipine 10 mg         VTE prophylaxis: enoxaparin ppx    I have performed a physical exam and reviewed and updated ROS and Plan today (1/30/2023). In review of yesterday's note (1/29/2023), there are no changes except as documented above.

## 2023-01-30 NOTE — DISCHARGE PLANNING
Agency/Facility Name: [facility]  Spoke To: Nohemy  Outcome: DPA called facility to check on referral status. Per Nohemy she will look referral over.

## 2023-01-30 NOTE — CARE PLAN
The patient is Stable - Low risk of patient condition declining or worsening    Shift Goals  Clinical Goals: Q2h turns, Q4h neuro checks  Patient Goals: Ice chips, sleep  Family Goals: Sleep    Progress made toward(s) clinical / shift goals:      Problem: Neuro Status  Goal: Neuro status will remain stable or improve  Outcome: Progressing   Pt is Q4h neuro checks. No acute changes in neuro status noted at this time.     Problem: Risk for Aspiration  Goal: Patient's risk for aspiration will be absent or decrease  Outcome: Progressing  Pt is receiving tube feed nutrition through their PEG tube. Aspiration precautions in place. Pt was given ice chips at the beginning of the shift per SLPs guidance, and pt tolerated well.    Problem: Self Care  Goal: Patient will have the ability to perform ADLs independently or with assistance (bathe, groom, dress, toilet and feed)  Outcome: Progressing  Pt is able to perform some ADLs with assistance.     Problem: Pain - Standard  Goal: Alleviation of pain or a reduction in pain to the patient’s comfort goal  Outcome: Progressing   Frequent pain assessments throughout shift. PRN pain medications provided per MAR and pt request. Pharmacological and non-pharmacological interventions utilized. Pt being medicated per MAR to have a pain goal to sleep comfortably.      Problem: Skin Integrity  Goal: Skin integrity is maintained or improved  Outcome: Progressing   Pt is Q2h turns. Mepliex in use on sacrum, heels, and elbows. Heel float boots on. Waffle overlay and TAPs system in use.    Problem: Fall Risk  Goal: Patient will remain free from falls  Outcome: Progressing   Patient's bed is locked and in the lowest position. Call light within reach. Bed alarm is on and plugged in. Pt given education on how to use the call light and not get up without calling for assistance. Pt receptive and demonstrates and understanding in teaching.       Patient is not progressing towards the following  goals:

## 2023-01-31 LAB
ALBUMIN SERPL BCP-MCNC: 3.9 G/DL (ref 3.2–4.9)
ALBUMIN/GLOB SERPL: 1.1 G/DL
ALP SERPL-CCNC: 126 U/L (ref 30–99)
ALT SERPL-CCNC: 42 U/L (ref 2–50)
ANION GAP SERPL CALC-SCNC: 11 MMOL/L (ref 7–16)
AST SERPL-CCNC: 29 U/L (ref 12–45)
BILIRUB SERPL-MCNC: 0.7 MG/DL (ref 0.1–1.5)
BUN SERPL-MCNC: 21 MG/DL (ref 8–22)
CALCIUM ALBUM COR SERPL-MCNC: 9.7 MG/DL (ref 8.5–10.5)
CALCIUM SERPL-MCNC: 9.6 MG/DL (ref 8.5–10.5)
CHLORIDE SERPL-SCNC: 101 MMOL/L (ref 96–112)
CO2 SERPL-SCNC: 26 MMOL/L (ref 20–33)
CREAT SERPL-MCNC: 0.56 MG/DL (ref 0.5–1.4)
GFR SERPLBLD CREATININE-BSD FMLA CKD-EPI: 112 ML/MIN/1.73 M 2
GLOBULIN SER CALC-MCNC: 3.4 G/DL (ref 1.9–3.5)
GLUCOSE SERPL-MCNC: 141 MG/DL (ref 65–99)
POTASSIUM SERPL-SCNC: 3.9 MMOL/L (ref 3.6–5.5)
PROT SERPL-MCNC: 7.3 G/DL (ref 6–8.2)
SODIUM SERPL-SCNC: 138 MMOL/L (ref 135–145)

## 2023-01-31 PROCEDURE — A9270 NON-COVERED ITEM OR SERVICE: HCPCS | Performed by: INTERNAL MEDICINE

## 2023-01-31 PROCEDURE — 700102 HCHG RX REV CODE 250 W/ 637 OVERRIDE(OP): Performed by: INTERNAL MEDICINE

## 2023-01-31 PROCEDURE — 80053 COMPREHEN METABOLIC PANEL: CPT

## 2023-01-31 PROCEDURE — 700102 HCHG RX REV CODE 250 W/ 637 OVERRIDE(OP): Performed by: HOSPITALIST

## 2023-01-31 PROCEDURE — 99232 SBSQ HOSP IP/OBS MODERATE 35: CPT | Performed by: STUDENT IN AN ORGANIZED HEALTH CARE EDUCATION/TRAINING PROGRAM

## 2023-01-31 PROCEDURE — 51798 US URINE CAPACITY MEASURE: CPT

## 2023-01-31 PROCEDURE — 770006 HCHG ROOM/CARE - MED/SURG/GYN SEMI*

## 2023-01-31 PROCEDURE — 700111 HCHG RX REV CODE 636 W/ 250 OVERRIDE (IP): Performed by: INTERNAL MEDICINE

## 2023-01-31 PROCEDURE — A9270 NON-COVERED ITEM OR SERVICE: HCPCS | Performed by: HOSPITALIST

## 2023-01-31 PROCEDURE — 36415 COLL VENOUS BLD VENIPUNCTURE: CPT

## 2023-01-31 PROCEDURE — 97530 THERAPEUTIC ACTIVITIES: CPT | Mod: CO

## 2023-01-31 RX ADMIN — CLOPIDOGREL BISULFATE 75 MG: 75 TABLET ORAL at 04:33

## 2023-01-31 RX ADMIN — SENNOSIDES AND DOCUSATE SODIUM 1 TABLET: 50; 8.6 TABLET ORAL at 17:26

## 2023-01-31 RX ADMIN — ATORVASTATIN CALCIUM 80 MG: 80 TABLET, FILM COATED ORAL at 17:26

## 2023-01-31 RX ADMIN — Medication 5 MG: at 21:43

## 2023-01-31 RX ADMIN — TERAZOSIN 1 MG: 1 CAPSULE ORAL at 17:26

## 2023-01-31 RX ADMIN — ENOXAPARIN SODIUM 40 MG: 40 INJECTION SUBCUTANEOUS at 17:26

## 2023-01-31 RX ADMIN — ACETAMINOPHEN 650 MG: 325 TABLET, FILM COATED ORAL at 04:32

## 2023-01-31 RX ADMIN — ASPIRIN 81 MG: 81 TABLET, CHEWABLE ORAL at 04:33

## 2023-01-31 RX ADMIN — AMLODIPINE BESYLATE 10 MG: 10 TABLET ORAL at 04:32

## 2023-01-31 ASSESSMENT — ENCOUNTER SYMPTOMS
SPEECH CHANGE: 1
FOCAL WEAKNESS: 1
SHORTNESS OF BREATH: 0

## 2023-01-31 ASSESSMENT — COGNITIVE AND FUNCTIONAL STATUS - GENERAL
TOILETING: TOTAL
DAILY ACTIVITIY SCORE: 10
DRESSING REGULAR UPPER BODY CLOTHING: A LOT
HELP NEEDED FOR BATHING: TOTAL
PERSONAL GROOMING: A LOT
DRESSING REGULAR LOWER BODY CLOTHING: A LOT
EATING MEALS: A LOT
SUGGESTED CMS G CODE MODIFIER DAILY ACTIVITY: CL

## 2023-01-31 ASSESSMENT — PAIN DESCRIPTION - PAIN TYPE
TYPE: ACUTE PAIN
TYPE: ACUTE PAIN

## 2023-01-31 NOTE — CARE PLAN
The patient is Stable - Low risk of patient condition declining or worsening    Shift Goals  Clinical Goals: Monitor neuro status  Patient Goals: Ice chips, sleep  Family Goals: Sleep    Progress made toward(s) clinical / shift goals:  q4hour neuro checks in place.     Patient is not progressing towards the following goals:      Problem: Optimal Care of the Stroke Patient  Goal: Optimal emergency care for the stroke patient  Outcome: Not Progressing

## 2023-01-31 NOTE — CARE PLAN
The patient is Stable - Low risk of patient condition declining or worsening    Shift Goals  Clinical Goals: No complications related to neuro status; Patient will remain free of aspiration signs;  Patient Goals: Ice chips; get up  Family Goals: Sleep    Progress made toward(s) clinical / shift goals:  Patient remains alert and oriented X 4. Neuro checks continued, as ordered. Aspiration precautions maintained. Will continue to monitor and re-assess.   Problem: Optimal Care of the Stroke Patient  Goal: Optimal emergency care for the stroke patient  Outcome: Progressing  Goal: Optimal acute care for the stroke patient  Outcome: Progressing     Problem: Knowledge Deficit - Stroke Education  Goal: Patient's knowledge of stroke and risk factors will improve  Outcome: Progressing     Problem: Psychosocial - Patient Condition  Goal: Patient's ability to verbalize feelings about condition will improve  Outcome: Progressing  Goal: Patient's ability to re-evaluate and adapt role responsibilities will improve  Outcome: Progressing     Problem: Discharge Planning - Stroke  Goal: Ensure Stroke Core Measures are met prior to discharge  Outcome: Progressing  Goal: Patient’s continuum of care needs will be met  Outcome: Progressing     Problem: Neuro Status  Goal: Neuro status will remain stable or improve  Outcome: Progressing     Problem: Hemodynamic Monitoring  Goal: Patient's hemodynamics, fluid balance and neurologic status will be stable or improve  Outcome: Progressing     Problem: Respiratory - Stroke Patient  Goal: Patient will achieve/maintain optimum respiratory rate/effort  Outcome: Progressing     Problem: Dysphagia  Goal: Dysphagia will improve  Outcome: Progressing     Problem: Risk for Aspiration  Goal: Patient's risk for aspiration will be absent or decrease  Outcome: Progressing     Problem: Urinary Elimination  Goal: Establish and maintain regular urinary output  Outcome: Progressing  Urinary catheter remains in  place and urine characteristics and output monitored      Problem: Bowel Elimination  Goal: Establish and maintain regular bowel function  Outcome: Progressing     Problem: Mobility - Stroke  Goal: Patient's capacity to carry out activities will improve  Outcome: Progressing   Patient assisted with ROM and bed mobility  Goal: Spasticity will be prevented or improved  Outcome: Progressing  Goal: Subluxation will be prevented or improved  Outcome: Progressing     Problem: Self Care  Goal: Patient will have the ability to perform ADLs independently or with assistance (bathe, groom, dress, toilet and feed)  Outcome: Progressing     Problem: Knowledge Deficit - Standard  Goal: Patient and family/care givers will demonstrate understanding of plan of care, disease process/condition, diagnostic tests and medications  Outcome: Progressing     Problem: Pain - Standard  Goal: Alleviation of pain or a reduction in pain to the patient’s comfort goal  Outcome: Progressing                           Patient assessed for pain, throughout the shift; assisted to comfortable positions     Problem: Skin Integrity  Goal: Skin integrity is maintained or improved  Outcome: Progressing     Preventative skin measures implemented        Problem: Fall Risk  Goal: Patient will remain free from falls  Outcome: Progressing          Patient is not progressing towards the following goals:

## 2023-01-31 NOTE — DISCHARGE PLANNING
LSW called [facility] at [facility], [facility] will need a updated PT note to determine if pt has improved. [facility] stated they can not accept if pt remains max assist. VOALTE message request sent to NIHARIKA Calle and NIHARIKA Nesbitt.

## 2023-01-31 NOTE — DISCHARGE PLANNING
Case Management Discharge Planning    Admission Date: 1/12/2023  GMLOS: 7.3  ALOS: 19    6-Clicks ADL Score: 10  6-Clicks Mobility Score: 6  PT and/or OT Eval ordered: Yes  Post-acute Referrals Ordered: Yes  Post-acute Choice Obtained: Yes  Has referral(s) been sent to post-acute provider:  Yes      Anticipated Discharge Dispo: Discharge Disposition: D/T to SNF with Medicare cert in anticipation of skilled care (03)    DME Needed: No    Action(s) Taken: OTHER    LSW called [facility], Nohemy stated pt was not accepted as of yet and she will call back with either a acceptance or denial.     LSW called [facility] and left a voicemail with [facility] in admissions regarding acceptance.     Escalations Completed: None    Medically Clear: Yes    Next Steps: LSW will follow up with pending SNFs regarding acceptance.     Barriers to Discharge: Pending Placement    Is the patient up for discharge tomorrow: No

## 2023-01-31 NOTE — THERAPY
Occupational Therapy  Daily Treatment     Patient Name: Kaiden Quiroz  Age:  62 y.o., Sex:  male  Medical Record #: [id number]  Today's Date: 1/31/2023       Precautions: Fall Risk, PEG Tube  Comments: L nubia    Assessment    Pt seen for OT tx. Continues to be limited by decreased activity tolerance, balance deficits, inattention and poor safety awareness impacting ability to complete ADLs and ADL transfers independently. Max A supine > sit, pt w/ eyes closed throughout session but following commands. Required min A for seated balance EOB. LUE continues to be flaccid w/ no active or purposeful movement. Will continue to follow while in house.     Plan    O.T. Treatment Plan: Continue Current Treatment Plan    DC Equipment Recommendations: Unable to determine at this time  Discharge Recommendations: Recommend post-acute placement for additional occupational therapy services prior to discharge home       01/31/23 1345   Cognition    Cognition / Consciousness X   Safety Awareness Impaired   New Learning Impaired   Attention Impaired   Sequencing Impaired   Active ROM Upper Body   Active ROM Upper Body  X   Comments LUE no active or purposeful movement   Strength Upper Body   Upper Body Strength  X   Comments LUE grossly weak w/ no functional movement   Balance   Sitting Balance (Static) Poor -   Sitting Balance (Dynamic) Poor -   Weight Shift Sitting Poor   Bed Mobility    Supine to Sit Maximal Assist   Sit to Supine Maximal Assist   Activities of Daily Living   Grooming Minimal Assist;Seated   Upper Body Dressing Moderate Assist   Lower Body Dressing Maximal Assist   Toileting Maximal Assist   Short Term Goals   Short Term Goal # 1 pt will groom seated EOB w/ setup   Goal Outcome # 1 Goal not met   Short Term Goal # 2 pt will maintain sitting balance unsupported 10 sec in prep for seated ADLs   Goal Outcome # 2 Goal not met   Short Term Goal # 3 pt will demo ADL txfs with [facility]   Goal Outcome # 3 Goal not met    Anticipated Discharge Equipment and Recommendations   DC Equipment Recommendations Unable to determine at this time   Discharge Recommendations Recommend post-acute placement for additional occupational therapy services prior to discharge home

## 2023-01-31 NOTE — DISCHARGE PLANNING
Case Management Discharge Planning    Admission Date: 1/12/2023  GMLOS: 7.3  ALOS: 19    6-Clicks ADL Score: 10  6-Clicks Mobility Score: 6  PT and/or OT Eval ordered: Yes  Post-acute Referrals Ordered: Yes  Post-acute Choice Obtained: Yes  Has referral(s) been sent to post-acute provider:  Yes      Anticipated Discharge Dispo: Discharge Disposition: D/T to SNF with Medicare cert in anticipation of skilled care (03)    DME Needed: No    Action(s) Taken: Completed PASSR/LOC    PASRR# [id number]    Escalations Completed: None    Medically Clear: Yes    Next Steps: LSW will follow up regarding possible SNF acceptance.     Barriers to Discharge: Pending Placement    Is the patient up for discharge tomorrow: No

## 2023-01-31 NOTE — PROGRESS NOTES
Hospital Medicine Daily Progress Note    Date of Service  1/31/2023    Chief Complaint  Kaiden Quiroz is a 62 y.o. male admitted 1/12/2023 with L side weakness    Hospital Course  63yo PMHx HTN, T2DM, ongoing tobacco use, BMI 36, ETOH.  Presenting with L side weakness on 1/12.  [place] not to be TNK candidate.  Taken to IR for MCA trhombectomy with TICI 3 post and R ICA stent.  On DAPT x 6 weeks. Had 10 sec pause intra procedure but resolved before CPR was initiated.  Was intubated.  In ICU required Precedex for ETOH withdrawal.  PEG placed 1/24.  In 10-14 days, the anchor button suture or sutures should be cut, releasing the anchor or anchors into the stomach. The suture anchors will then pass through the GI tract as desired. This can be performed in the interventional radiology department if   desired. Call [phone] any weekday [phone] hours to make an appointment.    Patient started on atorvastatin. Echo unremarkable.  Hospital course complicated by urinary retention requiring Mcclendon (on Terazosin), pneumonia and respiratory failure on high flow nasal cannula (now weaned down to 2 L nasal cannula).    Interval Problem Update  - No acute overnight events  - tolerating tube feeding. Reports dry mouth, asking for ice chips  - PT/OT rec SNF  - voiding trial     I have discussed this patient's plan of care and discharge plan at IDT rounds today with Case Management, Nursing, Nursing leadership, and other members of the IDT team.    Consultants/Specialty  neurology    Code Status  Full Code    Disposition  Patient is medically cleared for discharge.   Anticipate discharge to to skilled nursing facility.  I have placed the appropriate orders for post-discharge needs.    Review of Systems  Review of Systems   HENT:          Dry mouth   Respiratory:  Negative for shortness of breath.    Genitourinary:  Negative for hematuria.   Neurological:  Positive for speech change and focal weakness.      Physical Exam  Temp:   [36.1 °C (97 °F)-36.2 °C (97.1 °F)] 36.2 °C (97.1 °F)  Pulse:  [65-74] 73  Resp:  [18] 18  BP: (127-133)/(73-77) 133/77  SpO2:  [90 %-96 %] 95 %    Physical Exam  Constitutional:       General: He is not in acute distress.     Appearance: Normal appearance. He is well-developed. He is not diaphoretic.      Comments: Sleepy but wakes to voice   HENT:      Head: Normocephalic and atraumatic.      Nose: Nose normal.      Mouth/Throat:      Mouth: Mucous membranes are moist.   Eyes:      General: No scleral icterus.     Conjunctiva/sclera: Conjunctivae normal.   Neck:      Vascular: No JVD.   Cardiovascular:      Rate and Rhythm: Normal rate.      Heart sounds: No murmur heard.    No gallop.   Pulmonary:      Effort: Pulmonary effort is normal. No respiratory distress.      Breath sounds: No stridor. No wheezing or rales.   Abdominal:      Palpations: Abdomen is soft.      Tenderness: There is no abdominal tenderness. There is no guarding or rebound.      Comments: Gtube in place c/d/i   Genitourinary:     Comments: Mcclendon in place  Musculoskeletal:         General: No tenderness.      Right lower leg: No edema.      Left lower leg: No edema.   Skin:     General: Skin is warm and dry.      Capillary Refill: Capillary refill takes less than 2 seconds.      Coloration: Skin is not jaundiced or pale.      Findings: No rash.   Neurological:      Comments:   Pt does not attend to L side even with prompting and stim  Speech improved  +L side facial droop  L arm and leg flacid  Purposeful and 5/5 R upper and lower   Psychiatric:         Mood and Affect: Mood and affect normal.         Behavior: Behavior normal.       Fluids    Intake/Output Summary (Last 24 hours) at 1/31/2023 1222  Last data filed at 1/31/2023 0350  Gross per 24 hour   Intake 1360 ml   Output 1400 ml   Net -40 ml         Laboratory  Recent Labs     01/29/23  0702   WBC 9.3   RBC 4.17*   HEMOGLOBIN 13.9*   HEMATOCRIT 39.4*   MCV 94.5   MCH 33.3*   MCHC 35.3    RDW 41.1   PLATELETCT 271   MPV 11.2       Recent Labs     01/29/23  0203 01/30/23  0334 01/31/23  0327   SODIUM 138 138 138   POTASSIUM 3.9 4.1 3.9   CHLORIDE 101 101 101   CO2 25 27 26   GLUCOSE 129* 116* 141*   BUN 20 20 21   CREATININE 0.46* 0.54 0.56   CALCIUM 9.5 9.6 9.6                     Imaging  IR-GASTROSTOMY PLACEMENT   Final Result      1.  Fluoroscopic guided percutaneous gastrostomy with placement of an 18-[place] ELISE balloon gastrostomy catheter.      2. The gastrostomy tube may be used for tube feeds 18 hours after placement. Thereafter, liquid diet orders per the referring physician. Saline flush daily as per protocol.      3. In 10-14 days, the anchor button suture or sutures should be cut, releasing the anchor or anchors into the stomach. The suture anchors will then pass through the GI tract as desired. This can be performed in the interventional radiology department if    desired. Call [phone] any weekday [phone] hours to make an appointment.      CT-CTA CHEST PULMONARY ARTERY W/ RECONS   Final Result         1.  No pulmonary embolus appreciated.   2.  Linear densities the bilateral lung bases suggests changes of atelectasis, component of infiltrate is not excluded.   3.  Atherosclerosis and atherosclerotic coronary artery disease.      US-EXTREMITY VENOUS LOWER BILAT   Final Result      DX-ABDOMEN FOR TUBE PLACEMENT   Final Result      NG tube extends below the diaphragm into the region of the stomach. The tip is not visualized.      EC-ECHOCARDIOGRAM LTD W/ CONT   Final Result      DX-CHEST-PORTABLE (1 VIEW)   Final Result         1. No significant interval change.      CT-ABDOMEN-PELVIS W/O   Final Result         1.  Nondependent foci of air in the bladder, could represent changes from recent instrumentation or urinary tract infection.   2.  Diverticulosis   3.  Atherosclerosis and atherosclerotic coronary artery disease      DX-CHEST-PORTABLE (1 VIEW)   Final Result      Ill-defined  atelectasis versus consolidation in the left lower lung. Pneumonia can be considered in appropriate clinical setting.         DX-ABDOMEN FOR TUBE PLACEMENT   Final Result         1.  Nonspecific bowel gas pattern in the upper abdomen.   2.  Nasogastric tube tip terminates overlying the expected location of the gastric body.   3.  Cardiomegaly   4.  Hazy linear density left lung base could represent atelectasis or infiltrate.      CT-HEAD W/O   Final Result         1.  Evolving infarct/encephalomalacia in the right MCA distribution.   2.  Areas of hemorrhage in the right basal ganglia and involving the right frontal and anterior temporal sulci, similar compared to MRI January 13, 2023   3.  Atherosclerosis.      DX-CHEST-PORTABLE (1 VIEW)   Final Result         Diffuse interstitial and groundglass opacity throughout both lungs could relate to developing pulmonary edema.      DX-ABDOMEN FOR TUBE PLACEMENT   Final Result         Gastric drainage tube with tip projecting over the expected area of the stomach.      DX-CHEST-PORTABLE (1 VIEW)   Final Result      1.  Interval extubation without pneumothorax.   2.  Stable enlarged cardiac silhouette.   3.  There is bibasilar linear atelectasis.      EC-ECHOCARDIOGRAM COMPLETE W/ CONT   Final Result      MR-BRAIN-W/O   Final Result         Acute infarction involving the right frontal region, right basal ganglia and right caudate nucleus. Minimal punctate infarction involving the right parietal cortex. There is slight mass effect upon the right lateral ventricle with only minimal midline    shift measuring approximately 4 mm.      Petechial hemorrhage is noted within the right frontal cortical infarct. Also noted is petechial hemorrhage in the right basal ganglia and right caudate nucleus but without surrounding vasogenic edema.         DX-PELVIS-1 OR 2 VIEWS   Final Result         1.  No acute traumatic bony injury.      DX-CHEST-PORTABLE (1 VIEW)   Final Result         1.   Left midlung and lower lobe infiltrates, similar to prior study.      TQ-OVMLSCC-4 VIEW   Final Result         1.  Nonspecific bowel gas pattern in the upper abdomen.   2.  Cardiomegaly   3.  Hazy left lower lobe infiltrates.   4.  Nasogastric tube tip terminates overlying the expected location of the gastric fundus.      IR-THROMBO MECHANICAL ARTERY,INIT   Final Result         62-year-old patient who presented with complete occlusion of the right ICA at its origin underwent emergent mechanical thrombectomy. After initial angioplasty,   a right cervical internal carotid artery stent was deployed and right MCA thrombectomy was performed. The final angiographic images show complete restoration of flow into the right ICA and MCA with no evidence of distal intracranial embolization.      Final recanalization score: TICI 3      I, Petra Mobley was physically present and participated during the entire procedure of the IR-THROMBO MECHANICAL ARTERY,INIT.                  DX-CHEST-PORTABLE (1 VIEW)   Final Result      No acute cardiac or pulmonary abnormalities are identified.      CT-CTA NECK WITH & W/O-POST PROCESSING   Final Result      1.  Occlusion of the right internal carotid artery at its origin.      2.  Occlusion of the right vertebral artery at its origin.      Dr. Gongora discussed findings with Dr. Mccarthy.      CT-CTA HEAD WITH & W/O-POST PROCESS   Final Result         1.  Occlusion of the right internal carotid artery and right middle cerebral artery.   2.  Narrowed left M1 segment with severely narrowed and M2 and M3 branches, may represent chronic changes.      These findings were discussed with the patient's clinician, Joseline Mccarthy, on 1/12/2023 8:27 PM.      CT-CEREBRAL PERFUSION ANALYSIS   Final Result         1.  Cerebral blood flow less than 30% likely representing completed infarct = 52 mL.      2.  T Max more than 6 seconds likely representing combination of completed infarct and ischemia =  172 mL.      3.  Mismatched volume likely representing ischemic brain/penumbra = 120 mL      4.  Please note that the cerebral perfusion was performed on the limited brain tissue around the basal ganglia region. Infarct/ischemia outside the CT perfusion sections can be missed in this study.      CT-HEAD W/O   Final Result      1.  No evidence of intracranial hemorrhage, midline shift or mass effect.      2.  Question of hyperdense right MCA though evaluation limited by oblique positioning.                Assessment/Plan  * Acute ischemic right middle cerebral artery (MCA) stroke (HCC)- (present on admission)  Assessment & Plan  Right MCA CVA with hemorrhagic conversion  Neurology consulted   High Intensity statin therapy   BP control  S/p right ICA stent and MCA thrombectomy 1/12/23  DAPT  TTE negative for shunt  Tobacco cessation  PT/OT/SLP - recommending post-acute placement  PEG placed 1/24     Urinary retention  Assessment & Plan  Likely in the setting of BPH  Tried removing thacker 1/25, had to replace  Starting terazosin 1/28    Small amount of sediment in the tubing, however no significant hematuria, UA obtained which did show RBCs, CBC normal and afebrile  Voiding trial     Internal carotid artery stent present  Assessment & Plan  Placed 1/12  Per Neuro OK to start DAPT: ASA & Plavix for 6 wks    Hypernatremia  Assessment & Plan  Patient continues to have hyponatremia with elevated sodium level.  Resolved with Free H2O 200ml q6  Cont to follow daily    Pneumonia due to infectious organism- (present on admission)  Assessment & Plan  Resolved    Aspiration pneumonia (Developed 01/19/23)   Procal low  Sputum cultures neg  Strict aspiration precautions   Has completed 5 days of Abx's  MRSA nares neg  Mucomyst/3% normal saline nebs to maintain pulmonary toilet, RT protocol to continue  Pulmonology evaluated him and made recommendations          Oropharyngeal dysphagia- (present on admission)  Assessment &  Plan  Secondary to acute stroke.    Aspiration precautions   PEG 1/24  Nutrition following    Acute left hemiparesis (HCC)- (present on admission)  Assessment & Plan  Secondary to right MCA stroke.    Pressure ulcer precautions  Aspiration, Fall precautions  PT and OT  Working toward Rehab DC    Stroke, hemorrhagic (HCC)- (present on admission)  Assessment & Plan  Ischemic stroke with hemorrhagic conversion.  Neuro has OKd for antiplatelet therapy with ASA and plavix given presence of new ICA stent    Toxic metabolic encephalopathy- (present on admission)  Assessment & Plan  Improved    Persistent 01/20/23  Secondary to stroke, delirium and now concern for relation to underlying pneumonia  Limit restraints as able  Avoid polypharmacy, sedating medications  Delirium precautions  Management of underlying medical conditions  Obtain EEG 01/20/23 and it did not show seizure activity.   cont's to improve daily though examination is limited by dysarthria at times    Acute respiratory failure with hypoxia ([facility])- (present on admission)  Assessment & Plan  Improved     01/19/23 CXRAY consistent with Aspiration Pneumonia - Started on IV unasyn  Subsequently trnasitioned to [facility].  At present off Abx's  MRSA nares neg  trachael aspirate cultures neg  CTA chest neg for PE  TTE LVEF 70%, no signficant valvular or structural abnormalities  O2 demand decreasing: now off HFNC and on 2 LNC  Cont weaning O2    Severe obesity (BMI 35.0-35.9 with comorbidity) ([facility])- (present on admission)  Assessment & Plan  Body mass index is 37.39 kg/m².  Co-morbid HTN, T2DM, KENDAL  Outpatient weight loss    Type 2 diabetes mellitus without complication, without long-term current use of insulin ([facility])- (present on admission)  Assessment & Plan  A1c 5.3  No indication for strict BG control - POCT/SSI deferred  Atorvastatin    Alcohol dependence with withdrawal ([facility])- (present on admission)  Assessment & Plan  Resolved.    Tobacco use disorder- (present  on admission)  Assessment & Plan  On nicotine patch     Primary hypertension- (present on admission)  Assessment & Plan  Amlodipine 10 mg         VTE prophylaxis: enoxaparin ppx    I have performed a physical exam and reviewed and updated ROS and Plan today (1/31/2023). In review of yesterday's note (1/30/2023), there are no changes except as documented above.

## 2023-02-01 LAB
ALBUMIN SERPL BCP-MCNC: 3.7 G/DL (ref 3.2–4.9)
ALBUMIN/GLOB SERPL: 1.1 G/DL
ALP SERPL-CCNC: 116 U/L (ref 30–99)
ALT SERPL-CCNC: 44 U/L (ref 2–50)
ANION GAP SERPL CALC-SCNC: 11 MMOL/L (ref 7–16)
AST SERPL-CCNC: 33 U/L (ref 12–45)
BILIRUB SERPL-MCNC: 0.7 MG/DL (ref 0.1–1.5)
BUN SERPL-MCNC: 21 MG/DL (ref 8–22)
CALCIUM ALBUM COR SERPL-MCNC: 9.6 MG/DL (ref 8.5–10.5)
CALCIUM SERPL-MCNC: 9.4 MG/DL (ref 8.5–10.5)
CHLORIDE SERPL-SCNC: 102 MMOL/L (ref 96–112)
CO2 SERPL-SCNC: 25 MMOL/L (ref 20–33)
CREAT SERPL-MCNC: 0.52 MG/DL (ref 0.5–1.4)
GFR SERPLBLD CREATININE-BSD FMLA CKD-EPI: 114 ML/MIN/1.73 M 2
GLOBULIN SER CALC-MCNC: 3.4 G/DL (ref 1.9–3.5)
GLUCOSE SERPL-MCNC: 145 MG/DL (ref 65–99)
POTASSIUM SERPL-SCNC: 4 MMOL/L (ref 3.6–5.5)
PROT SERPL-MCNC: 7.1 G/DL (ref 6–8.2)
SODIUM SERPL-SCNC: 138 MMOL/L (ref 135–145)

## 2023-02-01 PROCEDURE — A9270 NON-COVERED ITEM OR SERVICE: HCPCS | Performed by: INTERNAL MEDICINE

## 2023-02-01 PROCEDURE — 97602 WOUND(S) CARE NON-SELECTIVE: CPT

## 2023-02-01 PROCEDURE — 770006 HCHG ROOM/CARE - MED/SURG/GYN SEMI*

## 2023-02-01 PROCEDURE — 700102 HCHG RX REV CODE 250 W/ 637 OVERRIDE(OP): Performed by: INTERNAL MEDICINE

## 2023-02-01 PROCEDURE — 700111 HCHG RX REV CODE 636 W/ 250 OVERRIDE (IP): Performed by: INTERNAL MEDICINE

## 2023-02-01 PROCEDURE — 80053 COMPREHEN METABOLIC PANEL: CPT

## 2023-02-01 PROCEDURE — 700102 HCHG RX REV CODE 250 W/ 637 OVERRIDE(OP): Performed by: HOSPITALIST

## 2023-02-01 PROCEDURE — A9270 NON-COVERED ITEM OR SERVICE: HCPCS | Performed by: HOSPITALIST

## 2023-02-01 PROCEDURE — 99232 SBSQ HOSP IP/OBS MODERATE 35: CPT | Performed by: STUDENT IN AN ORGANIZED HEALTH CARE EDUCATION/TRAINING PROGRAM

## 2023-02-01 PROCEDURE — 36415 COLL VENOUS BLD VENIPUNCTURE: CPT

## 2023-02-01 RX ADMIN — POLYETHYLENE GLYCOL 3350 1 PACKET: 17 POWDER, FOR SOLUTION ORAL at 17:21

## 2023-02-01 RX ADMIN — CLOPIDOGREL BISULFATE 75 MG: 75 TABLET ORAL at 05:04

## 2023-02-01 RX ADMIN — SENNOSIDES AND DOCUSATE SODIUM 1 TABLET: 50; 8.6 TABLET ORAL at 17:21

## 2023-02-01 RX ADMIN — ACETAMINOPHEN 650 MG: 325 TABLET, FILM COATED ORAL at 05:03

## 2023-02-01 RX ADMIN — ATORVASTATIN CALCIUM 80 MG: 80 TABLET, FILM COATED ORAL at 17:20

## 2023-02-01 RX ADMIN — ENOXAPARIN SODIUM 40 MG: 40 INJECTION SUBCUTANEOUS at 17:21

## 2023-02-01 RX ADMIN — Medication 5 MG: at 22:05

## 2023-02-01 RX ADMIN — TERAZOSIN 1 MG: 1 CAPSULE ORAL at 17:20

## 2023-02-01 RX ADMIN — ASPIRIN 81 MG: 81 TABLET, CHEWABLE ORAL at 05:04

## 2023-02-01 RX ADMIN — AMLODIPINE BESYLATE 10 MG: 10 TABLET ORAL at 05:04

## 2023-02-01 ASSESSMENT — ENCOUNTER SYMPTOMS
SHORTNESS OF BREATH: 0
FOCAL WEAKNESS: 1
SPEECH CHANGE: 1

## 2023-02-01 ASSESSMENT — PAIN DESCRIPTION - PAIN TYPE
TYPE: ACUTE PAIN
TYPE: ACUTE PAIN

## 2023-02-01 NOTE — DISCHARGE PLANNING
Case Management Discharge Planning    Admission Date: 1/12/2023  GMLOS: 7.3  ALOS: 20    6-Clicks ADL Score: 10  6-Clicks Mobility Score: 6  PT and/or OT Eval ordered: Yes  Post-acute Referrals Ordered: Yes  Post-acute Choice Obtained: Yes  Has referral(s) been sent to post-acute provider:  Yes      Anticipated Discharge Dispo: Discharge Disposition: D/T to SNF with Medicare cert in anticipation of skilled care (03)    DME Needed: No    Action(s) Taken: Choice obtained and OTHER    LSW met with pt at bedside. Pt alert and oriented. LSW discussed plan to expand SNF referrals. Pt verbalized agreement to send blanket SNFs to [place] and surrounding areas. Choice forms faxed to [facility].     LSW left a voicemail with Demi Dumont at [employer] as well. [phone].    Escalations Completed: [employer] Silva    Medically Clear: Yes    Next Steps: LSW will follow up with pending referrals.     Barriers to Discharge: Pending Placement    Is the patient up for discharge tomorrow: Yes    Is transport arranged for discharge disposition: No

## 2023-02-01 NOTE — CARE PLAN
The patient is Stable - Low risk of patient condition declining or worsening    Shift Goals  Clinical Goals: No complications related to neuro status; Adequate urine output;  Patient Goals: Mobility; Sleep  Family Goals: Sleep    Progress made toward(s) clinical / shift goals:  Patient remains alert and oriented X 4. Neuro checks continued, with no new complications observed. Patient continues voiding trial S/P Mcclendon removal. Patient voiding adequate amounts of urine with negative bladder scan results. Will continue to monitor and re-assess.    Problem: Optimal Care of the Stroke Patient  Goal: Optimal emergency care for the stroke patient  Outcome: Progressing  Goal: Optimal acute care for the stroke patient  Outcome: Progressing     Problem: Knowledge Deficit - Stroke Education  Goal: Patient's knowledge of stroke and risk factors will improve  Outcome: Progressing     Problem: Psychosocial - Patient Condition  Goal: Patient's ability to verbalize feelings about condition will improve  Outcome: Progressing  Goal: Patient's ability to re-evaluate and adapt role responsibilities will improve  Outcome: Progressing     Problem: Discharge Planning - Stroke  Goal: Ensure Stroke Core Measures are met prior to discharge  Outcome: Progressing  Goal: Patient’s continuum of care needs will be met  Outcome: Progressing     Problem: Neuro Status  Goal: Neuro status will remain stable or improve  Outcome: Progressing     Problem: Hemodynamic Monitoring  Goal: Patient's hemodynamics, fluid balance and neurologic status will be stable or improve  Outcome: Progressing     Problem: Respiratory - Stroke Patient  Goal: Patient will achieve/maintain optimum respiratory rate/effort  Outcome: Progressing   Patient receiving oxygen at 2L/min via nasal cannula     Problem: Dysphagia  Goal: Dysphagia will improve  Outcome: Progressing     Problem: Risk for Aspiration  Goal: Patient's risk for aspiration will be absent or decrease  Outcome:  Progressing  Aspiration precautions maintained, throughout the shift.      Problem: Urinary Elimination  Goal: Establish and maintain regular urinary output  Outcome: Progressing  Patient voiding adequately without indwelling catheter.   Bladder scans implemented     Problem: Bowel Elimination  Goal: Establish and maintain regular bowel function  Outcome: Progressing     Problem: Mobility - Stroke  Goal: Patient's capacity to carry out activities will improve  Outcome: Progressing  Goal: Spasticity will be prevented or improved  Outcome: Progressing  Goal: Subluxation will be prevented or improved  Outcome: Progressing     Problem: Self Care  Goal: Patient will have the ability to perform ADLs independently or with assistance (bathe, groom, dress, toilet and feed)  Outcome: Progressing     Problem: Knowledge Deficit - Standard  Goal: Patient and family/care givers will demonstrate understanding of plan of care, disease process/condition, diagnostic tests and medications  Outcome: Progressing     Problem: Pain - Standard  Goal: Alleviation of pain or a reduction in pain to the patient’s comfort goal  Outcome: Progressing     Problem: Skin Integrity  Goal: Skin integrity is maintained or improved  Outcome: Progressing  Preventative skin measures continued, throughout the shift.      Problem: Fall Risk  Goal: Patient will remain free from falls  Outcome: Progressing          Patient is not progressing towards the following goals:

## 2023-02-01 NOTE — CARE PLAN
The patient is Stable - Low risk of patient condition declining or worsening    Shift Goals  Clinical Goals: Monitor neuro status,  Patient Goals: work with therapy  Family Goals: Sleep    Progress made toward(s) clinical / shift goals:  Patient continues to have left sided weakness. Q2hour turns in place.     Patient is not progressing towards the following goals:

## 2023-02-01 NOTE — WOUND TEAM
[facility]  Inpatient Services  Wound and Skin Care Evaluation    Admission Date: 2023     Last order of IP CONSULT TO WOUND CARE was found on 2023 from Hospital Encounter on 2023     HPI, PMH, SH: Reviewed    History reviewed. No pertinent surgical history.  Social History     Tobacco Use    Smoking status: Former     Packs/day: 0.50     Years: 46.00     Pack years: 23.00     Types: Cigarettes     Start date:      Quit date: 2023     Years since quittin.0    Smokeless tobacco: Never   Substance Use Topics    Alcohol use: Yes     Alcohol/week: 25.2 oz     Types: 42 Cans of beer per week     Chief Complaint   Patient presents with    Possible Stroke     BIBA from home for stroke like symptoms  Wife found pt with slurred speech, L facial droop, and L sided weakness   LKW 1830  Pt had a fall yesterday, + head strike, - thinners  , aox4  Initial NIH of 20 by neurologist, pt taken to CT then red 11     Diagnosis: Acute CVA (cerebrovascular accident) (HCC) [I63.9]    Unit where seen by Wound Team: [id number]/     WOUND CONSULT/FOLLOW UP RELATED TO:  Sacrum     WOUND HISTORY:  Pt previously had NGT in place.  Came in for MCA stroke and has left sided deficits.      WOUND ASSESSMENT/LDA    Wound 23 Pressure Injury Nose Right R nare Indeterminable (Active)   Wound Image   23 1400   Site Assessment Dry;Brown 23 1400   Periwound Assessment Intact 23 1400   Margins Defined edges;Attached edges 23 1400   Closure Open to air 23 1400   Drainage Amount None 23 1400   Drainage Description RADHA 23 0000   Treatments Cleansed;Site care 23 1400   Wound Cleansing Normal Saline Irrigation 23 1400   Dressing Cleansing/Solutions Not Applicable 23 1400   Dressing Options Open to Air 23 1400   Dressing Status Open to Air 23 1400   Dressing Change/Treatment Frequency Every Shift, and As Needed 23 0000   NEXT  Dressing Change/Treatment Date 01/28/23 01/28/23 2000   NEXT Weekly Photo (Inpatient Only) 02/02/23 01/28/23 2000   WOUND NURSE ONLY - Pressure Injury Stage Indeterminab 02/01/23 1400   Non-staged Wound Description Not applicable 02/01/23 1400   Wound Length (cm) 0.5 cm 02/01/23 1400   Wound Width (cm) 0.3 cm 02/01/23 1400   Wound Depth (cm) 0 cm 02/01/23 1400   Wound Surface Area (cm^2) 0.15 cm^2 02/01/23 1400   Wound Volume (cm^3) 0 cm^3 02/01/23 1400   Shape circular 02/01/23 1400   Wound Odor None 01/26/23 1200   Exposed Structures None 02/01/23 1400   WOUND NURSE ONLY - Time Spent with Patient (mins) 30 02/01/23 1400   Wound 01/28/23 Traumatic Sacrum Per patient, he fell on a curb PTA, (Active)   Wound Image   02/01/23 1200   Site Assessment Red;Excoriated 02/01/23 1200   Periwound Assessment Dry 02/01/23 1200   Margins Defined edges 02/01/23 1200   Closure Open to air 02/01/23 1200   Drainage Amount None 02/01/23 1200   Treatments Cleansed;Site care;Offloading 01/29/23 1431   Wound Cleansing Foam Cleanser/Washcloth 01/29/23 1431   Periwound Protectant Barrier Paste 02/01/23 1200   Dressing Cleansing/Solutions Not Applicable 02/01/23 1200   Dressing Options Mepilex 02/01/23 1200   Dressing Changed Observed 01/30/23 2000   Dressing Status Clean;Dry;Intact 02/01/23 1200   Dressing Change/Treatment Frequency Every 72 hrs, and As Needed 02/01/23 1200   NEXT Dressing Change/Treatment Date 02/04/23 02/01/23 1200   NEXT Weekly Photo (Inpatient Only) 02/08/23 02/01/23 1200   Non-staged Wound Description Not applicable 02/01/23 1200   Shape diffuse, excoriation marks 02/01/23 1200   Wound Odor None 01/29/23 1431   Pulses N/A 01/29/23 1431   Exposed Structures None 02/01/23 1200   WOUND NURSE ONLY - Time Spent with Patient (mins) 30 02/01/23 1200           Vascular:    CHEN:   No results found.  Lab Values:    Lab Results   Component Value Date/Time    WBC 9.3 01/29/2023 07:02 AM    RBC 4.17 (L) 01/29/2023 07:02 AM     HEMOGLOBIN 13.9 (L) 01/29/2023 07:02 AM    HEMATOCRIT 39.4 (L) 01/29/2023 07:02 AM    CREACTPROT 0.32 01/30/2023 02:36 PM    HBA1C 5.3 01/12/2023 08:02 PM        Culture Results show:  No results found for this or any previous visit (from the past 720 hour(s)).    Pain Level/Medicated:  Denies pain     INTERVENTIONS BY WOUND TEAM:  Chart and images reviewed. Discussed with bedside RN. All areas of concern (based on picture review, LDA review and discussion with bedside RN) have been thoroughly assessed. Documentation of areas based on significant findings. This RN in to assess patient. Performed standard wound care which includes appropriate positioning, dressing removal and non-selective debridement. Pictures and measurements obtained weekly if/when required.     Preparation for Dressing removal: NA CAIO  Non-selectively Debrided with:  Normal Saline and Gauze   Sharp debridement: NA  Radha wound: Cleansed with Normal Saline and Gauze,   Primary Dressing: CAIO, offloading  Secondary (Outer) Dressing:     Sacrum - mepilex replaced     Interdisciplinary consultation: Patient, Bedside RN,     EVALUATION / RATIONALE FOR TREATMENT:  Most Recent Date:    2/1/23: R nare indeterminable wound now dry and scabbed, surrounding periwound skin intact. No source of pressure now that NGT has been discontinued and patient is not on oxygen. Sacrococcygeal area with linear shaped discoloration due to friction, however no open wounds. Mepilex replaced. All offloading measures in place.     1/29/23: re-consult for patient's sacrum, similar in previous assessment. Previous moisture fissure scab, applied barrier paste as patient is incontinent of stool and mepilex applied up higher to cover scar tissue.  Penis assessed and patient just needed thacker care.  Repositioned stat lock to give tubing more slack.  Patient on TAPs, but cloth jose alfredo, multiple dri-eduard pads-> removed extra layers and placed micro climate pad   1/26/23: Patient right  "nare with indeterminable pressure injury likely from previous NG tube. NGT no longer in place. Continue to offload area. Patient sacrum also assessed and found to have scar tissue with surrounding blanchable erythema. Patient states he had a previous injury by \"tripping on a curbside and falling on tailbone.\"     Goals: Steady decrease in wound area and depth weekly.    WOUND TEAM PLAN OF CARE ([X] for frequency of wound follow up,):  Nursing to follow dressing orders written for wound care. Contact wound team if area fails to progress, deteriorates or with any questions/concerns if something comes up before next scheduled follow up (See below as to whether wound is following and frequency of wound follow up)  Dressing changes by wound team:                   Follow up 3 times weekly:                NPWT change 3 times weekly:     Follow up 1-2 times weekly:    X weekly for nose  Follow up Bi-Monthly:           Follow up Monthly (High Risk):                        Follow up as needed:   for sacrum  Other (explain):     NURSING PLAN OF CARE ORDERS (X):  Dressing changes: See Dressing Care orders:   Skin care: See Skin Care orders:   RN Prevention Protocol: X  Rectal tube care: See Rectal Tube Care orders:   Other orders:    RSKIN:   CURRENTLY IN PLACE (X), APPLIED THIS VISIT (A), ORDERED (O):   Q shift Lucius:  X  Q shift pressure point assessments:  X    Surface/Positioning   Pressure redistribution mattress            Low Airloss          ICU Low Airloss   Bariatric NUNO     Waffle cushion        Waffle Overlay        X  Reposition q 2 hours      TAPs Turning system   x  Z Kaveh Pillow     Offloading/Redistribution   Sacral Mepilex (Silicone dressing)   A  Heel Mepilex (Silicone dressing)         Heel float boots (Prevalon boot)             Float Heels off Bed with Pillows           Respiratory   Silicone O2 tubing     X    Gray Foam Ear protectors     Cannula fixation Device (Tender )          High flow " offloading Clip    Elastic head band offloading device      Anchorfast                                                         Trach with Optifoam split foam             Containment/Moisture Prevention   Rectal tube or BMS    Purwick/Condom Cath        Mcclendon Catheter  X  Barrier wipes           Barrier paste a      Antifungal tx      Interdry        Mobilization not assessed    Up to chair        Ambulate      PT/OT      Nutrition      Dietician        Diabetes Education      PO     TF X    TPN     NPO   # days     Other        Anticipated discharge plans: TBD  LTACH:        SNF/Rehab:                  Home Health Care:           Outpatient Wound Center:            Self/Family Care:        Other:                  Vac Discharge Needs:   Vac Discharge plan is purely a recommendation from wound team and not a requirement for discharge unless otherwise stated by physician.  Not Applicable Pt not on a wound vac: X      Regular Vac while inpatient, alternative dressing at DC:        Regular Vac in use and continued at DC:            Reg. Vac w/ Skin Sub/Biologic in use. Will need to be changed 2x wkly:      Veraflo Vac while inpatient, ok to transition to Regular Vac on Discharge (Bedside RN to Clamp small instillation tubing at time of DC):           Veraflo Vac while inpatient, would benefit from remaining on Veraflo Vac upon discharge:

## 2023-02-01 NOTE — PROGRESS NOTES
Hospital Medicine Daily Progress Note    Date of Service  2/1/2023    Chief Complaint  Kaiden Quiroz is a 62 y.o. male admitted 1/12/2023 with L side weakness    Hospital Course  61yo PMHx HTN, T2DM, ongoing tobacco use, BMI 36, ETOH.  Presenting with L side weakness on 1/12.  [place] not to be TNK candidate.  Taken to IR for MCA trhombectomy with TICI 3 post and R ICA stent.  On DAPT x 6 weeks. Had 10 sec pause intra procedure but resolved before CPR was initiated.  Was intubated.  In ICU required Precedex for ETOH withdrawal.  PEG placed 1/24.  In 10-14 days, the anchor button suture or sutures should be cut, releasing the anchor or anchors into the stomach. The suture anchors will then pass through the GI tract as desired. This can be performed in the interventional radiology department if   desired. Call [phone] any weekday [phone] hours to make an appointment.    Patient started on atorvastatin. Echo unremarkable.  Hospital course complicated by urinary retention requiring Thacker (on Terazosin), pneumonia and respiratory failure on high flow nasal cannula (now weaned down to 2 L nasal cannula).    Interval Problem Update  - No acute overnight events  - tolerating tube feeding. Reports dry mouth, asking for ice chips  - PT/OT rec SNF  - voiding trial successful, thacker removed.    I have discussed this patient's plan of care and discharge plan at IDT rounds today with Case Management, Nursing, Nursing leadership, and other members of the IDT team.    Consultants/Specialty  neurology    Code Status  Full Code    Disposition  Patient is medically cleared for discharge.   Anticipate discharge to to skilled nursing facility.  I have placed the appropriate orders for post-discharge needs.    Review of Systems  Review of Systems   HENT:          Dry mouth   Respiratory:  Negative for shortness of breath.    Genitourinary:  Negative for hematuria.   Neurological:  Positive for speech change and focal weakness.       Physical Exam  Temp:  [36.1 °C (97 °F)-36.8 °C (98.2 °F)] 36.8 °C (98.2 °F)  Pulse:  [64-75] 64  Resp:  [16-18] 18  BP: (130-139)/(73-81) 138/76  SpO2:  [94 %-97 %] 97 %    Physical Exam  Constitutional:       General: He is not in acute distress.     Appearance: Normal appearance. He is well-developed. He is not diaphoretic.      Comments: Sleepy but wakes to voice   HENT:      Head: Normocephalic and atraumatic.      Nose: Nose normal.      Mouth/Throat:      Mouth: Mucous membranes are moist.   Eyes:      General: No scleral icterus.     Conjunctiva/sclera: Conjunctivae normal.   Neck:      Vascular: No JVD.   Cardiovascular:      Rate and Rhythm: Normal rate.      Heart sounds: No murmur heard.    No gallop.   Pulmonary:      Effort: Pulmonary effort is normal. No respiratory distress.      Breath sounds: No stridor. No wheezing or rales.   Abdominal:      Palpations: Abdomen is soft.      Tenderness: There is no abdominal tenderness. There is no guarding or rebound.      Comments: Gtube in place c/d/i   Musculoskeletal:         General: No tenderness.      Right lower leg: No edema.      Left lower leg: No edema.   Skin:     General: Skin is warm and dry.      Capillary Refill: Capillary refill takes less than 2 seconds.      Coloration: Skin is not jaundiced or pale.      Findings: No rash.   Neurological:      Comments:   Pt does not attend to L side even with prompting and stim  Speech improved  +L side facial droop  L arm and leg flacid  Purposeful and 5/5 R upper and lower   Psychiatric:         Mood and Affect: Mood and affect normal.         Behavior: Behavior normal.       Fluids    Intake/Output Summary (Last 24 hours) at 2/1/2023 1342  Last data filed at 2/1/2023 0800  Gross per 24 hour   Intake 1410 ml   Output 840 ml   Net 570 ml         Laboratory        Recent Labs     01/30/23  0334 01/31/23  0327 02/01/23  0241   SODIUM 138 138 138   POTASSIUM 4.1 3.9 4.0   CHLORIDE 101 101 102   CO2 27 26  25   GLUCOSE 116* 141* 145*   BUN 20 21 21   CREATININE 0.54 0.56 0.52   CALCIUM 9.6 9.6 9.4                     Imaging  IR-GASTROSTOMY PLACEMENT   Final Result      1.  Fluoroscopic guided percutaneous gastrostomy with placement of an 18-Ser[place] ELISE balloon gastrostomy catheter.      2. The gastrostomy tube may be used for tube feeds 18 hours after placement. Thereafter, liquid diet orders per the referring physician. Saline flush daily as per protocol.      3. In 10-14 days, the anchor button suture or sutures should be cut, releasing the anchor or anchors into the stomach. The suture anchors will then pass through the GI tract as desired. This can be performed in the interventional radiology department if    desired. Call [phone] any weekday [phone] hours to make an appointment.      CT-CTA CHEST PULMONARY ARTERY W/ RECONS   Final Result         1.  No pulmonary embolus appreciated.   2.  Linear densities the bilateral lung bases suggests changes of atelectasis, component of infiltrate is not excluded.   3.  Atherosclerosis and atherosclerotic coronary artery disease.      US-EXTREMITY VENOUS LOWER BILAT   Final Result      DX-ABDOMEN FOR TUBE PLACEMENT   Final Result      NG tube extends below the diaphragm into the region of the stomach. The tip is not visualized.      EC-ECHOCARDIOGRAM LTD W/ CONT   Final Result      DX-CHEST-PORTABLE (1 VIEW)   Final Result         1. No significant interval change.      CT-ABDOMEN-PELVIS W/O   Final Result         1.  Nondependent foci of air in the bladder, could represent changes from recent instrumentation or urinary tract infection.   2.  Diverticulosis   3.  Atherosclerosis and atherosclerotic coronary artery disease      DX-CHEST-PORTABLE (1 VIEW)   Final Result      Ill-defined atelectasis versus consolidation in the left lower lung. Pneumonia can be considered in appropriate clinical setting.         DX-ABDOMEN FOR TUBE PLACEMENT   Final Result         1.   Nonspecific bowel gas pattern in the upper abdomen.   2.  Nasogastric tube tip terminates overlying the expected location of the gastric body.   3.  Cardiomegaly   4.  Hazy linear density left lung base could represent atelectasis or infiltrate.      CT-HEAD W/O   Final Result         1.  Evolving infarct/encephalomalacia in the right MCA distribution.   2.  Areas of hemorrhage in the right basal ganglia and involving the right frontal and anterior temporal sulci, similar compared to MRI January 13, 2023   3.  Atherosclerosis.      DX-CHEST-PORTABLE (1 VIEW)   Final Result         Diffuse interstitial and groundglass opacity throughout both lungs could relate to developing pulmonary edema.      DX-ABDOMEN FOR TUBE PLACEMENT   Final Result         Gastric drainage tube with tip projecting over the expected area of the stomach.      DX-CHEST-PORTABLE (1 VIEW)   Final Result      1.  Interval extubation without pneumothorax.   2.  Stable enlarged cardiac silhouette.   3.  There is bibasilar linear atelectasis.      EC-ECHOCARDIOGRAM COMPLETE W/ CONT   Final Result      MR-BRAIN-W/O   Final Result         Acute infarction involving the right frontal region, right basal ganglia and right caudate nucleus. Minimal punctate infarction involving the right parietal cortex. There is slight mass effect upon the right lateral ventricle with only minimal midline    shift measuring approximately 4 mm.      Petechial hemorrhage is noted within the right frontal cortical infarct. Also noted is petechial hemorrhage in the right basal ganglia and right caudate nucleus but without surrounding vasogenic edema.         DX-PELVIS-1 OR 2 VIEWS   Final Result         1.  No acute traumatic bony injury.      DX-CHEST-PORTABLE (1 VIEW)   Final Result         1.  Left midlung and lower lobe infiltrates, similar to prior study.      [id number] VIEW   Final Result         1.  Nonspecific bowel gas pattern in the upper abdomen.   2.   Cardiomegaly   3.  Hazy left lower lobe infiltrates.   4.  Nasogastric tube tip terminates overlying the expected location of the gastric fundus.      IR-THROMBO MECHANICAL ARTERY,INIT   Final Result         62-year-old patient who presented with complete occlusion of the right ICA at its origin underwent emergent mechanical thrombectomy. After initial angioplasty,   a right cervical internal carotid artery stent was deployed and right MCA thrombectomy was performed. The final angiographic images show complete restoration of flow into the right ICA and MCA with no evidence of distal intracranial embolization.      Final recanalization score: TICI 3      I, Petra Mobley was physically present and participated during the entire procedure of the IR-THROMBO MECHANICAL ARTERY,INIT.                  DX-CHEST-PORTABLE (1 VIEW)   Final Result      No acute cardiac or pulmonary abnormalities are identified.      CT-CTA NECK WITH & W/O-POST PROCESSING   Final Result      1.  Occlusion of the right internal carotid artery at its origin.      2.  Occlusion of the right vertebral artery at its origin.      Dr. Gongora discussed findings with Dr. Mccarthy.      CT-CTA HEAD WITH & W/O-POST PROCESS   Final Result         1.  Occlusion of the right internal carotid artery and right middle cerebral artery.   2.  Narrowed left M1 segment with severely narrowed and M2 and M3 branches, may represent chronic changes.      These findings were discussed with the patient's clinician, Joseline Mccarthy, on 1/12/2023 8:27 PM.      CT-CEREBRAL PERFUSION ANALYSIS   Final Result         1.  Cerebral blood flow less than 30% likely representing completed infarct = 52 mL.      2.  T Max more than 6 seconds likely representing combination of completed infarct and ischemia = 172 mL.      3.  Mismatched volume likely representing ischemic brain/penumbra = 120 mL      4.  Please note that the cerebral perfusion was performed on the limited brain  tissue around the basal ganglia region. Infarct/ischemia outside the CT perfusion sections can be missed in this study.      CT-HEAD W/O   Final Result      1.  No evidence of intracranial hemorrhage, midline shift or mass effect.      2.  Question of hyperdense right MCA though evaluation limited by oblique positioning.                Assessment/Plan  * Acute ischemic right middle cerebral artery (MCA) stroke (HCC)- (present on admission)  Assessment & Plan  Right MCA CVA with hemorrhagic conversion  Neurology consulted   High Intensity statin therapy   BP control  S/p right ICA stent and MCA thrombectomy 1/12/23  DAPT  TTE negative for shunt  Tobacco cessation  PT/OT/SLP - recommending post-acute placement  PEG placed 1/24     Urinary retention  Assessment & Plan  Likely in the setting of BPH  Tried removing thacker 1/25, had to replace  Starting terazosin 1/28    Small amount of sediment in the tubing, however no significant hematuria, UA obtained which did show RBCs, CBC normal and afebrile  Voiding trial   Thacker removed on 1/31    Internal carotid artery stent present  Assessment & Plan  Placed 1/12  Per Neuro OK to start DAPT: ASA & Plavix for 6 wks    Hypernatremia  Assessment & Plan  Patient continues to have hyponatremia with elevated sodium level.  Resolved with Free H2O 200ml q6  Cont to follow daily    Pneumonia due to infectious organism- (present on admission)  Assessment & Plan  Resolved    Aspiration pneumonia (Developed 01/19/23)   Procal low  Sputum cultures neg  Strict aspiration precautions   Has completed 5 days of Abx's  MRSA nares neg  Mucomyst/3% normal saline nebs to maintain pulmonary toilet, RT protocol to continue  Pulmonology evaluated him and made recommendations          Oropharyngeal dysphagia- (present on admission)  Assessment & Plan  Secondary to acute stroke.    Aspiration precautions   PEG 1/24  Nutrition following    Acute left hemiparesis (HCC)- (present on admission)  Assessment  & Plan  Secondary to right MCA stroke.    Pressure ulcer precautions  Aspiration, Fall precautions  PT and OT  Working toward Rehab DC    Stroke, hemorrhagic (HCC)- (present on admission)  Assessment & Plan  Ischemic stroke with hemorrhagic conversion.  Neuro has OKd for antiplatelet therapy with ASA and plavix given presence of new ICA stent    Toxic metabolic encephalopathy- (present on admission)  Assessment & Plan  Improved    Persistent 01/20/23  Secondary to stroke, delirium and now concern for relation to underlying pneumonia  Limit restraints as able  Avoid polypharmacy, sedating medications  Delirium precautions  Management of underlying medical conditions  Obtain EEG 01/20/23 and it did not show seizure activity.   cont's to improve daily though examination is limited by dysarthria at times    Acute respiratory failure with hypoxia (HCC)- (present on admission)  Assessment & Plan  Improved     01/19/23 CXRAY consistent with Aspiration Pneumonia - Started on IV unasyn  Subsequently trnasitioned to [facility].  At present off Abx's  MRSA nares neg  trachael aspirate cultures neg  CTA chest neg for PE  TTE LVEF 70%, no signficant valvular or structural abnormalities  O2 demand decreasing: now off HFNC and on 2 LNC  Cont weaning O2    Severe obesity (BMI 35.0-35.9 with comorbidity) ([facility])- (present on admission)  Assessment & Plan  Body mass index is 37.39 kg/m².  Co-morbid HTN, T2DM, KENDAL  Outpatient weight loss    Type 2 diabetes mellitus without complication, without long-term current use of insulin (HCC)- (present on admission)  Assessment & Plan  A1c 5.3  No indication for strict BG control - POCT/SSI deferred  Atorvastatin    Alcohol dependence with withdrawal (HCC)- (present on admission)  Assessment & Plan  Resolved.    Tobacco use disorder- (present on admission)  Assessment & Plan  On nicotine patch     Primary hypertension- (present on admission)  Assessment & Plan  Amlodipine 10 mg         VTE  prophylaxis: enoxaparin ppx    I have performed a physical exam and reviewed and updated ROS and Plan today (2/1/2023). In review of yesterday's note (1/31/2023), there are no changes except as documented above.

## 2023-02-01 NOTE — DISCHARGE PLANNING
Received Choice Form @: 101   Agency/ Facility Name: [facility]  Referral Sent per Choice Form @: [phone]    Agency/Facility Name: [facility]  Spoke To: Margarita  Outcome: DPA received call that pt is declined as care exceeds capacity (pt is a max assist)

## 2023-02-02 LAB
ALBUMIN SERPL BCP-MCNC: 3.7 G/DL (ref 3.2–4.9)
ALBUMIN/GLOB SERPL: 1.2 G/DL
ALP SERPL-CCNC: 118 U/L (ref 30–99)
ALT SERPL-CCNC: 36 U/L (ref 2–50)
ANION GAP SERPL CALC-SCNC: 8 MMOL/L (ref 7–16)
AST SERPL-CCNC: 25 U/L (ref 12–45)
BILIRUB SERPL-MCNC: 0.7 MG/DL (ref 0.1–1.5)
BUN SERPL-MCNC: 20 MG/DL (ref 8–22)
CALCIUM ALBUM COR SERPL-MCNC: 9.6 MG/DL (ref 8.5–10.5)
CALCIUM SERPL-MCNC: 9.4 MG/DL (ref 8.5–10.5)
CHLORIDE SERPL-SCNC: 102 MMOL/L (ref 96–112)
CO2 SERPL-SCNC: 27 MMOL/L (ref 20–33)
CREAT SERPL-MCNC: 0.52 MG/DL (ref 0.5–1.4)
GFR SERPLBLD CREATININE-BSD FMLA CKD-EPI: 114 ML/MIN/1.73 M 2
GLOBULIN SER CALC-MCNC: 3.1 G/DL (ref 1.9–3.5)
GLUCOSE SERPL-MCNC: 110 MG/DL (ref 65–99)
POTASSIUM SERPL-SCNC: 4 MMOL/L (ref 3.6–5.5)
PROT SERPL-MCNC: 6.8 G/DL (ref 6–8.2)
SODIUM SERPL-SCNC: 137 MMOL/L (ref 135–145)

## 2023-02-02 PROCEDURE — 700102 HCHG RX REV CODE 250 W/ 637 OVERRIDE(OP): Performed by: INTERNAL MEDICINE

## 2023-02-02 PROCEDURE — A9270 NON-COVERED ITEM OR SERVICE: HCPCS | Performed by: INTERNAL MEDICINE

## 2023-02-02 PROCEDURE — 97530 THERAPEUTIC ACTIVITIES: CPT | Mod: CO

## 2023-02-02 PROCEDURE — 700102 HCHG RX REV CODE 250 W/ 637 OVERRIDE(OP): Performed by: HOSPITALIST

## 2023-02-02 PROCEDURE — 99232 SBSQ HOSP IP/OBS MODERATE 35: CPT | Performed by: STUDENT IN AN ORGANIZED HEALTH CARE EDUCATION/TRAINING PROGRAM

## 2023-02-02 PROCEDURE — A9270 NON-COVERED ITEM OR SERVICE: HCPCS | Performed by: HOSPITALIST

## 2023-02-02 PROCEDURE — 97530 THERAPEUTIC ACTIVITIES: CPT | Mod: CQ

## 2023-02-02 PROCEDURE — 36415 COLL VENOUS BLD VENIPUNCTURE: CPT

## 2023-02-02 PROCEDURE — 770006 HCHG ROOM/CARE - MED/SURG/GYN SEMI*

## 2023-02-02 PROCEDURE — 80053 COMPREHEN METABOLIC PANEL: CPT

## 2023-02-02 PROCEDURE — 92523 SPEECH SOUND LANG COMPREHEN: CPT

## 2023-02-02 RX ADMIN — POLYETHYLENE GLYCOL 3350 1 PACKET: 17 POWDER, FOR SOLUTION ORAL at 17:45

## 2023-02-02 RX ADMIN — ACETAMINOPHEN 650 MG: 325 TABLET, FILM COATED ORAL at 20:17

## 2023-02-02 RX ADMIN — AMLODIPINE BESYLATE 10 MG: 10 TABLET ORAL at 05:24

## 2023-02-02 RX ADMIN — TERAZOSIN 1 MG: 1 CAPSULE ORAL at 17:49

## 2023-02-02 RX ADMIN — SENNOSIDES AND DOCUSATE SODIUM 1 TABLET: 50; 8.6 TABLET ORAL at 17:46

## 2023-02-02 RX ADMIN — Medication 5 MG: at 20:17

## 2023-02-02 RX ADMIN — ATORVASTATIN CALCIUM 80 MG: 80 TABLET, FILM COATED ORAL at 17:46

## 2023-02-02 RX ADMIN — ASPIRIN 81 MG: 81 TABLET, CHEWABLE ORAL at 05:24

## 2023-02-02 RX ADMIN — SENNOSIDES AND DOCUSATE SODIUM 1 TABLET: 50; 8.6 TABLET ORAL at 05:25

## 2023-02-02 RX ADMIN — CLOPIDOGREL BISULFATE 75 MG: 75 TABLET ORAL at 05:25

## 2023-02-02 ASSESSMENT — ENCOUNTER SYMPTOMS
FOCAL WEAKNESS: 1
SHORTNESS OF BREATH: 0
SPEECH CHANGE: 1

## 2023-02-02 ASSESSMENT — COGNITIVE AND FUNCTIONAL STATUS - GENERAL
MOVING TO AND FROM BED TO CHAIR: UNABLE
DRESSING REGULAR UPPER BODY CLOTHING: A LOT
DAILY ACTIVITIY SCORE: 12
PERSONAL GROOMING: A LOT
MOBILITY SCORE: 6
STANDING UP FROM CHAIR USING ARMS: TOTAL
CLIMB 3 TO 5 STEPS WITH RAILING: TOTAL
SUGGESTED CMS G CODE MODIFIER MOBILITY: CN
MOVING FROM LYING ON BACK TO SITTING ON SIDE OF FLAT BED: UNABLE
WALKING IN HOSPITAL ROOM: TOTAL
DRESSING REGULAR LOWER BODY CLOTHING: A LOT
EATING MEALS: A LOT
TURNING FROM BACK TO SIDE WHILE IN FLAT BAD: UNABLE
HELP NEEDED FOR BATHING: A LOT
TOILETING: A LOT
SUGGESTED CMS G CODE MODIFIER DAILY ACTIVITY: CL

## 2023-02-02 ASSESSMENT — PAIN DESCRIPTION - PAIN TYPE
TYPE: ACUTE PAIN
TYPE: ACUTE PAIN

## 2023-02-02 ASSESSMENT — GAIT ASSESSMENTS: GAIT LEVEL OF ASSIST: UNABLE TO PARTICIPATE

## 2023-02-02 NOTE — CARE PLAN
The patient is Stable - Low risk of patient condition declining or worsening    Shift Goals  Clinical Goals: No complications related to neuro status; Adequate urine output;  Patient Goals: Mobility; Sleep  Family Goals: Sleep    Progress made toward(s) clinical / shift goals:  Patient remains alert and oriented X 4. Neuro checks continued with no new complications observed. Patient Receiving continuous oxygen at 2L/min via nasal cannula. Patient remains NPO with ICE chips and continuous tube feeding. No signs of distress have been observed. Will continue to monitor.   Problem: Optimal Care of the Stroke Patient  Goal: Optimal emergency care for the stroke patient  Outcome: Progressing  Goal: Optimal acute care for the stroke patient  Outcome: Progressing     Problem: Knowledge Deficit - Stroke Education  Goal: Patient's knowledge of stroke and risk factors will improve  Outcome: Progressing     Problem: Psychosocial - Patient Condition  Goal: Patient's ability to verbalize feelings about condition will improve  Outcome: Progressing  Goal: Patient's ability to re-evaluate and adapt role responsibilities will improve  Outcome: Progressing     Problem: Discharge Planning - Stroke  Goal: Ensure Stroke Core Measures are met prior to discharge  Outcome: Progressing  Goal: Patient’s continuum of care needs will be met  Outcome: Progressing     Problem: Neuro Status  Goal: Neuro status will remain stable or improve  Outcome: Progressing     Problem: Hemodynamic Monitoring  Goal: Patient's hemodynamics, fluid balance and neurologic status will be stable or improve  Outcome: Progressing     Problem: Respiratory - Stroke Patient  Goal: Patient will achieve/maintain optimum respiratory rate/effort  Outcome: Progressing     Problem: Dysphagia  Goal: Dysphagia will improve  Outcome: Progressing     Problem: Risk for Aspiration  Goal: Patient's risk for aspiration will be absent or decrease  Outcome: Progressing     Problem:  Urinary Elimination  Goal: Establish and maintain regular urinary output  Outcome: Progressing     Problem: Bowel Elimination  Goal: Establish and maintain regular bowel function  Outcome: Progressing     Problem: Mobility - Stroke  Goal: Patient's capacity to carry out activities will improve  Outcome: Progressing  Goal: Spasticity will be prevented or improved  Outcome: Progressing  Goal: Subluxation will be prevented or improved  Outcome: Progressing     Problem: Self Care  Goal: Patient will have the ability to perform ADLs independently or with assistance (bathe, groom, dress, toilet and feed)  Outcome: Progressing     Problem: Knowledge Deficit - Standard  Goal: Patient and family/care givers will demonstrate understanding of plan of care, disease process/condition, diagnostic tests and medications  Outcome: Progressing     Problem: Pain - Standard  Goal: Alleviation of pain or a reduction in pain to the patient’s comfort goal  Outcome: Progressing     Problem: Skin Integrity  Goal: Skin integrity is maintained or improved  Outcome: Progressing     Problem: Fall Risk  Goal: Patient will remain free from falls  Outcome: Progressing     Problem: Optimal Care for Alcohol Withdrawal  Goal: Optimal Care for the alcohol withdrawal patient  Outcome: Progressing       Patient is not progressing towards the following goals:

## 2023-02-02 NOTE — DISCHARGE PLANNING
Agency/Facility Name: [facility]  Outcome: DPA left vmail requesting updated referral status     Agency/Facility Name: [facility]   Outcome: DPA left vmail requesting updated referral status     Agency/Facility Name: [facility]   Outcome: DPA called to check on referral status. DPA was notified that facility is full at this time     Agency/Facility Name: [facility]   Outcome: DPA left vmail requesting updated referral status     Agency/Facility Name: [facility]  Spoke To: Joseline   Outcome: DPA called to check on referral status. Per Joseline, she will look at referral but if pt has any out pt appointments in the [place] area facility does not have any transportation. Wife/family also has to agree to the long commute     Agency/Facility Name: [facility]  Spoke To: Luz Marina   Outcome: DPA left message with Luz Marina ( worker) for admissions requesting updated referral status

## 2023-02-02 NOTE — DISCHARGE PLANNING
Case Management Discharge Planning    Admission Date: 1/12/2023  GMLOS: 7.3  ALOS: 21    Anticipated Discharge Dispo: Discharge Disposition: D/T to SNF with Medicare cert in anticipation of skilled care (03)    DME Needed: No    Action(s) Taken: Received return call from Demi Jacob. SNF's contracted with pt's insurance are: [facility], [facility], [facility], [facility], [facility] and [facility]. SNF's have declined due to care exceeding capacity or not contracted with insurance. [facility] considering pt but requesting updated therapy notes. SNF referrals expanded yesterday.     LSW met with pt's wife at bedside. Spouse Provided update regarding rehab denial, local SNF responses and reasons why referral was expanded to outlying locations. Spouse states her preference would be that pt stay locally if able. Discussed that [facility] has pended referral and is wanting to see if pt will improve with therapy. Spouse states transportation for her to visit would be a barrier if pt goes to an outlying facility. Discussed re-sending local referrals if pt improves.     [facility] phone # provided to spouse as requested.     Escalations Completed: None    Medically Clear: Yes    Next Steps: F/U with SNF    Barriers to Discharge: Pending Placement    Is the patient up for discharge tomorrow: No

## 2023-02-02 NOTE — THERAPY
Physical Therapy   Daily Treatment     Patient Name: Kaiden Quiroz  Age:  62 y.o., Sex:  male  Medical Record #: [id number]  Today's Date: 2/2/2023     Precautions  Precautions: Fall Risk;PEG Tube  Comments: L nubia    Assessment    Pt greeted and seen for PT treatment. Pt was able to participate for bed mobility, req'd ModA to sit and MaxA to supine. Pt demo'd improved sitting balance at EOB w/o assist for several min. Pt having PEG pain and returned to bed, nsg notified. Pt currently limited by impaired balance, weakness, decreased sequencing and coordination, and decreased activity tolerance which negatively impacts functional mobility. Pt will continue to benefit from skilled PT to address deficits.      Plan    Physical Therapy Treatment Plan  Physical Therapy Treatment Plan: Continue Current Treatment Plan    DC Equipment Recommendations: Unable to determine at this time  Discharge Recommendations: Recommend post-acute placement for additional physical therapy services prior to discharge home       02/02/23 1127   Cognition    Comments cooperative, hyperverbose   Balance   Sitting Balance (Static) Fair -   Sitting Balance (Dynamic) Poor   Weight Shift Sitting Poor   Skilled Intervention Verbal Cuing;Postural Facilitation;Sequencing   Comments pt is able to maintain sitting balance at EOB w/o assist   Bed Mobility    Supine to Sit Moderate Assist   Sit to Supine Maximal Assist   Skilled Intervention Verbal Cuing;Facilitation   Comments pt is able to participate in coming to EOB   Gait Analysis   Gait Level Of Assist Unable to Participate   Functional Mobility   Comments standing trial not attempted this day, pt reported PEG pain with noted bleeding, pt returned to bed and nsg notified   Short Term Goals    Short Term Goal # 1 Pt will transition from supine to EOB w/ Bandar in 6 visits to improve independence in bed mobility   Goal Outcome # 1 Progressing slower than expected   Short Term Goal # 2 Pt will  transfer from EOB to chair w/ FWW w/ ModA in 6 visits to improve OOB mobility   Goal Outcome # 2 Progressing slower than expected   Short Term Goal # 3 Pt will perform a STS w/ FWW w/ Bandar in 6 visits to improve OOB mobility   Goal Outcome # 3 Progressing slower than expected   Short Term Goal # 4 Pt will be able to ambulate 15 ft w/ FWW w/ ModA in 6 visits to access household distances   Goal Outcome # 4 Goal not met   Short Term Goal # 5 Pt will be able to complete 4 steps w/ FWW w/Mod A in 6 visits to access household   Goal Outcome # 5 Goal not met   Supervising Physical Therapist (PTA Treatments Only)   Supervising Physical Therapist Aurelia Nesbitt

## 2023-02-02 NOTE — THERAPY
"Speech Language Pathology   Initial Cognitive Assessment     Patient Name: Kaiden Quiroz  AGE:  62 y.o., SEX:  male  Medical Record #: [id number]  Today's Date: 2023     Precautions  Precautions: Fall Risk, PEG Tube  Comments: L nubia    HPI: Pt is 62 y.o. 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 He is now s/p TICI 3 mechanical thrombectomy on R ICA and M1 clot, and s/p R ICA stent placement. CSE .  PEG .     CMHx: R MCA CVA, dysphagia, toxic metabolic encephalopathy, L nubia, PNA, carotid artery stent, acute respirtory failure w/ hypoxia  PMHx: HTN, tobacco use disorder, alcohol use, DM2, obesity    MRI Brain w/o :  \"Acute infarction involving the right frontal region, right basal ganglia and right caudate nucleus. Minimal punctate infarction involving the right parietal cortex. There is slight mass effect upon the right lateral ventricle with only minimal midline   shift measuring approximately 4 mm.     Petechial hemorrhage is noted within the right frontal cortical infarct. Also noted is petechial hemorrhage in the right basal ganglia and right caudate nucleus but without surrounding vasogenic edema.\"      Level of Consciousness: Alert, Awake  Affect/Behavior: Calm, Cooperative  Follows Directives: Yes  Orientation: Self, , General place, Situation  Hearing: Functional hearing  Vision: Functional vision      Prior Living Situation & Level of Function:  Pt reports living with his wife in an apartment, I with IADLs at baseline. Working full time as a .      Subjective  Pt agreeable and cooperative with SLP evaluation tasks. .\"Is this test grade?\" Reports sufficient cognition for RTW and return to I with IADLs.     Assessment  The pt was seen for a cognitive evaluation. Portions of the Cognistat and other informal measures were administered by this SLP. Results are as follows:     Cognistat  Orientation: Mild  Attention: " Average  Comprehension: Average  Repetition: Average  Naming: Average  Memory: Average  Calculations: Mild  Similarities: Average  Judgment: Average    Clock Drawing  Pt demonstrated poor organization and fair-poor planning during clock drawing task. Clock drawn scored 9/13 per CLQT protocol indicating moderate impairment. Errors as follows: perseverated and missing numbers, hands equal length.    Medication Management  Pt answered temporal/numerical reasoning questions related to medication management with 50% accuracy. Stated he would use a watch alarm to enhance memory. Reports no medications taken at home. Able to brainstorm fair-good questions to ask if prescribed a new medication.     Clinical Impressions  Pt presents with mild-moderate cognitive impairment, evidenced by deficits in calculations and executive functioning. Pt will likely need intermittent A with IADLs upon discharge and would benefit from skilled SLP service for cognition in the acute and post-acute settings.     Of note, cognitive-linguistic evaluations assess performance on various cognitive-linguistic domains such as expressive and receptive language, attention, memory, executive function, problem solving, etc. It is not within the scope of Speech Language Pathologists to determine capacity, please defer to medical team or psych for capacity evaluation. Thank you.         Recommendations  Supervision Needs Upon Discharge: The pt will benefit from intermittent supervision throughout the day and direct assistance for the following IADLs: Medication management, Financial management, Appointment management, Household chores, Cooking.  2.   SLP following.     Plan    Recommend Speech Therapy 3 times per week until therapy goals are met for the following treatments:  Dysphagia Training, Cognitive-Linguistic Training, and Patient / Family / Caregiver Education.    Discharge Recommendations: Recommend post-acute placement for additional speech  "therapy services prior to discharge home       Objective   02/02/23 1152   Initial Contact Note    Initial Contact Note  Order Received and Verified, Speech Therapy Evaluation in Progress with Full Report to Follow.   Vitals   O2 (LPM) 1.5   O2 Delivery Device Silicone Nasal Cannula   Prior Living Situation   Prior Services None   Lives with - Patient's Self Care Capacity Spouse   Comments Pt reports he works full time and work requires physical labor. Commutes to work via bus/shuttle   Prior Level Of Function   Communication Within Functional Limits   Swallow Within Functional Limits   Occupation (Pre-Hospital Vocational) Employed Full Time   Cognitive-Linguistic   Level of Consciousness Alert   Orientation Level Not Oriented to Day;Not Oriented to Month   Patient / Family Goals   Patient / Family Goal #1 EDITED 1/19 - \"Some nice cold water.\"   Goal #1 Outcome Progressing slower than expected   Short Term Goals   Short Term Goal # 3 Pt will sequence 5-7 step tasks givne min cues from SLP with 90% accuracy.   Education Group   Education Provided CVA Right Hemisphere   CVA / Rt Hemisphere Patient Response Patient;Acceptance;Explanation;Verbal Demonstration;Action Demonstration   Problem List   Problem List Dysphagia;Cognitive-Linguistic Deficits;Executive Function Deficit   Interdisciplinary Plan of Care Collaboration   IDT Collaboration with  Nursing       "

## 2023-02-02 NOTE — THERAPY
Occupational Therapy  Daily Treatment     Patient Name: Kaiden Quiroz  Age:  62 y.o., Sex:  male  Medical Record #: [id number]  Today's Date: 2/2/2023       Precautions: Fall Risk, PEG Tube  Comments: L nubia    Assessment    Pt seen for OT tx. Continues to be limited by decreased activity tolerance, LUE weakness and balance deficits impacting ability to complete ADLs and ADL transfers independently. Continues to require mod A supine > sit, once seated EOB pt able to maintain sitting balance w/ occasional CGA for correction during seated ADLs. Pt continues to make progress towards goals. Will continue to benefit from OT services while in house.     Plan    O.T. Treatment Plan: Continue Current Treatment Plan    DC Equipment Recommendations: Unable to determine at this time  Discharge Recommendations: Recommend post-acute placement for additional occupational therapy services prior to discharge home       02/02/23 1045   Cognition    Cognition / Consciousness X   New Learning Impaired   Attention Impaired   Sequencing Impaired   Active ROM Upper Body   Active ROM Upper Body  X   Comments LUE no active or purposeful movement   Strength Upper Body   Upper Body Strength  X   Comments LUE grossly weak w/ no functional movement   Balance   Sitting Balance (Static) Fair -   Sitting Balance (Dynamic) Poor   Weight Shift Sitting Poor   Bed Mobility    Supine to Sit Moderate Assist   Sit to Supine Maximal Assist   Activities of Daily Living   Grooming Minimal Assist;Seated   Upper Body Dressing Moderate Assist   Lower Body Dressing Maximal Assist   Toileting Maximal Assist   Short Term Goals   Short Term Goal # 1 pt will groom seated EOB w/ setup   Goal Outcome # 1 Goal not met   Short Term Goal # 2 pt will maintain sitting balance unsupported 10 sec in prep for seated ADLs   Goal Outcome # 2 Goal not met   Short Term Goal # 3 pt will demo ADL txfs with Bandar   Goal Outcome # 3 Goal not met   Anticipated Discharge Equipment  and Recommendations   DC Equipment Recommendations Unable to determine at this time   Discharge Recommendations Recommend post-acute placement for additional occupational therapy services prior to discharge home

## 2023-02-02 NOTE — CARE PLAN
The patient is Stable - Low risk of patient condition declining or worsening    Shift Goals  Clinical Goals: No complications related to neuro status; Adequate urine output;  Patient Goals: Mobility; Sleep  Family Goals: Sleep    Progress made toward(s) clinical / shift goals:  Patient is alert and oriented x4, no complaints of pain.  Pt has peg tube with feeding infusing at goal rate.  Pt voiding.  Q2 turns implemented.  Bed is low and in locked position call bell within reach.   Problem: Knowledge Deficit - Stroke Education  Goal: Patient's knowledge of stroke and risk factors will improve  Description: Target End Date:  1-3 days or as soon as patient condition allows    Document in Patient Education    1.  Stroke education booklet provided  2.  Education regarding EMS activation, need for follow up, medication prescribed at discharge, risk factors for stroke/lifestyle modifications, warning signs and symptoms of stroke provided  Outcome: Progressing     Problem: Neuro Status  Goal: Neuro status will remain stable or improve  Description: Target End Date:  Prior to discharge or change in level of care    Document on Neuro assessment in the Assessment flowsheet    1.  Assess and monitor neurologic status per provider order/protocol/unit policy  2.  Assess level of consciousness and orientation  3.  Assess for speech, dysarthria, dysphagia, facial symmetry  4.  Assess visual field, eye movements, gaze preference, pupil reaction and size  5.  Assess muscle strength and motor response in all four extremities  6.  Assess for sensation (numbness and tingling)  7.  Assess basic neuro reflexes (cough, gag, corneal)  8.  Identify changes in neuro status and report to provider for testing/treatment orders  Outcome: Progressing       Patient is not progressing towards the following goals:

## 2023-02-02 NOTE — PROGRESS NOTES
[facility] Medicine Daily Progress Note    Date of Service  2/2/2023    Chief Complaint  Kaiden Quiroz is a 62 y.o. male admitted 1/12/2023 with L side weakness    Hospital Course  61yo PMHx HTN, T2DM, ongoing tobacco use, BMI 36, ETOH.  Presenting with L side weakness on 1/12.  Centuria not to be TNK candidate.  Taken to IR for MCA trhombectomy with TICI 3 post and R ICA stent.  On DAPT x 6 weeks. Had 10 sec pause intra procedure but resolved before CPR was initiated.  Was intubated.  In ICU required Precedex for ETOH withdrawal.  PEG placed 1/24.  In 10-14 days, the anchor button suture or sutures should be cut, releasing the anchor or anchors into the stomach. The suture anchors will then pass through the GI tract as desired. This can be performed in the interventional radiology department if   desired. Call [phone] any weekday [phone] hours to make an appointment.    Patient started on atorvastatin. Echo unremarkable.  Hospital course complicated by urinary retention requiring Thacker (on Terazosin), pneumonia and respiratory failure on high flow nasal cannula (now weaned down to 2 L nasal cannula).    Interval Problem Update  - No acute overnight events  - Reports dry mouth, asking for ice chips  - He reports abdominal pain. Tube feeding residual around 60cc. He had bowel movement last night. Will continue to monitor  - PT/OT rec SNF  - voiding trial successful, thacker removed.  - labs reviewed.     I have discussed this patient's plan of care and discharge plan at IDT rounds today with Case Management, Nursing, Nursing leadership, and other members of the IDT team.    Consultants/Specialty  neurology    Code Status  Full Code    Disposition  Patient is medically cleared for discharge.   Anticipate discharge to to skilled nursing facility.  I have placed the appropriate orders for post-discharge needs.    Review of Systems  Review of Systems   HENT:          Dry mouth   Respiratory:  Negative for shortness of  breath.    Genitourinary:  Negative for hematuria.   Neurological:  Positive for speech change and focal weakness.      Physical Exam  Temp:  [36.1 °C (97 °F)-36.4 °C (97.6 °F)] 36.2 °C (97.2 °F)  Pulse:  [63-71] 63  Resp:  [18-20] 18  BP: (128-137)/(73-80) 137/80  SpO2:  [95 %-97 %] 97 %    Physical Exam  Constitutional:       General: He is not in acute distress.     Appearance: Normal appearance. He is well-developed. He is not diaphoretic.      Comments: Sleepy but wakes to voice   HENT:      Head: Normocephalic and atraumatic.      Nose: Nose normal.      Mouth/Throat:      Mouth: Mucous membranes are moist.   Eyes:      General: No scleral icterus.     Conjunctiva/sclera: Conjunctivae normal.   Neck:      Vascular: No JVD.   Cardiovascular:      Rate and Rhythm: Normal rate.      Heart sounds: No murmur heard.    No gallop.   Pulmonary:      Effort: Pulmonary effort is normal. No respiratory distress.      Breath sounds: No stridor. No wheezing or rales.   Abdominal:      Palpations: Abdomen is soft.      Tenderness: There is no abdominal tenderness. There is no guarding or rebound.      Comments: Gtube in place c/d/i   Musculoskeletal:         General: No tenderness.      Right lower leg: No edema.      Left lower leg: No edema.   Skin:     General: Skin is warm and dry.      Capillary Refill: Capillary refill takes less than 2 seconds.      Coloration: Skin is not jaundiced or pale.      Findings: No rash.   Neurological:      Comments:   Pt does not attend to L side even with prompting and stim  Speech improved  +L side facial droop  L arm and leg flacid  Purposeful and 5/5 R upper and lower   Psychiatric:         Mood and Affect: Mood and affect normal.         Behavior: Behavior normal.       Fluids    Intake/Output Summary (Last 24 hours) at 2/2/2023 1114  Last data filed at 2/2/2023 0900  Gross per 24 hour   Intake 3360 ml   Output 1320 ml   Net 2040 ml         Laboratory        Recent Labs      01/31/23  0327 02/01/23  0241 02/02/23  0635   SODIUM 138 138 137   POTASSIUM 3.9 4.0 4.0   CHLORIDE 101 102 102   CO2 26 25 27   GLUCOSE 141* 145* 110*   BUN 21 21 20   CREATININE 0.56 0.52 0.52   CALCIUM 9.6 9.4 9.4                     Imaging  IR-GASTROSTOMY PLACEMENT   Final Result      1.  Fluoroscopic guided percutaneous gastrostomy with placement of an 18-[place]omin[place] ELISE balloon gastrostomy catheter.      2. The gastrostomy tube may be used for tube feeds 18 hours after placement. Thereafter, liquid diet orders per the referring physician. Saline flush daily as per protocol.      3. In 10-14 days, the anchor button suture or sutures should be cut, releasing the anchor or anchors into the stomach. The suture anchors will then pass through the GI tract as desired. This can be performed in the interventional radiology department if    desired. Call [phone] any weekday [phone] hours to make an appointment.      CT-CTA CHEST PULMONARY ARTERY W/ RECONS   Final Result         1.  No pulmonary embolus appreciated.   2.  Linear densities the bilateral lung bases suggests changes of atelectasis, component of infiltrate is not excluded.   3.  Atherosclerosis and atherosclerotic coronary artery disease.      US-EXTREMITY VENOUS LOWER BILAT   Final Result      DX-ABDOMEN FOR TUBE PLACEMENT   Final Result      NG tube extends below the diaphragm into the region of the stomach. The tip is not visualized.      EC-ECHOCARDIOGRAM LTD W/ CONT   Final Result      DX-CHEST-PORTABLE (1 VIEW)   Final Result         1. No significant interval change.      CT-ABDOMEN-PELVIS W/O   Final Result         1.  Nondependent foci of air in the bladder, could represent changes from recent instrumentation or urinary tract infection.   2.  Diverticulosis   3.  Atherosclerosis and atherosclerotic coronary artery disease      DX-CHEST-PORTABLE (1 VIEW)   Final Result      Ill-defined atelectasis versus consolidation in the left lower lung.  Pneumonia can be considered in appropriate clinical setting.         DX-ABDOMEN FOR TUBE PLACEMENT   Final Result         1.  Nonspecific bowel gas pattern in the upper abdomen.   2.  Nasogastric tube tip terminates overlying the expected location of the gastric body.   3.  Cardiomegaly   4.  Hazy linear density left lung base could represent atelectasis or infiltrate.      CT-HEAD W/O   Final Result         1.  Evolving infarct/encephalomalacia in the right MCA distribution.   2.  Areas of hemorrhage in the right basal ganglia and involving the right frontal and anterior temporal sulci, similar compared to MRI January 13, 2023   3.  Atherosclerosis.      DX-CHEST-PORTABLE (1 VIEW)   Final Result         Diffuse interstitial and groundglass opacity throughout both lungs could relate to developing pulmonary edema.      DX-ABDOMEN FOR TUBE PLACEMENT   Final Result         Gastric drainage tube with tip projecting over the expected area of the stomach.      DX-CHEST-PORTABLE (1 VIEW)   Final Result      1.  Interval extubation without pneumothorax.   2.  Stable enlarged cardiac silhouette.   3.  There is bibasilar linear atelectasis.      EC-ECHOCARDIOGRAM COMPLETE W/ CONT   Final Result      MR-BRAIN-W/O   Final Result         Acute infarction involving the right frontal region, right basal ganglia and right caudate nucleus. Minimal punctate infarction involving the right parietal cortex. There is slight mass effect upon the right lateral ventricle with only minimal midline    shift measuring approximately 4 mm.      Petechial hemorrhage is noted within the right frontal cortical infarct. Also noted is petechial hemorrhage in the right basal ganglia and right caudate nucleus but without surrounding vasogenic edema.         DX-PELVIS-1 OR 2 VIEWS   Final Result         1.  No acute traumatic bony injury.      DX-CHEST-PORTABLE (1 VIEW)   Final Result         1.  Left midlung and lower lobe infiltrates, similar to prior  study.      [id number]-5 VIEW   Final Result         1.  Nonspecific bowel gas pattern in the upper abdomen.   2.  Cardiomegaly   3.  Hazy left lower lobe infiltrates.   4.  Nasogastric tube tip terminates overlying the expected location of the gastric fundus.      IR-THROMBO MECHANICAL ARTERY,INIT   Final Result         62-year-old patient who presented with complete occlusion of the right ICA at its origin underwent emergent mechanical thrombectomy. After initial angioplasty,   a right cervical internal carotid artery stent was deployed and right MCA thrombectomy was performed. The final angiographic images show complete restoration of flow into the right ICA and MCA with no evidence of distal intracranial embolization.      Final recanalization score: TICI 3      I, Petra Mobley was physically present and participated during the entire procedure of the IR-THROMBO MECHANICAL ARTERY,INIT.                  DX-CHEST-PORTABLE (1 VIEW)   Final Result      No acute cardiac or pulmonary abnormalities are identified.      CT-CTA NECK WITH & W/O-POST PROCESSING   Final Result      1.  Occlusion of the right internal carotid artery at its origin.      2.  Occlusion of the right vertebral artery at its origin.      Dr. Gongora discussed findings with Dr. Mccarthy.      CT-CTA HEAD WITH & W/O-POST PROCESS   Final Result         1.  Occlusion of the right internal carotid artery and right middle cerebral artery.   2.  Narrowed left M1 segment with severely narrowed and M2 and M3 branches, may represent chronic changes.      These findings were discussed with the patient's clinician, Joseline Mccarthy, on 1/12/2023 8:27 PM.      CT-CEREBRAL PERFUSION ANALYSIS   Final Result         1.  Cerebral blood flow less than 30% likely representing completed infarct = 52 mL.      2.  T Max more than 6 seconds likely representing combination of completed infarct and ischemia = 172 mL.      3.  Mismatched volume likely representing  ischemic brain/penumbra = 120 mL      4.  Please note that the cerebral perfusion was performed on the limited brain tissue around the basal ganglia region. Infarct/ischemia outside the CT perfusion sections can be missed in this study.      CT-HEAD W/O   Final Result      1.  No evidence of intracranial hemorrhage, midline shift or mass effect.      2.  Question of hyperdense right MCA though evaluation limited by oblique positioning.                Assessment/Plan  * Acute ischemic right middle cerebral artery (MCA) stroke (HCC)- (present on admission)  Assessment & Plan  Right MCA CVA with hemorrhagic conversion  Neurology consulted   High Intensity statin therapy   BP control  S/p right ICA stent and MCA thrombectomy 1/12/23  DAPT  TTE negative for shunt  Tobacco cessation  PT/OT/SLP - recommending post-acute placement  PEG placed 1/24     Urinary retention  Assessment & Plan  Likely in the setting of BPH  Tried removing thacker 1/25, had to replace  Starting terazosin 1/28    Small amount of sediment in the tubing, however no significant hematuria, UA obtained which did show RBCs, CBC normal and afebrile  Voiding trial   Thacker removed on 1/31    Internal carotid artery stent present  Assessment & Plan  Placed 1/12  Per Neuro OK to start DAPT: ASA & Plavix for 6 wks    Hypernatremia  Assessment & Plan  Patient continues to have hyponatremia with elevated sodium level.  Resolved with Free H2O 200ml q6  Cont to follow daily    Pneumonia due to infectious organism- (present on admission)  Assessment & Plan  Resolved    Aspiration pneumonia (Developed 01/19/23)   Procal low  Sputum cultures neg  Strict aspiration precautions   Has completed 5 days of Abx's  MRSA nares neg  Mucomyst/3% normal saline nebs to maintain pulmonary toilet, RT protocol to continue  Pulmonology evaluated him and made recommendations          Oropharyngeal dysphagia- (present on admission)  Assessment & Plan  Secondary to acute stroke.     Aspiration precautions   PEG 1/24  Nutrition following    Acute left hemiparesis (HCC)- (present on admission)  Assessment & Plan  Secondary to right MCA stroke.    Pressure ulcer precautions  Aspiration, Fall precautions  PT and OT  Working toward Rehab DC    Stroke, hemorrhagic (HCC)- (present on admission)  Assessment & Plan  Ischemic stroke with hemorrhagic conversion.  Neuro has OKd for antiplatelet therapy with ASA and plavix given presence of new ICA stent    Toxic metabolic encephalopathy- (present on admission)  Assessment & Plan  Improved    Persistent 01/20/23  Secondary to stroke, delirium and now concern for relation to underlying pneumonia  Limit restraints as able  Avoid polypharmacy, sedating medications  Delirium precautions  Management of underlying medical conditions  Obtain EEG 01/20/23 and it did not show seizure activity.   cont's to improve daily though examination is limited by dysarthria at times    Acute respiratory failure with hypoxia ([facility])- (present on admission)  Assessment & Plan  Improved     01/19/23 CXRAY consistent with Aspiration Pneumonia - Started on IV unasyn  Subsequently trnasitioned to [facility].  At present off Abx's  MRSA nares neg  trachael aspirate cultures neg  CTA chest neg for PE  TTE LVEF 70%, no signficant valvular or structural abnormalities  O2 demand decreasing: now off HFNC and on 2 LNC  Cont weaning O2    Severe obesity (BMI 35.0-35.9 with comorbidity) ([facility])- (present on admission)  Assessment & Plan  Body mass index is 37.39 kg/m².  Co-morbid HTN, T2DM, KENDAL  Outpatient weight loss    Type 2 diabetes mellitus without complication, without long-term current use of insulin ([facility])- (present on admission)  Assessment & Plan  A1c 5.3  No indication for strict BG control - POCT/SSI deferred  Atorvastatin    Alcohol dependence with withdrawal ([facility])- (present on admission)  Assessment & Plan  Resolved.    Tobacco use disorder- (present on admission)  Assessment &  Plan  On nicotine patch     Primary hypertension- (present on admission)  Assessment & Plan  Amlodipine 10 mg         VTE prophylaxis: enoxaparin ppx    I have performed a physical exam and reviewed and updated ROS and Plan today (2/2/2023). In review of yesterday's note (2/1/2023), there are no changes except as documented above.

## 2023-02-03 LAB
ALBUMIN SERPL BCP-MCNC: 3.9 G/DL (ref 3.2–4.9)
ALBUMIN/GLOB SERPL: 1.1 G/DL
ALP SERPL-CCNC: 130 U/L (ref 30–99)
ALT SERPL-CCNC: 37 U/L (ref 2–50)
ANION GAP SERPL CALC-SCNC: 12 MMOL/L (ref 7–16)
AST SERPL-CCNC: 26 U/L (ref 12–45)
BILIRUB SERPL-MCNC: 0.8 MG/DL (ref 0.1–1.5)
BUN SERPL-MCNC: 21 MG/DL (ref 8–22)
CALCIUM ALBUM COR SERPL-MCNC: 9.8 MG/DL (ref 8.5–10.5)
CALCIUM SERPL-MCNC: 9.7 MG/DL (ref 8.5–10.5)
CHLORIDE SERPL-SCNC: 101 MMOL/L (ref 96–112)
CO2 SERPL-SCNC: 25 MMOL/L (ref 20–33)
CREAT SERPL-MCNC: 0.54 MG/DL (ref 0.5–1.4)
ERYTHROCYTE [DISTWIDTH] IN BLOOD BY AUTOMATED COUNT: 43.9 FL (ref 35.9–50)
GFR SERPLBLD CREATININE-BSD FMLA CKD-EPI: 113 ML/MIN/1.73 M 2
GLOBULIN SER CALC-MCNC: 3.4 G/DL (ref 1.9–3.5)
GLUCOSE SERPL-MCNC: 121 MG/DL (ref 65–99)
HCT VFR BLD AUTO: 40.4 % (ref 42–52)
HGB BLD-MCNC: 13.7 G/DL (ref 14–18)
MCH RBC QN AUTO: 32.6 PG (ref 27–33)
MCHC RBC AUTO-ENTMCNC: 33.9 G/DL (ref 33.7–35.3)
MCV RBC AUTO: 96.2 FL (ref 81.4–97.8)
PLATELET # BLD AUTO: 241 K/UL (ref 164–446)
PMV BLD AUTO: 11.3 FL (ref 9–12.9)
POTASSIUM SERPL-SCNC: 4.2 MMOL/L (ref 3.6–5.5)
PROT SERPL-MCNC: 7.3 G/DL (ref 6–8.2)
RBC # BLD AUTO: 4.2 M/UL (ref 4.7–6.1)
SODIUM SERPL-SCNC: 138 MMOL/L (ref 135–145)
WBC # BLD AUTO: 8.7 K/UL (ref 4.8–10.8)

## 2023-02-03 PROCEDURE — 700111 HCHG RX REV CODE 636 W/ 250 OVERRIDE (IP): Performed by: INTERNAL MEDICINE

## 2023-02-03 PROCEDURE — 80053 COMPREHEN METABOLIC PANEL: CPT

## 2023-02-03 PROCEDURE — A9270 NON-COVERED ITEM OR SERVICE: HCPCS | Performed by: INTERNAL MEDICINE

## 2023-02-03 PROCEDURE — 36415 COLL VENOUS BLD VENIPUNCTURE: CPT

## 2023-02-03 PROCEDURE — 92526 ORAL FUNCTION THERAPY: CPT

## 2023-02-03 PROCEDURE — 97530 THERAPEUTIC ACTIVITIES: CPT

## 2023-02-03 PROCEDURE — 700102 HCHG RX REV CODE 250 W/ 637 OVERRIDE(OP): Performed by: HOSPITALIST

## 2023-02-03 PROCEDURE — 700102 HCHG RX REV CODE 250 W/ 637 OVERRIDE(OP): Performed by: INTERNAL MEDICINE

## 2023-02-03 PROCEDURE — 97110 THERAPEUTIC EXERCISES: CPT

## 2023-02-03 PROCEDURE — 51798 US URINE CAPACITY MEASURE: CPT

## 2023-02-03 PROCEDURE — 85027 COMPLETE CBC AUTOMATED: CPT

## 2023-02-03 PROCEDURE — 770006 HCHG ROOM/CARE - MED/SURG/GYN SEMI*

## 2023-02-03 PROCEDURE — 99232 SBSQ HOSP IP/OBS MODERATE 35: CPT | Performed by: STUDENT IN AN ORGANIZED HEALTH CARE EDUCATION/TRAINING PROGRAM

## 2023-02-03 PROCEDURE — A9270 NON-COVERED ITEM OR SERVICE: HCPCS | Performed by: HOSPITALIST

## 2023-02-03 RX ADMIN — POLYETHYLENE GLYCOL 3350 1 PACKET: 17 POWDER, FOR SOLUTION ORAL at 16:35

## 2023-02-03 RX ADMIN — Medication 5 MG: at 22:16

## 2023-02-03 RX ADMIN — SENNOSIDES AND DOCUSATE SODIUM 1 TABLET: 50; 8.6 TABLET ORAL at 16:36

## 2023-02-03 RX ADMIN — POLYETHYLENE GLYCOL 3350 1 PACKET: 17 POWDER, FOR SOLUTION ORAL at 05:07

## 2023-02-03 RX ADMIN — ASPIRIN 81 MG: 81 TABLET, CHEWABLE ORAL at 05:06

## 2023-02-03 RX ADMIN — AMLODIPINE BESYLATE 10 MG: 10 TABLET ORAL at 05:06

## 2023-02-03 RX ADMIN — ENOXAPARIN SODIUM 40 MG: 40 INJECTION SUBCUTANEOUS at 16:35

## 2023-02-03 RX ADMIN — CLOPIDOGREL BISULFATE 75 MG: 75 TABLET ORAL at 05:06

## 2023-02-03 RX ADMIN — ACETAMINOPHEN 650 MG: 325 TABLET, FILM COATED ORAL at 22:18

## 2023-02-03 RX ADMIN — SENNOSIDES AND DOCUSATE SODIUM 1 TABLET: 50; 8.6 TABLET ORAL at 05:06

## 2023-02-03 RX ADMIN — ATORVASTATIN CALCIUM 80 MG: 80 TABLET, FILM COATED ORAL at 16:36

## 2023-02-03 RX ADMIN — TERAZOSIN 1 MG: 1 CAPSULE ORAL at 16:36

## 2023-02-03 ASSESSMENT — ENCOUNTER SYMPTOMS
SPEECH CHANGE: 1
FOCAL WEAKNESS: 1
SHORTNESS OF BREATH: 0

## 2023-02-03 ASSESSMENT — COGNITIVE AND FUNCTIONAL STATUS - GENERAL
STANDING UP FROM CHAIR USING ARMS: TOTAL
MOVING TO AND FROM BED TO CHAIR: UNABLE
MOVING FROM LYING ON BACK TO SITTING ON SIDE OF FLAT BED: UNABLE
CLIMB 3 TO 5 STEPS WITH RAILING: TOTAL
SUGGESTED CMS G CODE MODIFIER MOBILITY: CN
WALKING IN HOSPITAL ROOM: TOTAL
TURNING FROM BACK TO SIDE WHILE IN FLAT BAD: UNABLE
MOBILITY SCORE: 6

## 2023-02-03 ASSESSMENT — PAIN DESCRIPTION - PAIN TYPE
TYPE: ACUTE PAIN
TYPE: ACUTE PAIN

## 2023-02-03 ASSESSMENT — GAIT ASSESSMENTS: GAIT LEVEL OF ASSIST: UNABLE TO PARTICIPATE

## 2023-02-03 NOTE — DISCHARGE PLANNING
Agency/Facility Name: [facility]   Spoke To: Kit  Outcome: DPA called to check on referral status. Per Kit, referral came in partial cut off. DPA was asked to email referral. DPA emailed referral to Kit at [email or web address]

## 2023-02-03 NOTE — CARE PLAN
The patient is Stable - Low risk of patient condition declining or worsening    Shift Goals  Clinical Goals: Q4 neuro checks, stable neuro status, Skin integrity  Patient Goals: Sleep, work with PT/OT tomorrow  Family Goals: RADHA    Progress made toward(s) clinical / shift goals:  Patient is AAOx4, he is able to communicate needs. Education provided on Q2 hour turns and maintaining skin integrity. Education also provided on ice chips and sitting at 90 degrees to prevent aspiration. Patient had no complaints of pain, will continue to monitor. Bed low, locked and call light in reach.    Problem: Optimal Care of the Stroke Patient  Goal: Optimal acute care for the stroke patient  Outcome: Progressing   Core measures in place  Problem: Knowledge Deficit - Stroke Education  Goal: Patient's knowledge of stroke and risk factors will improve  Outcome: Progressing   Patient understands stroke diagnosis and adapting to diagnosis  Problem: Discharge Planning - Stroke  Goal: Patient’s continuum of care needs will be met  Outcome: Progressing   Stroke core measures in place, patient making progress and is self motivated in care and PT/OT  Problem: Neuro Status  Goal: Neuro status will remain stable or improve  Outcome: Progressing   Q4 hour neuro checks in place to monitor neuro status  Problem: Pain - Standard  Goal: Alleviation of pain or a reduction in pain to the patient’s comfort goal  Outcome: Progressing   He had no complaints of pain, pain assessment in place  Problem: Skin Integrity  Goal: Skin integrity is maintained or improved  Outcome: Progressing   Q2 hour turns in place, waffle overlay in place, heel float boots in place to maintain skin integrity    Patient is not progressing towards the following goals:    Problem: Dysphagia  Goal: Dysphagia will improve  Outcome: Not Progressing   Patient has PEG in place, he is a high risk for aspiration  Problem: Mobility - Stroke  Goal: Patient's capacity to carry out  activities will improve  Outcome: Not Progressing   Patient has weakness to the left side, unable to move that side or carry out ADLs

## 2023-02-03 NOTE — THERAPY
"Speech Language Pathology  Daily Treatment     Patient Name: Kaiden Quiroz  Age:  62 y.o., Sex:  male  Medical Record #: [id number]  Today's Date: 2/3/2023     Precautions  Precautions: Fall Risk, PEG Tube, Swallow Precautions ( See Comments)  Comments: L nubia    HPI: Pt is 62 y.o. 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 He is now s/p TICI 3 mechanical thrombectomy on R ICA and M1 clot, and s/p R ICA stent placement. CSE 1/13.  PEG 1/24.     Subjective  Pt was agreeable and cooperative with all SLP tx tasks. Not agreeable to FEES at this time but \"If you bring me a nice cold beer maybe I would do it.\"    Assessment  Pt reports oral care was provided this AM prior to SLP arrival. PO trials administered: ice chips x5, tsp TN0 x2, small cup sip TN0 x2. Pt self-administered trials in one attempt with demonstrated impulsivity. Oral phase broadly intact, although pt is edentulous and stated \"I have a bad habit of chewing a little bit to feel the food in my mouth and then I choke it down.\" No overt s/sx of aspiration including no cough/clear, no change in vocal or breath quality, and no increase in WOB. Pt has elevated risk for silent aspiration, however, so this does not reliably demonstrate swallow safety. Suspected timely swallow appreciated for all but one bolus, suspect pt was talking during swallow making it difficult to see, but this is concerning for possible airway invasion or pharyngeal inefficiency.     Pt and SLP had extensive discussion concerning FEES. SLP described rationale for FEES, importance of instrumentation prior to PO, FEES equipment (using pictures from online), FEES procedure. Pt did not allow for finger \"scope\". Pt verbalized concern that FEES would be like NG placement and described at length his discomfort with the NG tube. Not stimuable to SLP education concerning that ways that FEES scope is unlike NG. Eventually, pt stated that he would " "possibly do the test \"But I'm not ready today.\" Agreeable to have SLP document the conversation with plans to revisit and possible complete FEES next week.     Clinical Impressions  Pt presents with improvements in clinical presentation but continues to be at high risk for dysphagia and related sequela given limited mobility, dependence for oral care, fluctuating respiratory needs, and impaired cognition. Pt will likely need instrumentation prior to initiation of PO; not agreeable this date but did participate well in conversation about FEES. Conversation should be revisited.     Recommendations  1.  NPO/PEG   2.  Medication: Non-oral  2.  Swallowing Instructions & Precautions For Ice Chips with RN:  Oral Care PRIOR to ice chips: Q4h  Supervision: 1:1 with trained staff  Positioning: HOB at 90*  Strategies: One at a time via tsp, after oral care only !!  3.  SLP will follow for dysphagia management    Consider ongoing discussions with pt about FEES to possible complete study next week.         Plan    Continue current treatment plan.    Discharge Recommendations: Recommend post-acute placement for additional speech therapy services prior to discharge home       Objective   02/03/23 0923   Vitals   Pulse Oximetry 97 %   O2 (LPM) 2   O2 Delivery Device Silicone Nasal Cannula   Cognitive-Linguistic   Level of Consciousness Alert   Dysphagia    Diet / Liquid Recommendation NPO;Pre-Feeding Trials with SLP Only   Nutritional Liquid Intake Rating Scale Nothing by mouth   Nutritional Food Intake Rating Scale Nothing by mouth   Nursing Communication Swallow Precaution Sign Posted at Head of Bed   Comments discussed FEES at length, pt said \"I'm not ready today\"   Recommended Route of Medication Administration   Medication Administration  Via Gastric Tube   Patient / Family Goals   Patient / Family Goal #1 EDITED 1/19 - \"Some nice cold water.\"   Goal #1 Outcome Progressing slower than expected   Short Term Goals   Short Term " Goal # 1 Pt will consume prefeeding trials without any overt s/sx of aspiration   Goal Outcome # 1 Progressing as expected   Short Term Goal # 2 Pt will complete instrumental evaluation of swallow with SLP to further assess swallow function and determine POC.   Goal Outcome # 2  Progressing slower than expected   Education Group   Education Provided Dysphagia;Role of Speech Therapy   Dysphagia Patient Response Patient;Acceptance;Explanation;Demonstration;Verbal Demonstration;Action Demonstration;Reinforcement Needed   Role of SLP Patient Response Patient;Acceptance;Explanation;Demonstration;Verbal Demonstration;Action Demonstration;Reinforcement Needed   Additional Comments pt not agreeable to FEES at this time   Interdisciplinary Plan of Care Collaboration   IDT Collaboration with  Nursing   Collaboration Comments RN updated

## 2023-02-03 NOTE — CARE PLAN
The patient is Stable - Low risk of patient condition declining or worsening    Shift Goals  Clinical Goals: Safety/maintain skin integrity  Patient Goals: Sleep, work with PT/OT tomorrow  Family Goals: RADHA    Progress made toward(s) clinical / shift goals:    Problem: Pain - Standard  Goal: Alleviation of pain or a reduction in pain to the patient’s comfort goal  Outcome: Progressing   0-10 pain scale in place. Pt encouraged to ask for pain medication if needed.     Problem: Skin Integrity  Goal: Skin integrity is maintained or improved  Outcome: Progressing   Pt repositioned every 2 hour and as needed. Checked for incontinence during rounding.     Patient is not progressing towards the following goals: N/A

## 2023-02-03 NOTE — CARE PLAN
The patient is Stable - Low risk of patient condition declining or worsening    Shift Goals  Clinical Goals: No complications related to neuro status; Adequate urine output;  Patient Goals: Mobility; Sleep  Family Goals: Sleep    Progress made toward(s) clinical / shift goals:  Pt alert and oriented x4, with left sided deficits. Peg tube infusing at goal rate.  Q2 turns implemented.  Wife at bedside.  Call bell within reach.      Patient is not progressing towards the following goals:

## 2023-02-04 ENCOUNTER — APPOINTMENT (OUTPATIENT)
Dept: RADIOLOGY | Facility: MEDICAL CENTER | Age: 63
DRG: 023 | End: 2023-02-04
Attending: STUDENT IN AN ORGANIZED HEALTH CARE EDUCATION/TRAINING PROGRAM
Payer: COMMERCIAL

## 2023-02-04 PROCEDURE — 770006 HCHG ROOM/CARE - MED/SURG/GYN SEMI*

## 2023-02-04 PROCEDURE — A9270 NON-COVERED ITEM OR SERVICE: HCPCS | Performed by: INTERNAL MEDICINE

## 2023-02-04 PROCEDURE — 700102 HCHG RX REV CODE 250 W/ 637 OVERRIDE(OP): Performed by: HOSPITALIST

## 2023-02-04 PROCEDURE — 51798 US URINE CAPACITY MEASURE: CPT

## 2023-02-04 PROCEDURE — 700111 HCHG RX REV CODE 636 W/ 250 OVERRIDE (IP): Performed by: INTERNAL MEDICINE

## 2023-02-04 PROCEDURE — 70450 CT HEAD/BRAIN W/O DYE: CPT

## 2023-02-04 PROCEDURE — A9270 NON-COVERED ITEM OR SERVICE: HCPCS | Performed by: HOSPITALIST

## 2023-02-04 PROCEDURE — 99232 SBSQ HOSP IP/OBS MODERATE 35: CPT | Performed by: STUDENT IN AN ORGANIZED HEALTH CARE EDUCATION/TRAINING PROGRAM

## 2023-02-04 PROCEDURE — 700102 HCHG RX REV CODE 250 W/ 637 OVERRIDE(OP): Performed by: INTERNAL MEDICINE

## 2023-02-04 RX ADMIN — SENNOSIDES AND DOCUSATE SODIUM 1 TABLET: 50; 8.6 TABLET ORAL at 04:41

## 2023-02-04 RX ADMIN — ASPIRIN 81 MG: 81 TABLET, CHEWABLE ORAL at 04:40

## 2023-02-04 RX ADMIN — ATORVASTATIN CALCIUM 80 MG: 80 TABLET, FILM COATED ORAL at 16:27

## 2023-02-04 RX ADMIN — POLYETHYLENE GLYCOL 3350 1 PACKET: 17 POWDER, FOR SOLUTION ORAL at 16:28

## 2023-02-04 RX ADMIN — SENNOSIDES AND DOCUSATE SODIUM 1 TABLET: 50; 8.6 TABLET ORAL at 16:27

## 2023-02-04 RX ADMIN — AMLODIPINE BESYLATE 10 MG: 10 TABLET ORAL at 04:40

## 2023-02-04 RX ADMIN — POLYETHYLENE GLYCOL 3350 1 PACKET: 17 POWDER, FOR SOLUTION ORAL at 04:41

## 2023-02-04 RX ADMIN — ACETAMINOPHEN 650 MG: 325 TABLET, FILM COATED ORAL at 09:22

## 2023-02-04 RX ADMIN — TERAZOSIN 1 MG: 1 CAPSULE ORAL at 16:28

## 2023-02-04 RX ADMIN — ENOXAPARIN SODIUM 40 MG: 40 INJECTION SUBCUTANEOUS at 16:27

## 2023-02-04 RX ADMIN — CLOPIDOGREL BISULFATE 75 MG: 75 TABLET ORAL at 04:40

## 2023-02-04 RX ADMIN — Medication 5 MG: at 21:11

## 2023-02-04 ASSESSMENT — PAIN DESCRIPTION - PAIN TYPE
TYPE: ACUTE PAIN
TYPE: ACUTE PAIN

## 2023-02-04 ASSESSMENT — ENCOUNTER SYMPTOMS
SPEECH CHANGE: 1
SHORTNESS OF BREATH: 0
FOCAL WEAKNESS: 1

## 2023-02-04 ASSESSMENT — FIBROSIS 4 INDEX: FIB4 SCORE: 1.08

## 2023-02-04 NOTE — THERAPY
"Physical Therapy   Daily Treatment     Patient Name: Kaiden Quiroz  Age:  62 y.o., Sex:  male  Medical Record #: [id number]  Today's Date: 2/3/2023     Precautions  Precautions: (P) Fall Risk;PEG Tube;Swallow Precautions ( See Comments)  Comments: L nubia    Assessment    Pt tolerates session well; is initially difficulty to awaken, but is participatory and agreeable once fully awake. Pt's wife is present t/o session, is encouraging and helpful. Pt requires max assist for supine to sit largely due to decreased alertness initially, mod assist for return to supine. Pt sits EOB 20 minutes for LE therex and trunk strengthening exercises.   Pt with no pain reported during session, no SOB or significant LOB during session.   Pt will benefit from continued PT services in acute setting, as well as in post acute setting.       Plan    Physical Therapy Treatment Plan  Physical Therapy Treatment Plan: (P) Continue Current Treatment Plan    DC Equipment Recommendations: (P) Unable to determine at this time  Discharge Recommendations: (P) Recommend post-acute placement for additional physical therapy services prior to discharge home      Subjective    \"Back to bed.\"     Objective       02/03/23 1655   Charge Group   Charges  Yes   PT Therapeutic Activities (Units) 1   PT Therapeutic Exercise (Units) 1   Total Time Spent   PT Total Time Yes   PT Therapeutic Activities Time Spent (Mins) 10   PT Therapeutic Exercise Time Spent (Mins) 15   Precautions   Precautions Fall Risk;PEG Tube;Swallow Precautions ( See Comments)   Vitals   O2 (LPM) 2   O2 Delivery Device Silicone Nasal Cannula   Pain 0 - 10 Group   Therapist Pain Assessment 0;Post Activity Pain Same as Prior to Activity   Cognition    Cognition / Consciousness X   Speech/ Communication Delayed Responses   Level of Consciousness Alert   Ability To Follow Commands 1 Step   Safety Awareness Impaired   New Learning Impaired   Attention Impaired   Initiation Impaired   Comments " initially difficult to rouse, participatory after awake   Sitting Lower Body Exercises   Sitting Lower Body Exercises Yes   Ankle Pumps 2 sets of 10   Long Arc Quad 3 sets of 10   Other Exercises weight shifting laterally with UE support   Balance   Sitting Balance (Static) Fair -   Sitting Balance (Dynamic) Poor +   Skilled Intervention Verbal Cuing;Sequencing;Compensatory Strategies   Bed Mobility    Supine to Sit Moderate Assist   Sit to Supine Moderate Assist   Comments pt sits EOB for 20 minutes for therex and trunk control exercises   Gait Analysis   Gait Level Of Assist Unable to Participate   How much difficulty does the patient currently have...   Turning over in bed (including adjusting bedclothes, sheets and blankets)? 1   Sitting down on and standing up from a chair with arms (e.g., wheelchair, bedside commode, etc.) 1   Moving from lying on back to sitting on the side of the bed? 1   How much help from another person does the patient currently need...   Moving to and from a bed to a chair (including a wheelchair)? 1   Need to walk in a hospital room? 1   Climbing 3-5 steps with a railing? 1   6 clicks Mobility Score 6   Activity Tolerance   Sitting in Chair NT   Sitting Edge of Bed 20 min   Standing NT   Short Term Goals    Short Term Goal # 1 Pt will transition from supine to EOB w/ Bandar in 6 visits to improve independence in bed mobility   Goal Outcome # 1 Progressing slower than expected   Short Term Goal # 2 Pt will transfer from EOB to chair w/ FWW w/ ModA in 6 visits to improve OOB mobility   Goal Outcome # 2 Progressing slower than expected   Short Term Goal # 3 Pt will perform a STS w/ FWW w/ Bandar in 6 visits to improve OOB mobility   Goal Outcome # 3 Progressing slower than expected   Short Term Goal # 4 Pt will be able to ambulate 15 ft w/ FWW w/ ModA in 6 visits to access household distances   Goal Outcome # 4 Goal not met   Short Term Goal # 5 Pt will be able to complete 4 steps w/ FWW w/Mod  A in 6 visits to access household   Goal Outcome # 5 Goal not met   Education Group   Education Provided Exercises - Seated   Exercises - Seated Patient Response Patient;Family;Acceptance;Explanation;Demonstration;Verbal Demonstration;Action Demonstration;Reinforcement Needed   Transfer Status Patient Response Patient;Family;Acceptance;Explanation;Verbal Demonstration;Action Demonstration   Physical Therapy Treatment Plan   Physical Therapy Treatment Plan Continue Current Treatment Plan   Anticipated Discharge Equipment and Recommendations   DC Equipment Recommendations Unable to determine at this time   Discharge Recommendations Recommend post-acute placement for additional physical therapy services prior to discharge home   Interdisciplinary Plan of Care Collaboration   IDT Collaboration with  Nursing;Family / Caregiver   Patient Position at End of Therapy In Bed;Bed Alarm On;Call Light within Reach;Tray Table within Reach;Phone within Reach   Collaboration Comments RN updated   Session Information   Date / Session Number  2/3  -5  (2/4; 2/5)       Aurelia Nesbitt DPT

## 2023-02-04 NOTE — PROGRESS NOTES
Hospital Medicine Daily Progress Note    Date of Service  2/4/2023    Chief Complaint  Kaiden Quiroz is a 62 y.o. male admitted 1/12/2023 with L side weakness    Hospital Course  61yo PMHx HTN, T2DM, ongoing tobacco use, BMI 36, ETOH.  Presenting with L side weakness on 1/12.  [place] not to be TNK candidate.  Taken to IR for MCA trhombectomy with TICI 3 post and R ICA stent.  On DAPT x 6 weeks. Had 10 sec pause intra procedure but resolved before CPR was initiated.  Was intubated.  In ICU required Precedex for ETOH withdrawal.  PEG placed 1/24.  In 10-14 days, the anchor button suture or sutures should be cut, releasing the anchor or anchors into the stomach. The suture anchors will then pass through the GI tract as desired. This can be performed in the interventional radiology department if   desired. Call [phone] any weekday [phone] hours to make an appointment.    Patient started on atorvastatin. Echo unremarkable.  Hospital course complicated by urinary retention requiring Mcclendon (on Terazosin), pneumonia and respiratory failure on high flow nasal cannula (now weaned down to 2 L nasal cannula).    Interval Problem Update  - No acute overnight events  - Reports dry mouth, asking for ice chips  - noted urinary retention.  He had straight cath twice last night.  We will place a Mcclendon.  He is on Terazosin.   -  PT/OT rec SNF  - labs reviewed, stable    I have discussed this patient's plan of care and discharge plan at IDT rounds today with Case Management, Nursing, Nursing leadership, and other members of the IDT team.    Consultants/Specialty  neurology    Code Status  Full Code    Disposition  Patient is medically cleared for discharge.   Anticipate discharge to to skilled nursing facility.  I have placed the appropriate orders for post-discharge needs.    Review of Systems  Review of Systems   HENT:          Dry mouth   Respiratory:  Negative for shortness of breath.    Genitourinary:  Negative for  hematuria.   Neurological:  Positive for speech change and focal weakness.      Physical Exam  Temp:  [35.9 °C (96.7 °F)-36.6 °C (97.9 °F)] 36.6 °C (97.9 °F)  Pulse:  [65-74] 74  Resp:  [18] 18  BP: (114-152)/(69-89) 114/78  SpO2:  [94 %-97 %] 94 %    Physical Exam  Constitutional:       General: He is not in acute distress.     Appearance: Normal appearance. He is well-developed. He is not diaphoretic.      Comments: Sleepy but wakes to voice   HENT:      Head: Normocephalic and atraumatic.      Nose: Nose normal.      Mouth/Throat:      Mouth: Mucous membranes are moist.   Eyes:      General: No scleral icterus.     Conjunctiva/sclera: Conjunctivae normal.   Neck:      Vascular: No JVD.   Cardiovascular:      Rate and Rhythm: Normal rate.      Heart sounds: No murmur heard.    No gallop.   Pulmonary:      Effort: Pulmonary effort is normal. No respiratory distress.      Breath sounds: No stridor. No wheezing or rales.   Abdominal:      Palpations: Abdomen is soft.      Tenderness: There is no abdominal tenderness. There is no guarding or rebound.      Comments: Gtube in place c/d/i   Musculoskeletal:         General: No tenderness.      Right lower leg: No edema.      Left lower leg: No edema.   Skin:     General: Skin is warm and dry.      Capillary Refill: Capillary refill takes less than 2 seconds.      Coloration: Skin is not jaundiced or pale.      Findings: No rash.   Neurological:      Comments:   Pt does not attend to L side even with prompting and stim  Speech improved  +L side facial droop  L arm and leg flacid  Purposeful and 5/5 R upper and lower   Psychiatric:         Mood and Affect: Mood and affect normal.         Behavior: Behavior normal.       Fluids    Intake/Output Summary (Last 24 hours) at 2/4/2023 1019  Last data filed at 2/4/2023 0600  Gross per 24 hour   Intake 800 ml   Output 2550 ml   Net -1750 ml         Laboratory  Recent Labs     02/03/23  0436   WBC 8.7   RBC 4.20*   HEMOGLOBIN 13.7*    HEMATOCRIT 40.4*   MCV 96.2   MCH 32.6   MCHC 33.9   RDW 43.9   PLATELETCT 241   MPV 11.3       Recent Labs     02/02/23  0635 02/03/23  0436   SODIUM 137 138   POTASSIUM 4.0 4.2   CHLORIDE 102 101   CO2 27 25   GLUCOSE 110* 121*   BUN 20 21   CREATININE 0.52 0.54   CALCIUM 9.4 9.7                     Imaging  IR-GASTROSTOMY PLACEMENT   Final Result      1.  Fluoroscopic guided percutaneous gastrostomy with placement of an 18-[place] ELISE balloon gastrostomy catheter.      2. The gastrostomy tube may be used for tube feeds 18 hours after placement. Thereafter, liquid diet orders per the referring physician. Saline flush daily as per protocol.      3. In 10-14 days, the anchor button suture or sutures should be cut, releasing the anchor or anchors into the stomach. The suture anchors will then pass through the GI tract as desired. This can be performed in the interventional radiology department if    desired. Call [phone] any weekday [phone] hours to make an appointment.      CT-CTA CHEST PULMONARY ARTERY W/ RECONS   Final Result         1.  No pulmonary embolus appreciated.   2.  Linear densities the bilateral lung bases suggests changes of atelectasis, component of infiltrate is not excluded.   3.  Atherosclerosis and atherosclerotic coronary artery disease.      US-EXTREMITY VENOUS LOWER BILAT   Final Result      DX-ABDOMEN FOR TUBE PLACEMENT   Final Result      NG tube extends below the diaphragm into the region of the stomach. The tip is not visualized.      EC-ECHOCARDIOGRAM LTD W/ CONT   Final Result      DX-CHEST-PORTABLE (1 VIEW)   Final Result         1. No significant interval change.      CT-ABDOMEN-PELVIS W/O   Final Result         1.  Nondependent foci of air in the bladder, could represent changes from recent instrumentation or urinary tract infection.   2.  Diverticulosis   3.  Atherosclerosis and atherosclerotic coronary artery disease      DX-CHEST-PORTABLE (1 VIEW)   Final Result       Ill-defined atelectasis versus consolidation in the left lower lung. Pneumonia can be considered in appropriate clinical setting.         DX-ABDOMEN FOR TUBE PLACEMENT   Final Result         1.  Nonspecific bowel gas pattern in the upper abdomen.   2.  Nasogastric tube tip terminates overlying the expected location of the gastric body.   3.  Cardiomegaly   4.  Hazy linear density left lung base could represent atelectasis or infiltrate.      CT-HEAD W/O   Final Result         1.  Evolving infarct/encephalomalacia in the right MCA distribution.   2.  Areas of hemorrhage in the right basal ganglia and involving the right frontal and anterior temporal sulci, similar compared to MRI January 13, 2023   3.  Atherosclerosis.      DX-CHEST-PORTABLE (1 VIEW)   Final Result         Diffuse interstitial and groundglass opacity throughout both lungs could relate to developing pulmonary edema.      DX-ABDOMEN FOR TUBE PLACEMENT   Final Result         Gastric drainage tube with tip projecting over the expected area of the stomach.      DX-CHEST-PORTABLE (1 VIEW)   Final Result      1.  Interval extubation without pneumothorax.   2.  Stable enlarged cardiac silhouette.   3.  There is bibasilar linear atelectasis.      EC-ECHOCARDIOGRAM COMPLETE W/ CONT   Final Result      MR-BRAIN-W/O   Final Result         Acute infarction involving the right frontal region, right basal ganglia and right caudate nucleus. Minimal punctate infarction involving the right parietal cortex. There is slight mass effect upon the right lateral ventricle with only minimal midline    shift measuring approximately 4 mm.      Petechial hemorrhage is noted within the right frontal cortical infarct. Also noted is petechial hemorrhage in the right basal ganglia and right caudate nucleus but without surrounding vasogenic edema.         DX-PELVIS-1 OR 2 VIEWS   Final Result         1.  No acute traumatic bony injury.      DX-CHEST-PORTABLE (1 VIEW)   Final  Result         1.  Left midlung and lower lobe infiltrates, similar to prior study.      QT-YCNTBYY-9 VIEW   Final Result         1.  Nonspecific bowel gas pattern in the upper abdomen.   2.  Cardiomegaly   3.  Hazy left lower lobe infiltrates.   4.  Nasogastric tube tip terminates overlying the expected location of the gastric fundus.      IR-THROMBO MECHANICAL ARTERY,INIT   Final Result         62-year-old patient who presented with complete occlusion of the right ICA at its origin underwent emergent mechanical thrombectomy. After initial angioplasty,   a right cervical internal carotid artery stent was deployed and right MCA thrombectomy was performed. The final angiographic images show complete restoration of flow into the right ICA and MCA with no evidence of distal intracranial embolization.      Final recanalization score: TICI 3      I, Petra Mobley was physically present and participated during the entire procedure of the IR-THROMBO MECHANICAL ARTERY,INIT.                  DX-CHEST-PORTABLE (1 VIEW)   Final Result      No acute cardiac or pulmonary abnormalities are identified.      CT-CTA NECK WITH & W/O-POST PROCESSING   Final Result      1.  Occlusion of the right internal carotid artery at its origin.      2.  Occlusion of the right vertebral artery at its origin.      Dr. Gongora discussed findings with Dr. Mccarthy.      CT-CTA HEAD WITH & W/O-POST PROCESS   Final Result         1.  Occlusion of the right internal carotid artery and right middle cerebral artery.   2.  Narrowed left M1 segment with severely narrowed and M2 and M3 branches, may represent chronic changes.      These findings were discussed with the patient's clinician, Joseline Mccarthy, on 1/12/2023 8:27 PM.      CT-CEREBRAL PERFUSION ANALYSIS   Final Result         1.  Cerebral blood flow less than 30% likely representing completed infarct = 52 mL.      2.  T Max more than 6 seconds likely representing combination of completed  infarct and ischemia = 172 mL.      3.  Mismatched volume likely representing ischemic brain/penumbra = 120 mL      4.  Please note that the cerebral perfusion was performed on the limited brain tissue around the basal ganglia region. Infarct/ischemia outside the CT perfusion sections can be missed in this study.      CT-HEAD W/O   Final Result      1.  No evidence of intracranial hemorrhage, midline shift or mass effect.      2.  Question of hyperdense right MCA though evaluation limited by oblique positioning.                Assessment/Plan  * Acute ischemic right middle cerebral artery (MCA) stroke (HCC)- (present on admission)  Assessment & Plan  Right MCA CVA with hemorrhagic conversion  Neurology consulted   High Intensity statin therapy   BP control  S/p right ICA stent and MCA thrombectomy 1/12/23  DAPT  TTE negative for shunt  Tobacco cessation  PT/OT/SLP - recommending post-acute placement  PEG placed 1/24     Urinary retention  Assessment & Plan  Likely in the setting of BPH  Tried removing thacker 1/25, had to replace  Starting terazosin 1/28    Small amount of sediment in the tubing, however no significant hematuria, UA obtained which did show RBCs, CBC normal and afebrile  Voiding trial   Thacker removed on 1/31 2/4: noted urinary retention and had straight cath twice on 2/3.  Thacker replaced.  On terazosin    Internal carotid artery stent present  Assessment & Plan  Placed 1/12  Per Neuro OK to start DAPT: ASA & Plavix for 6 wks    Hypernatremia  Assessment & Plan  Patient continues to have hyponatremia with elevated sodium level.  Resolved with Free H2O 200ml q6  Cont to follow daily    Pneumonia due to infectious organism- (present on admission)  Assessment & Plan  Resolved    Aspiration pneumonia (Developed 01/19/23)   Procal low  Sputum cultures neg  Strict aspiration precautions   Has completed 5 days of Abx's  MRSA nares neg  Mucomyst/3% normal saline nebs to maintain pulmonary toilet, RT protocol  to continue  Pulmonology evaluated him and made recommendations          Oropharyngeal dysphagia- (present on admission)  Assessment & Plan  Secondary to acute stroke.    Aspiration precautions   PEG 1/24  Nutrition following    Acute left hemiparesis ([facility])- (present on admission)  Assessment & Plan  Secondary to right MCA stroke.    Pressure ulcer precautions  Aspiration, Fall precautions  PT and OT  Working toward Rehab DC    Stroke, hemorrhagic ([facility])- (present on admission)  Assessment & Plan  Ischemic stroke with hemorrhagic conversion.  Neuro has OKd for antiplatelet therapy with ASA and plavix given presence of new ICA stent    Toxic metabolic encephalopathy- (present on admission)  Assessment & Plan  Improved    Persistent 01/20/23  Secondary to stroke, delirium and now concern for relation to underlying pneumonia  Limit restraints as able  Avoid polypharmacy, sedating medications  Delirium precautions  Management of underlying medical conditions  Obtain EEG 01/20/23 and it did not show seizure activity.   cont's to improve daily though examination is limited by dysarthria at times    Acute respiratory failure with hypoxia ([facility])- (present on admission)  Assessment & Plan  Improved     01/19/23 CXRAY consistent with Aspiration Pneumonia - Started on IV unasyn  Subsequently trnasitioned to [facility].  At present off Abx's  MRSA nares neg  trachael aspirate cultures neg  CTA chest neg for PE  TTE LVEF 70%, no signficant valvular or structural abnormalities  O2 demand decreasing: now off HFNC and on 2 LNC  Cont weaning O2    Severe obesity (BMI 35.0-35.9 with comorbidity) ([facility])- (present on admission)  Assessment & Plan  Body mass index is 37.39 kg/m².  Co-morbid HTN, T2DM, KENDAL  Outpatient weight loss    Type 2 diabetes mellitus without complication, without long-term current use of insulin ([facility])- (present on admission)  Assessment & Plan  A1c 5.3  No indication for strict BG control - POCT/SSI  deferred  Atorvastatin    Alcohol dependence with withdrawal (HCC)- (present on admission)  Assessment & Plan  Resolved.    Tobacco use disorder- (present on admission)  Assessment & Plan  On nicotine patch     Primary hypertension- (present on admission)  Assessment & Plan  Amlodipine 10 mg         VTE prophylaxis: enoxaparin ppx    I have performed a physical exam and reviewed and updated ROS and Plan today (2/4/2023). In review of yesterday's note (2/3/2023), there are no changes except as documented above.

## 2023-02-04 NOTE — PROGRESS NOTES
Primary RN unable to find [employer]. Hospitalist, Nehemias Freeman, notified. [employer] [facility] utilized until [employer] is available.

## 2023-02-04 NOTE — DISCHARGE PLANNING
Case Management Discharge Planning    Admission Date: 1/12/2023  GMLOS: 7.3  ALOS: 22    Anticipated Discharge Dispo: Discharge Disposition: D/T to SNF with Medicare cert in anticipation of skilled care (03)    DME Needed: No    Action(s) Taken: OTHER    [facility] stating [facility] are considering pt. She needs to look into pt's insurance    Called [facility] and spoke with [place] who will review updated therapy notes    Escalations Completed: None    Medically Clear: Yes    Next Steps: F/U with SNF    Barriers to Discharge: Pending Placement    Is the patient up for discharge tomorrow: No

## 2023-02-04 NOTE — CARE PLAN
The patient is Stable - Low risk of patient condition declining or worsening    Shift Goals  Clinical Goals: Maintain skin integrity, monitor neuro status  Patient Goals: Talk to wife, sleep  Family Goals: [place]    Progress made toward(s) clinical / shift goals:      Problem: Skin Integrity  Goal: Skin integrity is maintained or improved  Outcome: Progressing   Pt turned and repositioned Q2H or as requested. Barrier cream and mepilexes used to prevent skin breakdown on delicate perineal areas and bony prominences. Linen changes provided as needed to avoid risk of developing pressure ulcers.     Problem: Neuro Status  Goal: Neuro status will remain stable or improve  Outcome: Progressing    Q4H neuro checks in place. Pt's neurological status remains unchanged throughout shift. Bed alarm is on, call light within reach.     Patient is not progressing towards the following goals:

## 2023-02-05 PROCEDURE — 700102 HCHG RX REV CODE 250 W/ 637 OVERRIDE(OP): Performed by: INTERNAL MEDICINE

## 2023-02-05 PROCEDURE — 99232 SBSQ HOSP IP/OBS MODERATE 35: CPT | Performed by: STUDENT IN AN ORGANIZED HEALTH CARE EDUCATION/TRAINING PROGRAM

## 2023-02-05 PROCEDURE — A9270 NON-COVERED ITEM OR SERVICE: HCPCS | Performed by: INTERNAL MEDICINE

## 2023-02-05 PROCEDURE — 700102 HCHG RX REV CODE 250 W/ 637 OVERRIDE(OP): Performed by: HOSPITALIST

## 2023-02-05 PROCEDURE — 770006 HCHG ROOM/CARE - MED/SURG/GYN SEMI*

## 2023-02-05 PROCEDURE — 700111 HCHG RX REV CODE 636 W/ 250 OVERRIDE (IP): Performed by: INTERNAL MEDICINE

## 2023-02-05 PROCEDURE — A9270 NON-COVERED ITEM OR SERVICE: HCPCS | Performed by: HOSPITALIST

## 2023-02-05 RX ADMIN — POLYETHYLENE GLYCOL 3350 1 PACKET: 17 POWDER, FOR SOLUTION ORAL at 05:54

## 2023-02-05 RX ADMIN — SENNOSIDES AND DOCUSATE SODIUM 1 TABLET: 50; 8.6 TABLET ORAL at 05:54

## 2023-02-05 RX ADMIN — ACETAMINOPHEN 650 MG: 325 TABLET, FILM COATED ORAL at 12:25

## 2023-02-05 RX ADMIN — AMLODIPINE BESYLATE 10 MG: 10 TABLET ORAL at 05:54

## 2023-02-05 RX ADMIN — ASPIRIN 81 MG: 81 TABLET, CHEWABLE ORAL at 05:54

## 2023-02-05 RX ADMIN — ATORVASTATIN CALCIUM 80 MG: 80 TABLET, FILM COATED ORAL at 16:32

## 2023-02-05 RX ADMIN — CLOPIDOGREL BISULFATE 75 MG: 75 TABLET ORAL at 05:54

## 2023-02-05 RX ADMIN — TERAZOSIN 1 MG: 1 CAPSULE ORAL at 16:32

## 2023-02-05 RX ADMIN — Medication 5 MG: at 20:39

## 2023-02-05 RX ADMIN — SENNOSIDES AND DOCUSATE SODIUM 1 TABLET: 50; 8.6 TABLET ORAL at 16:31

## 2023-02-05 RX ADMIN — POLYETHYLENE GLYCOL 3350 1 PACKET: 17 POWDER, FOR SOLUTION ORAL at 16:31

## 2023-02-05 RX ADMIN — ENOXAPARIN SODIUM 40 MG: 40 INJECTION SUBCUTANEOUS at 16:31

## 2023-02-05 ASSESSMENT — PAIN DESCRIPTION - PAIN TYPE
TYPE: ACUTE PAIN
TYPE: ACUTE PAIN

## 2023-02-05 ASSESSMENT — ENCOUNTER SYMPTOMS
FOCAL WEAKNESS: 1
SHORTNESS OF BREATH: 0
SPEECH CHANGE: 1

## 2023-02-05 NOTE — CARE PLAN
The patient is Stable - Low risk of patient condition declining or worsening    Shift Goals  Clinical Goals: Monitor neuro status, maintain skin integrity  Patient Goals: Sleep  Family Goals: [place]    Progress made toward(s) clinical / shift goals:      Problem: Neuro Status  Goal: Neuro status will remain stable or improve  Outcome: Progressing    Q4H neuro checks in place. Pt's neurological status remains unchanged throughout shift. Bed alarm is on, call light within reach.     Problem: Skin Integrity  Goal: Skin integrity is maintained or improved  Outcome: Progressing   Pt turned and repositioned Q2H or as requested. Barrier cream and mepilexes used to prevent skin breakdown on delicate perineal areas and bony prominences. Linen changes provided as needed to avoid risk of developing pressure ulcers.     Patient is not progressing towards the following goals:

## 2023-02-05 NOTE — PROGRESS NOTES
Hospital Medicine Daily Progress Note    Date of Service  2/5/2023    Chief Complaint  Kaiden Quiroz is a 62 y.o. male admitted 1/12/2023 with L side weakness    Hospital Course  63yo PMHx HTN, T2DM, ongoing tobacco use, BMI 36, ETOH.  Presenting with L side weakness on 1/12.  [facility] not to be TNK candidate.  Taken to IR for MCA trhombectomy with TICI 3 post and R ICA stent.  On DAPT x 6 weeks. Had 10 sec pause intra procedure but resolved before CPR was initiated.  Was intubated.  In ICU required Precedex for ETOH withdrawal.  PEG placed 1/24.  In 10-14 days, the anchor button suture or sutures should be cut, releasing the anchor or anchors into the stomach. The suture anchors will then pass through the GI tract as desired. This can be performed in the interventional radiology department if   desired. Call [phone] any weekday [phone] hours to make an appointment.    Patient started on atorvastatin. Echo unremarkable.  Hospital course complicated by urinary retention requiring Mcclendon (on Terazosin), pneumonia and respiratory failure on high flow nasal cannula (now weaned down to 2 L nasal cannula).    Interval Problem Update  - Patient was noted lethargy yesterday. Ordered head CT stat which showed Interval decrease in mass effect from subacute right MCA territory infarction with expected evolution of macro hemorrhagic transformation.  - Tolerating PEG feeding.   - No acute overnight events  - Mcclendon re-placed.   -  PT/OT rec SNF  - labs reviewed, stable    I have discussed this patient's plan of care and discharge plan at IDT rounds today with Case Management, Nursing, Nursing leadership, and other members of the IDT team.    Consultants/Specialty  neurology    Code Status  Full Code    Disposition  Patient is medically cleared for discharge.   Anticipate discharge to to skilled nursing facility.  I have placed the appropriate orders for post-discharge needs.    Review of Systems  Review of Systems   HENT:           Dry mouth   Respiratory:  Negative for shortness of breath.    Genitourinary:  Negative for hematuria.   Neurological:  Positive for speech change and focal weakness.      Physical Exam  Temp:  [36.1 °C (97 °F)-37 °C (98.6 °F)] 36.2 °C (97.1 °F)  Pulse:  [54-68] 68  Resp:  [18] 18  BP: (117-143)/(69-79) 138/69  SpO2:  [94 %-100 %] 94 %    Physical Exam  Constitutional:       General: He is not in acute distress.     Appearance: Normal appearance. He is well-developed. He is not diaphoretic.      Comments: Sleepy but wakes to voice   HENT:      Head: Normocephalic and atraumatic.      Nose: Nose normal.      Mouth/Throat:      Mouth: Mucous membranes are moist.   Eyes:      General: No scleral icterus.     Conjunctiva/sclera: Conjunctivae normal.   Neck:      Vascular: No JVD.   Cardiovascular:      Rate and Rhythm: Normal rate.      Heart sounds: No murmur heard.    No gallop.   Pulmonary:      Effort: Pulmonary effort is normal. No respiratory distress.      Breath sounds: No stridor. No wheezing or rales.   Abdominal:      Palpations: Abdomen is soft.      Tenderness: There is no abdominal tenderness. There is no guarding or rebound.      Comments: Gtube in place c/d/i   Genitourinary:     Comments: Mcclendon in place  Musculoskeletal:         General: No tenderness.      Right lower leg: No edema.      Left lower leg: No edema.   Skin:     General: Skin is warm and dry.      Capillary Refill: Capillary refill takes less than 2 seconds.      Coloration: Skin is not jaundiced or pale.      Findings: No rash.   Neurological:      Comments:   Pt does not attend to L side even with prompting and stim  Speech improved  +L side facial droop  L arm and leg flacid  Purposeful and 5/5 R upper and lower   Psychiatric:         Mood and Affect: Mood and affect normal.         Behavior: Behavior normal.       Fluids    Intake/Output Summary (Last 24 hours) at 2/5/2023 1033  Last data filed at 2/5/2023 0600  Gross per 24  hour   Intake 1000 ml   Output 550 ml   Net 450 ml         Laboratory  Recent Labs     02/03/23  0436   WBC 8.7   RBC 4.20*   HEMOGLOBIN 13.7*   HEMATOCRIT 40.4*   MCV 96.2   MCH 32.6   MCHC 33.9   RDW 43.9   PLATELETCT 241   MPV 11.3       Recent Labs     02/03/23  0436   SODIUM 138   POTASSIUM 4.2   CHLORIDE 101   CO2 25   GLUCOSE 121*   BUN 21   CREATININE 0.54   CALCIUM 9.7                     Imaging  CT-HEAD W/O   Final Result      No new intracranial abnormality is identified.      Interval decrease in mass effect from subacute right MCA territory infarction with  expected evolution of macro hemorrhagic transformation      IR-GASTROSTOMY PLACEMENT   Final Result      1.  Fluoroscopic guided percutaneous gastrostomy with placement of an 18-[place] ELISE balloon gastrostomy catheter.      2. The gastrostomy tube may be used for tube feeds 18 hours after placement. Thereafter, liquid diet orders per the referring physician. Saline flush daily as per protocol.      3. In 10-14 days, the anchor button suture or sutures should be cut, releasing the anchor or anchors into the stomach. The suture anchors will then pass through the GI tract as desired. This can be performed in the interventional radiology department if    desired. Call [phone] any weekday [phone] hours to make an appointment.      CT-CTA CHEST PULMONARY ARTERY W/ RECONS   Final Result         1.  No pulmonary embolus appreciated.   2.  Linear densities the bilateral lung bases suggests changes of atelectasis, component of infiltrate is not excluded.   3.  Atherosclerosis and atherosclerotic coronary artery disease.      US-EXTREMITY VENOUS LOWER BILAT   Final Result      DX-ABDOMEN FOR TUBE PLACEMENT   Final Result      NG tube extends below the diaphragm into the region of the stomach. The tip is not visualized.      EC-ECHOCARDIOGRAM LTD W/ CONT   Final Result      DX-CHEST-PORTABLE (1 VIEW)   Final Result         1. No significant interval change.       CT-ABDOMEN-PELVIS W/O   Final Result         1.  Nondependent foci of air in the bladder, could represent changes from recent instrumentation or urinary tract infection.   2.  Diverticulosis   3.  Atherosclerosis and atherosclerotic coronary artery disease      DX-CHEST-PORTABLE (1 VIEW)   Final Result      Ill-defined atelectasis versus consolidation in the left lower lung. Pneumonia can be considered in appropriate clinical setting.         DX-ABDOMEN FOR TUBE PLACEMENT   Final Result         1.  Nonspecific bowel gas pattern in the upper abdomen.   2.  Nasogastric tube tip terminates overlying the expected location of the gastric body.   3.  Cardiomegaly   4.  Hazy linear density left lung base could represent atelectasis or infiltrate.      CT-HEAD W/O   Final Result         1.  Evolving infarct/encephalomalacia in the right MCA distribution.   2.  Areas of hemorrhage in the right basal ganglia and involving the right frontal and anterior temporal sulci, similar compared to MRI January 13, 2023   3.  Atherosclerosis.      DX-CHEST-PORTABLE (1 VIEW)   Final Result         Diffuse interstitial and groundglass opacity throughout both lungs could relate to developing pulmonary edema.      DX-ABDOMEN FOR TUBE PLACEMENT   Final Result         Gastric drainage tube with tip projecting over the expected area of the stomach.      DX-CHEST-PORTABLE (1 VIEW)   Final Result      1.  Interval extubation without pneumothorax.   2.  Stable enlarged cardiac silhouette.   3.  There is bibasilar linear atelectasis.      EC-ECHOCARDIOGRAM COMPLETE W/ CONT   Final Result      MR-BRAIN-W/O   Final Result         Acute infarction involving the right frontal region, right basal ganglia and right caudate nucleus. Minimal punctate infarction involving the right parietal cortex. There is slight mass effect upon the right lateral ventricle with only minimal midline    shift measuring approximately 4 mm.      Petechial hemorrhage is  noted within the right frontal cortical infarct. Also noted is petechial hemorrhage in the right basal ganglia and right caudate nucleus but without surrounding vasogenic edema.         DX-PELVIS-1 OR 2 VIEWS   Final Result         1.  No acute traumatic bony injury.      DX-CHEST-PORTABLE (1 VIEW)   Final Result         1.  Left midlung and lower lobe infiltrates, similar to prior study.      [id number] VIEW   Final Result         1.  Nonspecific bowel gas pattern in the upper abdomen.   2.  Cardiomegaly   3.  Hazy left lower lobe infiltrates.   4.  Nasogastric tube tip terminates overlying the expected location of the gastric fundus.      IR-THROMBO MECHANICAL ARTERY,INIT   Final Result         62-year-old patient who presented with complete occlusion of the right ICA at its origin underwent emergent mechanical thrombectomy. After initial angioplasty,   a right cervical internal carotid artery stent was deployed and right MCA thrombectomy was performed. The final angiographic images show complete restoration of flow into the right ICA and MCA with no evidence of distal intracranial embolization.      Final recanalization score: TICI 3      I, Isacckleverkatie Hackettlupe was physically present and participated during the entire procedure of the IR-THROMBO MECHANICAL ARTERY,INIT.                  DX-CHEST-PORTABLE (1 VIEW)   Final Result      No acute cardiac or pulmonary abnormalities are identified.      CT-CTA NECK WITH & W/O-POST PROCESSING   Final Result      1.  Occlusion of the right internal carotid artery at its origin.      2.  Occlusion of the right vertebral artery at its origin.      Dr. Gongora discussed findings with Dr. Mccarthy.      CT-CTA HEAD WITH & W/O-POST PROCESS   Final Result         1.  Occlusion of the right internal carotid artery and right middle cerebral artery.   2.  Narrowed left M1 segment with severely narrowed and M2 and M3 branches, may represent chronic changes.      These findings were  discussed with the patient's clinician, Joseline Mccarthy, on 1/12/2023 8:27 PM.      CT-CEREBRAL PERFUSION ANALYSIS   Final Result         1.  Cerebral blood flow less than 30% likely representing completed infarct = 52 mL.      2.  T Max more than 6 seconds likely representing combination of completed infarct and ischemia = 172 mL.      3.  Mismatched volume likely representing ischemic brain/penumbra = 120 mL      4.  Please note that the cerebral perfusion was performed on the limited brain tissue around the basal ganglia region. Infarct/ischemia outside the CT perfusion sections can be missed in this study.      CT-HEAD W/O   Final Result      1.  No evidence of intracranial hemorrhage, midline shift or mass effect.      2.  Question of hyperdense right MCA though evaluation limited by oblique positioning.                Assessment/Plan  * Acute ischemic right middle cerebral artery (MCA) stroke (HCC)- (present on admission)  Assessment & Plan  Right MCA CVA with hemorrhagic conversion  Neurology consulted   High Intensity statin therapy   BP control  S/p right ICA stent and MCA thrombectomy 1/12/23  DAPT  TTE negative for shunt  Tobacco cessation  PT/OT/SLP - recommending post-acute placement  PEG placed 1/24 2/4 repeated CT head: Interval decrease in mass effect from subacute right MCA territory infarction with expected evolution of macro hemorrhagic transformation.    Urinary retention  Assessment & Plan  Likely in the setting of BPH  Tried removing thacker 1/25, had to replace  Starting terazosin 1/28    Small amount of sediment in the tubing, however no significant hematuria, UA obtained which did show RBCs, CBC normal and afebrile  Voiding trial   Thacker removed on 1/31 2/4: noted urinary retention and had straight cath twice on 2/3.  Thacker re-placed.  On terazosin    Internal carotid artery stent present  Assessment & Plan  Placed 1/12  Per Neuro OK to start DAPT: ASA & Plavix for 6  wks    Hypernatremia  Assessment & Plan  Patient continues to have hyponatremia with elevated sodium level.  Resolved with Free H2O 200ml q6  Cont to follow daily    Pneumonia due to infectious organism- (present on admission)  Assessment & Plan  Resolved    Aspiration pneumonia (Developed 01/19/23)   Procal low  Sputum cultures neg  Strict aspiration precautions   Has completed 5 days of Abx's  MRSA nares neg  Mucomyst/3% normal saline nebs to maintain pulmonary toilet, RT protocol to continue  Pulmonology evaluated him and made recommendations          Oropharyngeal dysphagia- (present on admission)  Assessment & Plan  Secondary to acute stroke.    Aspiration precautions   PEG 1/24  Nutrition following    Acute left hemiparesis (HCC)- (present on admission)  Assessment & Plan  Secondary to right MCA stroke.    Pressure ulcer precautions  Aspiration, Fall precautions  PT and OT: SNF placement    Stroke, hemorrhagic (HCC)- (present on admission)  Assessment & Plan  Ischemic stroke with hemorrhagic conversion.  Neuro has OKd for antiplatelet therapy with ASA and plavix given presence of new ICA stent    Toxic metabolic encephalopathy- (present on admission)  Assessment & Plan  Improved    Persistent 01/20/23  Secondary to stroke, delirium and now concern for relation to underlying pneumonia  Limit restraints as able  Avoid polypharmacy, sedating medications  Delirium precautions  Management of underlying medical conditions  Obtain EEG 01/20/23 and it did not show seizure activity.   cont's to improve daily though examination is limited by dysarthria at times    Acute respiratory failure with hypoxia (HCC)- (present on admission)  Assessment & Plan  Improved     01/19/23 CXRAY consistent with Aspiration Pneumonia - Started on IV unasyn  Subsequently trnasitioned to [facility].  At present off Abx's  MRSA nares neg  trachael aspirate cultures neg  CTA chest neg for PE  TTE LVEF 70%, no signficant valvular or structural  abnormalities  O2 demand decreasing: now off HFNC and on 2 LNC  Cont weaning O2    Severe obesity (BMI 35.0-35.9 with comorbidity) ([facility])- (present on admission)  Assessment & Plan  Body mass index is 37.39 kg/m².  Co-morbid HTN, T2DM, KENDAL  Outpatient weight loss    Type 2 diabetes mellitus without complication, without long-term current use of insulin ([facility])- (present on admission)  Assessment & Plan  A1c 5.3  No indication for strict BG control - POCT/SSI deferred  Atorvastatin    Alcohol dependence with withdrawal ([facility])- (present on admission)  Assessment & Plan  Resolved.    Tobacco use disorder- (present on admission)  Assessment & Plan  On nicotine patch     Primary hypertension- (present on admission)  Assessment & Plan  Amlodipine 10 mg         VTE prophylaxis: enoxaparin ppx    I have performed a physical exam and reviewed and updated ROS and Plan today (2/5/2023). In review of yesterday's note (2/4/2023), there are no changes except as documented above.

## 2023-02-06 ENCOUNTER — PATIENT OUTREACH (OUTPATIENT)
Dept: SCHEDULING | Facility: IMAGING CENTER | Age: 63
End: 2023-02-06
Payer: COMMERCIAL

## 2023-02-06 LAB
ALBUMIN SERPL BCP-MCNC: 3.7 G/DL (ref 3.2–4.9)
ALBUMIN/GLOB SERPL: 1.2 G/DL
ALP SERPL-CCNC: 119 U/L (ref 30–99)
ALT SERPL-CCNC: 34 U/L (ref 2–50)
ANION GAP SERPL CALC-SCNC: 10 MMOL/L (ref 7–16)
AST SERPL-CCNC: 23 U/L (ref 12–45)
BILIRUB SERPL-MCNC: 0.7 MG/DL (ref 0.1–1.5)
BUN SERPL-MCNC: 30 MG/DL (ref 8–22)
CALCIUM ALBUM COR SERPL-MCNC: 9.2 MG/DL (ref 8.5–10.5)
CALCIUM SERPL-MCNC: 9 MG/DL (ref 8.5–10.5)
CHLORIDE SERPL-SCNC: 100 MMOL/L (ref 96–112)
CO2 SERPL-SCNC: 27 MMOL/L (ref 20–33)
CREAT SERPL-MCNC: 0.48 MG/DL (ref 0.5–1.4)
CRP SERPL HS-MCNC: <0.3 MG/DL (ref 0–0.75)
ERYTHROCYTE [DISTWIDTH] IN BLOOD BY AUTOMATED COUNT: 42.2 FL (ref 35.9–50)
GFR SERPLBLD CREATININE-BSD FMLA CKD-EPI: 117 ML/MIN/1.73 M 2
GLOBULIN SER CALC-MCNC: 3.1 G/DL (ref 1.9–3.5)
GLUCOSE SERPL-MCNC: 124 MG/DL (ref 65–99)
HCT VFR BLD AUTO: 34.4 % (ref 42–52)
HGB BLD-MCNC: 11.8 G/DL (ref 14–18)
MAGNESIUM SERPL-MCNC: 1.9 MG/DL (ref 1.5–2.5)
MCH RBC QN AUTO: 32.3 PG (ref 27–33)
MCHC RBC AUTO-ENTMCNC: 34.3 G/DL (ref 33.7–35.3)
MCV RBC AUTO: 94.2 FL (ref 81.4–97.8)
PHOSPHATE SERPL-MCNC: 4 MG/DL (ref 2.5–4.5)
PLATELET # BLD AUTO: 204 K/UL (ref 164–446)
PMV BLD AUTO: 11.7 FL (ref 9–12.9)
POTASSIUM SERPL-SCNC: 3.8 MMOL/L (ref 3.6–5.5)
PREALB SERPL-MCNC: 26.1 MG/DL (ref 18–38)
PROT SERPL-MCNC: 6.8 G/DL (ref 6–8.2)
RBC # BLD AUTO: 3.65 M/UL (ref 4.7–6.1)
SODIUM SERPL-SCNC: 137 MMOL/L (ref 135–145)
WBC # BLD AUTO: 6.3 K/UL (ref 4.8–10.8)

## 2023-02-06 PROCEDURE — 80053 COMPREHEN METABOLIC PANEL: CPT

## 2023-02-06 PROCEDURE — 84134 ASSAY OF PREALBUMIN: CPT

## 2023-02-06 PROCEDURE — 85027 COMPLETE CBC AUTOMATED: CPT

## 2023-02-06 PROCEDURE — A9270 NON-COVERED ITEM OR SERVICE: HCPCS | Performed by: INTERNAL MEDICINE

## 2023-02-06 PROCEDURE — 97530 THERAPEUTIC ACTIVITIES: CPT | Mod: CO

## 2023-02-06 PROCEDURE — 700102 HCHG RX REV CODE 250 W/ 637 OVERRIDE(OP): Performed by: HOSPITALIST

## 2023-02-06 PROCEDURE — 84100 ASSAY OF PHOSPHORUS: CPT

## 2023-02-06 PROCEDURE — 83735 ASSAY OF MAGNESIUM: CPT

## 2023-02-06 PROCEDURE — 700102 HCHG RX REV CODE 250 W/ 637 OVERRIDE(OP): Performed by: INTERNAL MEDICINE

## 2023-02-06 PROCEDURE — 99232 SBSQ HOSP IP/OBS MODERATE 35: CPT | Performed by: STUDENT IN AN ORGANIZED HEALTH CARE EDUCATION/TRAINING PROGRAM

## 2023-02-06 PROCEDURE — A9270 NON-COVERED ITEM OR SERVICE: HCPCS | Performed by: HOSPITALIST

## 2023-02-06 PROCEDURE — 770006 HCHG ROOM/CARE - MED/SURG/GYN SEMI*

## 2023-02-06 PROCEDURE — 36415 COLL VENOUS BLD VENIPUNCTURE: CPT

## 2023-02-06 PROCEDURE — 86140 C-REACTIVE PROTEIN: CPT

## 2023-02-06 PROCEDURE — 700111 HCHG RX REV CODE 636 W/ 250 OVERRIDE (IP): Performed by: INTERNAL MEDICINE

## 2023-02-06 RX ORDER — ATORVASTATIN CALCIUM 80 MG/1
80 TABLET, FILM COATED ORAL EVERY EVENING
Qty: 30 TABLET | Status: SHIPPED
Start: 2023-02-06 | End: 2023-02-11

## 2023-02-06 RX ORDER — ASPIRIN 81 MG/1
81 TABLET, CHEWABLE ORAL DAILY
Qty: 100 TABLET | Status: SHIPPED
Start: 2023-02-07 | End: 2023-02-11

## 2023-02-06 RX ORDER — CLOPIDOGREL BISULFATE 75 MG/1
75 TABLET ORAL DAILY
Qty: 30 TABLET | Status: SHIPPED
Start: 2023-02-07 | End: 2023-02-11

## 2023-02-06 RX ORDER — TERAZOSIN 1 MG/1
1 CAPSULE ORAL EVERY EVENING
Qty: 30 CAPSULE | Refills: 3 | Status: SHIPPED
Start: 2023-02-06 | End: 2023-02-11

## 2023-02-06 RX ORDER — CHOLECALCIFEROL (VITAMIN D3) 125 MCG
5 CAPSULE ORAL NIGHTLY
Qty: 30 TABLET | Status: SHIPPED
Start: 2023-02-06 | End: 2023-02-11

## 2023-02-06 RX ORDER — AMLODIPINE BESYLATE 10 MG/1
10 TABLET ORAL DAILY
Qty: 30 TABLET | Status: SHIPPED
Start: 2023-02-07 | End: 2023-02-11

## 2023-02-06 RX ADMIN — SENNOSIDES AND DOCUSATE SODIUM 1 TABLET: 50; 8.6 TABLET ORAL at 17:35

## 2023-02-06 RX ADMIN — ENOXAPARIN SODIUM 40 MG: 40 INJECTION SUBCUTANEOUS at 17:35

## 2023-02-06 RX ADMIN — CLOPIDOGREL BISULFATE 75 MG: 75 TABLET ORAL at 05:32

## 2023-02-06 RX ADMIN — AMLODIPINE BESYLATE 10 MG: 10 TABLET ORAL at 05:32

## 2023-02-06 RX ADMIN — ACETAMINOPHEN 650 MG: 325 TABLET, FILM COATED ORAL at 20:28

## 2023-02-06 RX ADMIN — SENNOSIDES AND DOCUSATE SODIUM 1 TABLET: 50; 8.6 TABLET ORAL at 05:32

## 2023-02-06 RX ADMIN — ATORVASTATIN CALCIUM 80 MG: 80 TABLET, FILM COATED ORAL at 17:35

## 2023-02-06 RX ADMIN — POLYETHYLENE GLYCOL 3350 1 PACKET: 17 POWDER, FOR SOLUTION ORAL at 05:32

## 2023-02-06 RX ADMIN — TERAZOSIN 1 MG: 1 CAPSULE ORAL at 17:35

## 2023-02-06 RX ADMIN — ASPIRIN 81 MG: 81 TABLET, CHEWABLE ORAL at 05:32

## 2023-02-06 RX ADMIN — Medication 5 MG: at 20:26

## 2023-02-06 RX ADMIN — POLYETHYLENE GLYCOL 3350 1 PACKET: 17 POWDER, FOR SOLUTION ORAL at 17:35

## 2023-02-06 ASSESSMENT — COGNITIVE AND FUNCTIONAL STATUS - GENERAL
DRESSING REGULAR LOWER BODY CLOTHING: A LOT
DAILY ACTIVITIY SCORE: 12
TOILETING: A LOT
DRESSING REGULAR UPPER BODY CLOTHING: A LOT
SUGGESTED CMS G CODE MODIFIER DAILY ACTIVITY: CL
HELP NEEDED FOR BATHING: A LOT
PERSONAL GROOMING: A LOT
EATING MEALS: A LOT

## 2023-02-06 ASSESSMENT — ENCOUNTER SYMPTOMS
SHORTNESS OF BREATH: 0
FOCAL WEAKNESS: 1
SPEECH CHANGE: 1

## 2023-02-06 ASSESSMENT — PAIN DESCRIPTION - PAIN TYPE: TYPE: ACUTE PAIN

## 2023-02-06 NOTE — PROGRESS NOTES
[facility] Medicine Daily Progress Note    Date of Service  2/6/2023    Chief Complaint  Kaiden Quiroz is a 62 y.o. male admitted 1/12/2023 with L side weakness    Hospital Course  61yo PMHx HTN, T2DM, ongoing tobacco use, BMI 36, ETOH.  Presenting with L side weakness on 1/12.  Cecil not to be TNK candidate.  Taken to IR for MCA trhombectomy with TICI 3 post and R ICA stent.  On DAPT x 6 weeks. Had 10 sec pause intra procedure but resolved before CPR was initiated.  Was intubated.  In ICU required Precedex for ETOH withdrawal.  PEG placed 1/24.  In 10-14 days, the anchor button suture or sutures should be cut, releasing the anchor or anchors into the stomach. The suture anchors will then pass through the GI tract as desired. This can be performed in the interventional radiology department if   desired. Call [phone] any weekday [phone] hours to make an appointment.    Patient started on atorvastatin. Echo unremarkable.  Hospital course complicated by urinary retention requiring Mcclendon (on Terazosin), pneumonia and respiratory failure on high flow nasal cannula (now weaned down to 2 L nasal cannula).    Interval Problem Update  - no acute overnight events.   - reports dry lips, requesting ice chips  - Tolerating PEG feeding.   - No acute overnight events  - Mcclendon re-placed.   -  PT/OT rec SNF  - labs reviewed, stable. Hb 11.8. no sign of active bleeding. Cont monitoring    I have discussed this patient's plan of care and discharge plan at IDT rounds today with Case Management, Nursing, Nursing leadership, and other members of the IDT team.    Consultants/Specialty  neurology    Code Status  Full Code    Disposition  Patient is medically cleared for discharge.   Anticipate discharge to to skilled nursing facility.  I have placed the appropriate orders for post-discharge needs.    Review of Systems  Review of Systems   HENT:          Dry mouth   Respiratory:  Negative for shortness of breath.    Genitourinary:   Negative for hematuria.   Neurological:  Positive for speech change and focal weakness.      Physical Exam  Temp:  [36.3 °C (97.3 °F)-37.1 °C (98.8 °F)] 37.1 °C (98.8 °F)  Pulse:  [74-78] 77  Resp:  [18] 18  BP: (136-146)/(80-84) 146/84  SpO2:  [91 %-98 %] 97 %    Physical Exam  Constitutional:       General: He is not in acute distress.     Appearance: Normal appearance. He is well-developed. He is not diaphoretic.      Comments: Sleepy but wakes to voice   HENT:      Head: Normocephalic and atraumatic.      Nose: Nose normal.      Mouth/Throat:      Mouth: Mucous membranes are moist.   Eyes:      General: No scleral icterus.     Conjunctiva/sclera: Conjunctivae normal.   Neck:      Vascular: No JVD.   Cardiovascular:      Rate and Rhythm: Normal rate.      Heart sounds: No murmur heard.    No gallop.   Pulmonary:      Effort: Pulmonary effort is normal. No respiratory distress.      Breath sounds: No stridor. No wheezing or rales.   Abdominal:      Palpations: Abdomen is soft.      Tenderness: There is no abdominal tenderness. There is no guarding or rebound.      Comments: Gtube in place c/d/i   Genitourinary:     Comments: Mcclendon in place  Musculoskeletal:         General: No tenderness.      Right lower leg: No edema.      Left lower leg: No edema.   Skin:     General: Skin is warm and dry.      Capillary Refill: Capillary refill takes less than 2 seconds.      Coloration: Skin is not jaundiced or pale.      Findings: No rash.   Neurological:      Comments:   Pt does not attend to L side even with prompting and stim  Speech improved  +L side facial droop  L arm and leg flacid  Purposeful and 5/5 R upper and lower   Psychiatric:         Mood and Affect: Mood and affect normal.         Behavior: Behavior normal.       Fluids    Intake/Output Summary (Last 24 hours) at 2/6/2023 1147  Last data filed at 2/6/2023 0900  Gross per 24 hour   Intake 1000 ml   Output 1600 ml   Net -600 ml         Laboratory  Recent Labs      02/06/23  0010   WBC 6.3   RBC 3.65*   HEMOGLOBIN 11.8*   HEMATOCRIT 34.4*   MCV 94.2   MCH 32.3   MCHC 34.3   RDW 42.2   PLATELETCT 204   MPV 11.7       Recent Labs     02/06/23  0010   SODIUM 137   POTASSIUM 3.8   CHLORIDE 100   CO2 27   GLUCOSE 124*   BUN 30*   CREATININE 0.48*   CALCIUM 9.0                     Imaging  CT-HEAD W/O   Final Result      No new intracranial abnormality is identified.      Interval decrease in mass effect from subacute right MCA territory infarction with  expected evolution of macro hemorrhagic transformation      IR-GASTROSTOMY PLACEMENT   Final Result      1.  Fluoroscopic guided percutaneous gastrostomy with placement of an 18-[place] ELISE balloon gastrostomy catheter.      2. The gastrostomy tube may be used for tube feeds 18 hours after placement. Thereafter, liquid diet orders per the referring physician. Saline flush daily as per protocol.      3. In 10-14 days, the anchor button suture or sutures should be cut, releasing the anchor or anchors into the stomach. The suture anchors will then pass through the GI tract as desired. This can be performed in the interventional radiology department if    desired. Call [phone] any weekday [phone] hours to make an appointment.      CT-CTA CHEST PULMONARY ARTERY W/ RECONS   Final Result         1.  No pulmonary embolus appreciated.   2.  Linear densities the bilateral lung bases suggests changes of atelectasis, component of infiltrate is not excluded.   3.  Atherosclerosis and atherosclerotic coronary artery disease.      US-EXTREMITY VENOUS LOWER BILAT   Final Result      DX-ABDOMEN FOR TUBE PLACEMENT   Final Result      NG tube extends below the diaphragm into the region of the stomach. The tip is not visualized.      EC-ECHOCARDIOGRAM LTD W/ CONT   Final Result      DX-CHEST-PORTABLE (1 VIEW)   Final Result         1. No significant interval change.      CT-ABDOMEN-PELVIS W/O   Final Result         1.  Nondependent foci of air in  the bladder, could represent changes from recent instrumentation or urinary tract infection.   2.  Diverticulosis   3.  Atherosclerosis and atherosclerotic coronary artery disease      DX-CHEST-PORTABLE (1 VIEW)   Final Result      Ill-defined atelectasis versus consolidation in the left lower lung. Pneumonia can be considered in appropriate clinical setting.         DX-ABDOMEN FOR TUBE PLACEMENT   Final Result         1.  Nonspecific bowel gas pattern in the upper abdomen.   2.  Nasogastric tube tip terminates overlying the expected location of the gastric body.   3.  Cardiomegaly   4.  Hazy linear density left lung base could represent atelectasis or infiltrate.      CT-HEAD W/O   Final Result         1.  Evolving infarct/encephalomalacia in the right MCA distribution.   2.  Areas of hemorrhage in the right basal ganglia and involving the right frontal and anterior temporal sulci, similar compared to MRI January 13, 2023   3.  Atherosclerosis.      DX-CHEST-PORTABLE (1 VIEW)   Final Result         Diffuse interstitial and groundglass opacity throughout both lungs could relate to developing pulmonary edema.      DX-ABDOMEN FOR TUBE PLACEMENT   Final Result         Gastric drainage tube with tip projecting over the expected area of the stomach.      DX-CHEST-PORTABLE (1 VIEW)   Final Result      1.  Interval extubation without pneumothorax.   2.  Stable enlarged cardiac silhouette.   3.  There is bibasilar linear atelectasis.      EC-ECHOCARDIOGRAM COMPLETE W/ CONT   Final Result      MR-BRAIN-W/O   Final Result         Acute infarction involving the right frontal region, right basal ganglia and right caudate nucleus. Minimal punctate infarction involving the right parietal cortex. There is slight mass effect upon the right lateral ventricle with only minimal midline    shift measuring approximately 4 mm.      Petechial hemorrhage is noted within the right frontal cortical infarct. Also noted is petechial hemorrhage  in the right basal ganglia and right caudate nucleus but without surrounding vasogenic edema.         DX-PELVIS-1 OR 2 VIEWS   Final Result         1.  No acute traumatic bony injury.      DX-CHEST-PORTABLE (1 VIEW)   Final Result         1.  Left midlung and lower lobe infiltrates, similar to prior study.      GC-HAVQJTL-0 VIEW   Final Result         1.  Nonspecific bowel gas pattern in the upper abdomen.   2.  Cardiomegaly   3.  Hazy left lower lobe infiltrates.   4.  Nasogastric tube tip terminates overlying the expected location of the gastric fundus.      IR-THROMBO MECHANICAL ARTERY,INIT   Final Result         62-year-old patient who presented with complete occlusion of the right ICA at its origin underwent emergent mechanical thrombectomy. After initial angioplasty,   a right cervical internal carotid artery stent was deployed and right MCA thrombectomy was performed. The final angiographic images show complete restoration of flow into the right ICA and MCA with no evidence of distal intracranial embolization.      Final recanalization score: TICI 3      I, Petra Mobley was physically present and participated during the entire procedure of the IR-THROMBO MECHANICAL ARTERY,INIT.                  DX-CHEST-PORTABLE (1 VIEW)   Final Result      No acute cardiac or pulmonary abnormalities are identified.      CT-CTA NECK WITH & W/O-POST PROCESSING   Final Result      1.  Occlusion of the right internal carotid artery at its origin.      2.  Occlusion of the right vertebral artery at its origin.      Dr. Gongora discussed findings with Dr. Mccarthy.      CT-CTA HEAD WITH & W/O-POST PROCESS   Final Result         1.  Occlusion of the right internal carotid artery and right middle cerebral artery.   2.  Narrowed left M1 segment with severely narrowed and M2 and M3 branches, may represent chronic changes.      These findings were discussed with the patient's clinician, Joseline Mccarthy, on 1/12/2023 8:27 PM.       CT-CEREBRAL PERFUSION ANALYSIS   Final Result         1.  Cerebral blood flow less than 30% likely representing completed infarct = 52 mL.      2.  T Max more than 6 seconds likely representing combination of completed infarct and ischemia = 172 mL.      3.  Mismatched volume likely representing ischemic brain/penumbra = 120 mL      4.  Please note that the cerebral perfusion was performed on the limited brain tissue around the basal ganglia region. Infarct/ischemia outside the CT perfusion sections can be missed in this study.      CT-HEAD W/O   Final Result      1.  No evidence of intracranial hemorrhage, midline shift or mass effect.      2.  Question of hyperdense right MCA though evaluation limited by oblique positioning.                Assessment/Plan  * Acute ischemic right middle cerebral artery (MCA) stroke (HCC)- (present on admission)  Assessment & Plan  Right MCA CVA with hemorrhagic conversion  Neurology consulted   High Intensity statin therapy   BP control  S/p right ICA stent and MCA thrombectomy 1/12/23  DAPT  TTE negative for shunt  Tobacco cessation  PT/OT/SLP - recommending post-acute placement  PEG placed 1/24 2/4 repeated CT head: Interval decrease in mass effect from subacute right MCA territory infarction with expected evolution of macro hemorrhagic transformation.    Urinary retention  Assessment & Plan  Likely in the setting of BPH  Tried removing thacker 1/25, had to replace  Starting terazosin 1/28    Small amount of sediment in the tubing, however no significant hematuria, UA obtained which did show RBCs, CBC normal and afebrile  Voiding trial   Thacker removed on 1/31 2/4: noted urinary retention and had straight cath twice on 2/3.  Thacker re-placed.  On terazosin    Internal carotid artery stent present  Assessment & Plan  Placed 1/12  Per Neuro OK to start DAPT: ASA & Plavix for 6 wks    Hypernatremia  Assessment & Plan  Patient continues to have hyponatremia with elevated sodium  level.  Resolved with Free H2O 200ml q6  Cont to follow daily    Pneumonia due to infectious organism- (present on admission)  Assessment & Plan  Resolved    Aspiration pneumonia (Developed 01/19/23)   Procal low  Sputum cultures neg  Strict aspiration precautions   Has completed 5 days of Abx's  MRSA nares neg  Mucomyst/3% normal saline nebs to maintain pulmonary toilet, RT protocol to continue  Pulmonology evaluated him and made recommendations          Oropharyngeal dysphagia- (present on admission)  Assessment & Plan  Secondary to acute stroke.    Aspiration precautions   PEG 1/24  Nutrition following    Acute left hemiparesis (HCC)- (present on admission)  Assessment & Plan  Secondary to right MCA stroke.    Pressure ulcer precautions  Aspiration, Fall precautions  PT and OT: SNF placement    Stroke, hemorrhagic (HCC)- (present on admission)  Assessment & Plan  Ischemic stroke with hemorrhagic conversion.  Neuro has OKd for antiplatelet therapy with ASA and plavix given presence of new ICA stent    Toxic metabolic encephalopathy- (present on admission)  Assessment & Plan  Improved    Persistent 01/20/23  Secondary to stroke, delirium and now concern for relation to underlying pneumonia  Limit restraints as able  Avoid polypharmacy, sedating medications  Delirium precautions  Management of underlying medical conditions  Obtain EEG 01/20/23 and it did not show seizure activity.   cont's to improve daily though examination is limited by dysarthria at times    Acute respiratory failure with hypoxia (HCC)- (present on admission)  Assessment & Plan  Improved     01/19/23 CXRAY consistent with Aspiration Pneumonia - Started on IV unasyn  Subsequently trnasitioned to [facility].  At present off Abx's  MRSA nares neg  trachael aspirate cultures neg  CTA chest neg for PE  TTE LVEF 70%, no signficant valvular or structural abnormalities  O2 demand decreasing: now off HFNC and on 2 LNC  Cont weaning O2    Severe obesity (BMI  35.0-35.9 with comorbidity) (HCC)- (present on admission)  Assessment & Plan  Body mass index is 37.39 kg/m².  Co-morbid HTN, T2DM, KENDAL  Outpatient weight loss    Type 2 diabetes mellitus without complication, without long-term current use of insulin (HCC)- (present on admission)  Assessment & Plan  A1c 5.3  No indication for strict BG control - POCT/SSI deferred  Atorvastatin    Alcohol dependence with withdrawal (HCC)- (present on admission)  Assessment & Plan  Resolved.    Tobacco use disorder- (present on admission)  Assessment & Plan  On nicotine patch     Primary hypertension- (present on admission)  Assessment & Plan  Amlodipine 10 mg         VTE prophylaxis: enoxaparin ppx    I have performed a physical exam and reviewed and updated ROS and Plan today (2/6/2023). In review of yesterday's note (2/5/2023), there are no changes except as documented above.

## 2023-02-06 NOTE — HOSPITAL COURSE
"This is a 62-year-old male with past medical history of hypertension, type 2 diabetes, alcohol use, tobacco use disorder and obesity who was admitted on 1/12/2023 with left-sided weakness, significant for right MCA CVA s/p ICA stent and thrombectomy on 1/12, complicated by hemorrhagic conversion.     \"He presented 1/12/22 with Left facial droop and Left-sided weakness. CTH demonstrated hyperdense MCA, for which subsequent CT perfusion demonstrated infarct with penumbra. CTA demonstrated Right ICA and Right MCA occlusion. Neurology was consulted. He was not a candidate for tPA due to hypertension. Neuro-IR performed a Right MCA thrombectomy and Right ICA stent 1/12/23. Subsequent MRI demonstrated Right frontal, basal ganglia, and caudate nucleus infarct. Petechial hemorrhages were noted concerning for hemorrhagic conversion, for which he was not a candidate for DAPT but treated with ASA monotherapy per Neurology recommendation. He was admitted to the ICU post-procedure for neurologic monitoring and nicardipine gtt due to uncontrolled HTN.Started on DAPT 1/24.     During his IR procedure he developed a sinus pause for which CPR was not required prior to spontaneous ROSC. He was administered atropine and glycopyrrolate for sinus bradycardia. He was intubated post-procedure and extubated 1/13/23 to [facility]. CXR demonstrated evolving pulmonary edema. TTE demonstrated normal systolic function and was negative for shunt. He has been diuresed with IV lasix with vast improvement.\"    Patient noted to have significant dysphagia, resulting in aspiration pneumonia, patient required PEG tube placement in 1/24. Patient is s/p FEES 2/8, started on a modified diet, tolerating 100% PO intake.    Patient noted to have melena, history of extensive alcohol use prior to admission.  No prior EGD or colonoscopy.  GI consulted he is s/p EGD on 2/13 with 2 mm nonbleeding angioectasia in second portion of duodenum treated with APC, no active " bleeding.  PPI x4 weeks.  Resumed Plavix.

## 2023-02-06 NOTE — DISCHARGE PLANNING
Agency/Facility Name: [facility]  Outcome: DPA left vmail to check on referral status.      [id number]    Agency/Facility Name: [facility]  Spoke To: Agustina  Outcome: DPA left message with Michelle asking for call back on referral sent

## 2023-02-06 NOTE — DIETARY
"Nutrition support weekly update:  Day 25 of admit.  Kaiden Quiroz is a 62 y.o. male with admitting DX of Acute CVA.      Tube feeding initiated on 1/15. Current TF via PEG is Replete with Fiber with goal rate 80 ml/hr to provide 120 kcals, 123 gm protein, and 1594 ml free water per day.     Assessment:  Weight 2/4: 103.2 kg via bed scale. Weight fluctuating but overall stable. Pt is -3.7L fluids per I/O.   Height: 5' 7\" (170.2 cm), BMI: 35.63 (obesity class I)    Evaluation:   Pt is receiving TF at goal rate.   MD notes tolerating TF on 2/3. Notes is medically cleared and awaiting SNF placement.   T2DM on problem list, however A1C is 5.3 and no diabetic meds on MAR. Does not need CHO-controlled formula at this time.   Skin: Wound team saw 2/1, no staged or open wounds. General dependent edema noted.   Labs: Glucose 124, BUN 30, Creat 0.48, Alk phos 119  Meds: lipitor, miralax, colace, bowel protocol  Last BM: 2/5  Standard formula indicated.     Malnutrition risk: Pt with dependent general edema, no other risk identified.     Recommendations/Plan:  Continue TF Replete with Fiber with goal rate 80 ml/hr.   Fluids per MD.   Diet upgrades per SLP.     RD following.              "

## 2023-02-06 NOTE — CARE PLAN
The patient is Stable - Low risk of patient condition declining or worsening    Shift Goals  Clinical Goals: monitor neuro status  Patient Goals: rest  Family Goals: RADHA    Progress made toward(s) clinical / shift goals: Patient tolerating tube feeds. Skin remains intact. Pain controlled.    Patient is not progressing towards the following goals: Not safe to have oral intake at this time. Requiring assistance of 2 staff for positioning in bed and ADLs.      Problem: Dysphagia  Goal: Dysphagia will improve  Outcome: Not Progressing     Problem: Risk for Aspiration  Goal: Patient's risk for aspiration will be absent or decrease  Outcome: Not Progressing     Problem: Self Care  Goal: Patient will have the ability to perform ADLs independently or with assistance (bathe, groom, dress, toilet and feed)  Outcome: Not Progressing

## 2023-02-07 PROCEDURE — 700111 HCHG RX REV CODE 636 W/ 250 OVERRIDE (IP): Performed by: INTERNAL MEDICINE

## 2023-02-07 PROCEDURE — 97530 THERAPEUTIC ACTIVITIES: CPT | Mod: CQ

## 2023-02-07 PROCEDURE — A9270 NON-COVERED ITEM OR SERVICE: HCPCS | Performed by: INTERNAL MEDICINE

## 2023-02-07 PROCEDURE — 770006 HCHG ROOM/CARE - MED/SURG/GYN SEMI*

## 2023-02-07 PROCEDURE — 700102 HCHG RX REV CODE 250 W/ 637 OVERRIDE(OP): Performed by: HOSPITALIST

## 2023-02-07 PROCEDURE — 700102 HCHG RX REV CODE 250 W/ 637 OVERRIDE(OP): Performed by: INTERNAL MEDICINE

## 2023-02-07 PROCEDURE — 99233 SBSQ HOSP IP/OBS HIGH 50: CPT | Performed by: GENERAL PRACTICE

## 2023-02-07 PROCEDURE — 92526 ORAL FUNCTION THERAPY: CPT

## 2023-02-07 PROCEDURE — A9270 NON-COVERED ITEM OR SERVICE: HCPCS | Performed by: HOSPITALIST

## 2023-02-07 RX ADMIN — TERAZOSIN 1 MG: 1 CAPSULE ORAL at 17:09

## 2023-02-07 RX ADMIN — CLOPIDOGREL BISULFATE 75 MG: 75 TABLET ORAL at 05:13

## 2023-02-07 RX ADMIN — Medication 5 MG: at 20:21

## 2023-02-07 RX ADMIN — ATORVASTATIN CALCIUM 80 MG: 80 TABLET, FILM COATED ORAL at 17:08

## 2023-02-07 RX ADMIN — AMLODIPINE BESYLATE 10 MG: 10 TABLET ORAL at 05:14

## 2023-02-07 RX ADMIN — SENNOSIDES AND DOCUSATE SODIUM 1 TABLET: 50; 8.6 TABLET ORAL at 17:08

## 2023-02-07 RX ADMIN — ENOXAPARIN SODIUM 40 MG: 40 INJECTION SUBCUTANEOUS at 17:08

## 2023-02-07 RX ADMIN — ASPIRIN 81 MG: 81 TABLET, CHEWABLE ORAL at 05:14

## 2023-02-07 RX ADMIN — ACETAMINOPHEN 650 MG: 325 TABLET, FILM COATED ORAL at 20:21

## 2023-02-07 ASSESSMENT — COGNITIVE AND FUNCTIONAL STATUS - GENERAL
TURNING FROM BACK TO SIDE WHILE IN FLAT BAD: UNABLE
MOBILITY SCORE: 6
WALKING IN HOSPITAL ROOM: TOTAL
MOVING TO AND FROM BED TO CHAIR: UNABLE
CLIMB 3 TO 5 STEPS WITH RAILING: TOTAL
SUGGESTED CMS G CODE MODIFIER MOBILITY: CN
MOVING FROM LYING ON BACK TO SITTING ON SIDE OF FLAT BED: UNABLE
STANDING UP FROM CHAIR USING ARMS: TOTAL

## 2023-02-07 ASSESSMENT — ENCOUNTER SYMPTOMS
FOCAL WEAKNESS: 1
WEAKNESS: 1
SPEECH CHANGE: 1

## 2023-02-07 ASSESSMENT — PAIN DESCRIPTION - PAIN TYPE
TYPE: ACUTE PAIN
TYPE: ACUTE PAIN

## 2023-02-07 ASSESSMENT — GAIT ASSESSMENTS: GAIT LEVEL OF ASSIST: UNABLE TO PARTICIPATE

## 2023-02-07 NOTE — PROGRESS NOTES
"Hospital Medicine Daily Progress Note    Date of Service  2/7/2023    Chief Complaint  Kaiden Quiroz is a 62 y.o. male admitted 1/12/2023 with left-sided weakness    Hospital Course  This is a 62-year-old male with past medical history of hypertension, type 2 diabetes, alcohol use, tobacco use disorder and obesity who was admitted on 1/12/2023 with left-sided weakness, significant for right MCA CVA s/p ICA stent and thrombectomy on 1/12, complicated by hemorrhagic conversion.     \"He presented 1/12/22 with Left facial droop and Left-sided weakness. CTH demonstrated hyperdense MCA, for which subsequent CT perfusion demonstrated infarct with penumbra. CTA demonstrated Right ICA and Right MCA occlusion. Neurology was consulted. He was not a candidate for tPA due to hypertension. Neuro-IR performed a Right MCA thrombectomy and Right ICA stent 1/12/23. Subsequent MRI demonstrated Right frontal, basal ganglia, and caudate nucleus infarct. Petechial hemorrhages were noted concerning for hemorrhagic conversion, for which he was not a candidate for DAPT but treated with ASA monotherapy per Neurology recommendation. He was admitted to the ICU post-procedure for neurologic monitoring and nicardipine gtt due to uncontrolled HTN.      During his IR procedure he developed a sinus pause for which CPR was not required prior to spontaneous ROSC. He was administered atropine and glycopyrrolate for sinus bradycardia. He was intubated post-procedure and extubated 1/13/23 to [facility]. CXR demonstrated evolving pulmonary edema. TTE demonstrated normal systolic function and was negative for shunt. He has been diuresed with IV lasix with vast improvement.\"    Patient noted to have significant dysphagia, resulting in aspiration pneumonia, patient required PEG tube placement in 1/24. Started on DAPT 1/24.     Interval Problem Update  Patient is tolerating PEG tube  Patient has failed voiding trial x2, to discharge with Mcclendon catheter in " place.  Outpatient urology referral placed.  Patient is for FEES 2/8.  SNF pending.    I have discussed this patient's plan of care and discharge plan at IDT rounds today with Case Management, Nursing, Nursing leadership, and other members of the IDT team.    Consultants/Specialty  neurology and pulmonary    Code Status  Full Code    Disposition  Patient is medically cleared for discharge.   Anticipate discharge to to skilled nursing facility.  I have placed the appropriate orders for post-discharge needs.    Review of Systems  Review of Systems   Neurological:  Positive for speech change, focal weakness and weakness.   All other systems reviewed and are negative.     Physical Exam  Temp:  [36.1 °C (97 °F)-37.1 °C (98.7 °F)] 37.1 °C (98.7 °F)  Pulse:  [71-78] 77  Resp:  [18] 18  BP: (121-127)/(71-76) 123/71  SpO2:  [93 %-98 %] 95 %    Physical Exam  Constitutional:       General: He is not in acute distress.     Appearance: Normal appearance. He is well-developed. He is obese. He is not diaphoretic.      Comments: Sleepy but wakes to voice   HENT:      Head: Normocephalic and atraumatic.      Nose: Nose normal.      Mouth/Throat:      Mouth: Mucous membranes are moist.   Eyes:      General: No scleral icterus.     Conjunctiva/sclera: Conjunctivae normal.   Neck:      Vascular: No JVD.   Cardiovascular:      Rate and Rhythm: Normal rate.      Heart sounds: No murmur heard.    No gallop.   Pulmonary:      Effort: Pulmonary effort is normal. No respiratory distress.      Breath sounds: No stridor. No wheezing or rales.   Abdominal:      Palpations: Abdomen is soft.      Tenderness: There is no abdominal tenderness. There is no guarding or rebound.      Comments: Gtube in place c/d/i   Genitourinary:     Comments: Mcclendon in place  Musculoskeletal:         General: No tenderness.      Right lower leg: No edema.      Left lower leg: No edema.   Skin:     General: Skin is warm and dry.      Capillary Refill: Capillary  refill takes less than 2 seconds.      Coloration: Skin is not jaundiced or pale.      Findings: No rash.   Neurological:      Comments: Left-sided hemiparesis  +L side facial droop  Purposeful and 5/5 R upper and lower   Psychiatric:         Mood and Affect: Mood and affect normal.         Behavior: Behavior normal.       Fluids    Intake/Output Summary (Last 24 hours) at 2/7/2023 1603  Last data filed at 2/7/2023 1100  Gross per 24 hour   Intake 540 ml   Output 2100 ml   Net -1560 ml       Laboratory  Recent Labs     02/06/23  0010   WBC 6.3   RBC 3.65*   HEMOGLOBIN 11.8*   HEMATOCRIT 34.4*   MCV 94.2   MCH 32.3   MCHC 34.3   RDW 42.2   PLATELETCT 204   MPV 11.7     Recent Labs     02/06/23  0010   SODIUM 137   POTASSIUM 3.8   CHLORIDE 100   CO2 27   GLUCOSE 124*   BUN 30*   CREATININE 0.48*   CALCIUM 9.0                   Imaging  CT-HEAD W/O   Final Result      No new intracranial abnormality is identified.      Interval decrease in mass effect from subacute right MCA territory infarction with  expected evolution of macro hemorrhagic transformation      IR-GASTROSTOMY PLACEMENT   Final Result      1.  Fluoroscopic guided percutaneous gastrostomy with placement of an 18-[place] ELISE balloon gastrostomy catheter.      2. The gastrostomy tube may be used for tube feeds 18 hours after placement. Thereafter, liquid diet orders per the referring physician. Saline flush daily as per protocol.      3. In 10-14 days, the anchor button suture or sutures should be cut, releasing the anchor or anchors into the stomach. The suture anchors will then pass through the GI tract as desired. This can be performed in the interventional radiology department if    desired. Call [phone] any weekday [phone] hours to make an appointment.      CT-CTA CHEST PULMONARY ARTERY W/ RECONS   Final Result         1.  No pulmonary embolus appreciated.   2.  Linear densities the bilateral lung bases suggests changes of atelectasis, component of  infiltrate is not excluded.   3.  Atherosclerosis and atherosclerotic coronary artery disease.      US-EXTREMITY VENOUS LOWER BILAT   Final Result      DX-ABDOMEN FOR TUBE PLACEMENT   Final Result      NG tube extends below the diaphragm into the region of the stomach. The tip is not visualized.      EC-ECHOCARDIOGRAM LTD W/ CONT   Final Result      DX-CHEST-PORTABLE (1 VIEW)   Final Result         1. No significant interval change.      CT-ABDOMEN-PELVIS W/O   Final Result         1.  Nondependent foci of air in the bladder, could represent changes from recent instrumentation or urinary tract infection.   2.  Diverticulosis   3.  Atherosclerosis and atherosclerotic coronary artery disease      DX-CHEST-PORTABLE (1 VIEW)   Final Result      Ill-defined atelectasis versus consolidation in the left lower lung. Pneumonia can be considered in appropriate clinical setting.         DX-ABDOMEN FOR TUBE PLACEMENT   Final Result         1.  Nonspecific bowel gas pattern in the upper abdomen.   2.  Nasogastric tube tip terminates overlying the expected location of the gastric body.   3.  Cardiomegaly   4.  Hazy linear density left lung base could represent atelectasis or infiltrate.      CT-HEAD W/O   Final Result         1.  Evolving infarct/encephalomalacia in the right MCA distribution.   2.  Areas of hemorrhage in the right basal ganglia and involving the right frontal and anterior temporal sulci, similar compared to MRI January 13, 2023   3.  Atherosclerosis.      DX-CHEST-PORTABLE (1 VIEW)   Final Result         Diffuse interstitial and groundglass opacity throughout both lungs could relate to developing pulmonary edema.      DX-ABDOMEN FOR TUBE PLACEMENT   Final Result         Gastric drainage tube with tip projecting over the expected area of the stomach.      DX-CHEST-PORTABLE (1 VIEW)   Final Result      1.  Interval extubation without pneumothorax.   2.  Stable enlarged cardiac silhouette.   3.  There is bibasilar  linear atelectasis.      EC-ECHOCARDIOGRAM COMPLETE W/ CONT   Final Result      MR-BRAIN-W/O   Final Result         Acute infarction involving the right frontal region, right basal ganglia and right caudate nucleus. Minimal punctate infarction involving the right parietal cortex. There is slight mass effect upon the right lateral ventricle with only minimal midline    shift measuring approximately 4 mm.      Petechial hemorrhage is noted within the right frontal cortical infarct. Also noted is petechial hemorrhage in the right basal ganglia and right caudate nucleus but without surrounding vasogenic edema.         DX-PELVIS-1 OR 2 VIEWS   Final Result         1.  No acute traumatic bony injury.      DX-CHEST-PORTABLE (1 VIEW)   Final Result         1.  Left midlung and lower lobe infiltrates, similar to prior study.      CJ-DGMZIEU-2 VIEW   Final Result         1.  Nonspecific bowel gas pattern in the upper abdomen.   2.  Cardiomegaly   3.  Hazy left lower lobe infiltrates.   4.  Nasogastric tube tip terminates overlying the expected location of the gastric fundus.      IR-THROMBO MECHANICAL ARTERY,INIT   Final Result         62-year-old patient who presented with complete occlusion of the right ICA at its origin underwent emergent mechanical thrombectomy. After initial angioplasty,   a right cervical internal carotid artery stent was deployed and right MCA thrombectomy was performed. The final angiographic images show complete restoration of flow into the right ICA and MCA with no evidence of distal intracranial embolization.      Final recanalization score: TICI 3      I, Petra Mobley was physically present and participated during the entire procedure of the IR-THROMBO MECHANICAL ARTERY,INIT.                  DX-CHEST-PORTABLE (1 VIEW)   Final Result      No acute cardiac or pulmonary abnormalities are identified.      CT-CTA NECK WITH & W/O-POST PROCESSING   Final Result      1.  Occlusion of the right  internal carotid artery at its origin.      2.  Occlusion of the right vertebral artery at its origin.      Dr. Gongora discussed findings with Dr. Mccarthy.      CT-CTA HEAD WITH & W/O-POST PROCESS   Final Result         1.  Occlusion of the right internal carotid artery and right middle cerebral artery.   2.  Narrowed left M1 segment with severely narrowed and M2 and M3 branches, may represent chronic changes.      These findings were discussed with the patient's clinician, Joseline Mccarthy, on 1/12/2023 8:27 PM.      CT-CEREBRAL PERFUSION ANALYSIS   Final Result         1.  Cerebral blood flow less than 30% likely representing completed infarct = 52 mL.      2.  T Max more than 6 seconds likely representing combination of completed infarct and ischemia = 172 mL.      3.  Mismatched volume likely representing ischemic brain/penumbra = 120 mL      4.  Please note that the cerebral perfusion was performed on the limited brain tissue around the basal ganglia region. Infarct/ischemia outside the CT perfusion sections can be missed in this study.      CT-HEAD W/O   Final Result      1.  No evidence of intracranial hemorrhage, midline shift or mass effect.      2.  Question of hyperdense right MCA though evaluation limited by oblique positioning.              Assessment/Plan  * Acute ischemic right middle cerebral artery (MCA) stroke (HCC)- (present on admission)  Assessment & Plan  Right MCA CVA with hemorrhagic conversion  S/p right ICA stent and MCA thrombectomy 1/12/23  Neurology consulted   High Intensity statin therapy   BP control  DAPT  TTE negative for shunt  Tobacco cessation  PEG placed 1/24 2/4 repeated CT head: Interval decrease in mass effect from subacute right MCA territory infarction with expected evolution of macro hemorrhagic transformation.  PT/OT/SLP - recommending post-acute placement    Urinary retention  Assessment & Plan  Likely in the setting of BPH  Tried voiding trial 1/25 and 1/31, failed  replaced 2/3  Starting terazosin 1/28    Internal carotid artery stent present  Assessment & Plan  Placed 1/12  Per Neuro OK to start DAPT: ASA & Plavix for 6 wks    Hypernatremia  Assessment & Plan  Resolved  Resolved with Free H2O 200ml q6    Pneumonia due to infectious organism- (present on admission)  Assessment & Plan  Resolved    Aspiration pneumonia (Developed 01/19/23)   Procal low  Sputum cultures neg  Strict aspiration precautions   Has completed 5 days of Abx's  MRSA nares neg  Mucomyst/3% normal saline nebs to maintain pulmonary toilet, RT protocol to continue  Pulmonology evaluated him and made recommendations          Oropharyngeal dysphagia- (present on admission)  Assessment & Plan  Secondary to acute stroke.    Aspiration precautions   PEG 1/24  Nutrition following  SLP following  Patient is for FEES 2/8/2023    Acute left hemiparesis (HCC)- (present on admission)  Assessment & Plan  Secondary to right MCA stroke.    Pressure ulcer precautions  Aspiration, Fall precautions  PT and OT: SNF placement    Stroke, hemorrhagic (HCC)- (present on admission)  Assessment & Plan  Ischemic stroke with hemorrhagic conversion.  Neuro has OKd for antiplatelet therapy with ASA and plavix given presence of new ICA stent    Toxic metabolic encephalopathy- (present on admission)  Assessment & Plan  Resolved    Persistent 01/20/23  Secondary to stroke, delirium and now concern for relation to underlying pneumonia  Limit restraints as able  Avoid polypharmacy, sedating medications  Delirium precautions  Management of underlying medical conditions  Obtain EEG 01/20/23 and it did not show seizure activity.   cont's to improve daily though examination is limited by dysarthria at times    Acute respiratory failure with hypoxia (HCC)- (present on admission)  Assessment & Plan  Improve, currently on 1 L of oxygen    01/19/23 CXRAY consistent with Aspiration Pneumonia - Started on IV unasyn  Subsequently trnasitioned to  [facility].  At present off Abx's  MRSA nares neg  trachael aspirate cultures neg  CTA chest neg for PE  TTE LVEF 70%, no signficant valvular or structural abnormalities  O2 demand decreasing: now off HFNC and on 1 LNC  Cont weaning O2    Severe obesity (BMI 35.0-35.9 with comorbidity) ([facility])- (present on admission)  Assessment & Plan  Body mass index is 37.39 kg/m².  Co-morbid HTN, T2DM, KENDAL  Outpatient weight loss    Type 2 diabetes mellitus without complication, without long-term current use of insulin ([facility])- (present on admission)  Assessment & Plan  A1c 5.3  No indication for strict BG control - POCT/SSI deferred  Atorvastatin    Alcohol dependence with withdrawal ([facility])- (present on admission)  Assessment & Plan  Resolved.    Tobacco use disorder- (present on admission)  Assessment & Plan  On nicotine patch     Primary hypertension- (present on admission)  Assessment & Plan  Amlodipine 10 mg         VTE prophylaxis: SCDs/TEDs and enoxaparin ppx    I have performed a physical exam and reviewed and updated ROS and Plan today (2/7/2023). In review of yesterday's note (2/6/2023), there are no changes except as documented above.

## 2023-02-07 NOTE — THERAPY
Speech Language Therapy Contact Note    Patient Name: Kaiden Quiroz  Age:  62 y.o., Sex:  male  Medical Record #: [id number]  Today's Date: 1/17/2023    Discussed missed therapy with RN       01/17/23 1245   Treatment Variance   Reason For Missed Therapy Medical - Patient Unarousable   Interdisciplinary Plan of Care Collaboration   IDT Collaboration with  Nursing   Collaboration Comments This SLP attempted to see patient for dysphagia tx session. Per RN, hold for today, as patient is lethargic and not appropriate for dysphagia tx. SLP will hold and re-attempt as able/appropriate. Thank you.        Wound Care: Vaseline

## 2023-02-07 NOTE — THERAPY
Speech Language Pathology  Daily Treatment     Patient Name: Kaiden Quiroz  Age:  62 y.o., Sex:  male  Medical Record #: [id number]  Today's Date: 2/7/2023       Precautions: Fall Risk, Swallow Precautions ( See Comments), PEG Tube  Comments: L nubia    Assessment  Patient seen on this date for dysphagia reassessment to determine readiness for diagnostic swallow study. Pt verbose requiring redirection to task intermittently but AAOx4. Speech mild-moderately dysarthric. PO trials administered and consisted of ice chips, thins via tsp/straw, and applesauce. Mild wet vocal quality noted at the end of PO trials concerning for laryngeal penetration or aspiration. Pt is at high risk for silent aspiration given size and site of lesion noted on MRI of brain. Education provided to pt regarding current status, risk of aspiration s/p RMCA CVA, and SLP recs for diagnostic swallow study prior to advancing to PO. Pt agreeable to FEES.     Recommendations  1.  NPO/PEG   2.  Medication: Non-oral  3.  Swallowing Instructions & Precautions For Ice Chips with RN:  Oral Care PRIOR to ice chips: Q4h  Supervision: 1:1 with trained staff  Positioning: HOB at 90*  Strategies: One at a time via tsp, after oral care only !!  4.  FEES requested - will complete tomorrow AM, as appropriate    Plan  Continue current treatment plan.    Discharge Recommendations: Recommend post-acute placement for additional speech therapy services prior to discharge home       02/07/23 1115   Vitals   O2 (LPM) 2   O2 Delivery Device Silicone Nasal Cannula   Short Term Goals   Short Term Goal # 1 Pt will consume prefeeding trials without any overt s/sx of aspiration   Goal Outcome # 1 Progressing as expected

## 2023-02-07 NOTE — CARE PLAN
The patient is Stable - Low risk of patient condition declining or worsening    Shift Goals  Clinical Goals: Monitor neuro status, Q2 turns  Patient Goals: Sleep  Family Goals: RADHA    Progress made toward(s) clinical / shift goals:    Problem: Neuro Status  Goal: Neuro status will remain stable or improve  Outcome: Progressing   Q4hr neuro checks. Neuro status remains stable.    Problem: Fall Risk  Goal: Patient will remain free from falls  Outcome: Progressing   Appropriate fall precautions are in place. Bed alarm is on at all times. Staff present for bathroom needs.   Problem: Skin Integrity  Goal: Skin integrity is maintained or improved  Outcome: Progressing   Pt turned and repositioned Q2H or as requested. Barrier cream and mepilexes used to prevent skin breakdown on delicate perineal areas and bony prominences. Linen changes provided as needed to avoid risk of developing pressure ulcers.    Problem: Pain - Standard  Goal: Alleviation of pain or a reduction in pain to the patient’s comfort goal  Outcome: Progressing   Pt's pain assessed throughout shift. Patient offered pharmacological and non-pharmacological interventions.      Patient is not progressing towards the following goals:

## 2023-02-07 NOTE — CARE PLAN
The patient is Stable - Low risk of patient condition declining or worsening    Shift Goals  Clinical Goals: monitor neuro status, improve mobility  Patient Goals: work with therapy more  Family Goals: RADHA    Progress made toward(s) clinical / shift goals:  Patient stood with therapy and worked with speech on swallow today. Pain controlled. Oxygen saturation 95% on room air.    Patient is not progressing towards the following goals: Patient complaining of insomnia and requesting melatonin.

## 2023-02-08 PROCEDURE — A9270 NON-COVERED ITEM OR SERVICE: HCPCS | Performed by: INTERNAL MEDICINE

## 2023-02-08 PROCEDURE — 700102 HCHG RX REV CODE 250 W/ 637 OVERRIDE(OP): Performed by: INTERNAL MEDICINE

## 2023-02-08 PROCEDURE — 700102 HCHG RX REV CODE 250 W/ 637 OVERRIDE(OP): Performed by: HOSPITALIST

## 2023-02-08 PROCEDURE — 99233 SBSQ HOSP IP/OBS HIGH 50: CPT | Performed by: GENERAL PRACTICE

## 2023-02-08 PROCEDURE — 92612 ENDOSCOPY SWALLOW (FEES) VID: CPT

## 2023-02-08 PROCEDURE — A9270 NON-COVERED ITEM OR SERVICE: HCPCS | Performed by: HOSPITALIST

## 2023-02-08 PROCEDURE — 770006 HCHG ROOM/CARE - MED/SURG/GYN SEMI*

## 2023-02-08 PROCEDURE — 97535 SELF CARE MNGMENT TRAINING: CPT | Mod: CO

## 2023-02-08 PROCEDURE — 97602 WOUND(S) CARE NON-SELECTIVE: CPT

## 2023-02-08 PROCEDURE — 700111 HCHG RX REV CODE 636 W/ 250 OVERRIDE (IP): Performed by: INTERNAL MEDICINE

## 2023-02-08 RX ADMIN — ATORVASTATIN CALCIUM 80 MG: 80 TABLET, FILM COATED ORAL at 17:46

## 2023-02-08 RX ADMIN — SENNOSIDES AND DOCUSATE SODIUM 1 TABLET: 50; 8.6 TABLET ORAL at 17:46

## 2023-02-08 RX ADMIN — CLOPIDOGREL BISULFATE 75 MG: 75 TABLET ORAL at 05:12

## 2023-02-08 RX ADMIN — TERAZOSIN 1 MG: 1 CAPSULE ORAL at 17:45

## 2023-02-08 RX ADMIN — POLYETHYLENE GLYCOL 3350 1 PACKET: 17 POWDER, FOR SOLUTION ORAL at 17:46

## 2023-02-08 RX ADMIN — DIPHENHYDRAMINE HYDROCHLORIDE 25 MG: 25 TABLET ORAL at 00:49

## 2023-02-08 RX ADMIN — AMLODIPINE BESYLATE 10 MG: 10 TABLET ORAL at 05:12

## 2023-02-08 RX ADMIN — SENNOSIDES AND DOCUSATE SODIUM 1 TABLET: 50; 8.6 TABLET ORAL at 05:11

## 2023-02-08 RX ADMIN — ACETAMINOPHEN 650 MG: 325 TABLET, FILM COATED ORAL at 09:43

## 2023-02-08 RX ADMIN — Medication 5 MG: at 22:06

## 2023-02-08 RX ADMIN — ASPIRIN 81 MG: 81 TABLET, CHEWABLE ORAL at 05:12

## 2023-02-08 RX ADMIN — POLYETHYLENE GLYCOL 3350 1 PACKET: 17 POWDER, FOR SOLUTION ORAL at 06:00

## 2023-02-08 RX ADMIN — ENOXAPARIN SODIUM 40 MG: 40 INJECTION SUBCUTANEOUS at 17:45

## 2023-02-08 ASSESSMENT — COGNITIVE AND FUNCTIONAL STATUS - GENERAL
SUGGESTED CMS G CODE MODIFIER DAILY ACTIVITY: CL
HELP NEEDED FOR BATHING: A LOT
PERSONAL GROOMING: A LOT
DAILY ACTIVITIY SCORE: 12
DRESSING REGULAR UPPER BODY CLOTHING: A LOT
DRESSING REGULAR LOWER BODY CLOTHING: A LOT
EATING MEALS: A LOT
TOILETING: A LOT

## 2023-02-08 ASSESSMENT — ENCOUNTER SYMPTOMS
FOCAL WEAKNESS: 1
SPEECH CHANGE: 1
WEAKNESS: 1

## 2023-02-08 ASSESSMENT — PAIN DESCRIPTION - PAIN TYPE
TYPE: ACUTE PAIN

## 2023-02-08 NOTE — THERAPY
Speech Language Pathology   Fiberoptic Endoscopic Evaluation of Swallow     Patient Name: Kaiden Quiroz  AGE:  62 y.o., SEX:  male  Medical Record #: [id number]  Today's Date: 2/8/2023     Precautions: Fall Risk, PEG Tube, Swallow Precautions ( See Comments)  Comments: L nubia      Current Method of Nutrition   PEG tube      Pertinent Information:  Affect/Behavior: Cooperative  Oxygen Requirements:  2 L Nasal Cannula  Feeding Tube: PEG tube  Dentition: Good  Factor(s) Affecting Performance: None    Discussed the risks, benefits, and alternatives of the FEES procedure. Patient/family acknowledged and agreed to proceed.     Assessment  Flexible Endoscopic Evaluation of Swallowing (FEES) completed at bedside today. The endoscope was passed transnasally via left nare to evaluate the anatomy and physiology of swallowing. Pt tolerated the procedure with no apparent distress.    Anatomic Findings: Unremarkable  Vocal Fold Motion: Bilateral movement  Secretion Management: WNL  PO trials: ice chips, thin liquids, mildly thick liquids, puree, soft & bite sized, regular solids, mixed consistency      Consistency PAS Score Timing Comments   Ice Chips 1 N/A    Thin Liquid 3 During swallow (inferred) PAS 1: cup x1, straw (single) x1  PAS 3 (trace high penetration): tsp x1, cup x3, straw (single) x1  PAS 3 (min residue approximating VCs): straw (single) x1, straw (consecutive) x1   Mildly Thick Liquid 1 N/A    Puree 1 N/A    Soft & Bite Sized 1 N/A    Regular 1 N/A    Mixed Consistency 1 N/A      Penetration-Aspiration Scale (PAS)  1     No contrast enters airway  2     Contrast enters the airway, remains above the vocal folds, and is ejected from the airway (not seen in the airway at the end of the swallow).  3     Contrast enters the airway, remains above the vocal folds, and is not ejected from the airway (is seen in the airway after the swallow).  4     Contrast enters the airway, contacts the vocal folds, and is ejected  from the airway.  5     Contrast enters the airway, contacts the vocal folds, and is not ejected from the airway  6     Contrast enters the airway, crosses the plane of the vocal folds, and is ejected from the airway.  7     Contrast enters the airway, crosses the plane of the vocal folds, and is not ejected from the airway despite effort.  8     Contrast enters the airway, crosses the plane of the vocal folds, is not ejected from the airway and there is no response to aspiration.      Oral phase: Mildly prolonged mastication with regular solids; otherwise, WFL. No oral residue noted after study but will monitor during follow up sessions.     Pharyngeal phase: Mildly reduced pharyngeal squeeze and mildly impaired laryngeal vestibule closure resulted in the following:  Trace-min laryngeal penetration suspected during the swallow occurred with the majority of trials of thin liquids. The majority of instances where laryngeal penetration did occur, residue was trace and seen high in the laryngeal vestibule. The amount of residue in laryngeal vestibule increased with straw sips (single and consecutive) and had a tendency to approximate vocal cords.   Mild diffuse pharyngeal residue with thin liquids and purees; however, eliminated with cued or spontaneous swallows.       Clinical Impressions  The pt presents with a mild oropharyngeal dysphagia, likely acute related to R MCA CVA, prolonged NPO status, and ARF. A modified diet is indicated to reduce risk of aspiration; however, anticipate pt will progress to a regular diet in the following days. SLP following to ensure diet tolerance and advance diet as tolerated.       Recommendations  Initiation of soft & bite size (level 6) and mildly thick liquid (level 2) diet   PEG tube feedings per RD recs  OK for single sips of un-thickened water between meals (no straws)  3.  Swallowing Instructions & Precautions:   Supervision: Direct supervision during meals  Positioning: Fully  upright and midline during oral intake  Medication: Whole with liquid  Strategies: Small bites/sips, Slow rate of intake, Multiple swallows (x 2) per bite/sip , straws OK w/ MTL  Oral Care: Q4h        Plan  Recommend Speech Therapy 5 times per week until therapy goals are met for the following treatments:  Dysphagia Training, Cognitive-Linguistic Training, and Patient / Family / Caregiver Education.    Discharge Recommendations: Anticipate that the patient will have no further speech therapy needs after discharge from the hospital       02/08/23 0905   Vitals   O2 (LPM) 2   O2 Delivery Device Nasal Cannula   Prior Level Of Function   Communication Within Functional Limits   Swallow Within Functional Limits   Patient's Primary Language English   Occupation (Pre-Hospital Vocational) Employed Full Time   Short Term Goals   Short Term Goal # 2 Pt will complete instrumental evaluation of swallow with SLP to further assess swallow function and determine POC.   Goal Outcome # 2  Goal met, new goal aded   Short Term Goal # 2 B  Patient will consume a SB6/MT2 diet with no overt s/sx of aspiration given dir sup.   Short Term Goal # 4 Patient will consume sips of thin liquids w/ no overt s/sx of aspiration.

## 2023-02-08 NOTE — THERAPY
Physical Therapy   Daily Treatment     Patient Name: Kaiden Quiroz  Age:  62 y.o., Sex:  male  Medical Record #: [id number]  Today's Date: 2/7/2023     Precautions  Precautions: Fall Risk;PEG Tube;Swallow Precautions ( See Comments)  Comments: L nubia    Assessment  Pt greeted and seen for PT treatment. Pt req'd ModA for bed mobility, pt able to maintain sitting balance at EOB w/o assist for 10+min. Upon STS initiation, therapist noticed poor breaks on pt's bed, informed CNA immediately. CNA assisted therapist and tech transferring pt to chair and then into new bed. Pt was able to initiate stand, MaxA x2, but unable to sequence pivot, totalA x3 for pivotting to surfaces. Pt returned to bed, all needs met. Pt currently limited by impaired balance, weakness, decreased sequencing and coordination, and decreased activity tolerance which negatively impacts functional mobility. Pt will continue to benefit from skilled PT to address deficits.       Plan    Physical Therapy Treatment Plan  Physical Therapy Treatment Plan: Continue Current Treatment Plan    DC Equipment Recommendations: Unable to determine at this time  Discharge Recommendations: Recommend post-acute placement for additional physical therapy services prior to discharge home       02/07/23 1236   Other Treatments   Other Treatments Provided pt was able to wiggle B toes and ankles. Pt was able to perform quad set on L LE x3. Pt able to push into knee ext into resistance.   Balance   Sitting Balance (Static) Fair -   Sitting Balance (Dynamic) Fair -   Standing Balance (Static) Trace +   Standing Balance (Dynamic) Trace   Weight Shift Sitting Poor   Weight Shift Standing Poor   Skilled Intervention Verbal Cuing   Comments able to sit EOB and maintain balance for 10 min. standing, physical assist x2   Bed Mobility    Supine to Sit Moderate Assist   Sit to Supine Moderate Assist   Scooting Maximal Assist   Rolling Moderate Assist to Lt.;Maximal Assist to Rt.    Skilled Intervention Verbal Cuing;Sequencing   Comments log roll to come EOB   Gait Analysis   Gait Level Of Assist Unable to Participate   Functional Mobility   Sit to Stand Maximal Assist  (x2 person)   Bed, Chair, Wheelchair Transfer Maximal Assist  (x3 person)   Transfer Method Stand Pivot   Mobility bed mobility, stand pivot bed<>chair   Skilled Intervention Verbal Cuing;Tactile Cuing;Sequencing;Postural Facilitation;Facilitation   Comments Upon STS, therapist noticed poor breaks on pt's bed, informed CNA immediately. CNA assisted therapist and tech transferring pt to chair and into new bed. Pt was able to initiate stand but unable to sequence pivot, totalA for pivotting to surfaces.   Short Term Goals    Short Term Goal # 1 Pt will transition from supine to EOB w/ [place] in 6 visits to improve independence in bed mobility   Goal Outcome # 1 Progressing slower than expected   Short Term Goal # 2 Pt will transfer from EOB to chair w/ FWW w/ ModA in 6 visits to improve OOB mobility   Goal Outcome # 2 Progressing slower than expected   Short Term Goal # 3 Pt will perform a STS w/ FWW w/ Bandar in 6 visits to improve OOB mobility   Goal Outcome # 3 Progressing slower than expected   Short Term Goal # 4 Pt will be able to ambulate 15 ft w/ FWW w/ ModA in 6 visits to access household distances   Goal Outcome # 4 Goal not met   Short Term Goal # 5 Pt will be able to complete 4 steps w/ FWW w/Mod A in 6 visits to access household   Goal Outcome # 5 Goal not met   Supervising Physical Therapist (PTA Treatments Only)   Supervising Physical Therapist Aurelia Nesbitt

## 2023-02-08 NOTE — PROGRESS NOTES
"Hospital Medicine Daily Progress Note    Date of Service  2/8/2023    Chief Complaint  Kaiden Quiroz is a 62 y.o. male admitted 1/12/2023 with left-sided weakness    Hospital Course  This is a 62-year-old male with past medical history of hypertension, type 2 diabetes, alcohol use, tobacco use disorder and obesity who was admitted on 1/12/2023 with left-sided weakness, significant for right MCA CVA s/p ICA stent and thrombectomy on 1/12, complicated by hemorrhagic conversion.     \"He presented 1/12/22 with Left facial droop and Left-sided weakness. CTH demonstrated hyperdense MCA, for which subsequent CT perfusion demonstrated infarct with penumbra. CTA demonstrated Right ICA and Right MCA occlusion. Neurology was consulted. He was not a candidate for tPA due to hypertension. Neuro-IR performed a Right MCA thrombectomy and Right ICA stent 1/12/23. Subsequent MRI demonstrated Right frontal, basal ganglia, and caudate nucleus infarct. Petechial hemorrhages were noted concerning for hemorrhagic conversion, for which he was not a candidate for DAPT but treated with ASA monotherapy per Neurology recommendation. He was admitted to the ICU post-procedure for neurologic monitoring and nicardipine gtt due to uncontrolled HTN.Started on DAPT 1/24.     During his IR procedure he developed a sinus pause for which CPR was not required prior to spontaneous ROSC. He was administered atropine and glycopyrrolate for sinus bradycardia. He was intubated post-procedure and extubated 1/13/23 to [facility]. CXR demonstrated evolving pulmonary edema. TTE demonstrated normal systolic function and was negative for shunt. He has been diuresed with IV lasix with vast improvement.\"    Patient noted to have significant dysphagia, resulting in aspiration pneumonia, patient required PEG tube placement in 1/24. Patient is s/p FEES 2/8, started on a modified diet.     Interval Problem Update  Patient is s/p FEES 2/8, started on a modified " diet.    PEG tube to remain in place at this time.  [place] will be removed today by nursing staff.    Patient has failed voiding trial x2, to discharge with Mcclendon catheter in place.  Outpatient urology referral placed.    SNF pending.    I have discussed this patient's plan of care and discharge plan at IDT rounds today with Case Management, Nursing, Nursing leadership, and other members of the IDT team.    Consultants/Specialty  neurology and pulmonary    Code Status  Full Code    Disposition  Patient is medically cleared for discharge.   Anticipate discharge to to skilled nursing facility.  I have placed the appropriate orders for post-discharge needs.    Review of Systems  Review of Systems   Neurological:  Positive for speech change, focal weakness and weakness.   All other systems reviewed and are negative.     Physical Exam  Temp:  [36.1 °C (97 °F)-37.1 °C (98.7 °F)] 36.4 °C (97.6 °F)  Pulse:  [18-83] 76  Resp:  [18] 18  BP: (123-141)/(71-77) 140/77  SpO2:  [92 %-97 %] 93 %    Physical Exam  Constitutional:       General: He is not in acute distress.     Appearance: Normal appearance. He is well-developed. He is obese. He is not diaphoretic.      Comments: Sleepy but wakes to voice   HENT:      Head: Normocephalic and atraumatic.      Nose: Nose normal.      Mouth/Throat:      Mouth: Mucous membranes are moist.   Eyes:      General: No scleral icterus.     Conjunctiva/sclera: Conjunctivae normal.   Neck:      Vascular: No JVD.   Cardiovascular:      Rate and Rhythm: Normal rate.      Heart sounds: No murmur heard.    No gallop.   Pulmonary:      Effort: Pulmonary effort is normal. No respiratory distress.      Breath sounds: No stridor. No wheezing or rales.   Abdominal:      Palpations: Abdomen is soft.      Tenderness: There is no abdominal tenderness. There is no guarding or rebound.      Comments: Gtube in place c/d/i   Genitourinary:     Comments: Mcclendon in place  Musculoskeletal:         General: No  tenderness.      Right lower leg: No edema.      Left lower leg: No edema.   Skin:     General: Skin is warm and dry.      Capillary Refill: Capillary refill takes less than 2 seconds.      Coloration: Skin is not jaundiced or pale.      Findings: No rash.   Neurological:      Comments: Left-sided hemiparesis  +L side facial droop  Purposeful and 5/5 R upper and lower   Psychiatric:         Mood and Affect: Mood and affect normal.         Behavior: Behavior normal.       Fluids    Intake/Output Summary (Last 24 hours) at 2/8/2023 1215  Last data filed at 2/8/2023 1100  Gross per 24 hour   Intake 2818 ml   Output 825 ml   Net 1993 ml       Laboratory  Recent Labs     02/06/23  0010   WBC 6.3   RBC 3.65*   HEMOGLOBIN 11.8*   HEMATOCRIT 34.4*   MCV 94.2   MCH 32.3   MCHC 34.3   RDW 42.2   PLATELETCT 204   MPV 11.7     Recent Labs     02/06/23  0010   SODIUM 137   POTASSIUM 3.8   CHLORIDE 100   CO2 27   GLUCOSE 124*   BUN 30*   CREATININE 0.48*   CALCIUM 9.0                   Imaging  CT-HEAD W/O   Final Result      No new intracranial abnormality is identified.      Interval decrease in mass effect from subacute right MCA territory infarction with  expected evolution of macro hemorrhagic transformation      IR-GASTROSTOMY PLACEMENT   Final Result      1.  Fluoroscopic guided percutaneous gastrostomy with placement of an 18-[place] ELISE balloon gastrostomy catheter.      2. The gastrostomy tube may be used for tube feeds 18 hours after placement. Thereafter, liquid diet orders per the referring physician. Saline flush daily as per protocol.      3. In 10-14 days, the anchor button suture or sutures should be cut, releasing the anchor or anchors into the stomach. The suture anchors will then pass through the GI tract as desired. This can be performed in the interventional radiology department if    desired. Call [phone] any weekday [phone] hours to make an appointment.      CT-CTA CHEST PULMONARY ARTERY W/ RECONS    Final Result         1.  No pulmonary embolus appreciated.   2.  Linear densities the bilateral lung bases suggests changes of atelectasis, component of infiltrate is not excluded.   3.  Atherosclerosis and atherosclerotic coronary artery disease.      US-EXTREMITY VENOUS LOWER BILAT   Final Result      DX-ABDOMEN FOR TUBE PLACEMENT   Final Result      NG tube extends below the diaphragm into the region of the stomach. The tip is not visualized.      EC-ECHOCARDIOGRAM LTD W/ CONT   Final Result      DX-CHEST-PORTABLE (1 VIEW)   Final Result         1. No significant interval change.      CT-ABDOMEN-PELVIS W/O   Final Result         1.  Nondependent foci of air in the bladder, could represent changes from recent instrumentation or urinary tract infection.   2.  Diverticulosis   3.  Atherosclerosis and atherosclerotic coronary artery disease      DX-CHEST-PORTABLE (1 VIEW)   Final Result      Ill-defined atelectasis versus consolidation in the left lower lung. Pneumonia can be considered in appropriate clinical setting.         DX-ABDOMEN FOR TUBE PLACEMENT   Final Result         1.  Nonspecific bowel gas pattern in the upper abdomen.   2.  Nasogastric tube tip terminates overlying the expected location of the gastric body.   3.  Cardiomegaly   4.  Hazy linear density left lung base could represent atelectasis or infiltrate.      CT-HEAD W/O   Final Result         1.  Evolving infarct/encephalomalacia in the right MCA distribution.   2.  Areas of hemorrhage in the right basal ganglia and involving the right frontal and anterior temporal sulci, similar compared to MRI January 13, 2023   3.  Atherosclerosis.      DX-CHEST-PORTABLE (1 VIEW)   Final Result         Diffuse interstitial and groundglass opacity throughout both lungs could relate to developing pulmonary edema.      DX-ABDOMEN FOR TUBE PLACEMENT   Final Result         Gastric drainage tube with tip projecting over the expected area of the stomach.       DX-CHEST-PORTABLE (1 VIEW)   Final Result      1.  Interval extubation without pneumothorax.   2.  Stable enlarged cardiac silhouette.   3.  There is bibasilar linear atelectasis.      EC-ECHOCARDIOGRAM COMPLETE W/ CONT   Final Result      MR-BRAIN-W/O   Final Result         Acute infarction involving the right frontal region, right basal ganglia and right caudate nucleus. Minimal punctate infarction involving the right parietal cortex. There is slight mass effect upon the right lateral ventricle with only minimal midline    shift measuring approximately 4 mm.      Petechial hemorrhage is noted within the right frontal cortical infarct. Also noted is petechial hemorrhage in the right basal ganglia and right caudate nucleus but without surrounding vasogenic edema.         DX-PELVIS-1 OR 2 VIEWS   Final Result         1.  No acute traumatic bony injury.      DX-CHEST-PORTABLE (1 VIEW)   Final Result         1.  Left midlung and lower lobe infiltrates, similar to prior study.      [id number] VIEW   Final Result         1.  Nonspecific bowel gas pattern in the upper abdomen.   2.  Cardiomegaly   3.  Hazy left lower lobe infiltrates.   4.  Nasogastric tube tip terminates overlying the expected location of the gastric fundus.      IR-THROMBO MECHANICAL ARTERY,INIT   Final Result         62-year-old patient who presented with complete occlusion of the right ICA at its origin underwent emergent mechanical thrombectomy. After initial angioplasty,   a right cervical internal carotid artery stent was deployed and right MCA thrombectomy was performed. The final angiographic images show complete restoration of flow into the right ICA and MCA with no evidence of distal intracranial embolization.      Final recanalization score: TICI 3      I, Rupertoadriana Annebritanylupe was physically present and participated during the entire procedure of the IR-THROMBO MECHANICAL ARTERY,INIT.                  DX-CHEST-PORTABLE (1 VIEW)   Final  Result      No acute cardiac or pulmonary abnormalities are identified.      CT-CTA NECK WITH & W/O-POST PROCESSING   Final Result      1.  Occlusion of the right internal carotid artery at its origin.      2.  Occlusion of the right vertebral artery at its origin.      Dr. Gongora discussed findings with Dr. Mccarthy.      CT-CTA HEAD WITH & W/O-POST PROCESS   Final Result         1.  Occlusion of the right internal carotid artery and right middle cerebral artery.   2.  Narrowed left M1 segment with severely narrowed and M2 and M3 branches, may represent chronic changes.      These findings were discussed with the patient's clinician, Joseline Mccarthy, on 1/12/2023 8:27 PM.      CT-CEREBRAL PERFUSION ANALYSIS   Final Result         1.  Cerebral blood flow less than 30% likely representing completed infarct = 52 mL.      2.  T Max more than 6 seconds likely representing combination of completed infarct and ischemia = 172 mL.      3.  Mismatched volume likely representing ischemic brain/penumbra = 120 mL      4.  Please note that the cerebral perfusion was performed on the limited brain tissue around the basal ganglia region. Infarct/ischemia outside the CT perfusion sections can be missed in this study.      CT-HEAD W/O   Final Result      1.  No evidence of intracranial hemorrhage, midline shift or mass effect.      2.  Question of hyperdense right MCA though evaluation limited by oblique positioning.              Assessment/Plan  * Acute ischemic right middle cerebral artery (MCA) stroke (HCC)- (present on admission)  Assessment & Plan  Right MCA CVA with hemorrhagic conversion  S/p right ICA stent and MCA thrombectomy 1/12/23  Neurology consulted   High Intensity statin therapy   BP control  DAPT  TTE negative for shunt  Tobacco cessation  PEG placed 1/24, Patient is s/p FEES 2/8, started on a modified diet.  2/4 repeated CT head: Interval decrease in mass effect from subacute right MCA territory infarction with  expected evolution of macro hemorrhagic transformation.  PT/OT/SLP - recommending post-acute placement    Urinary retention  Assessment & Plan  Likely in the setting of BPH  Tried voiding trial 1/25 and 1/31, failed replaced 2/3  Starting terazosin 1/28    Internal carotid artery stent present  Assessment & Plan  Placed 1/12  Per Neuro OK to start DAPT: ASA & Plavix for 6 wks    Hypernatremia  Assessment & Plan  Resolved  Resolved with Free H2O 200ml q6    Pneumonia due to infectious organism- (present on admission)  Assessment & Plan  Resolved    Aspiration pneumonia (Developed 01/19/23)   Procal low  Sputum cultures neg  Strict aspiration precautions   Has completed 5 days of Abx's  MRSA nares neg  Mucomyst/3% normal saline nebs to maintain pulmonary toilet, RT protocol to continue  Pulmonology evaluated him and made recommendations          Oropharyngeal dysphagia- (present on admission)  Assessment & Plan  Secondary to acute stroke.    Aspiration precautions   PEG 1/24  Nutrition following  SLP following  Patient is s/p FEES 2/8, started on a modified diet.    Acute left hemiparesis (HCC)- (present on admission)  Assessment & Plan  Secondary to right MCA stroke.    Pressure ulcer precautions  Aspiration, Fall precautions  PT and OT: SNF placement    Stroke, hemorrhagic (HCC)- (present on admission)  Assessment & Plan  Ischemic stroke with hemorrhagic conversion.  Neuro has OKd for antiplatelet therapy with ASA and plavix given presence of new ICA stent    Toxic metabolic encephalopathy- (present on admission)  Assessment & Plan  Resolved    Persistent 01/20/23  Secondary to stroke, delirium and now concern for relation to underlying pneumonia  Limit restraints as able  Avoid polypharmacy, sedating medications  Delirium precautions  Management of underlying medical conditions  Obtain EEG 01/20/23 and it did not show seizure activity.   cont's to improve daily though examination is limited by dysarthria at  times    Acute respiratory failure with hypoxia (HCC)- (present on admission)  Assessment & Plan  Improve, currently on 1 L of oxygen    01/19/23 CXRAY consistent with Aspiration Pneumonia - Started on IV unasyn  Subsequently trnasitioned to P[facility].  At present off Abx's  MRSA nares neg  trachael aspirate cultures neg  CTA chest neg for PE  TTE LVEF 70%, no signficant valvular or structural abnormalities  O2 demand decreasing: now off HFNC and on 1 LNC  Cont weaning O2    Severe obesity (BMI 35.0-35.9 with comorbidity) ([facility])- (present on admission)  Assessment & Plan  Body mass index is 37.39 kg/m².  Co-morbid HTN, T2DM, KENDAL  Outpatient weight loss    Type 2 diabetes mellitus without complication, without long-term current use of insulin ([facility])- (present on admission)  Assessment & Plan  A1c 5.3  No indication for strict BG control - POCT/SSI deferred  Atorvastatin    Alcohol dependence with withdrawal ([facility])- (present on admission)  Assessment & Plan  Resolved.    Tobacco use disorder- (present on admission)  Assessment & Plan  On nicotine patch     Primary hypertension- (present on admission)  Assessment & Plan  Amlodipine 10 mg         VTE prophylaxis: SCDs/TEDs and enoxaparin ppx    I have performed a physical exam and reviewed and updated ROS and Plan today (2/8/2023). In review of yesterday's note (2/7/2023), there are no changes except as documented above.

## 2023-02-08 NOTE — THERAPY
Occupational Therapy  Daily Treatment     Patient Name: Kaiden Quiroz  Age:  62 y.o., Sex:  male  Medical Record #: [id number]  Today's Date: 2/8/2023       Precautions: Fall Risk, PEG Tube, Swallow Precautions ( See Comments)  Comments: L side deficits    Assessment    Pt seen for OT tx. Continues to be limited by decreased activity tolerance and LUE weakness impacting ability to complete ADLs and ADL transfers independently. Mod A supine > sit, able to maintain sitting balance EOB w/o asssist from therapist. LUE continues to be limited by weakness w/ trace L shoulder movement this session. Pt hyperverbose but redirectable. Will continue to follow while in house.     Plan    O.T. Treatment Plan: Continue Current Treatment Plan    DC Equipment Recommendations: Unable to determine at this time  Discharge Recommendations: Recommend post-acute placement for additional occupational therapy services prior to discharge home     02/08/23 1031   Cognition    Cognition / Consciousness X   Safety Awareness Impaired   New Learning Impaired   Attention Impaired   Sequencing Impaired   Active ROM Upper Body   Active ROM Upper Body  X   Comments LUE limited ROM grossly weak, trace movement in L shoulder   Strength Upper Body   Upper Body Strength  X   Comments LUE flaccid   Balance   Sitting Balance (Static) Fair -   Sitting Balance (Dynamic) Fair -   Weight Shift Sitting Poor   Bed Mobility    Supine to Sit Moderate Assist   Sit to Supine Moderate Assist   Activities of Daily Living   Grooming Minimal Assist;Seated   Upper Body Dressing Moderate Assist   Lower Body Dressing Maximal Assist   Toileting Maximal Assist   Functional Mobility   Comments did not assess   Short Term Goals   Short Term Goal # 1 pt will groom seated EOB w/ setup   Goal Outcome # 1 Progressing as expected   Short Term Goal # 2 pt will maintain sitting balance unsupported 10 sec in prep for seated ADLs   Goal Outcome # 2 Progressing as expected   Short  Term Goal # 3 pt will demo ADL txfs with Bandar   Goal Outcome # 3 Progressing as expected   Anticipated Discharge Equipment and Recommendations   DC Equipment Recommendations Unable to determine at this time   Discharge Recommendations Recommend post-acute placement for additional occupational therapy services prior to discharge home

## 2023-02-08 NOTE — DISCHARGE PLANNING
Agency/Facility Name: [facility]  Outcome: DPA left vmail asking for update on referral status.    Agency/Facility Name: [facility]  Spoke To: Ashwini  Outcome: DPA called to check on status of referral. DPA was told to email admissions at [email or web address]  DPA emailed admissions     Agency[facility] Name[facility]  Spoke To: Ashlee  Outcome: DPA left vmail with reception asking for a call back from admissions regarding pts referral status     [id number]    DPA manually faxed to [facility] at [phone]    DPA hard faxed referral to [facility] at [phone]

## 2023-02-08 NOTE — DIETARY
Nutrition Services: Brief Update    Pt started on soft and bite-sized, diabetic diet with MTL. Pt was scheduled for FEES this am and appears to have passed as SLP placed order, pending note. Pt previously on TF Replete with Fiber at goal rate this am via PEG tube.     Recommendations/Plan:  Diet per SLP, does not need diabetic diet as A1C is 5.3 and sugars have been stable on standard formula. Will adjust order.   Hold TF to assess PO intake. RD will check back tomorrow on PO intake.     RD following.

## 2023-02-08 NOTE — CARE PLAN
Problem: Optimal Care of the Stroke Patient  Goal: Optimal emergency care for the stroke patient  Outcome: Progressing  Goal: Optimal acute care for the stroke patient  Outcome: Progressing     Problem: Knowledge Deficit - Stroke Education  Goal: Patient's knowledge of stroke and risk factors will improve  Outcome: Progressing     Problem: Psychosocial - Patient Condition  Goal: Patient's ability to verbalize feelings about condition will improve  Outcome: Progressing  Goal: Patient's ability to re-evaluate and adapt role responsibilities will improve  Outcome: Progressing     Problem: Discharge Planning - Stroke  Goal: Ensure Stroke Core Measures are met prior to discharge  Outcome: Progressing  Goal: Patient’s continuum of care needs will be met  Outcome: Progressing     Problem: Neuro Status  Goal: Neuro status will remain stable or improve  Outcome: Progressing     Problem: Hemodynamic Monitoring  Goal: Patient's hemodynamics, fluid balance and neurologic status will be stable or improve  Outcome: Progressing     Problem: Respiratory - Stroke Patient  Goal: Patient will achieve/maintain optimum respiratory rate/effort  Outcome: Progressing     Problem: Dysphagia  Goal: Dysphagia will improve  Outcome: Progressing     Problem: Risk for Aspiration  Goal: Patient's risk for aspiration will be absent or decrease  Outcome: Progressing     Problem: Urinary Elimination  Goal: Establish and maintain regular urinary output  Outcome: Progressing     Problem: Bowel Elimination  Goal: Establish and maintain regular bowel function  Outcome: Progressing     Problem: Mobility - Stroke  Goal: Patient's capacity to carry out activities will improve  Outcome: Progressing  Goal: Spasticity will be prevented or improved  Outcome: Progressing  Goal: Subluxation will be prevented or improved  Outcome: Progressing     Problem: Self Care  Goal: Patient will have the ability to perform ADLs independently or with assistance (bathe,  groom, dress, toilet and feed)  Outcome: Progressing     Problem: Knowledge Deficit - Standard  Goal: Patient and family/care givers will demonstrate understanding of plan of care, disease process/condition, diagnostic tests and medications  Outcome: Progressing     Problem: Pain - Standard  Goal: Alleviation of pain or a reduction in pain to the patient’s comfort goal  Outcome: Progressing     Problem: Skin Integrity  Goal: Skin integrity is maintained or improved  Outcome: Progressing     Problem: Fall Risk  Goal: Patient will remain free from falls  Outcome: Progressing     Problem: Optimal Care for Alcohol Withdrawal  Goal: Optimal Care for the alcohol withdrawal patient  Outcome: Progressing   The patient is Stable - Low risk of patient condition declining or worsening    Shift Goals  Clinical Goals: q4 hr neuro checks, improve mobility  Patient Goals: Sleep/pain control  Family Goals: RADHA    Progress made toward(s) clinical / shift goals:  Improve mobility.     Patient is not progressing towards the following goals:

## 2023-02-08 NOTE — WOUND TEAM
[facility]  Inpatient Services  Wound and Skin Care Evaluation    Admission Date: 2023     Last order of IP CONSULT TO WOUND CARE was found on 2023 from Hospital Encounter on 2023     HPI, PMH, SH: Reviewed    History reviewed. No pertinent surgical history.  Social History     Tobacco Use    Smoking status: Former     Packs/day: 0.50     Years: 46.00     Pack years: 23.00     Types: Cigarettes     Start date:      Quit date: 2023     Years since quittin.0    Smokeless tobacco: Never   Substance Use Topics    Alcohol use: Yes     Alcohol/week: 25.2 oz     Types: 42 Cans of beer per week     Chief Complaint   Patient presents with    Possible Stroke     BIBA from home for stroke like symptoms  Wife found pt with slurred speech, L facial droop, and L sided weakness   LKW 1830  Pt had a fall yesterday, + head strike, - thinners  , aox4  Initial NIH of 20 by neurologist, pt taken to CT then red 11     Diagnosis: Acute CVA (cerebrovascular accident) (HCC) [I63.9]    Unit where seen by Wound Team: [id number]/     WOUND CONSULT/FOLLOW UP RELATED TO:  Sacrum     WOUND HISTORY:  Pt previously had NGT in place.  Came in for MCA stroke and has left sided deficits.      WOUND ASSESSMENT/LDA   Wound 23 Pressure Injury Nose Right R nare Indeterminable---> Resolved (Active)   Wound Image   23 1200   Site Assessment Intact 23 1200   Periwound Assessment Clean;Dry;Intact 23 0800   Margins Attached edges;Defined edges 23 0400   Closure Open to air 23   Drainage Amount None 23   Drainage Description RADHA 23 0000   Treatments Cleansed;Site care 23   Wound Cleansing Normal Saline Irrigation 23   Dressing Cleansing/Solutions Not Applicable 23 1400   Dressing Options Open to Air 23   Dressing Status Open to Air 23                                                                                Vascular:    CHEN:   No results found.  Lab Values:    Lab Results   Component Value Date/Time    WBC 6.3 02/06/2023 12:10 AM    RBC 3.65 (L) 02/06/2023 12:10 AM    HEMOGLOBIN 11.8 (L) 02/06/2023 12:10 AM    HEMATOCRIT 34.4 (L) 02/06/2023 12:10 AM    CREACTPROT <0.30 02/06/2023 02:38 PM    HBA1C 5.3 01/12/2023 08:02 PM        Culture Results show:  No results found for this or any previous visit (from the past 720 hour(s)).    Pain Level/Medicated:  Denies pain     INTERVENTIONS BY WOUND TEAM:  Chart and images reviewed. Discussed with bedside RN. All areas of concern (based on picture review, LDA review and discussion with bedside RN) have been thoroughly assessed. Documentation of areas based on significant findings. This RN in to assess patient. Performed standard wound care which includes appropriate positioning, dressing removal and non-selective debridement. Pictures and measurements obtained weekly if/when required.     Preparation for Dressing removal: NA CAIO  Non-selectively Debrided with:  Normal Saline and Gauze   Sharp debridement: NA  Rahda wound: Cleansed with Normal Saline and Gauze,   Primary Dressing: CAIO, offloading  Secondary (Outer) Dressing:     Sacrum - mepilex replaced     Interdisciplinary consultation: Patient, Bedside RN,     EVALUATION / RATIONALE FOR TREATMENT:  Most Recent Date:  2/8/23 Patient's R. Nares is resolved.  Wound team is signing off.  Please re-consult if needed.      2/1/23: R nare indeterminable wound now dry and scabbed, surrounding periwound skin intact. No source of pressure now that NGT has been discontinued and patient is not on oxygen. Sacrococcygeal area with linear shaped discoloration due to friction, however no open wounds. Mepilex replaced. All offloading measures in place.     1/29/23: re-consult for patient's sacrum, similar in previous assessment. Previous moisture fissure scab, applied barrier paste as patient is incontinent of stool and mepilex applied up  "higher to cover scar tissue.  Penis assessed and patient just needed thacker care.  Repositioned stat lock to give tubing more slack.  Patient on TAPs, but cloth jose alfredo, multiple dri-eduard pads-> removed extra layers and placed micro climate pad   1/26/23: Patient right nare with indeterminable pressure injury likely from previous NG tube. NGT no longer in place. Continue to offload area. Patient sacrum also assessed and found to have scar tissue with surrounding blanchable erythema. Patient states he had a previous injury by \"tripping on a curbside and falling on tailbone.\"     Goals: Steady decrease in wound area and depth weekly.    WOUND TEAM PLAN OF CARE ([X] for frequency of wound follow up,):  Nursing to follow dressing orders written for wound care. Contact wound team if area fails to progress, deteriorates or with any questions/concerns if something comes up before next scheduled follow up (See below as to whether wound is following and frequency of wound follow up)  Dressing changes by wound team:                   Follow up 3 times weekly:                NPWT change 3 times weekly:     Follow up 1-2 times weekly:    X weekly for nose  Follow up Bi-Monthly:           Follow up Monthly (High Risk):                        Follow up as needed:   for sacrum  Other (explain):     NURSING PLAN OF CARE ORDERS (X):  Dressing changes: See Dressing Care orders:   Skin care: See Skin Care orders:   RN Prevention Protocol: X  Rectal tube care: See Rectal Tube Care orders:   Other orders:    RSKIN:   CURRENTLY IN PLACE (X), APPLIED THIS VISIT (A), ORDERED (O):   Q shift Lucius:  X  Q shift pressure point assessments:  X    Surface/Positioning   Pressure redistribution mattress            Low Airloss          ICU Low Airloss   Bariatric NUNO     Waffle cushion        Waffle Overlay        X  Reposition q 2 hours      TAPs Turning system   x  Z Eduard Pillow     Offloading/Redistribution   Sacral Mepilex (Silicone dressing)   " A  Heel Mepilex (Silicone dressing)         Heel float boots (Prevalon boot)             Float Heels off Bed with Pillows           Respiratory   Silicone O2 tubing     X    Gray Foam Ear protectors     Cannula fixation Device (Tender )          High flow offloading Clip    Elastic head band offloading device      Anchorfast                                                         Trach with Optifoam split foam             Containment/Moisture Prevention   Rectal tube or BMS    Purwick/Condom Cath        Mcclendon Catheter  X  Barrier wipes           Barrier paste a      Antifungal tx      Interdry        Mobilization not assessed    Up to chair        Ambulate      PT/OT      Nutrition      Dietician        Diabetes Education      PO     TF X    TPN     NPO   # days     Other        Anticipated discharge plans: TBD  LTACH:        SNF/Rehab:                  Home Health Care:           Outpatient Wound Center:            Self/Family Care:        Other:                  Vac Discharge Needs:   Vac Discharge plan is purely a recommendation from wound team and not a requirement for discharge unless otherwise stated by physician.  Not Applicable Pt not on a wound vac: X      Regular Vac while inpatient, alternative dressing at DC:        Regular Vac in use and continued at DC:            Reg. Vac w/ Skin Sub/Biologic in use. Will need to be changed 2x wkly:      Veraflo Vac while inpatient, ok to transition to Regular Vac on Discharge (Bedside RN to Clamp small instillation tubing at time of DC):           Veraflo Vac while inpatient, would benefit from remaining on Veraflo Vac upon discharge:

## 2023-02-08 NOTE — CARE PLAN
The patient is Stable - Low risk of patient condition declining or worsening    Shift Goals  Clinical Goals: stable neuro checks, improve mobility  Patient Goals: rest well, sit up  Family Goals: RADHA    Progress made toward(s) clinical / shift goals:  Tube feed held to evaluate oral intake on new diet.    Patient is not progressing towards the following goals:

## 2023-02-09 PROCEDURE — 700102 HCHG RX REV CODE 250 W/ 637 OVERRIDE(OP): Performed by: GENERAL PRACTICE

## 2023-02-09 PROCEDURE — 700111 HCHG RX REV CODE 636 W/ 250 OVERRIDE (IP): Performed by: INTERNAL MEDICINE

## 2023-02-09 PROCEDURE — 770006 HCHG ROOM/CARE - MED/SURG/GYN SEMI*

## 2023-02-09 PROCEDURE — A9270 NON-COVERED ITEM OR SERVICE: HCPCS | Performed by: GENERAL PRACTICE

## 2023-02-09 PROCEDURE — 99232 SBSQ HOSP IP/OBS MODERATE 35: CPT | Performed by: GENERAL PRACTICE

## 2023-02-09 PROCEDURE — 92526 ORAL FUNCTION THERAPY: CPT

## 2023-02-09 RX ORDER — POLYETHYLENE GLYCOL 3350 17 G/17G
1 POWDER, FOR SOLUTION ORAL 2 TIMES DAILY
Status: DISCONTINUED | OUTPATIENT
Start: 2023-02-09 | End: 2023-02-12

## 2023-02-09 RX ORDER — TERAZOSIN 1 MG/1
1 CAPSULE ORAL EVERY EVENING
Status: DISCONTINUED | OUTPATIENT
Start: 2023-02-09 | End: 2023-02-16 | Stop reason: HOSPADM

## 2023-02-09 RX ORDER — BISACODYL 10 MG
10 SUPPOSITORY, RECTAL RECTAL
Status: DISCONTINUED | OUTPATIENT
Start: 2023-02-09 | End: 2023-02-16 | Stop reason: HOSPADM

## 2023-02-09 RX ORDER — POLYETHYLENE GLYCOL 3350 17 G/17G
1 POWDER, FOR SOLUTION ORAL
Status: DISCONTINUED | OUTPATIENT
Start: 2023-02-09 | End: 2023-02-16 | Stop reason: HOSPADM

## 2023-02-09 RX ORDER — AMLODIPINE BESYLATE 5 MG/1
10 TABLET ORAL
Status: DISCONTINUED | OUTPATIENT
Start: 2023-02-09 | End: 2023-02-16 | Stop reason: HOSPADM

## 2023-02-09 RX ORDER — CHOLECALCIFEROL (VITAMIN D3) 125 MCG
5 CAPSULE ORAL NIGHTLY
Status: DISCONTINUED | OUTPATIENT
Start: 2023-02-09 | End: 2023-02-16 | Stop reason: HOSPADM

## 2023-02-09 RX ORDER — ATORVASTATIN CALCIUM 80 MG/1
80 TABLET, FILM COATED ORAL EVERY EVENING
Status: DISCONTINUED | OUTPATIENT
Start: 2023-02-09 | End: 2023-02-16 | Stop reason: HOSPADM

## 2023-02-09 RX ORDER — CLOPIDOGREL BISULFATE 75 MG/1
75 TABLET ORAL DAILY
Status: DISCONTINUED | OUTPATIENT
Start: 2023-02-09 | End: 2023-02-12

## 2023-02-09 RX ORDER — ASPIRIN 81 MG/1
81 TABLET, CHEWABLE ORAL DAILY
Status: DISCONTINUED | OUTPATIENT
Start: 2023-02-09 | End: 2023-02-12

## 2023-02-09 RX ORDER — DIPHENHYDRAMINE HCL 25 MG
25 TABLET ORAL EVERY 6 HOURS PRN
Status: DISCONTINUED | OUTPATIENT
Start: 2023-02-09 | End: 2023-02-16 | Stop reason: HOSPADM

## 2023-02-09 RX ORDER — AMOXICILLIN 250 MG
1 CAPSULE ORAL 2 TIMES DAILY
Status: DISCONTINUED | OUTPATIENT
Start: 2023-02-09 | End: 2023-02-16 | Stop reason: HOSPADM

## 2023-02-09 RX ORDER — ACETAMINOPHEN 325 MG/1
650 TABLET ORAL EVERY 6 HOURS PRN
Status: DISCONTINUED | OUTPATIENT
Start: 2023-02-09 | End: 2023-02-16 | Stop reason: HOSPADM

## 2023-02-09 RX ADMIN — CLOPIDOGREL BISULFATE 75 MG: 75 TABLET ORAL at 05:59

## 2023-02-09 RX ADMIN — ACETAMINOPHEN 650 MG: 325 TABLET, FILM COATED ORAL at 22:54

## 2023-02-09 RX ADMIN — TERAZOSIN 1 MG: 1 CAPSULE ORAL at 17:59

## 2023-02-09 RX ADMIN — ENOXAPARIN SODIUM 40 MG: 40 INJECTION SUBCUTANEOUS at 17:59

## 2023-02-09 RX ADMIN — ATORVASTATIN CALCIUM 80 MG: 80 TABLET, FILM COATED ORAL at 17:59

## 2023-02-09 RX ADMIN — Medication 5 MG: at 21:25

## 2023-02-09 RX ADMIN — AMLODIPINE BESYLATE 10 MG: 5 TABLET ORAL at 05:59

## 2023-02-09 RX ADMIN — ASPIRIN 81 MG 81 MG: 81 TABLET ORAL at 05:59

## 2023-02-09 ASSESSMENT — ENCOUNTER SYMPTOMS
FOCAL WEAKNESS: 1
SPEECH CHANGE: 1
WEAKNESS: 1

## 2023-02-09 ASSESSMENT — PAIN DESCRIPTION - PAIN TYPE
TYPE: ACUTE PAIN

## 2023-02-09 ASSESSMENT — FIBROSIS 4 INDEX: FIB4 SCORE: 1.18

## 2023-02-09 NOTE — DIETARY
Nutrition Services Brief Update:    Day 28 of admit.  Kaiden Quiroz is a 62 y.o. male with admitting DX of Acute CVA.    Pt is on soft and bite-sized diet with MTL. Pt previously receiving TF Replete with Fiber at 80 ml/hr which is being held to assess PO intake. Pt has PEG tube in place. PO intake since starting oral diet yesterday is variable:    2/8   Breakfast = 0%  Lunch = <25%  Dinner = %  2/9  Breakfast = 25-50%    Plan to hold TF one more day to assess PO intake for consistency. Observed pt asleep and lunch tray untouched. Will order Ready Care shakes to promote adequate PO intake.     Problem: Nutritional:  Goal: Achieve adequate nutritional intake  Description: Patient will consume 50% of meals  Outcome: Progressing    RD following. Will check back on PO intake tomorrow.

## 2023-02-09 NOTE — CARE PLAN
The patient is Stable - Low risk of patient condition declining or worsening    Shift Goals  Clinical Goals: Q4h neuro checks, SBP <160, increase mobility  Patient Goals: Sleep  Family Goals: RADHA    Progress made toward(s) clinical / shift goals:      Problem: Knowledge Deficit - Stroke Education  Goal: Patient's knowledge of stroke and risk factors will improve  Outcome: Progressing   Pt is able to verbalize an understanding of the care plan with knowledge on stroke/risk factors.     Problem: Neuro Status  Goal: Neuro status will remain stable or improve  Outcome: Progressing   Pt is Q4h neuro checks. No acute changes in neuro status noted at this time.     Problem: Dysphagia  Goal: Dysphagia will improve  Outcome: Progressing   Pt has been advanced to a diet via SLP. Monitoring 1:1 during feeding.     Problem: Risk for Aspiration  Goal: Patient's risk for aspiration will be absent or decrease  Outcome: Progressing   Pt shows no signs or symptoms of aspiration while eating and drinking on approved diet by SLP.     Problem: Fall Risk  Goal: Patient will remain free from falls  Outcome: Progressing   Patient's bed is locked and in the lowest position. Call light within reach. Bed alarm is on and plugged in. Pt given education on how to use the call light and not get up without calling for assistance. Pt receptive and demonstrates and understanding in teaching.       Patient is not progressing towards the following goals:

## 2023-02-09 NOTE — CARE PLAN
The patient is Stable - Low risk of patient condition declining or worsening    Shift Goals  Clinical Goals: Shower, increase oral intake  Patient Goals: rest, shower  Family Goals: RADHA    Progress made toward(s) clinical / shift goals:  Pt was given a shower this morning. PO intake remains minimal RD to follow. Boosts will be added to trays.     Problem: Optimal Care of the Stroke Patient  Goal: Optimal emergency care for the stroke patient  Outcome: Progressing     Problem: Knowledge Deficit - Stroke Education  Goal: Patient's knowledge of stroke and risk factors will improve  Outcome: Progressing     Problem: Neuro Status  Goal: Neuro status will remain stable or improve  Outcome: Progressing

## 2023-02-10 PROCEDURE — 99232 SBSQ HOSP IP/OBS MODERATE 35: CPT | Performed by: GENERAL PRACTICE

## 2023-02-10 PROCEDURE — 700111 HCHG RX REV CODE 636 W/ 250 OVERRIDE (IP): Performed by: INTERNAL MEDICINE

## 2023-02-10 PROCEDURE — 770006 HCHG ROOM/CARE - MED/SURG/GYN SEMI*

## 2023-02-10 PROCEDURE — A9270 NON-COVERED ITEM OR SERVICE: HCPCS | Performed by: GENERAL PRACTICE

## 2023-02-10 PROCEDURE — 700102 HCHG RX REV CODE 250 W/ 637 OVERRIDE(OP): Performed by: GENERAL PRACTICE

## 2023-02-10 PROCEDURE — 97530 THERAPEUTIC ACTIVITIES: CPT | Mod: CO

## 2023-02-10 PROCEDURE — 97112 NEUROMUSCULAR REEDUCATION: CPT | Mod: CQ

## 2023-02-10 RX ADMIN — AMLODIPINE BESYLATE 10 MG: 5 TABLET ORAL at 04:15

## 2023-02-10 RX ADMIN — ASPIRIN 81 MG 81 MG: 81 TABLET ORAL at 04:15

## 2023-02-10 RX ADMIN — POLYETHYLENE GLYCOL 3350 1 PACKET: 17 POWDER, FOR SOLUTION ORAL at 17:08

## 2023-02-10 RX ADMIN — Medication 5 MG: at 21:16

## 2023-02-10 RX ADMIN — TERAZOSIN 1 MG: 1 CAPSULE ORAL at 17:07

## 2023-02-10 RX ADMIN — ATORVASTATIN CALCIUM 80 MG: 80 TABLET, FILM COATED ORAL at 17:07

## 2023-02-10 RX ADMIN — ENOXAPARIN SODIUM 40 MG: 40 INJECTION SUBCUTANEOUS at 17:07

## 2023-02-10 RX ADMIN — SENNOSIDES AND DOCUSATE SODIUM 1 TABLET: 50; 8.6 TABLET ORAL at 17:07

## 2023-02-10 RX ADMIN — CLOPIDOGREL BISULFATE 75 MG: 75 TABLET ORAL at 04:15

## 2023-02-10 ASSESSMENT — COGNITIVE AND FUNCTIONAL STATUS - GENERAL
EATING MEALS: A LOT
SUGGESTED CMS G CODE MODIFIER MOBILITY: CM
SUGGESTED CMS G CODE MODIFIER DAILY ACTIVITY: CL
TURNING FROM BACK TO SIDE WHILE IN FLAT BAD: A LOT
MOBILITY SCORE: 7
TOILETING: A LOT
MOVING FROM LYING ON BACK TO SITTING ON SIDE OF FLAT BED: UNABLE
PERSONAL GROOMING: A LOT
MOVING TO AND FROM BED TO CHAIR: UNABLE
DRESSING REGULAR UPPER BODY CLOTHING: A LOT
HELP NEEDED FOR BATHING: A LOT
WALKING IN HOSPITAL ROOM: TOTAL
STANDING UP FROM CHAIR USING ARMS: TOTAL
DRESSING REGULAR LOWER BODY CLOTHING: A LOT
DAILY ACTIVITIY SCORE: 12
CLIMB 3 TO 5 STEPS WITH RAILING: TOTAL

## 2023-02-10 ASSESSMENT — PATIENT HEALTH QUESTIONNAIRE - PHQ9
1. LITTLE INTEREST OR PLEASURE IN DOING THINGS: NOT AT ALL
SUM OF ALL RESPONSES TO PHQ9 QUESTIONS 1 AND 2: 0
2. FEELING DOWN, DEPRESSED, IRRITABLE, OR HOPELESS: NOT AT ALL

## 2023-02-10 ASSESSMENT — PAIN DESCRIPTION - PAIN TYPE
TYPE: ACUTE PAIN

## 2023-02-10 ASSESSMENT — ENCOUNTER SYMPTOMS
FOCAL WEAKNESS: 1
SPEECH CHANGE: 1
WEAKNESS: 1

## 2023-02-10 ASSESSMENT — GAIT ASSESSMENTS: GAIT LEVEL OF ASSIST: UNABLE TO PARTICIPATE

## 2023-02-10 NOTE — PROGRESS NOTES
"Hospital Medicine Daily Progress Note    Date of Service  2/10/2023    Chief Complaint  Kaiden Quiroz is a 62 y.o. male admitted 1/12/2023 with left-sided weakness    Hospital Course  This is a 62-year-old male with past medical history of hypertension, type 2 diabetes, alcohol use, tobacco use disorder and obesity who was admitted on 1/12/2023 with left-sided weakness, significant for right MCA CVA s/p ICA stent and thrombectomy on 1/12, complicated by hemorrhagic conversion.     \"He presented 1/12/22 with Left facial droop and Left-sided weakness. CTH demonstrated hyperdense MCA, for which subsequent CT perfusion demonstrated infarct with penumbra. CTA demonstrated Right ICA and Right MCA occlusion. Neurology was consulted. He was not a candidate for tPA due to hypertension. Neuro-IR performed a Right MCA thrombectomy and Right ICA stent 1/12/23. Subsequent MRI demonstrated Right frontal, basal ganglia, and caudate nucleus infarct. Petechial hemorrhages were noted concerning for hemorrhagic conversion, for which he was not a candidate for DAPT but treated with ASA monotherapy per Neurology recommendation. He was admitted to the ICU post-procedure for neurologic monitoring and nicardipine gtt due to uncontrolled HTN.Started on DAPT 1/24.     During his IR procedure he developed a sinus pause for which CPR was not required prior to spontaneous ROSC. He was administered atropine and glycopyrrolate for sinus bradycardia. He was intubated post-procedure and extubated 1/13/23 to [facility]. CXR demonstrated evolving pulmonary edema. TTE demonstrated normal systolic function and was negative for shunt. He has been diuresed with IV lasix with vast improvement.\"    Patient noted to have significant dysphagia, resulting in aspiration pneumonia, patient required PEG tube placement in 1/24. Patient is s/p FEES 2/8, started on a modified diet.     Interval Problem Update  Patient is s/p FEES 2/8, patient continues to tolerate " modified diet with appropriate oral intake.  Tube feeds have been placed on hold at this time.  PEG tube to remain in place at this time.  [place] removed.    Patient has failed voiding trial x2, to discharge with Mcclendon catheter in place.  Outpatient urology referral placed.    SNF pending, asked case management to resend referrals as there is a lag time in bed availability.    I have discussed this patient's plan of care and discharge plan at IDT rounds today with Case Management, Nursing, Nursing leadership, and other members of the IDT team.    Consultants/Specialty  neurology and pulmonary    Code Status  Full Code    Disposition  Patient is medically cleared for discharge.   Anticipate discharge to to skilled nursing facility.  I have placed the appropriate orders for post-discharge needs.    Review of Systems  Review of Systems   Neurological:  Positive for speech change, focal weakness and weakness.   All other systems reviewed and are negative.     Physical Exam  Temp:  [36.1 °C (97 °F)] 36.1 °C (97 °F)  Pulse:  [67-90] 75  Resp:  [18] 18  BP: (131-153)/(71-86) 153/78  SpO2:  [94 %-95 %] 95 %    Physical Exam  Constitutional:       General: He is not in acute distress.     Appearance: Normal appearance. He is well-developed. He is obese. He is not diaphoretic.      Comments: Sleepy but wakes to voice   HENT:      Head: Normocephalic and atraumatic.      Nose: Nose normal.      Mouth/Throat:      Mouth: Mucous membranes are moist.   Eyes:      General: No scleral icterus.     Conjunctiva/sclera: Conjunctivae normal.   Neck:      Vascular: No JVD.   Cardiovascular:      Rate and Rhythm: Normal rate.      Heart sounds: No murmur heard.    No gallop.   Pulmonary:      Effort: Pulmonary effort is normal. No respiratory distress.      Breath sounds: No stridor. No wheezing or rales.   Abdominal:      Palpations: Abdomen is soft.      Tenderness: There is no abdominal tenderness. There is no guarding or rebound.       Comments: Gtube in place c/d/i   Genitourinary:     Comments: Mcclendon in place  Musculoskeletal:         General: No tenderness.      Right lower leg: No edema.      Left lower leg: No edema.   Skin:     General: Skin is warm and dry.      Capillary Refill: Capillary refill takes less than 2 seconds.      Coloration: Skin is not jaundiced or pale.      Findings: No rash.   Neurological:      Comments: Left-sided hemiparesis  +L side facial droop  Purposeful and 5/5 R upper and lower   Psychiatric:         Mood and Affect: Mood and affect normal.         Behavior: Behavior normal.       Fluids    Intake/Output Summary (Last 24 hours) at 2/10/2023 1310  Last data filed at 2/10/2023 1100  Gross per 24 hour   Intake 180 ml   Output 750 ml   Net -570 ml       Laboratory                            Imaging  CT-HEAD W/O   Final Result      No new intracranial abnormality is identified.      Interval decrease in mass effect from subacute right MCA territory infarction with  expected evolution of macro hemorrhagic transformation      IR-GASTROSTOMY PLACEMENT   Final Result      1.  Fluoroscopic guided percutaneous gastrostomy with placement of an 18-[place] ELISE balloon gastrostomy catheter.      2. The gastrostomy tube may be used for tube feeds 18 hours after placement. Thereafter, liquid diet orders per the referring physician. Saline flush daily as per protocol.      3. In 10-14 days, the anchor button suture or sutures should be cut, releasing the anchor or anchors into the stomach. The suture anchors will then pass through the GI tract as desired. This can be performed in the interventional radiology department if    desired. Call [phone] any weekday [phone] hours to make an appointment.      CT-CTA CHEST PULMONARY ARTERY W/ RECONS   Final Result         1.  No pulmonary embolus appreciated.   2.  Linear densities the bilateral lung bases suggests changes of atelectasis, component of infiltrate is not excluded.   3.   Atherosclerosis and atherosclerotic coronary artery disease.      US-EXTREMITY VENOUS LOWER BILAT   Final Result      DX-ABDOMEN FOR TUBE PLACEMENT   Final Result      NG tube extends below the diaphragm into the region of the stomach. The tip is not visualized.      EC-ECHOCARDIOGRAM LTD W/ CONT   Final Result      DX-CHEST-PORTABLE (1 VIEW)   Final Result         1. No significant interval change.      CT-ABDOMEN-PELVIS W/O   Final Result         1.  Nondependent foci of air in the bladder, could represent changes from recent instrumentation or urinary tract infection.   2.  Diverticulosis   3.  Atherosclerosis and atherosclerotic coronary artery disease      DX-CHEST-PORTABLE (1 VIEW)   Final Result      Ill-defined atelectasis versus consolidation in the left lower lung. Pneumonia can be considered in appropriate clinical setting.         DX-ABDOMEN FOR TUBE PLACEMENT   Final Result         1.  Nonspecific bowel gas pattern in the upper abdomen.   2.  Nasogastric tube tip terminates overlying the expected location of the gastric body.   3.  Cardiomegaly   4.  Hazy linear density left lung base could represent atelectasis or infiltrate.      CT-HEAD W/O   Final Result         1.  Evolving infarct/encephalomalacia in the right MCA distribution.   2.  Areas of hemorrhage in the right basal ganglia and involving the right frontal and anterior temporal sulci, similar compared to MRI January 13, 2023   3.  Atherosclerosis.      DX-CHEST-PORTABLE (1 VIEW)   Final Result         Diffuse interstitial and groundglass opacity throughout both lungs could relate to developing pulmonary edema.      DX-ABDOMEN FOR TUBE PLACEMENT   Final Result         Gastric drainage tube with tip projecting over the expected area of the stomach.      DX-CHEST-PORTABLE (1 VIEW)   Final Result      1.  Interval extubation without pneumothorax.   2.  Stable enlarged cardiac silhouette.   3.  There is bibasilar linear atelectasis.       EC-ECHOCARDIOGRAM COMPLETE W/ CONT   Final Result      MR-BRAIN-W/O   Final Result         Acute infarction involving the right frontal region, right basal ganglia and right caudate nucleus. Minimal punctate infarction involving the right parietal cortex. There is slight mass effect upon the right lateral ventricle with only minimal midline    shift measuring approximately 4 mm.      Petechial hemorrhage is noted within the right frontal cortical infarct. Also noted is petechial hemorrhage in the right basal ganglia and right caudate nucleus but without surrounding vasogenic edema.         DX-PELVIS-1 OR 2 VIEWS   Final Result         1.  No acute traumatic bony injury.      DX-CHEST-PORTABLE (1 VIEW)   Final Result         1.  Left midlung and lower lobe infiltrates, similar to prior study.      [id number] VIEW   Final Result         1.  Nonspecific bowel gas pattern in the upper abdomen.   2.  Cardiomegaly   3.  Hazy left lower lobe infiltrates.   4.  Nasogastric tube tip terminates overlying the expected location of the gastric fundus.      IR-THROMBO MECHANICAL ARTERY,INIT   Final Result         62-year-old patient who presented with complete occlusion of the right ICA at its origin underwent emergent mechanical thrombectomy. After initial angioplasty,   a right cervical internal carotid artery stent was deployed and right MCA thrombectomy was performed. The final angiographic images show complete restoration of flow into the right ICA and MCA with no evidence of distal intracranial embolization.      Final recanalization score: TICI 3      I, Petra Mobley was physically present and participated during the entire procedure of the IR-THROMBO MECHANICAL ARTERY,INIT.                  DX-CHEST-PORTABLE (1 VIEW)   Final Result      No acute cardiac or pulmonary abnormalities are identified.      CT-CTA NECK WITH & W/O-POST PROCESSING   Final Result      1.  Occlusion of the right internal carotid artery at  its origin.      2.  Occlusion of the right vertebral artery at its origin.      Dr. Gongora discussed findings with Dr. Mccarthy.      CT-CTA HEAD WITH & W/O-POST PROCESS   Final Result         1.  Occlusion of the right internal carotid artery and right middle cerebral artery.   2.  Narrowed left M1 segment with severely narrowed and M2 and M3 branches, may represent chronic changes.      These findings were discussed with the patient's clinician, Joseline Mccarthy, on 1/12/2023 8:27 PM.      CT-CEREBRAL PERFUSION ANALYSIS   Final Result         1.  Cerebral blood flow less than 30% likely representing completed infarct = 52 mL.      2.  T Max more than 6 seconds likely representing combination of completed infarct and ischemia = 172 mL.      3.  Mismatched volume likely representing ischemic brain/penumbra = 120 mL      4.  Please note that the cerebral perfusion was performed on the limited brain tissue around the basal ganglia region. Infarct/ischemia outside the CT perfusion sections can be missed in this study.      CT-HEAD W/O   Final Result      1.  No evidence of intracranial hemorrhage, midline shift or mass effect.      2.  Question of hyperdense right MCA though evaluation limited by oblique positioning.              Assessment/Plan  * Acute ischemic right middle cerebral artery (MCA) stroke (HCC)- (present on admission)  Assessment & Plan  Right MCA CVA with hemorrhagic conversion  S/p right ICA stent and MCA thrombectomy 1/12/23  Neurology consulted   High Intensity statin therapy   BP control  DAPT  TTE negative for shunt  Tobacco cessation  PEG placed 1/24, Patient is s/p FEES 2/8, started on a modified diet.  2/4 repeated CT head: Interval decrease in mass effect from subacute right MCA territory infarction with expected evolution of macro hemorrhagic transformation.  PT/OT/SLP - recommending post-acute placement    Urinary retention  Assessment & Plan  Likely in the setting of BPH  Tried voiding trial  1/25 and 1/31, failed replaced 2/3  Starting terazosin 1/28    Internal carotid artery stent present  Assessment & Plan  Placed 1/12  Per Neuro OK to start DAPT: ASA & Plavix for 6 wks    Hypernatremia  Assessment & Plan  Resolved  Resolved with Free H2O 200ml q6    Pneumonia due to infectious organism- (present on admission)  Assessment & Plan  Resolved    Aspiration pneumonia (Developed 01/19/23)   Procal low  Sputum cultures neg  Strict aspiration precautions   Has completed 5 days of Abx's  MRSA nares neg  Mucomyst/3% normal saline nebs to maintain pulmonary toilet, RT protocol to continue  Pulmonology evaluated him and made recommendations          Oropharyngeal dysphagia- (present on admission)  Assessment & Plan  Secondary to acute stroke.    Aspiration precautions   PEG 1/24  Nutrition following  SLP following  Patient is s/p FEES 2/8, patient continues to tolerate modified diet. PEG tube to remain in place at this time.     Acute left hemiparesis (HCC)- (present on admission)  Assessment & Plan  Secondary to right MCA stroke.    Pressure ulcer precautions  Aspiration, Fall precautions  PT and OT: SNF placement    Stroke, hemorrhagic (HCC)- (present on admission)  Assessment & Plan  Ischemic stroke with hemorrhagic conversion.  Neuro has OKd for antiplatelet therapy with ASA and plavix given presence of new ICA stent    Toxic metabolic encephalopathy- (present on admission)  Assessment & Plan  Resolved    Persistent 01/20/23  Secondary to stroke, delirium and now concern for relation to underlying pneumonia  Limit restraints as able  Avoid polypharmacy, sedating medications  Delirium precautions  Management of underlying medical conditions  Obtain EEG 01/20/23 and it did not show seizure activity.   cont's to improve daily though examination is limited by dysarthria at times    Acute respiratory failure with hypoxia (HCC)- (present on admission)  Assessment & Plan  Improve, currently on 1 L of  oxygen    01/19/23 CXRAY consistent with Aspiration Pneumonia - Started on IV unasyn  Subsequently trnasitioned to [facility].  At present off Abx's  MRSA nares neg  trachael aspirate cultures neg  CTA chest neg for PE  TTE LVEF 70%, no signficant valvular or structural abnormalities  O2 demand decreasing: now off HFNC and on 1 LNC  Cont weaning O2    Severe obesity (BMI 35.0-35.9 with comorbidity) ([facility])- (present on admission)  Assessment & Plan  Body mass index is 37.39 kg/m².  Co-morbid HTN, T2DM, KENDAL  Outpatient weight loss    Type 2 diabetes mellitus without complication, without long-term current use of insulin ([facility])- (present on admission)  Assessment & Plan  A1c 5.3  No indication for strict BG control - POCT/SSI deferred  Atorvastatin    Alcohol dependence with withdrawal ([facility])- (present on admission)  Assessment & Plan  Resolved.    Tobacco use disorder- (present on admission)  Assessment & Plan  On nicotine patch     Primary hypertension- (present on admission)  Assessment & Plan  Amlodipine 10 mg         VTE prophylaxis: SCDs/TEDs and enoxaparin ppx    I have performed a physical exam and reviewed and updated ROS and Plan today (2/10/2023). In review of yesterday's note (2/9/2023), there are no changes except as documented above.

## 2023-02-10 NOTE — PROGRESS NOTES
"Hospital Medicine Daily Progress Note    Date of Service  2/9/2023    Chief Complaint  Kaiden Quiroz is a 62 y.o. male admitted 1/12/2023 with left-sided weakness    Hospital Course  This is a 62-year-old male with past medical history of hypertension, type 2 diabetes, alcohol use, tobacco use disorder and obesity who was admitted on 1/12/2023 with left-sided weakness, significant for right MCA CVA s/p ICA stent and thrombectomy on 1/12, complicated by hemorrhagic conversion.     \"He presented 1/12/22 with Left facial droop and Left-sided weakness. CTH demonstrated hyperdense MCA, for which subsequent CT perfusion demonstrated infarct with penumbra. CTA demonstrated Right ICA and Right MCA occlusion. Neurology was consulted. He was not a candidate for tPA due to hypertension. Neuro-IR performed a Right MCA thrombectomy and Right ICA stent 1/12/23. Subsequent MRI demonstrated Right frontal, basal ganglia, and caudate nucleus infarct. Petechial hemorrhages were noted concerning for hemorrhagic conversion, for which he was not a candidate for DAPT but treated with ASA monotherapy per Neurology recommendation. He was admitted to the ICU post-procedure for neurologic monitoring and nicardipine gtt due to uncontrolled HTN.Started on DAPT 1/24.     During his IR procedure he developed a sinus pause for which CPR was not required prior to spontaneous ROSC. He was administered atropine and glycopyrrolate for sinus bradycardia. He was intubated post-procedure and extubated 1/13/23 to [facility]. CXR demonstrated evolving pulmonary edema. TTE demonstrated normal systolic function and was negative for shunt. He has been diuresed with IV lasix with vast improvement.\"    Patient noted to have significant dysphagia, resulting in aspiration pneumonia, patient required PEG tube placement in 1/24. Patient is s/p FEES 2/8, started on a modified diet.     Interval Problem Update  Patient is s/p FEES 2/8, patient continues to tolerate " modified diet. PEG tube to remain in place at this time.  Bradley removed.    Patient has failed voiding trial x2, to discharge with Mcclendon catheter in place.  Outpatient urology referral placed.    SNF pending, asked case management to resend referrals as there is a lag time and bed availability.    I have discussed this patient's plan of care and discharge plan at IDT rounds today with Case Management, Nursing, Nursing leadership, and other members of the IDT team.    Consultants/Specialty  neurology and pulmonary    Code Status  Full Code    Disposition  Patient is medically cleared for discharge.   Anticipate discharge to to skilled nursing facility.  I have placed the appropriate orders for post-discharge needs.    Review of Systems  Review of Systems   Neurological:  Positive for speech change, focal weakness and weakness.   All other systems reviewed and are negative.     Physical Exam  Temp:  [36.1 °C (96.9 °F)-36.4 °C (97.6 °F)] 36.1 °C (97 °F)  Pulse:  [69-85] 72  Resp:  [18] 18  BP: (131-159)/(71-93) 131/71  SpO2:  [92 %-96 %] 96 %    Physical Exam  Constitutional:       General: He is not in acute distress.     Appearance: Normal appearance. He is well-developed. He is obese. He is not diaphoretic.      Comments: Sleepy but wakes to voice   HENT:      Head: Normocephalic and atraumatic.      Nose: Nose normal.      Mouth/Throat:      Mouth: Mucous membranes are moist.   Eyes:      General: No scleral icterus.     Conjunctiva/sclera: Conjunctivae normal.   Neck:      Vascular: No JVD.   Cardiovascular:      Rate and Rhythm: Normal rate.      Heart sounds: No murmur heard.    No gallop.   Pulmonary:      Effort: Pulmonary effort is normal. No respiratory distress.      Breath sounds: No stridor. No wheezing or rales.   Abdominal:      Palpations: Abdomen is soft.      Tenderness: There is no abdominal tenderness. There is no guarding or rebound.      Comments: Gtube in place c/d/i   Genitourinary:      Comments: Mcclendon in place  Musculoskeletal:         General: No tenderness.      Right lower leg: No edema.      Left lower leg: No edema.   Skin:     General: Skin is warm and dry.      Capillary Refill: Capillary refill takes less than 2 seconds.      Coloration: Skin is not jaundiced or pale.      Findings: No rash.   Neurological:      Comments: Left-sided hemiparesis  +L side facial droop  Purposeful and 5/5 R upper and lower   Psychiatric:         Mood and Affect: Mood and affect normal.         Behavior: Behavior normal.       Fluids    Intake/Output Summary (Last 24 hours) at 2/9/2023 1652  Last data filed at 2/9/2023 1647  Gross per 24 hour   Intake 200 ml   Output 2600 ml   Net -2400 ml       Laboratory                            Imaging  CT-HEAD W/O   Final Result      No new intracranial abnormality is identified.      Interval decrease in mass effect from subacute right MCA territory infarction with  expected evolution of macro hemorrhagic transformation      IR-GASTROSTOMY PLACEMENT   Final Result      1.  Fluoroscopic guided percutaneous gastrostomy with placement of an 18-Irish ELISE balloon gastrostomy catheter.      2. The gastrostomy tube may be used for tube feeds 18 hours after placement. Thereafter, liquid diet orders per the referring physician. Saline flush daily as per protocol.      3. In 10-14 days, the anchor button suture or sutures should be cut, releasing the anchor or anchors into the stomach. The suture anchors will then pass through the GI tract as desired. This can be performed in the interventional radiology department if    desired. Call [phone] any weekday [phone] hours to make an appointment.      CT-CTA CHEST PULMONARY ARTERY W/ RECONS   Final Result         1.  No pulmonary embolus appreciated.   2.  Linear densities the bilateral lung bases suggests changes of atelectasis, component of infiltrate is not excluded.   3.  Atherosclerosis and atherosclerotic coronary artery  disease.      US-EXTREMITY VENOUS LOWER BILAT   Final Result      DX-ABDOMEN FOR TUBE PLACEMENT   Final Result      NG tube extends below the diaphragm into the region of the stomach. The tip is not visualized.      EC-ECHOCARDIOGRAM LTD W/ CONT   Final Result      DX-CHEST-PORTABLE (1 VIEW)   Final Result         1. No significant interval change.      CT-ABDOMEN-PELVIS W/O   Final Result         1.  Nondependent foci of air in the bladder, could represent changes from recent instrumentation or urinary tract infection.   2.  Diverticulosis   3.  Atherosclerosis and atherosclerotic coronary artery disease      DX-CHEST-PORTABLE (1 VIEW)   Final Result      Ill-defined atelectasis versus consolidation in the left lower lung. Pneumonia can be considered in appropriate clinical setting.         DX-ABDOMEN FOR TUBE PLACEMENT   Final Result         1.  Nonspecific bowel gas pattern in the upper abdomen.   2.  Nasogastric tube tip terminates overlying the expected location of the gastric body.   3.  Cardiomegaly   4.  Hazy linear density left lung base could represent atelectasis or infiltrate.      CT-HEAD W/O   Final Result         1.  Evolving infarct/encephalomalacia in the right MCA distribution.   2.  Areas of hemorrhage in the right basal ganglia and involving the right frontal and anterior temporal sulci, similar compared to MRI January 13, 2023   3.  Atherosclerosis.      DX-CHEST-PORTABLE (1 VIEW)   Final Result         Diffuse interstitial and groundglass opacity throughout both lungs could relate to developing pulmonary edema.      DX-ABDOMEN FOR TUBE PLACEMENT   Final Result         Gastric drainage tube with tip projecting over the expected area of the stomach.      DX-CHEST-PORTABLE (1 VIEW)   Final Result      1.  Interval extubation without pneumothorax.   2.  Stable enlarged cardiac silhouette.   3.  There is bibasilar linear atelectasis.      EC-ECHOCARDIOGRAM COMPLETE W/ CONT   Final Result       MR-BRAIN-W/O   Final Result         Acute infarction involving the right frontal region, right basal ganglia and right caudate nucleus. Minimal punctate infarction involving the right parietal cortex. There is slight mass effect upon the right lateral ventricle with only minimal midline    shift measuring approximately 4 mm.      Petechial hemorrhage is noted within the right frontal cortical infarct. Also noted is petechial hemorrhage in the right basal ganglia and right caudate nucleus but without surrounding vasogenic edema.         DX-PELVIS-1 OR 2 VIEWS   Final Result         1.  No acute traumatic bony injury.      DX-CHEST-PORTABLE (1 VIEW)   Final Result         1.  Left midlung and lower lobe infiltrates, similar to prior study.      TO-AZGLZWW-8 VIEW   Final Result         1.  Nonspecific bowel gas pattern in the upper abdomen.   2.  Cardiomegaly   3.  Hazy left lower lobe infiltrates.   4.  Nasogastric tube tip terminates overlying the expected location of the gastric fundus.      IR-THROMBO MECHANICAL ARTERY,INIT   Final Result         62-year-old patient who presented with complete occlusion of the right ICA at its origin underwent emergent mechanical thrombectomy. After initial angioplasty,   a right cervical internal carotid artery stent was deployed and right MCA thrombectomy was performed. The final angiographic images show complete restoration of flow into the right ICA and MCA with no evidence of distal intracranial embolization.      Final recanalization score: TICI 3      I, Petra Mobley was physically present and participated during the entire procedure of the IR-THROMBO MECHANICAL ARTERY,INIT.                  DX-CHEST-PORTABLE (1 VIEW)   Final Result      No acute cardiac or pulmonary abnormalities are identified.      CT-CTA NECK WITH & W/O-POST PROCESSING   Final Result      1.  Occlusion of the right internal carotid artery at its origin.      2.  Occlusion of the right vertebral  artery at its origin.      Dr. Gongora discussed findings with Dr. Mccarthy.      CT-CTA HEAD WITH & W/O-POST PROCESS   Final Result         1.  Occlusion of the right internal carotid artery and right middle cerebral artery.   2.  Narrowed left M1 segment with severely narrowed and M2 and M3 branches, may represent chronic changes.      These findings were discussed with the patient's clinician, Joseline Mccarthy, on 1/12/2023 8:27 PM.      CT-CEREBRAL PERFUSION ANALYSIS   Final Result         1.  Cerebral blood flow less than 30% likely representing completed infarct = 52 mL.      2.  T Max more than 6 seconds likely representing combination of completed infarct and ischemia = 172 mL.      3.  Mismatched volume likely representing ischemic brain/penumbra = 120 mL      4.  Please note that the cerebral perfusion was performed on the limited brain tissue around the basal ganglia region. Infarct/ischemia outside the CT perfusion sections can be missed in this study.      CT-HEAD W/O   Final Result      1.  No evidence of intracranial hemorrhage, midline shift or mass effect.      2.  Question of hyperdense right MCA though evaluation limited by oblique positioning.              Assessment/Plan  * Acute ischemic right middle cerebral artery (MCA) stroke (HCC)- (present on admission)  Assessment & Plan  Right MCA CVA with hemorrhagic conversion  S/p right ICA stent and MCA thrombectomy 1/12/23  Neurology consulted   High Intensity statin therapy   BP control  DAPT  TTE negative for shunt  Tobacco cessation  PEG placed 1/24, Patient is s/p FEES 2/8, started on a modified diet.  2/4 repeated CT head: Interval decrease in mass effect from subacute right MCA territory infarction with expected evolution of macro hemorrhagic transformation.  PT/OT/SLP - recommending post-acute placement    Urinary retention  Assessment & Plan  Likely in the setting of BPH  Tried voiding trial 1/25 and 1/31, failed replaced 2/3  Starting terazosin  1/28    Internal carotid artery stent present  Assessment & Plan  Placed 1/12  Per Neuro OK to start DAPT: ASA & Plavix for 6 wks    Hypernatremia  Assessment & Plan  Resolved  Resolved with Free H2O 200ml q6    Pneumonia due to infectious organism- (present on admission)  Assessment & Plan  Resolved    Aspiration pneumonia (Developed 01/19/23)   Procal low  Sputum cultures neg  Strict aspiration precautions   Has completed 5 days of Abx's  MRSA nares neg  Mucomyst/3% normal saline nebs to maintain pulmonary toilet, RT protocol to continue  Pulmonology evaluated him and made recommendations          Oropharyngeal dysphagia- (present on admission)  Assessment & Plan  Secondary to acute stroke.    Aspiration precautions   PEG 1/24  Nutrition following  SLP following  Patient is s/p FEES 2/8, patient continues to tolerate modified diet. PEG tube to remain in place at this time.     Acute left hemiparesis (HCC)- (present on admission)  Assessment & Plan  Secondary to right MCA stroke.    Pressure ulcer precautions  Aspiration, Fall precautions  PT and OT: SNF placement    Stroke, hemorrhagic (HCC)- (present on admission)  Assessment & Plan  Ischemic stroke with hemorrhagic conversion.  Neuro has OKd for antiplatelet therapy with ASA and plavix given presence of new ICA stent    Toxic metabolic encephalopathy- (present on admission)  Assessment & Plan  Resolved    Persistent 01/20/23  Secondary to stroke, delirium and now concern for relation to underlying pneumonia  Limit restraints as able  Avoid polypharmacy, sedating medications  Delirium precautions  Management of underlying medical conditions  Obtain EEG 01/20/23 and it did not show seizure activity.   cont's to improve daily though examination is limited by dysarthria at times    Acute respiratory failure with hypoxia (HCC)- (present on admission)  Assessment & Plan  Improve, currently on 1 L of oxygen    01/19/23 CXRAY consistent with Aspiration Pneumonia -  Started on IV unasyn  Subsequently trnasitioned to [facility].  At present off Abx's  MRSA nares neg  trachael aspirate cultures neg  CTA chest neg for PE  TTE LVEF 70%, no signficant valvular or structural abnormalities  O2 demand decreasing: now off HFNC and on 1 LNC  Cont weaning O2    Severe obesity (BMI 35.0-35.9 with comorbidity) ([facility])- (present on admission)  Assessment & Plan  Body mass index is 37.39 kg/m².  Co-morbid HTN, T2DM, KENDAL  Outpatient weight loss    Type 2 diabetes mellitus without complication, without long-term current use of insulin ([facility])- (present on admission)  Assessment & Plan  A1c 5.3  No indication for strict BG control - POCT/SSI deferred  Atorvastatin    Alcohol dependence with withdrawal ([facility])- (present on admission)  Assessment & Plan  Resolved.    Tobacco use disorder- (present on admission)  Assessment & Plan  On nicotine patch     Primary hypertension- (present on admission)  Assessment & Plan  Amlodipine 10 mg         VTE prophylaxis: SCDs/TEDs and enoxaparin ppx    I have performed a physical exam and reviewed and updated ROS and Plan today (2/9/2023). In review of yesterday's note (2/8/2023), there are no changes except as documented above.

## 2023-02-10 NOTE — PROGRESS NOTES
Assumed patient care around 1900. Patient is alert and oriented x 4. Pt being medicatd per MAR for left shoulder pain. Bed in lowest position and locked. Call light within reach and bed alarm is set. Educated pt on importance of increasing PO intake. Pt demonstrates an understanding of the importance and ate 50-75% of their dinner.

## 2023-02-10 NOTE — CARE PLAN
The patient is Stable - Low risk of patient condition declining or worsening    Shift Goals  Clinical Goals: Improve mobility, increase oral intake  Patient Goals: Sleep  Family Goals: RADHA    Progress made toward(s) clinical / shift goals:      Problem: Knowledge Deficit - Stroke Education  Goal: Patient's knowledge of stroke and risk factors will improve  Outcome: Progressing   Pt is able to verbalize an understanding of the plan of care. All of the pt's questions/concerns are being answered/addressed.    Problem: Neuro Status  Goal: Neuro status will remain stable or improve  Outcome: Progressing   Q4H neuro checks in place. No acute changes in the pt's neuro status noted at this time.      Problem: Risk for Aspiration  Goal: Patient's risk for aspiration will be absent or decrease  Outcome: Progressing  Pt on an approved diet by SLP. Pt does not show any signs of aspiration while eating.  Educated pt on the importance of taking small sips/slowly eating to prevent aspiration. Pt demonstrates an understanding of importance.     Problem: Pain - Standard  Goal: Alleviation of pain or a reduction in pain to the patient’s comfort goal  Outcome: Progressing   Frequent pain assessments throughout shift. PRN pain medications provided per MAR and pt request. Pharmacological and non-pharmacological interventions utilized.  Pt had 4/10 left shoulder pain, and medicated to meet their goal to sleep comfortably.    Problem: Skin Integrity  Goal: Skin integrity is maintained or improved  Outcome: Progressing  Pt is Q2h turns using TAPs system and wedges for repositioning. Pillows in use for support and placed under left upper extremity. Elbow mepilex applied to pt's left elbow. Sacral wound care and mepilex reapplied.    Patient is not progressing towards the following goals:

## 2023-02-10 NOTE — DIETARY
Nutrition Services: Update   Day 29 of admit.  Kaiden Quiroz is a 62 y.o. male with admitting DX of Acute CVA.     Pt is currently on Level 6 soft and bite sized diet with supplements. Pt previously receiving TF Replete with Fiber at 80 ml/hr. Per MD note PEG to remain in place at this time though holding TF at this time. Pt with variable yet overall adequate intake. RD continues to encourage intake of all meals and supplements     2/8   Breakfast = 0%  Lunch = <25%  Dinner = %  2/9  Breakfast = 25-50%  Lunch: %  Snack: %  Dinner: 50-75%, % of nutritional supplement    Problem: Nutritional:  Goal: Achieve adequate nutritional intake  Description: Patient will consume >50-75% of meals  Outcome: Progressing       Recommendations/Plan:  PO diet as tolerated per MD and SLP   Encourage intake of meals >50-75%  Document intake of all meals as % taken in ADL's to provide interdisciplinary communication across all shifts.   Monitor weight.  Nutrition rep will continue to see patient for ongoing meal and snack preferences.    RD following

## 2023-02-10 NOTE — PROGRESS NOTES
Received patient from [facility]. Patient is aox4. Patient has PEG tube but as per orders, holding feeding at this time. Only doing q12 hr 60mL water flush. Encouraging oral feed. Will document intake %. Educated on the importance of consuming >50% of his meals today.     Call bell at bedside. Continuity of care.

## 2023-02-10 NOTE — THERAPY
"Speech Language Pathology  Daily Treatment     Patient Name: Kaiden Quiroz  Age:  62 y.o., Sex:  male  Medical Record #: [id number]  Today's Date: 2/9/2023     Precautions  Precautions: (P) Fall Risk, PEG Tube  Comments: L side deficits    Assessment    Patient completed a FEES yesterday with results :\"presents with a mild oropharyngeal dysphagia, likely acute related to R MCA CVA, prolonged NPO status, and ARF\".     Patient with lunch tray. Patient was attempting to take thin Gatorade from a large bottle when I entered the room.  He had a cup of thickened Gatorade at bedside.  Patient tolerated soft bite sized tray without overt s/s of aspiration and was eating slowly taking small bites independently.  Patient tolerated mildly thick Gatorade and juice without overt s/s of aspiration.  Patient needed cues, but recalled after why he was supposed to drink only the thickened liquids.  He also reported knowing he was not supposed to drink the thin Gatorade, but stated he preferred it because it \"doesn't loosen my bowels\".  Educated regarding results of FEES yesterday and that it was still recommended that he continue with a modified diet to reduce risk of aspiration; however, anticipate pt will progress to a regular diet in the following days. SLP following to ensure diet tolerance and advance diet as tolerated.         Recommendations  Continue soft & bite size (level 6) and mildly thick liquid (level 2) diet   PEG tube feedings per RD recs  OK for single sips of un-thickened water between meals (no straws)  3.  Swallowing Instructions & Precautions:   Supervision: Direct supervision during meals  Positioning: Fully upright and midline during oral intake  Medication: Whole with liquid  Strategies: Small bites/sips, Slow rate of intake, Multiple swallows (x 2) per bite/sip , straws OK w/ MTL  Oral Care: Q4h    Plan    Continue current treatment plan.    Discharge Recommendations: (P) Recommend home health for " "continued speech therapy services    Subjective    RN cleared patient for po for dysphagia tx.  Patient agreeable as he has his lunch tray.     Objective       02/09/23 1602   Precautions   Precautions Fall Risk;PEG Tube   Vitals   O2 (LPM) 2   O2 Delivery Device Silicone Nasal Cannula   Pain 0 - 10 Group   Therapist Pain Assessment Post Activity Pain Same as Prior to Activity   Dysphagia    Nutritional Liquid Intake Rating Scale Thickened beverages (mildly thick unless otherwise specified)   Nutritional Food Intake Rating Scale Total oral diet with multiple consistencies but requiring special preparation or compensations   Nursing Communication Swallow Precaution Sign Posted at Head of Bed   Patient / Family Goals   Patient / Family Goal #1 EDITED 1/19 - \"Some nice cold water.\"   Goal #1 Outcome Progressing as expected   Short Term Goals   Short Term Goal # 1 Pt will consume prefeeding trials without any overt s/sx of aspiration   Goal Outcome # 1 Goal met   Short Term Goal # 2 Pt will complete instrumental evaluation of swallow with SLP to further assess swallow function and determine POC.   Goal Outcome # 2  Goal met, new goal aded   Short Term Goal # 2 B  Patient will consume a SB6/MT2 diet with no overt s/sx of aspiration given dir sup.   Goal Outcome  # 2 B Progressing as expected   Short Term Goal # 4 Patient will consume sips of thin liquids w/ no overt s/sx of aspiration.   Goal Outcome  # 4 Progressing as expected   Education Group   Dysphagia Patient Response Patient;Acceptance;Explanation;Verbal Demonstration;Action Demonstration   Anticipated Discharge Needs   Discharge Recommendations Recommend home health for continued speech therapy services   Therapy Recommendations Upon DC Dysphagia Training;Patient / Family / Caregiver Education   Interdisciplinary Plan of Care Collaboration   IDT Collaboration with  Nursing   Patient Position at End of Therapy In Bed;Bed Alarm On;Call Light within Reach;Tray " Table within Reach;Phone within Reach   Collaboration Comments RN updated

## 2023-02-10 NOTE — THERAPY
Physical Therapy   Daily Treatment     Patient Name: Kaiden Quiroz  Age:  62 y.o., Sex:  male  Medical Record #: [id number]  Today's Date: 2/10/2023     Precautions  Precautions: Fall Risk;PEG Tube  Comments: L side deficits    Assessment    Pt greeted and seen for PT treatment. Pt has improved bed mobility, req'd [place] for bed mobility and is able to maintain sitting balance EOB w/o assist or UE support. Standing trial attempted x1, pt with poor initiation and poor WB tolerance, req'd MaxA x2 person to stand. Pt currently limited by impaired balance, weakness and decreased activity tolerance which negatively impacts functional mobility. Pt will continue to benefit from skilled PT to address deficits.       Plan    Physical Therapy Treatment Plan  Physical Therapy Treatment Plan: Continue Current Treatment Plan    DC Equipment Recommendations: Unable to determine at this time  Discharge Recommendations: Recommend post-acute placement for additional physical therapy services prior to discharge home         02/10/23 0945   Cognition    Comments slow to wake, cooperative, hyperverbose   Neuro-Muscular Treatments   Neuro-Muscular Treatments Postural Facilitation;Tactile Cuing;Verbal Cuing   Comments able to maintain sitting balance w/o assist.pt is able to perform purposeful mvmts through B LE, but poor WBing in standing. Pt is unable to acheive full upright stand.   Balance   Sitting Balance (Static) Fair   Sitting Balance (Dynamic) Fair -   Standing Balance (Static) Trace   Standing Balance (Dynamic) Trace   Weight Shift Sitting Poor   Weight Shift Standing Absent   Skilled Intervention Verbal Cuing;Postural Facilitation;Sequencing;Tactile Cuing   Comments standing w/ physical assist x2   Bed Mobility    Supine to Sit Minimal Assist   Sit to Supine Minimal Assist   Scooting Minimal Assist   Rolling Minimal Assist to Rt.;Minimum Assist to Lt.;Moderate Assist to Rt.;Moderate Assist to Lt.   Skilled Intervention  Verbal Cuing;Tactile Cuing;Sequencing;Postural Facilitation   Gait Analysis   Gait Level Of Assist Unable to Participate   Functional Mobility   Sit to Stand Maximal Assist  (x2 person)   Comments unable to achieve full standing, poor WB tolerance in L LE   Activity Tolerance   Sitting Edge of Bed 20 min   Short Term Goals    Short Term Goal # 1 Pt will transition from supine to EOB w/ Bandar in 6 visits to improve independence in bed mobility   Goal Outcome # 1 Progressing as expected   Short Term Goal # 2 Pt will transfer from EOB to chair w/ FWW w/ ModA in 6 visits to improve OOB mobility   Goal Outcome # 2 Progressing slower than expected   Short Term Goal # 3 Pt will perform a STS w/ FWW w/ Bandar in 6 visits to improve OOB mobility   Goal Outcome # 3 Progressing slower than expected   Short Term Goal # 4 Pt will be able to ambulate 15 ft w/ FWW w/ ModA in 6 visits to access household distances   Goal Outcome # 4 Goal not met   Short Term Goal # 5 Pt will be able to complete 4 steps w/ FWW w/Mod A in 6 visits to access household   Goal Outcome # 5 Goal not met   Supervising Physical Therapist (PTA Treatments Only)   Supervising Physical Therapist Aurelia Nesbitt

## 2023-02-11 PROCEDURE — 99232 SBSQ HOSP IP/OBS MODERATE 35: CPT | Performed by: GENERAL PRACTICE

## 2023-02-11 PROCEDURE — 770006 HCHG ROOM/CARE - MED/SURG/GYN SEMI*

## 2023-02-11 PROCEDURE — 700111 HCHG RX REV CODE 636 W/ 250 OVERRIDE (IP): Performed by: INTERNAL MEDICINE

## 2023-02-11 PROCEDURE — 700102 HCHG RX REV CODE 250 W/ 637 OVERRIDE(OP): Performed by: GENERAL PRACTICE

## 2023-02-11 PROCEDURE — A9270 NON-COVERED ITEM OR SERVICE: HCPCS | Performed by: GENERAL PRACTICE

## 2023-02-11 RX ORDER — ASPIRIN 81 MG/1
81 TABLET, CHEWABLE ORAL DAILY
Qty: 100 TABLET | Status: SHIPPED
Start: 2023-02-12

## 2023-02-11 RX ORDER — CLOPIDOGREL BISULFATE 75 MG/1
75 TABLET ORAL DAILY
Qty: 30 TABLET | Status: SHIPPED
Start: 2023-02-12

## 2023-02-11 RX ORDER — LISINOPRIL 10 MG/1
10 TABLET ORAL
Status: DISCONTINUED | OUTPATIENT
Start: 2023-02-11 | End: 2023-02-15

## 2023-02-11 RX ORDER — AMLODIPINE BESYLATE 10 MG/1
10 TABLET ORAL DAILY
Qty: 30 TABLET | Status: SHIPPED
Start: 2023-02-12 | End: 2023-08-06

## 2023-02-11 RX ORDER — LISINOPRIL 10 MG/1
10 TABLET ORAL DAILY
Qty: 30 TABLET | Status: SHIPPED
Start: 2023-02-12 | End: 2023-02-16 | Stop reason: SDUPTHER

## 2023-02-11 RX ORDER — CHOLECALCIFEROL (VITAMIN D3) 125 MCG
5 CAPSULE ORAL NIGHTLY
Qty: 30 TABLET | Status: SHIPPED
Start: 2023-02-11 | End: 2023-08-06

## 2023-02-11 RX ORDER — ATORVASTATIN CALCIUM 80 MG/1
80 TABLET, FILM COATED ORAL EVERY EVENING
Qty: 30 TABLET | Status: SHIPPED
Start: 2023-02-11 | End: 2023-08-06

## 2023-02-11 RX ORDER — TERAZOSIN 1 MG/1
1 CAPSULE ORAL EVERY EVENING
Qty: 30 CAPSULE | Refills: 3 | Status: SHIPPED
Start: 2023-02-11

## 2023-02-11 RX ADMIN — ENOXAPARIN SODIUM 40 MG: 40 INJECTION SUBCUTANEOUS at 18:21

## 2023-02-11 RX ADMIN — LISINOPRIL 10 MG: 10 TABLET ORAL at 08:17

## 2023-02-11 RX ADMIN — AMLODIPINE BESYLATE 10 MG: 5 TABLET ORAL at 05:29

## 2023-02-11 RX ADMIN — TERAZOSIN 1 MG: 1 CAPSULE ORAL at 20:06

## 2023-02-11 RX ADMIN — Medication 5 MG: at 20:06

## 2023-02-11 RX ADMIN — ASPIRIN 81 MG 81 MG: 81 TABLET ORAL at 05:30

## 2023-02-11 RX ADMIN — CLOPIDOGREL BISULFATE 75 MG: 75 TABLET ORAL at 05:30

## 2023-02-11 RX ADMIN — ATORVASTATIN CALCIUM 80 MG: 80 TABLET, FILM COATED ORAL at 18:21

## 2023-02-11 ASSESSMENT — PAIN DESCRIPTION - PAIN TYPE
TYPE: ACUTE PAIN

## 2023-02-11 ASSESSMENT — ENCOUNTER SYMPTOMS
FOCAL WEAKNESS: 1
SPEECH CHANGE: 1
WEAKNESS: 1

## 2023-02-11 NOTE — PROGRESS NOTES
Assumed patient care around 1900. Patient is alert and oriented x 4. Denies pain or discomfort. Bed in lowest position and locked. Call light within reach and bed alarm is set. Pt moved to S186-1 during the night. Pt preferred to text his wife with his updated room, than for me to call her.

## 2023-02-11 NOTE — CARE PLAN
The patient is Stable - Low risk of patient condition declining or worsening    Shift Goals  Clinical Goals: safety, increase PO intake  Patient Goals: comfort  Family Goals: [place]    Progress made toward(s) clinical / shift goals:    Problem: Knowledge Deficit - Stroke Education  Goal: Patient's knowledge of stroke and risk factors will improve  Outcome: Progressing     Problem: Psychosocial - Patient Condition  Goal: Patient's ability to verbalize feelings about condition will improve  Outcome: Progressing     Problem: Neuro Status  Goal: Neuro status will remain stable or improve  Outcome: Progressing     Problem: Hemodynamic Monitoring  Goal: Patient's hemodynamics, fluid balance and neurologic status will be stable or improve  Outcome: Progressing     Problem: Respiratory - Stroke Patient  Goal: Patient will achieve/maintain optimum respiratory rate/effort  Outcome: Progressing     Problem: Dysphagia  Goal: Dysphagia will improve  Outcome: Progressing       Patient is not progressing towards the following goals:

## 2023-02-11 NOTE — CARE PLAN
The patient is Stable - Low risk of patient condition declining or worsening    Shift Goals  Clinical Goals: encourage PO intake >50% of each meal, no pain, q2hr turns,  Patient Goals: increase appetite, d/c to SNF  Family Goals: Rylie    Progress made toward(s) clinical / shift goals:      Problem: Optimal Care of the Stroke Patient  Goal: Optimal emergency care for the stroke patient  Outcome: Progressing  Goal: Optimal acute care for the stroke patient  Outcome: Progressing     Problem: Knowledge Deficit - Stroke Education  Goal: Patient's knowledge of stroke and risk factors will improve  Outcome: Progressing     Problem: Psychosocial - Patient Condition  Goal: Patient's ability to verbalize feelings about condition will improve  Outcome: Progressing  Goal: Patient's ability to re-evaluate and adapt role responsibilities will improve  Outcome: Progressing     Problem: Discharge Planning - Stroke  Goal: Ensure Stroke Core Measures are met prior to discharge  Outcome: Progressing  Goal: Patient’s continuum of care needs will be met  Outcome: Progressing     Problem: Neuro Status  Goal: Neuro status will remain stable or improve  Outcome: Progressing     Problem: Hemodynamic Monitoring  Goal: Patient's hemodynamics, fluid balance and neurologic status will be stable or improve  Outcome: Progressing     Problem: Respiratory - Stroke Patient  Goal: Patient will achieve/maintain optimum respiratory rate/effort  Outcome: Progressing       Patient is not progressing towards the following goals:

## 2023-02-11 NOTE — THERAPY
Occupational Therapy  Daily Treatment     Patient Name: Kaiden Quiroz  Age:  62 y.o., Sex:  male  Medical Record #: [id number]  Today's Date: 2/10/2023       Precautions: Fall Risk, PEG Tube  Comments: L side deficits    Assessment    Pt seen for OT tx. Continues to be limited by decreased activity tolerance, balance deficits and LUE weakness impacting ability to complete ADLs and ADL transfers independently. Min A supine > sit, able to maintain sitting balance EOB w/o assist from therapist. LUE grossly limited by weakness w/ no active or purposeful movement. Able to complete self feeding and ADLs w/ RUE. Will continue to benefit from OT services while in house.     Plan    O.T. Treatment Plan: Continue Current Treatment Plan    DC Equipment Recommendations: Unable to determine at this time  Discharge Recommendations: Recommend post-acute placement for additional occupational therapy services prior to discharge home       02/10/23 0915   Balance   Sitting Balance (Static) Fair -   Sitting Balance (Dynamic) Fair -   Standing Balance (Static) Trace   Standing Balance (Dynamic) Trace   Weight Shift Sitting Poor   Bed Mobility    Supine to Sit Minimal Assist   Sit to Supine Minimal Assist   Activities of Daily Living   Grooming Minimal Assist;Seated   Upper Body Dressing Moderate Assist   Lower Body Dressing Maximal Assist   Toileting Maximal Assist   Functional Mobility   Sit to Stand Maximal Assist   Short Term Goals   Short Term Goal # 1 pt will groom seated EOB w/ setup   Goal Outcome # 1 Progressing as expected   Short Term Goal # 2 pt will maintain sitting balance unsupported 10 sec in prep for seated ADLs   Goal Outcome # 2 Progressing as expected   Short Term Goal # 3 pt will demo ADL txfs with Bandar   Goal Outcome # 3 Progressing as expected   Anticipated Discharge Equipment and Recommendations   DC Equipment Recommendations Unable to determine at this time   Discharge Recommendations Recommend post-acute  placement for additional occupational therapy services prior to discharge home

## 2023-02-11 NOTE — CARE PLAN
The patient is Stable - Low risk of patient condition declining or worsening    Shift Goals  Clinical Goals: Increase PO intake. Consume >50% meals, increase mobility  Patient Goals: Sleep  Family Goals: RADHA    Progress made toward(s) clinical / shift goals:      Problem: Knowledge Deficit - Stroke Education  Goal: Patient's knowledge of stroke and risk factors will improve  Outcome: Progressing   Pt is able to verbalize an understanding of the plan of care. Pt displays an understanding of stroke/stroke risk factors.     Problem: Neuro Status  Goal: Neuro status will remain stable or improve  Outcome: Progressing  Pt is Q4h neuro checks. No acute changes in neuro status noted at this time.      Problem: Respiratory - Stroke Patient  Goal: Patient will achieve/maintain optimum respiratory rate/effort  Outcome: Progressing  Pt is on 2L NC, and their oxygen saturation is WDL.     Problem: Risk for Aspiration  Goal: Patient's risk for aspiration will be absent or decrease  Outcome: Progressing  Following pt's diet order and precautions per SLP. PT understands importance of slow eating/drinking rate. Pt shows no signs of aspiration at this time while eating/drinking.      Problem: Skin Integrity  Goal: Skin integrity is maintained or improved  Outcome: Progressing  Pt is Q2h turns. TAPs system with wedges in use. Sacral, elbow, and heel mepilex applied. Pillows in use for support/repositioning. Skin assessment completed.      Problem: Fall Risk  Goal: Patient will remain free from falls  Outcome: Progressing   Patient's bed is locked and in the lowest position. Call light within reach. Bed alarm is on and plugged in. Pt given education on how to use the call light and not get up without calling for assistance. Pt receptive and demonstrates and understanding in teaching.     Patient is not progressing towards the following goals:

## 2023-02-11 NOTE — PROGRESS NOTES
"Hospital Medicine Daily Progress Note    Date of Service  2/11/2023    Chief Complaint  Kaiden Quiroz is a 62 y.o. male admitted 1/12/2023 with left-sided weakness    Hospital Course  This is a 62-year-old male with past medical history of hypertension, type 2 diabetes, alcohol use, tobacco use disorder and obesity who was admitted on 1/12/2023 with left-sided weakness, significant for right MCA CVA s/p ICA stent and thrombectomy on 1/12, complicated by hemorrhagic conversion.     \"He presented 1/12/22 with Left facial droop and Left-sided weakness. CTH demonstrated hyperdense MCA, for which subsequent CT perfusion demonstrated infarct with penumbra. CTA demonstrated Right ICA and Right MCA occlusion. Neurology was consulted. He was not a candidate for tPA due to hypertension. Neuro-IR performed a Right MCA thrombectomy and Right ICA stent 1/12/23. Subsequent MRI demonstrated Right frontal, basal ganglia, and caudate nucleus infarct. Petechial hemorrhages were noted concerning for hemorrhagic conversion, for which he was not a candidate for DAPT but treated with ASA monotherapy per Neurology recommendation. He was admitted to the ICU post-procedure for neurologic monitoring and nicardipine gtt due to uncontrolled HTN.Started on DAPT 1/24.     During his IR procedure he developed a sinus pause for which CPR was not required prior to spontaneous ROSC. He was administered atropine and glycopyrrolate for sinus bradycardia. He was intubated post-procedure and extubated 1/13/23 to [facility]. CXR demonstrated evolving pulmonary edema. TTE demonstrated normal systolic function and was negative for shunt. He has been diuresed with IV lasix with vast improvement.\"    Patient noted to have significant dysphagia, resulting in aspiration pneumonia, patient required PEG tube placement in 1/24. Patient is s/p FEES 2/8, started on a modified diet.     Interval Problem Update  Patient seen at bedside, doing well.  Tolerating oral " intake.    Patient noted to continue to be hypertensive, added lisinopril 10 mg in addition to the patient's amlodipine 10, continue to monitor blood pressure.    Patient's wife at bedside, updated on plan of care, all questions answered.    Patient has failed voiding trial x2, to discharge with Mcclendon catheter in place.  Outpatient urology referral placed.    SNF pending, asked case management to resend referrals as there is a lag time in bed availability.    I have discussed this patient's plan of care and discharge plan at IDT rounds today with Case Management, Nursing, Nursing leadership, and other members of the IDT team.    Consultants/Specialty  neurology and pulmonary    Code Status  Full Code    Disposition  Patient is medically cleared for discharge.   Anticipate discharge to to skilled nursing facility.  I have placed the appropriate orders for post-discharge needs.    Review of Systems  Review of Systems   Neurological:  Positive for speech change, focal weakness and weakness.   All other systems reviewed and are negative.     Physical Exam  Temp:  [36.1 °C (96.9 °F)-36.3 °C (97.3 °F)] 36.1 °C (96.9 °F)  Pulse:  [72-86] 74  Resp:  [18] 18  BP: (130-153)/(66-88) 150/77  SpO2:  [93 %-96 %] 94 %    Physical Exam  Constitutional:       General: He is not in acute distress.     Appearance: Normal appearance. He is well-developed. He is obese. He is not diaphoretic.      Comments: Sleepy but wakes to voice   HENT:      Head: Normocephalic and atraumatic.      Nose: Nose normal.      Mouth/Throat:      Mouth: Mucous membranes are moist.   Eyes:      General: No scleral icterus.     Conjunctiva/sclera: Conjunctivae normal.   Neck:      Vascular: No JVD.   Cardiovascular:      Rate and Rhythm: Normal rate.      Heart sounds: No murmur heard.    No gallop.   Pulmonary:      Effort: Pulmonary effort is normal. No respiratory distress.      Breath sounds: No stridor. No wheezing or rales.   Abdominal:       Palpations: Abdomen is soft.      Tenderness: There is no abdominal tenderness. There is no guarding or rebound.      Comments: Gtube in place c/d/i   Genitourinary:     Comments: Mcclendon in place  Musculoskeletal:         General: No tenderness.      Right lower leg: No edema.      Left lower leg: No edema.   Skin:     General: Skin is warm and dry.      Capillary Refill: Capillary refill takes less than 2 seconds.      Coloration: Skin is not jaundiced or pale.      Findings: No rash.   Neurological:      Comments: Left-sided hemiparesis  +L side facial droop  Purposeful and 5/5 R upper and lower   Psychiatric:         Mood and Affect: Mood and affect normal.         Behavior: Behavior normal.       Fluids    Intake/Output Summary (Last 24 hours) at 2/11/2023 1422  Last data filed at 2/11/2023 0800  Gross per 24 hour   Intake 120 ml   Output 1050 ml   Net -930 ml       Laboratory                            Imaging  CT-HEAD W/O   Final Result      No new intracranial abnormality is identified.      Interval decrease in mass effect from subacute right MCA territory infarction with  expected evolution of macro hemorrhagic transformation      IR-GASTROSTOMY PLACEMENT   Final Result      1.  Fluoroscopic guided percutaneous gastrostomy with placement of an 18-[place] ELISE balloon gastrostomy catheter.      2. The gastrostomy tube may be used for tube feeds 18 hours after placement. Thereafter, liquid diet orders per the referring physician. Saline flush daily as per protocol.      3. In 10-14 days, the anchor button suture or sutures should be cut, releasing the anchor or anchors into the stomach. The suture anchors will then pass through the GI tract as desired. This can be performed in the interventional radiology department if    desired. Call [phone] any weekday [phone] hours to make an appointment.      CT-CTA CHEST PULMONARY ARTERY W/ RECONS   Final Result         1.  No pulmonary embolus appreciated.   2.  Linear  densities the bilateral lung bases suggests changes of atelectasis, component of infiltrate is not excluded.   3.  Atherosclerosis and atherosclerotic coronary artery disease.      US-EXTREMITY VENOUS LOWER BILAT   Final Result      DX-ABDOMEN FOR TUBE PLACEMENT   Final Result      NG tube extends below the diaphragm into the region of the stomach. The tip is not visualized.      EC-ECHOCARDIOGRAM LTD W/ CONT   Final Result      DX-CHEST-PORTABLE (1 VIEW)   Final Result         1. No significant interval change.      CT-ABDOMEN-PELVIS W/O   Final Result         1.  Nondependent foci of air in the bladder, could represent changes from recent instrumentation or urinary tract infection.   2.  Diverticulosis   3.  Atherosclerosis and atherosclerotic coronary artery disease      DX-CHEST-PORTABLE (1 VIEW)   Final Result      Ill-defined atelectasis versus consolidation in the left lower lung. Pneumonia can be considered in appropriate clinical setting.         DX-ABDOMEN FOR TUBE PLACEMENT   Final Result         1.  Nonspecific bowel gas pattern in the upper abdomen.   2.  Nasogastric tube tip terminates overlying the expected location of the gastric body.   3.  Cardiomegaly   4.  Hazy linear density left lung base could represent atelectasis or infiltrate.      CT-HEAD W/O   Final Result         1.  Evolving infarct/encephalomalacia in the right MCA distribution.   2.  Areas of hemorrhage in the right basal ganglia and involving the right frontal and anterior temporal sulci, similar compared to MRI January 13, 2023   3.  Atherosclerosis.      DX-CHEST-PORTABLE (1 VIEW)   Final Result         Diffuse interstitial and groundglass opacity throughout both lungs could relate to developing pulmonary edema.      DX-ABDOMEN FOR TUBE PLACEMENT   Final Result         Gastric drainage tube with tip projecting over the expected area of the stomach.      DX-CHEST-PORTABLE (1 VIEW)   Final Result      1.  Interval extubation without  pneumothorax.   2.  Stable enlarged cardiac silhouette.   3.  There is bibasilar linear atelectasis.      EC-ECHOCARDIOGRAM COMPLETE W/ CONT   Final Result      MR-BRAIN-W/O   Final Result         Acute infarction involving the right frontal region, right basal ganglia and right caudate nucleus. Minimal punctate infarction involving the right parietal cortex. There is slight mass effect upon the right lateral ventricle with only minimal midline    shift measuring approximately 4 mm.      Petechial hemorrhage is noted within the right frontal cortical infarct. Also noted is petechial hemorrhage in the right basal ganglia and right caudate nucleus but without surrounding vasogenic edema.         DX-PELVIS-1 OR 2 VIEWS   Final Result         1.  No acute traumatic bony injury.      DX-CHEST-PORTABLE (1 VIEW)   Final Result         1.  Left midlung and lower lobe infiltrates, similar to prior study.      FP-EYKHLVO-4 VIEW   Final Result         1.  Nonspecific bowel gas pattern in the upper abdomen.   2.  Cardiomegaly   3.  Hazy left lower lobe infiltrates.   4.  Nasogastric tube tip terminates overlying the expected location of the gastric fundus.      IR-THROMBO MECHANICAL ARTERY,INIT   Final Result         62-year-old patient who presented with complete occlusion of the right ICA at its origin underwent emergent mechanical thrombectomy. After initial angioplasty,   a right cervical internal carotid artery stent was deployed and right MCA thrombectomy was performed. The final angiographic images show complete restoration of flow into the right ICA and MCA with no evidence of distal intracranial embolization.      Final recanalization score: TICI 3      I, Isacckleverkatie Mobley was physically present and participated during the entire procedure of the IR-THROMBO MECHANICAL ARTERY,INIT.                  DX-CHEST-PORTABLE (1 VIEW)   Final Result      No acute cardiac or pulmonary abnormalities are identified.      CT-CTA  NECK WITH & W/O-POST PROCESSING   Final Result      1.  Occlusion of the right internal carotid artery at its origin.      2.  Occlusion of the right vertebral artery at its origin.      Dr. Gongora discussed findings with Dr. Mccarthy.      CT-CTA HEAD WITH & W/O-POST PROCESS   Final Result         1.  Occlusion of the right internal carotid artery and right middle cerebral artery.   2.  Narrowed left M1 segment with severely narrowed and M2 and M3 branches, may represent chronic changes.      These findings were discussed with the patient's clinician, Joseline Mccarthy, on 1/12/2023 8:27 PM.      CT-CEREBRAL PERFUSION ANALYSIS   Final Result         1.  Cerebral blood flow less than 30% likely representing completed infarct = 52 mL.      2.  T Max more than 6 seconds likely representing combination of completed infarct and ischemia = 172 mL.      3.  Mismatched volume likely representing ischemic brain/penumbra = 120 mL      4.  Please note that the cerebral perfusion was performed on the limited brain tissue around the basal ganglia region. Infarct/ischemia outside the CT perfusion sections can be missed in this study.      CT-HEAD W/O   Final Result      1.  No evidence of intracranial hemorrhage, midline shift or mass effect.      2.  Question of hyperdense right MCA though evaluation limited by oblique positioning.              Assessment/Plan  * Acute ischemic right middle cerebral artery (MCA) stroke (HCC)- (present on admission)  Assessment & Plan  Right MCA CVA with hemorrhagic conversion  S/p right ICA stent and MCA thrombectomy 1/12/23  Neurology consulted   High Intensity statin therapy   BP control  DAPT  TTE negative for shunt  Tobacco cessation  PEG placed 1/24, Patient is s/p FEES 2/8, started on a modified diet.  2/4 repeated CT head: Interval decrease in mass effect from subacute right MCA territory infarction with expected evolution of macro hemorrhagic transformation.  PT/OT/SLP - recommending  post-acute placement    Urinary retention  Assessment & Plan  Likely in the setting of BPH  Tried voiding trial 1/25 and 1/31, failed replaced 2/3  Starting terazosin 1/28    Internal carotid artery stent present  Assessment & Plan  Placed 1/12  Per Neuro OK to start DAPT: ASA & Plavix for 6 wks    Hypernatremia  Assessment & Plan  Resolved  Resolved with Free H2O 200ml q6    Pneumonia due to infectious organism- (present on admission)  Assessment & Plan  Resolved    Aspiration pneumonia (Developed 01/19/23)   Procal low  Sputum cultures neg  Strict aspiration precautions   Has completed 5 days of Abx's  MRSA nares neg  Mucomyst/3% normal saline nebs to maintain pulmonary toilet, RT protocol to continue  Pulmonology evaluated him and made recommendations          Oropharyngeal dysphagia- (present on admission)  Assessment & Plan  Secondary to acute stroke.    Aspiration precautions   PEG 1/24  Nutrition following  SLP following  Patient is s/p FEES 2/8, patient continues to tolerate modified diet. PEG tube to remain in place at this time.     Acute left hemiparesis (HCC)- (present on admission)  Assessment & Plan  Secondary to right MCA stroke.    Pressure ulcer precautions  Aspiration, Fall precautions  PT and OT: SNF placement    Stroke, hemorrhagic (HCC)- (present on admission)  Assessment & Plan  Ischemic stroke with hemorrhagic conversion.  Neuro has OKd for antiplatelet therapy with ASA and plavix given presence of new ICA stent    Toxic metabolic encephalopathy- (present on admission)  Assessment & Plan  Resolved    Persistent 01/20/23  Secondary to stroke, delirium and now concern for relation to underlying pneumonia  Limit restraints as able  Avoid polypharmacy, sedating medications  Delirium precautions  Management of underlying medical conditions  Obtain EEG 01/20/23 and it did not show seizure activity.   cont's to improve daily though examination is limited by dysarthria at times    Acute respiratory  failure with hypoxia (HCC)- (present on admission)  Assessment & Plan  Improve, currently on 1 L of oxygen    01/19/23 CXRAY consistent with Aspiration Pneumonia - Started on IV unasyn  Subsequently trnasitioned to [facility].  At present off Abx's  MRSA nares neg  trachael aspirate cultures neg  CTA chest neg for PE  TTE LVEF 70%, no signficant valvular or structural abnormalities  O2 demand decreasing: now off HFNC and on 1 LNC  Cont weaning O2    Severe obesity (BMI 35.0-35.9 with comorbidity) ([facility])- (present on admission)  Assessment & Plan  Body mass index is 37.39 kg/m².  Co-morbid HTN, T2DM, KENDAL  Outpatient weight loss    Type 2 diabetes mellitus without complication, without long-term current use of insulin ([facility])- (present on admission)  Assessment & Plan  A1c 5.3  No indication for strict BG control - POCT/SSI deferred  Atorvastatin    Alcohol dependence with withdrawal ([facility])- (present on admission)  Assessment & Plan  Resolved.    Tobacco use disorder- (present on admission)  Assessment & Plan  On nicotine patch     Primary hypertension- (present on admission)  Assessment & Plan  Amlodipine 10 mg  Added lisinopril 10 mg         VTE prophylaxis: SCDs/TEDs and enoxaparin ppx    I have performed a physical exam and reviewed and updated ROS and Plan today (2/11/2023). In review of yesterday's note (2/10/2023), there are no changes except as documented above.

## 2023-02-12 PROBLEM — K92.1 MELENA: Status: ACTIVE | Noted: 2023-02-12

## 2023-02-12 LAB
HCT VFR BLD AUTO: 34.8 % (ref 42–52)
HGB BLD-MCNC: 11.5 G/DL (ref 14–18)

## 2023-02-12 PROCEDURE — 85014 HEMATOCRIT: CPT

## 2023-02-12 PROCEDURE — 36415 COLL VENOUS BLD VENIPUNCTURE: CPT

## 2023-02-12 PROCEDURE — A9270 NON-COVERED ITEM OR SERVICE: HCPCS | Performed by: GENERAL PRACTICE

## 2023-02-12 PROCEDURE — 700102 HCHG RX REV CODE 250 W/ 637 OVERRIDE(OP): Performed by: GENERAL PRACTICE

## 2023-02-12 PROCEDURE — 85018 HEMOGLOBIN: CPT

## 2023-02-12 PROCEDURE — 99233 SBSQ HOSP IP/OBS HIGH 50: CPT | Performed by: GENERAL PRACTICE

## 2023-02-12 PROCEDURE — 700111 HCHG RX REV CODE 636 W/ 250 OVERRIDE (IP): Performed by: GENERAL PRACTICE

## 2023-02-12 PROCEDURE — C9113 INJ PANTOPRAZOLE SODIUM, VIA: HCPCS | Performed by: GENERAL PRACTICE

## 2023-02-12 PROCEDURE — 770006 HCHG ROOM/CARE - MED/SURG/GYN SEMI*

## 2023-02-12 RX ORDER — ASPIRIN 81 MG/1
81 TABLET, CHEWABLE ORAL DAILY
Status: DISCONTINUED | OUTPATIENT
Start: 2023-02-12 | End: 2023-02-16 | Stop reason: HOSPADM

## 2023-02-12 RX ORDER — CLOPIDOGREL BISULFATE 75 MG/1
75 TABLET ORAL DAILY
Status: DISCONTINUED | OUTPATIENT
Start: 2023-02-12 | End: 2023-02-16 | Stop reason: HOSPADM

## 2023-02-12 RX ORDER — PANTOPRAZOLE SODIUM 40 MG/10ML
40 INJECTION, POWDER, LYOPHILIZED, FOR SOLUTION INTRAVENOUS 2 TIMES DAILY
Status: DISCONTINUED | OUTPATIENT
Start: 2023-02-12 | End: 2023-02-13

## 2023-02-12 RX ADMIN — Medication 5 MG: at 20:16

## 2023-02-12 RX ADMIN — ASPIRIN 81 MG 81 MG: 81 TABLET ORAL at 16:03

## 2023-02-12 RX ADMIN — ATORVASTATIN CALCIUM 80 MG: 80 TABLET, FILM COATED ORAL at 17:26

## 2023-02-12 RX ADMIN — TERAZOSIN 1 MG: 1 CAPSULE ORAL at 17:26

## 2023-02-12 RX ADMIN — PANTOPRAZOLE SODIUM 40 MG: 40 INJECTION, POWDER, LYOPHILIZED, FOR SOLUTION INTRAVENOUS at 17:26

## 2023-02-12 RX ADMIN — PANTOPRAZOLE SODIUM 40 MG: 40 INJECTION, POWDER, LYOPHILIZED, FOR SOLUTION INTRAVENOUS at 10:49

## 2023-02-12 RX ADMIN — ACETAMINOPHEN 650 MG: 325 TABLET, FILM COATED ORAL at 00:16

## 2023-02-12 ASSESSMENT — ENCOUNTER SYMPTOMS
INSOMNIA: 0
HEADACHES: 0
CHILLS: 0
SPEECH CHANGE: 1
FOCAL WEAKNESS: 1
FEVER: 0
SHORTNESS OF BREATH: 0
FALLS: 0
MYALGIAS: 0
ABDOMINAL PAIN: 1
PALPITATIONS: 0
CONSTIPATION: 0
NAUSEA: 0
FLANK PAIN: 0
SPEECH CHANGE: 0
COUGH: 0
WEAKNESS: 1
NERVOUS/ANXIOUS: 0
DIARRHEA: 0
SORE THROAT: 0
VOMITING: 0
BLOOD IN STOOL: 0

## 2023-02-12 ASSESSMENT — PAIN DESCRIPTION - PAIN TYPE
TYPE: ACUTE PAIN

## 2023-02-12 ASSESSMENT — LIFESTYLE VARIABLES: SUBSTANCE_ABUSE: 0

## 2023-02-12 NOTE — CARE PLAN
The patient is Stable - Low risk of patient condition declining or worsening    Shift Goals  Clinical Goals: Monitor neuro status, Q2 turns  Patient Goals: Rest  Family Goals: RADHA    Progress made toward(s) clinical / shift goals:  Patient is A&Ox4. Q2 hour turns. Medicated patient per MAR for left shoulder pain. Educated patient on POC. Encouraged patient to voice needs. Ice chips give for comfort. Bed is low and locked. Bed alarm on. Call light within reach. Hourly rounding continues.     Patient is not progressing towards the following goals:      Problem: Self Care  Goal: Patient will have the ability to perform ADLs independently or with assistance (bathe, groom, dress, toilet and feed)  Outcome: Not Progressing  Note: Patient needing assistance with ADL's.

## 2023-02-12 NOTE — CARE PLAN
The patient is Stable - Low risk of patient condition declining or worsening    Shift Goals  Clinical Goals: monitor neuro status, safety  Patient Goals: cold water  Family Goals: [place]    Progress made toward(s) clinical / shift goals:    Problem: Knowledge Deficit - Stroke Education  Goal: Patient's knowledge of stroke and risk factors will improve  Outcome: Progressing     Problem: Psychosocial - Patient Condition  Goal: Patient's ability to verbalize feelings about condition will improve  Outcome: Progressing     Problem: Neuro Status  Goal: Neuro status will remain stable or improve  Outcome: Progressing     Problem: Hemodynamic Monitoring  Goal: Patient's hemodynamics, fluid balance and neurologic status will be stable or improve  Outcome: Progressing     Problem: Respiratory - Stroke Patient  Goal: Patient will achieve/maintain optimum respiratory rate/effort  Outcome: Progressing     Problem: Dysphagia  Goal: Dysphagia will improve  Outcome: Progressing     Problem: Risk for Aspiration  Goal: Patient's risk for aspiration will be absent or decrease  Outcome: Progressing       Patient is not progressing towards the following goals:      Problem: Discharge Planning - Stroke  Goal: Ensure Stroke Core Measures are met prior to discharge  Outcome: Not Progressing

## 2023-02-12 NOTE — CONSULTS
Date of Consultation:  2/12/2023    Patient: : Kaiden Quiroz  MRN: [id number]    Referring Physician: Pura Ramos DO     GI:MARIN Booth     Reason for Consultation: Melena    History of Present Illness:     This is a pleasant 62-year-old male with a past medical history including hypertension, type 2 diabetes mellitus, alcohol use, tobacco use, obesity who was admitted on 1/12/2023 for left-sided weakness.  Imaging significant for right ICA and MCA CVA.  He was not a candidate for tPA due to hypertension.  He subsequently underwent ICA stent and thrombectomy on 1/12/2023 but unfortunately sustained hemorrhagic conversion post procedurally for which MRI showed right frontal, basal ganglia, and caudate nucleus infarctions.  Patient is not a candidate for dual antiplatelet therapy at that time and was treated with aspirin monotherapy only.  He was later started on dual antiplatelet therapy on 1/24/2023.  Patient was noted to have significant dysphagia resulting in aspiration pneumonia and a PEG tube was placed on 1/24/2023.  His admission was complicated by acute development of melena in early morning of 2/12/2023.  He denies reflux/heartburn, nausea, vomiting, hematemesis, abdominal pain, odynophagia.      Tobacco use: Denies  Alcohol use: Denies  Illicit drug use: Denies    Last EGD: Denies previous EGD  Last colonoscopy: Denies previous colonoscopy  NSAID/ASA use: Patient on dual antiplatelet therapy for secondary stroke invention.  Last dose aspirin 81 mg and Plavix 75 mg was 0500 on 2/11/2023  Anticoagulation use: Patient was on prophylactic Lovenox, last dose 2/11/2023 at 1800      Past Medical History:   Diagnosis Date    Diabetes (HCC)     Hypertension          History reviewed. No pertinent surgical history.    Family History   Problem Relation Age of Onset    Hypertension Mother     Diabetes Mother     Hypertension Father     Diabetes Father     Hypertension Sister     Stroke Neg Hx         Social History     Socioeconomic History    Marital status:    Tobacco Use    Smoking status: Former     Packs/day: 0.50     Years: 46.00     Pack years: 23.00     Types: Cigarettes     Start date:      Quit date: 2023     Years since quittin.0    Smokeless tobacco: Never   Vaping Use    Vaping Use: Some days    Substances: Nicotine   Substance and Sexual Activity    Alcohol use: Yes     Alcohol/week: 25.2 oz     Types: 42 Cans of beer per week    Drug use: Yes     Types: Inhaled     Comment: JUDI       Review of systems:  Review of Systems   Constitutional:  Negative for chills, fever and malaise/fatigue.   HENT:  Negative for congestion and sore throat.    Respiratory:  Negative for cough and shortness of breath.    Cardiovascular:  Negative for chest pain, palpitations and leg swelling.   Gastrointestinal:  Positive for abdominal pain (At PEG tube insertion site with palpation only) and melena. Negative for blood in stool, constipation, diarrhea, nausea and vomiting.   Genitourinary:  Negative for dysuria and flank pain.   Musculoskeletal:  Negative for falls and myalgias.   Neurological:  Positive for focal weakness (Left-sided weakness s/p CVA). Negative for speech change and headaches.   Psychiatric/Behavioral:  Negative for substance abuse. The patient is not nervous/anxious and does not have insomnia.    All other systems reviewed and are negative.      Physical Exam:  Vitals:    23 0412 23 0737   BP:  132/62 137/68 133/68   Pulse: 76  75 70   Resp: 16  16 16   Temp: 36.4 °C (97.6 °F)  36.3 °C (97.4 °F) 36.2 °C (97.1 °F)   TempSrc: Temporal  Temporal Temporal   SpO2: 95%  96% 96%   Weight:       Height:           Physical Exam  Vitals and nursing note reviewed.   Constitutional:       Appearance: Normal appearance. He is well-developed and overweight.   HENT:      Head: Normocephalic.      Nose: Nose normal. No congestion.      Mouth/Throat:       Mouth: Mucous membranes are moist.      Pharynx: Oropharynx is clear. No oropharyngeal exudate.   Eyes:      General: No scleral icterus.     Extraocular Movements: Extraocular movements intact.      Conjunctiva/sclera: Conjunctivae normal.   Cardiovascular:      Rate and Rhythm: Normal rate and regular rhythm.      Pulses: Normal pulses.      Heart sounds: Normal heart sounds. No murmur heard.  Pulmonary:      Effort: Pulmonary effort is normal. No respiratory distress.      Breath sounds: Normal breath sounds.   Chest:      Chest wall: No tenderness.   Abdominal:      General: Abdomen is flat. Bowel sounds are normal. There is no distension.      Palpations: Abdomen is soft.      Comments: PEG tube in place with mild tenderness around site with palpation   Musculoskeletal:      Right lower leg: No edema.      Left lower leg: No edema.   Skin:     General: Skin is warm and dry.      Capillary Refill: Capillary refill takes less than 2 seconds.      Coloration: Skin is not jaundiced or pale.   Neurological:      Mental Status: He is alert and oriented to person, place, and time.      Motor: Weakness (LUE, LLE weakness) present.   Psychiatric:         Mood and Affect: Mood normal.         Behavior: Behavior normal.         Thought Content: Thought content normal.         Judgment: Judgment normal.         Labs:  Recent Labs     02/12/23  1057   HEMOGLOBIN 11.5*   HEMATOCRIT 34.8*     No results for input(s): SODIUM, POTASSIUM, CHLORIDE, CO2, GLUCOSE, BUN, CPKTOTAL in the last 72 hours.                Imaging:  CT-HEAD W/O  Narrative: 2/4/2023 3:05 PM    HISTORY/REASON FOR EXAM:  Headache, chronic, new features or increased frequency.    TECHNIQUE/EXAM DESCRIPTION AND NUMBER OF VIEWS:    CT of the head without contrast.    Contiguous 5 mm axial sections were obtained from the skull base through the vertex.    Up to date radiation dose reduction adjustments have been utilized to meet ALARA standards for radiation dose  reduction.    COMPARISON:   1/15/2023.    FINDINGS:  Right MCA territory hypodensity in is again seen but there is decreased mass effect. Hemorrhagic transformation visualized previously has undergone expected evolution with decreasing density.    No new areas of macro hemorrhagic transformation are seen    Mass effect upon the right lateral ventricle has nearly resolved    There is no hydrocephalus    There is no midline shift    No acute intracranial hemorrhage is seen.    Gray white junction differentiation is otherwise distinct.    Visualized paranasal sinuses and mastoid air cells are notable for mild maxillary mucosal thickening.    No acute calvarium abnormality is noted.  Impression: No new intracranial abnormality is identified.    Interval decrease in mass effect from subacute right MCA territory infarction with  expected evolution of macro hemorrhagic transformation            Impressions:  Melena  S/p right ICA and MCA ischemic CVA with hemorrhagic conversion  S/p ICA stent and thrombectomy on 1/12/2023  Hypertension  Type 2 diabetes mellitus    MDM:  This is a pleasant 62-year-old male who initially presented with right ICA and MCA CVA who is s/p ICA stent and thrombectomy.  Started on dual antiplatelet therapy on 1/24/2023.  Patient had subsequent PEG tube placed by IR for dysphagia.  Patients admission complicated by single episode of melena on early morning of 2/12/2023.  Discussed concern for upper GI bleeding with patient and his wife at bedside.  He is agreeable to EGD for further evaluation and possible treatment.  Hospital medicine discussed with neurology who agreed to hold Plavix for procedure.    Recommendations:  Monitor for signs of overt bleeding  Monitor H&H-transfuse if necessary  Continue to hold dual antiplatelet therapy and DVT prophylaxis pending procedure.  N.p.o. at midnight  Plan for EGD tomorrow afternoon    Discussed with patient, patient's wife (Perri), Dr. Richard Dr.  Sanjana        This note was generated using voice recognition software which has a small chance of producing errors of grammar and possibly content. I have made every reasonable attempt to find and correct any obvious errors, but expect that some may not be found prior to finalization of this note.

## 2023-02-13 ENCOUNTER — ANESTHESIA EVENT (OUTPATIENT)
Dept: SURGERY | Facility: MEDICAL CENTER | Age: 63
DRG: 023 | End: 2023-02-13
Payer: COMMERCIAL

## 2023-02-13 ENCOUNTER — ANESTHESIA (OUTPATIENT)
Dept: SURGERY | Facility: MEDICAL CENTER | Age: 63
DRG: 023 | End: 2023-02-13
Payer: COMMERCIAL

## 2023-02-13 LAB
ALBUMIN SERPL BCP-MCNC: 3.8 G/DL (ref 3.2–4.9)
ALBUMIN/GLOB SERPL: 1.5 G/DL
ALP SERPL-CCNC: 128 U/L (ref 30–99)
ALT SERPL-CCNC: 45 U/L (ref 2–50)
ANION GAP SERPL CALC-SCNC: 9 MMOL/L (ref 7–16)
AST SERPL-CCNC: 28 U/L (ref 12–45)
BILIRUB SERPL-MCNC: 1.2 MG/DL (ref 0.1–1.5)
BUN SERPL-MCNC: 9 MG/DL (ref 8–22)
CALCIUM ALBUM COR SERPL-MCNC: 9.2 MG/DL (ref 8.5–10.5)
CALCIUM SERPL-MCNC: 9 MG/DL (ref 8.5–10.5)
CHLORIDE SERPL-SCNC: 101 MMOL/L (ref 96–112)
CO2 SERPL-SCNC: 25 MMOL/L (ref 20–33)
CREAT SERPL-MCNC: 0.55 MG/DL (ref 0.5–1.4)
ERYTHROCYTE [DISTWIDTH] IN BLOOD BY AUTOMATED COUNT: 43.7 FL (ref 35.9–50)
GFR SERPLBLD CREATININE-BSD FMLA CKD-EPI: 112 ML/MIN/1.73 M 2
GLOBULIN SER CALC-MCNC: 2.5 G/DL (ref 1.9–3.5)
GLUCOSE SERPL-MCNC: 97 MG/DL (ref 65–99)
HCT VFR BLD AUTO: 34.1 % (ref 42–52)
HGB BLD-MCNC: 11.4 G/DL (ref 14–18)
INR PPP: 1.04 (ref 0.87–1.13)
MAGNESIUM SERPL-MCNC: 1.6 MG/DL (ref 1.5–2.5)
MCH RBC QN AUTO: 31.1 PG (ref 27–33)
MCHC RBC AUTO-ENTMCNC: 33.4 G/DL (ref 33.7–35.3)
MCV RBC AUTO: 93.2 FL (ref 81.4–97.8)
PHOSPHATE SERPL-MCNC: 3.7 MG/DL (ref 2.5–4.5)
PLATELET # BLD AUTO: 142 K/UL (ref 164–446)
PMV BLD AUTO: 10.7 FL (ref 9–12.9)
POTASSIUM SERPL-SCNC: 3.7 MMOL/L (ref 3.6–5.5)
PROT SERPL-MCNC: 6.3 G/DL (ref 6–8.2)
PROTHROMBIN TIME: 13.5 SEC (ref 12–14.6)
RBC # BLD AUTO: 3.66 M/UL (ref 4.7–6.1)
SODIUM SERPL-SCNC: 135 MMOL/L (ref 135–145)
WBC # BLD AUTO: 5.7 K/UL (ref 4.8–10.8)

## 2023-02-13 PROCEDURE — 85027 COMPLETE CBC AUTOMATED: CPT

## 2023-02-13 PROCEDURE — 85610 PROTHROMBIN TIME: CPT

## 2023-02-13 PROCEDURE — C9113 INJ PANTOPRAZOLE SODIUM, VIA: HCPCS | Performed by: GENERAL PRACTICE

## 2023-02-13 PROCEDURE — 700111 HCHG RX REV CODE 636 W/ 250 OVERRIDE (IP): Performed by: INTERNAL MEDICINE

## 2023-02-13 PROCEDURE — 700102 HCHG RX REV CODE 250 W/ 637 OVERRIDE(OP): Performed by: GENERAL PRACTICE

## 2023-02-13 PROCEDURE — 160035 HCHG PACU - 1ST 60 MINS PHASE I: Performed by: INTERNAL MEDICINE

## 2023-02-13 PROCEDURE — 97530 THERAPEUTIC ACTIVITIES: CPT | Mod: CO

## 2023-02-13 PROCEDURE — A9270 NON-COVERED ITEM OR SERVICE: HCPCS | Performed by: INTERNAL MEDICINE

## 2023-02-13 PROCEDURE — 80053 COMPREHEN METABOLIC PANEL: CPT

## 2023-02-13 PROCEDURE — 700111 HCHG RX REV CODE 636 W/ 250 OVERRIDE (IP): Performed by: ANESTHESIOLOGY

## 2023-02-13 PROCEDURE — 770006 HCHG ROOM/CARE - MED/SURG/GYN SEMI*

## 2023-02-13 PROCEDURE — 84100 ASSAY OF PHOSPHORUS: CPT

## 2023-02-13 PROCEDURE — 700111 HCHG RX REV CODE 636 W/ 250 OVERRIDE (IP): Performed by: GENERAL PRACTICE

## 2023-02-13 PROCEDURE — 160009 HCHG ANES TIME/MIN: Performed by: INTERNAL MEDICINE

## 2023-02-13 PROCEDURE — 700102 HCHG RX REV CODE 250 W/ 637 OVERRIDE(OP): Performed by: INTERNAL MEDICINE

## 2023-02-13 PROCEDURE — 97112 NEUROMUSCULAR REEDUCATION: CPT | Mod: CO

## 2023-02-13 PROCEDURE — 700105 HCHG RX REV CODE 258: Performed by: ANESTHESIOLOGY

## 2023-02-13 PROCEDURE — 160048 HCHG OR STATISTICAL LEVEL 1-5: Performed by: INTERNAL MEDICINE

## 2023-02-13 PROCEDURE — 160002 HCHG RECOVERY MINUTES (STAT): Performed by: INTERNAL MEDICINE

## 2023-02-13 PROCEDURE — 160203 HCHG ENDO MINUTES - 1ST 30 MINS LEVEL 4: Performed by: INTERNAL MEDICINE

## 2023-02-13 PROCEDURE — 700101 HCHG RX REV CODE 250: Performed by: ANESTHESIOLOGY

## 2023-02-13 PROCEDURE — 0D598ZZ DESTRUCTION OF DUODENUM, VIA NATURAL OR ARTIFICIAL OPENING ENDOSCOPIC: ICD-10-PCS | Performed by: INTERNAL MEDICINE

## 2023-02-13 PROCEDURE — A9270 NON-COVERED ITEM OR SERVICE: HCPCS | Performed by: GENERAL PRACTICE

## 2023-02-13 PROCEDURE — 43278 ERCP LESION ABLATE W/DILATE: CPT | Performed by: INTERNAL MEDICINE

## 2023-02-13 PROCEDURE — 99233 SBSQ HOSP IP/OBS HIGH 50: CPT | Performed by: GENERAL PRACTICE

## 2023-02-13 PROCEDURE — 83735 ASSAY OF MAGNESIUM: CPT

## 2023-02-13 PROCEDURE — 00731 ANES UPR GI NDSC PX NOS: CPT | Performed by: ANESTHESIOLOGY

## 2023-02-13 PROCEDURE — 36415 COLL VENOUS BLD VENIPUNCTURE: CPT

## 2023-02-13 RX ORDER — PHENYLEPHRINE HYDROCHLORIDE 10 MG/ML
INJECTION, SOLUTION INTRAMUSCULAR; INTRAVENOUS; SUBCUTANEOUS PRN
Status: DISCONTINUED | OUTPATIENT
Start: 2023-02-13 | End: 2023-02-13 | Stop reason: SURG

## 2023-02-13 RX ORDER — SODIUM CHLORIDE, SODIUM LACTATE, POTASSIUM CHLORIDE, CALCIUM CHLORIDE 600; 310; 30; 20 MG/100ML; MG/100ML; MG/100ML; MG/100ML
INJECTION, SOLUTION INTRAVENOUS
Status: DISCONTINUED | OUTPATIENT
Start: 2023-02-13 | End: 2023-02-13 | Stop reason: SURG

## 2023-02-13 RX ORDER — MIDAZOLAM HYDROCHLORIDE 1 MG/ML
INJECTION INTRAMUSCULAR; INTRAVENOUS PRN
Status: DISCONTINUED | OUTPATIENT
Start: 2023-02-13 | End: 2023-02-13 | Stop reason: SURG

## 2023-02-13 RX ORDER — LIDOCAINE HYDROCHLORIDE 20 MG/ML
INJECTION, SOLUTION EPIDURAL; INFILTRATION; INTRACAUDAL; PERINEURAL PRN
Status: DISCONTINUED | OUTPATIENT
Start: 2023-02-13 | End: 2023-02-13 | Stop reason: SURG

## 2023-02-13 RX ORDER — OMEPRAZOLE 20 MG/1
40 CAPSULE, DELAYED RELEASE ORAL DAILY
Status: DISCONTINUED | OUTPATIENT
Start: 2023-02-13 | End: 2023-02-16 | Stop reason: HOSPADM

## 2023-02-13 RX ADMIN — LISINOPRIL 10 MG: 10 TABLET ORAL at 06:01

## 2023-02-13 RX ADMIN — PANTOPRAZOLE SODIUM 40 MG: 40 INJECTION, POWDER, LYOPHILIZED, FOR SOLUTION INTRAVENOUS at 06:01

## 2023-02-13 RX ADMIN — ATORVASTATIN CALCIUM 80 MG: 80 TABLET, FILM COATED ORAL at 16:55

## 2023-02-13 RX ADMIN — SODIUM CHLORIDE, POTASSIUM CHLORIDE, SODIUM LACTATE AND CALCIUM CHLORIDE: 600; 310; 30; 20 INJECTION, SOLUTION INTRAVENOUS at 14:02

## 2023-02-13 RX ADMIN — PHENYLEPHRINE HYDROCHLORIDE 100 MCG: 10 INJECTION INTRAVENOUS at 14:18

## 2023-02-13 RX ADMIN — PROPOFOL 35 MG: 10 INJECTION, EMULSION INTRAVENOUS at 14:11

## 2023-02-13 RX ADMIN — PROPOFOL 20 MG: 10 INJECTION, EMULSION INTRAVENOUS at 14:15

## 2023-02-13 RX ADMIN — AMLODIPINE BESYLATE 10 MG: 5 TABLET ORAL at 06:01

## 2023-02-13 RX ADMIN — Medication 5 MG: at 19:53

## 2023-02-13 RX ADMIN — MIDAZOLAM HYDROCHLORIDE 2 MG: 1 INJECTION, SOLUTION INTRAMUSCULAR; INTRAVENOUS at 14:05

## 2023-02-13 RX ADMIN — FENTANYL CITRATE 25 MCG: 50 INJECTION, SOLUTION INTRAMUSCULAR; INTRAVENOUS at 14:07

## 2023-02-13 RX ADMIN — LIDOCAINE HYDROCHLORIDE 50 MG: 20 INJECTION, SOLUTION EPIDURAL; INFILTRATION; INTRACAUDAL at 14:11

## 2023-02-13 RX ADMIN — PHENYLEPHRINE HYDROCHLORIDE 150 MCG: 10 INJECTION INTRAVENOUS at 14:22

## 2023-02-13 RX ADMIN — SENNOSIDES AND DOCUSATE SODIUM 1 TABLET: 50; 8.6 TABLET ORAL at 16:55

## 2023-02-13 RX ADMIN — TERAZOSIN 1 MG: 1 CAPSULE ORAL at 17:42

## 2023-02-13 RX ADMIN — OMEPRAZOLE 40 MG: 20 CAPSULE, DELAYED RELEASE ORAL at 16:55

## 2023-02-13 RX ADMIN — PHENYLEPHRINE HYDROCHLORIDE 100 MCG: 10 INJECTION INTRAVENOUS at 14:09

## 2023-02-13 RX ADMIN — ENOXAPARIN SODIUM 40 MG: 40 INJECTION SUBCUTANEOUS at 19:53

## 2023-02-13 RX ADMIN — ASPIRIN 81 MG 81 MG: 81 TABLET ORAL at 06:01

## 2023-02-13 ASSESSMENT — COGNITIVE AND FUNCTIONAL STATUS - GENERAL
PERSONAL GROOMING: A LOT
HELP NEEDED FOR BATHING: A LOT
EATING MEALS: A LOT
TOILETING: A LOT
DRESSING REGULAR UPPER BODY CLOTHING: A LOT
DAILY ACTIVITIY SCORE: 12
DRESSING REGULAR LOWER BODY CLOTHING: A LOT
SUGGESTED CMS G CODE MODIFIER DAILY ACTIVITY: CL

## 2023-02-13 ASSESSMENT — PAIN DESCRIPTION - PAIN TYPE
TYPE: ACUTE PAIN
TYPE: SURGICAL PAIN
TYPE: ACUTE PAIN

## 2023-02-13 ASSESSMENT — ENCOUNTER SYMPTOMS
WEAKNESS: 1
FOCAL WEAKNESS: 1
SPEECH CHANGE: 1

## 2023-02-13 NOTE — PROCEDURES
OPERATIVE REPORT    PATIENT:   Kaiden Quiroz   1960       PREOPERATIVE DIAGNOSES/INDICATIONS: single episode of melena    POSTOPERATIVE DIAGNOSIS: duodenal angioectasia, intact PEG without ulceration    PROCEDURE:  ESOPHAGOGASTRODUODENOSCOPY with lesion ablation    PHYSICIAN:  Dagmar Allan MD    ANESTHESIA:  Per anesthesiologist.    LOCATION: [facility]    CONSENT:  OBTAINED. The risks, benefits and alternatives of the procedure were discussed in details. The risks include and are not limited to bleeding, infection, perforation, missed lesions, and sedations risks (cardiopulmonary compromise and allergic reaction to medications).    DESCRIPTION: The patient presented to the procedure room.  After routine checkup was performed, patient was brought into the endoscopy suite.  Patient was placed on his left lateral decubitus position. A bite block was placed in patient's mouth. Patient was sedated by anesthesia.  Vital signs were monitored throughout the procedure.  Oxygenation support was provided throughout the procedure. Upper endoscope was inserted into patient's mouth and advanced to the third portion of the duodenum under direct visualization.      Once the site was reached and examined, the upper endoscope was withdrawn.  Retroflexion was made within the stomach.  The stomach was decompressed, scope was withdrawn and the procedure was terminated.     The patient tolerated the procedure well.  There were no immediate complications.    OPERATIVE FINDINGS:    1. Esophagus: Normal  2. Stomach: PEG button intact, no ulceration at base of PEG, no blood in lumen, few small spots of erythema proximal stomach but no erosions or ulceration  3. Duodenum: 2mm nonbleeding angioectasia in second portion of duodenum, treated with APC on right colon setting. No bleeding with procedure    RECOMMENDATIONS:  No active bleeding.  BUN was normal and no significant drop in blood count.  Would continue PPI x 4 weeks.   Resume diet.      Signing off.

## 2023-02-13 NOTE — CARE PLAN
Problem: Knowledge Deficit - Stroke Education  Goal: Patient's knowledge of stroke and risk factors will improve  Outcome: Progressing  Note: Patient understands procedure for EGD today, NPO since 0000, no BM overnight, no signs of bleeding.     Problem: Neuro Status  Goal: Neuro status will remain stable or improve  Outcome: Progressing  Note: Q4 neuro checks in place, AOx4, left sided weakness noted, thacker in place.   The patient is Stable - Low risk of patient condition declining or worsening    Shift Goals  Clinical Goals: Monitor for GI bleed  Patient Goals: Rest for procedure  Family Goals: [place]    Progress made toward(s) clinical / shift goals:  see notes for progress    Patient is not progressing towards the following goals:

## 2023-02-13 NOTE — THERAPY
Physical Therapy Contact Note    Patient Name: Kaiden Quiroz  Age:  62 y.o., Sex:  male  Medical Record #: [id number]  Today's Date: 2/13/2023    Pt off the floor for EGD, will resume PT POC post-procedure as appropriate.

## 2023-02-13 NOTE — ANESTHESIA TIME REPORT
Anesthesia Start and Stop Event Times     Date Time Event    2/13/2023 1335 Ready for Procedure     1402 Anesthesia Start     1432 Anesthesia Stop        Responsible Staff  02/13/23    Name Role Begin End    Armando Delarosa M.D. Anesth 1402 1432        Overtime Reason:  no overtime (within assigned shift)    Comments:

## 2023-02-13 NOTE — PROGRESS NOTES
"Hospital Medicine Daily Progress Note    Date of Service  2/12/2023    Chief Complaint  Kaiden Quiroz is a 62 y.o. male admitted 1/12/2023 with left-sided weakness    Hospital Course  This is a 62-year-old male with past medical history of hypertension, type 2 diabetes, alcohol use, tobacco use disorder and obesity who was admitted on 1/12/2023 with left-sided weakness, significant for right MCA CVA s/p ICA stent and thrombectomy on 1/12, complicated by hemorrhagic conversion.     \"He presented 1/12/22 with Left facial droop and Left-sided weakness. CTH demonstrated hyperdense MCA, for which subsequent CT perfusion demonstrated infarct with penumbra. CTA demonstrated Right ICA and Right MCA occlusion. Neurology was consulted. He was not a candidate for tPA due to hypertension. Neuro-IR performed a Right MCA thrombectomy and Right ICA stent 1/12/23. Subsequent MRI demonstrated Right frontal, basal ganglia, and caudate nucleus infarct. Petechial hemorrhages were noted concerning for hemorrhagic conversion, for which he was not a candidate for DAPT but treated with ASA monotherapy per Neurology recommendation. He was admitted to the ICU post-procedure for neurologic monitoring and nicardipine gtt due to uncontrolled HTN.Started on DAPT 1/24.     During his IR procedure he developed a sinus pause for which CPR was not required prior to spontaneous ROSC. He was administered atropine and glycopyrrolate for sinus bradycardia. He was intubated post-procedure and extubated 1/13/23 to [facility]. CXR demonstrated evolving pulmonary edema. TTE demonstrated normal systolic function and was negative for shunt. He has been diuresed with IV lasix with vast improvement.\"    Patient noted to have significant dysphagia, resulting in aspiration pneumonia, patient required PEG tube placement in 1/24. Patient is s/p FEES 2/8, started on a modified diet.     Interval Problem Update  Patient noted to have melena 2/11 PM  Patient with " history of extensive alcohol use prior to admission  No prior EGD or colonoscopy  Hemoglobin stable  Started on PPI twice daily  Continued aspirin, held Plavix and held DVT prophylaxis    GI consulted, patient is for EGD 2/13    Patient's wife at bedside, updated on plan of care, all questions answered.    Patient has failed voiding trial x2, to discharge with Mcclendon catheter in place.  Outpatient urology referral placed.    SNF pending, asked case management to resend referrals as there is a lag time in bed availability.    I have discussed this patient's plan of care and discharge plan at IDT rounds today with Case Management, Nursing, Nursing leadership, and other members of the IDT team.    Consultants/Specialty  neurology and pulmonary    Code Status  Full Code    Disposition  Patient is not medically cleared for discharge.   Anticipate discharge to to skilled nursing facility.  I have placed the appropriate orders for post-discharge needs.    Review of Systems  Review of Systems   Neurological:  Positive for speech change, focal weakness and weakness.   All other systems reviewed and are negative.     Physical Exam  Temp:  [36.2 °C (97.1 °F)-37.3 °C (99.2 °F)] 37.3 °C (99.2 °F)  Pulse:  [68-75] 68  Resp:  [16-18] 18  BP: (133-137)/(68-70) 135/70  SpO2:  [95 %-96 %] 95 %    Physical Exam  Constitutional:       General: He is not in acute distress.     Appearance: Normal appearance. He is well-developed. He is obese. He is not diaphoretic.      Comments: Sleepy but wakes to voice   HENT:      Head: Normocephalic and atraumatic.      Nose: Nose normal.      Mouth/Throat:      Mouth: Mucous membranes are moist.   Eyes:      General: No scleral icterus.     Conjunctiva/sclera: Conjunctivae normal.   Neck:      Vascular: No JVD.   Cardiovascular:      Rate and Rhythm: Normal rate.      Heart sounds: No murmur heard.    No gallop.   Pulmonary:      Effort: Pulmonary effort is normal. No respiratory distress.       Breath sounds: No stridor. No wheezing or rales.   Abdominal:      Palpations: Abdomen is soft.      Tenderness: There is no abdominal tenderness. There is no guarding or rebound.      Comments: Gtube in place c/d/i   Genitourinary:     Comments: Mcclendon in place  Musculoskeletal:         General: No tenderness.      Right lower leg: No edema.      Left lower leg: No edema.   Skin:     General: Skin is warm and dry.      Capillary Refill: Capillary refill takes less than 2 seconds.      Coloration: Skin is not jaundiced or pale.      Findings: No rash.   Neurological:      Comments: Left-sided hemiparesis  +L side facial droop  Purposeful and 5/5 R upper and lower   Psychiatric:         Mood and Affect: Mood and affect normal.         Behavior: Behavior normal.       Fluids    Intake/Output Summary (Last 24 hours) at 2/12/2023 2033  Last data filed at 2/12/2023 1827  Gross per 24 hour   Intake --   Output 1575 ml   Net -1575 ml       Laboratory  Recent Labs     02/12/23  1057   HEMOGLOBIN 11.5*   HEMATOCRIT 34.8*                           Imaging  CT-HEAD W/O   Final Result      No new intracranial abnormality is identified.      Interval decrease in mass effect from subacute right MCA territory infarction with  expected evolution of macro hemorrhagic transformation      IR-GASTROSTOMY PLACEMENT   Final Result      1.  Fluoroscopic guided percutaneous gastrostomy with placement of an 18-Sinhala ELISE balloon gastrostomy catheter.      2. The gastrostomy tube may be used for tube feeds 18 hours after placement. Thereafter, liquid diet orders per the referring physician. Saline flush daily as per protocol.      3. In 10-14 days, the anchor button suture or sutures should be cut, releasing the anchor or anchors into the stomach. The suture anchors will then pass through the GI tract as desired. This can be performed in the interventional radiology department if    desired. Call [phone] any weekday [phone] hours to make  an appointment.      CT-CTA CHEST PULMONARY ARTERY W/ RECONS   Final Result         1.  No pulmonary embolus appreciated.   2.  Linear densities the bilateral lung bases suggests changes of atelectasis, component of infiltrate is not excluded.   3.  Atherosclerosis and atherosclerotic coronary artery disease.      US-EXTREMITY VENOUS LOWER BILAT   Final Result      DX-ABDOMEN FOR TUBE PLACEMENT   Final Result      NG tube extends below the diaphragm into the region of the stomach. The tip is not visualized.      EC-ECHOCARDIOGRAM LTD W/ CONT   Final Result      DX-CHEST-PORTABLE (1 VIEW)   Final Result         1. No significant interval change.      CT-ABDOMEN-PELVIS W/O   Final Result         1.  Nondependent foci of air in the bladder, could represent changes from recent instrumentation or urinary tract infection.   2.  Diverticulosis   3.  Atherosclerosis and atherosclerotic coronary artery disease      DX-CHEST-PORTABLE (1 VIEW)   Final Result      Ill-defined atelectasis versus consolidation in the left lower lung. Pneumonia can be considered in appropriate clinical setting.         DX-ABDOMEN FOR TUBE PLACEMENT   Final Result         1.  Nonspecific bowel gas pattern in the upper abdomen.   2.  Nasogastric tube tip terminates overlying the expected location of the gastric body.   3.  Cardiomegaly   4.  Hazy linear density left lung base could represent atelectasis or infiltrate.      CT-HEAD W/O   Final Result         1.  Evolving infarct/encephalomalacia in the right MCA distribution.   2.  Areas of hemorrhage in the right basal ganglia and involving the right frontal and anterior temporal sulci, similar compared to MRI January 13, 2023   3.  Atherosclerosis.      DX-CHEST-PORTABLE (1 VIEW)   Final Result         Diffuse interstitial and groundglass opacity throughout both lungs could relate to developing pulmonary edema.      DX-ABDOMEN FOR TUBE PLACEMENT   Final Result         Gastric drainage tube with tip  projecting over the expected area of the stomach.      DX-CHEST-PORTABLE (1 VIEW)   Final Result      1.  Interval extubation without pneumothorax.   2.  Stable enlarged cardiac silhouette.   3.  There is bibasilar linear atelectasis.      EC-ECHOCARDIOGRAM COMPLETE W/ CONT   Final Result      MR-BRAIN-W/O   Final Result         Acute infarction involving the right frontal region, right basal ganglia and right caudate nucleus. Minimal punctate infarction involving the right parietal cortex. There is slight mass effect upon the right lateral ventricle with only minimal midline    shift measuring approximately 4 mm.      Petechial hemorrhage is noted within the right frontal cortical infarct. Also noted is petechial hemorrhage in the right basal ganglia and right caudate nucleus but without surrounding vasogenic edema.         DX-PELVIS-1 OR 2 VIEWS   Final Result         1.  No acute traumatic bony injury.      DX-CHEST-PORTABLE (1 VIEW)   Final Result         1.  Left midlung and lower lobe infiltrates, similar to prior study.      [id number] VIEW   Final Result         1.  Nonspecific bowel gas pattern in the upper abdomen.   2.  Cardiomegaly   3.  Hazy left lower lobe infiltrates.   4.  Nasogastric tube tip terminates overlying the expected location of the gastric fundus.      IR-THROMBO MECHANICAL ARTERY,INIT   Final Result         62-year-old patient who presented with complete occlusion of the right ICA at its origin underwent emergent mechanical thrombectomy. After initial angioplasty,   a right cervical internal carotid artery stent was deployed and right MCA thrombectomy was performed. The final angiographic images show complete restoration of flow into the right ICA and MCA with no evidence of distal intracranial embolization.      Final recanalization score: TICI 3      I, Petra Mobley was physically present and participated during the entire procedure of the IR-THROMBO MECHANICAL ARTERY,INIT.                   DX-CHEST-PORTABLE (1 VIEW)   Final Result      No acute cardiac or pulmonary abnormalities are identified.      CT-CTA NECK WITH & W/O-POST PROCESSING   Final Result      1.  Occlusion of the right internal carotid artery at its origin.      2.  Occlusion of the right vertebral artery at its origin.      Dr. Gongora discussed findings with Dr. Mccarthy.      CT-CTA HEAD WITH & W/O-POST PROCESS   Final Result         1.  Occlusion of the right internal carotid artery and right middle cerebral artery.   2.  Narrowed left M1 segment with severely narrowed and M2 and M3 branches, may represent chronic changes.      These findings were discussed with the patient's clinician, Joseline Mccarthy, on 1/12/2023 8:27 PM.      CT-CEREBRAL PERFUSION ANALYSIS   Final Result         1.  Cerebral blood flow less than 30% likely representing completed infarct = 52 mL.      2.  T Max more than 6 seconds likely representing combination of completed infarct and ischemia = 172 mL.      3.  Mismatched volume likely representing ischemic brain/penumbra = 120 mL      4.  Please note that the cerebral perfusion was performed on the limited brain tissue around the basal ganglia region. Infarct/ischemia outside the CT perfusion sections can be missed in this study.      CT-HEAD W/O   Final Result      1.  No evidence of intracranial hemorrhage, midline shift or mass effect.      2.  Question of hyperdense right MCA though evaluation limited by oblique positioning.              Assessment/Plan  * Acute ischemic right middle cerebral artery (MCA) stroke (HCC)- (present on admission)  Assessment & Plan  Right MCA CVA with hemorrhagic conversion  S/p right ICA stent and MCA thrombectomy 1/12/23  Neurology consulted   High Intensity statin therapy   BP control  DAPT  TTE negative for shunt  Tobacco cessation  PEG placed 1/24, Patient is s/p FEES 2/8, started on a modified diet.  2/4 repeated CT head: Interval decrease in mass effect from  subacute right MCA territory infarction with expected evolution of macro hemorrhagic transformation.  PT/OT/SLP - recommending post-acute placement    Melena  Assessment & Plan  Patient noted to have melena 2/11 PM  Patient with history of extensive alcohol use prior to admission  No prior EGD or colonoscopy  Hemoglobin stable  Started on PPI twice daily  Continued aspirin, held Plavix and DVT prophylaxis    GI consulted, patient is for EGD 2/13    Urinary retention  Assessment & Plan  Likely in the setting of BPH  Tried voiding trial 1/25 and 1/31, failed replaced 2/3  Starting terazosin 1/28    Internal carotid artery stent present  Assessment & Plan  Placed 1/12  Per Neuro OK to start DAPT: ASA & Plavix for 6 wks    Hypernatremia  Assessment & Plan  Resolved  Resolved with Free H2O 200ml q6    Pneumonia due to infectious organism- (present on admission)  Assessment & Plan  Resolved    Aspiration pneumonia (Developed 01/19/23)   Procal low  Sputum cultures neg  Strict aspiration precautions   Has completed 5 days of Abx's  MRSA nares neg  Mucomyst/3% normal saline nebs to maintain pulmonary toilet, RT protocol to continue  Pulmonology evaluated him and made recommendations          Oropharyngeal dysphagia- (present on admission)  Assessment & Plan  Secondary to acute stroke.    Aspiration precautions   PEG 1/24  Nutrition following  SLP following  Patient is s/p FEES 2/8, patient continues to tolerate modified diet. PEG tube to remain in place at this time.     Acute left hemiparesis (HCC)- (present on admission)  Assessment & Plan  Secondary to right MCA stroke.    Pressure ulcer precautions  Aspiration, Fall precautions  PT and OT: SNF placement    Stroke, hemorrhagic (HCC)- (present on admission)  Assessment & Plan  Ischemic stroke with hemorrhagic conversion.  Neuro has OKd for antiplatelet therapy with ASA and plavix given presence of new ICA stent    Toxic metabolic encephalopathy- (present on  admission)  Assessment & Plan  Resolved    Persistent 01/20/23  Secondary to stroke, delirium and now concern for relation to underlying pneumonia  Limit restraints as able  Avoid polypharmacy, sedating medications  Delirium precautions  Management of underlying medical conditions  Obtain EEG 01/20/23 and it did not show seizure activity.   cont's to improve daily though examination is limited by dysarthria at times    Acute respiratory failure with hypoxia (HCC)- (present on admission)  Assessment & Plan  Improve, currently on 1 L of oxygen    01/19/23 CXRAY consistent with Aspiration Pneumonia - Started on IV unasyn  Subsequently trnasitioned to [facility].  At present off Abx's  MRSA nares neg  trachael aspirate cultures neg  CTA chest neg for PE  TTE LVEF 70%, no signficant valvular or structural abnormalities  O2 demand decreasing: now off HFNC and on 1 LNC  Cont weaning O2    Severe obesity (BMI 35.0-35.9 with comorbidity) ([facility])- (present on admission)  Assessment & Plan  Body mass index is 37.39 kg/m².  Co-morbid HTN, T2DM, KENDAL  Outpatient weight loss    Type 2 diabetes mellitus without complication, without long-term current use of insulin (HCC)- (present on admission)  Assessment & Plan  A1c 5.3  No indication for strict BG control - POCT/SSI deferred  Atorvastatin    Alcohol dependence with withdrawal ([facility])- (present on admission)  Assessment & Plan  Resolved.    Tobacco use disorder- (present on admission)  Assessment & Plan  On nicotine patch     Primary hypertension- (present on admission)  Assessment & Plan  Amlodipine 10 mg  Added lisinopril 10 mg         VTE prophylaxis: SCDs/TEDs and enoxaparin ppx    I have performed a physical exam and reviewed and updated ROS and Plan today (2/12/2023). In review of yesterday's note (2/11/2023), there are no changes except as documented above.

## 2023-02-13 NOTE — THERAPY
Occupational Therapy  Daily Treatment     Patient Name: Kaiden Quiroz  Age:  62 y.o., Sex:  male  Medical Record #: [id number]  Today's Date: 2/13/2023       Precautions: Fall Risk, PEG Tube  Comments: L side deficits    Assessment    Pt seen for OT tx. Continues to be limited by decreased activity tolerance, balance deficits and LUE weakness impacting ability to complete ADLs and ADL transfers independently. Min A supine > sit, once seated EOB able to maintain balance w/o assist from therapist. Mod A sit > stand, LUE continues to have no functional movement d/t gross weakness. Encouraged pt to mobilize w/ nrsg staff as tolerated. Will continue to follow while in house.     Plan    O.T. Treatment Plan: Continue Current Treatment Plan    DC Equipment Recommendations: Unable to determine at this time  Discharge Recommendations: Recommend post-acute placement for additional occupational therapy services prior to discharge home       02/13/23 1001   Cognition    Cognition / Consciousness X   Safety Awareness Impaired   New Learning Impaired   Attention Impaired   Sequencing Impaired   Comments pleasant and cooperative   Active ROM Upper Body   Active ROM Upper Body  X   Comments LUE limited RO mgrossly weak, trace movement in L shoulder   Strength Upper Body   Upper Body Strength  X   Comments LUE flaccid   Balance   Sitting Balance (Static) Fair   Sitting Balance (Dynamic) Fair -   Standing Balance (Static) Trace +   Standing Balance (Dynamic) Trace +   Weight Shift Sitting Fair   Weight Shift Standing Absent   Bed Mobility    Supine to Sit Minimal Assist   Sit to Supine Minimal Assist   Activities of Daily Living   Grooming Minimal Assist;Seated   Upper Body Dressing Moderate Assist   Lower Body Dressing Maximal Assist   Toileting Maximal Assist   Functional Mobility   Sit to Stand Moderate Assist   Short Term Goals   Short Term Goal # 1 pt will groom seated EOB w/ setup   Goal Outcome # 1 Progressing as expected    Short Term Goal # 2 pt will maintain sitting balance unsupported 10 sec in prep for seated ADLs   Goal Outcome # 2 Goal met   Short Term Goal # 3 pt will demo ADL txfs with Bandar   Goal Outcome # 3 Goal not met   Anticipated Discharge Equipment and Recommendations   DC Equipment Recommendations Unable to determine at this time   Discharge Recommendations Recommend post-acute placement for additional occupational therapy services prior to discharge home

## 2023-02-13 NOTE — PROGRESS NOTES
Pt. PEG tube site seen with small scant bleeding. Site cleaned of blood with NS and dressing applied. Site is now CDI. Will continue to monitor.

## 2023-02-13 NOTE — ASSESSMENT & PLAN NOTE
Patient noted to have melena 2/11 PM  Evaluated by GI EGD on 2/13/2023 with no active bleeding revealed  duodenal angioectasia, intact PEG without ulceration    Continue Prilosec for 4 weeks per GI recommendations

## 2023-02-13 NOTE — OR NURSING
1428 Pt in PACU from OR, report from Dr. Delarosa and OR RN.     1500 Pt awake, denies pain. VSS.     1507 Report called to Isabel HELLER.     1410 Handoff to Giulia HELLER.

## 2023-02-13 NOTE — DISCHARGE PLANNING
Case Management Discharge Planning    Admission Date: 1/12/2023  GMLOS: 7.3  ALOS: 32    6-Clicks ADL Score: 12  6-Clicks Mobility Score: 7  PT and/or OT Eval ordered: Yes  Post-acute Referrals Ordered: Yes  Post-acute Choice Obtained: Yes  Has referral(s) been sent to post-acute provider:  Yes      Anticipated Discharge Dispo: Discharge Disposition: D/T to SNF with Medicare cert in anticipation of skilled care (03)    DME Needed: No    Action(s) Taken: Case discussed in morning DC rounds.  Patient not yet medically cleared for DC.  +GIB/melena with stable Hgb; Plan for EGD today.  SNF search expanded by prior CM; No accepting facility to date.    Escalations Completed:  PT/OT updated notes requested.    Medically Clear: No    Next Steps: CM will f/u on medical clearance & SNF referrals    Barriers to Discharge: Medical clearance and Pending Placement    Is the patient up for discharge tomorrow:  Potentially

## 2023-02-13 NOTE — ANESTHESIA PREPROCEDURE EVALUATION
Case: [id number] Date/Time: 02/13/23 1300    Procedure: GASTROSCOPY (Esophagus)    Anesthesia type: General    Pre-op diagnosis: melena    Location: [facility] ROOM 26 / SURGERY SAME DAY [facility]    Surgeons: Dagmar Allan M.D.      Recent cva, left side weak  Smoker  etoh    Relevant Problems   PULMONARY   (positive) Pneumonia due to infectious organism      NEURO   (positive) Acute ischemic right middle cerebral artery (MCA) stroke (HCC)      CARDIAC   (positive) Primary hypertension      ENDO   (positive) Type 2 diabetes mellitus without complication, without long-term current use of insulin (HCC)       Physical Exam    Airway   Mallampati: II  TM distance: >3 FB  Neck ROM: full       Cardiovascular - normal exam  Rhythm: regular  Rate: normal  (-) murmur     Dental - normal exam      Very poor dentition  Facial Hair   Pulmonary - normal exam  Breath sounds clear to auscultation     Abdominal    Neurological - normal exam         Other findings: Missing many teeth            Anesthesia Plan    ASA 4   ASA physical status 4 criteria: CVA or TIA - recent (< 3 months)    Plan - MAC               Induction: intravenous    Postoperative Plan: Postoperative administration of opioids is intended.    Pertinent diagnostic labs and testing reviewed    Informed Consent:    Anesthetic plan and risks discussed with patient.    Use of blood products discussed with: patient whom consented to blood products.

## 2023-02-13 NOTE — CARE PLAN
The patient is Stable - Low risk of patient condition declining or worsening    Shift Goals  Clinical Goals: EGD, skin integrity  Patient Goals: rest, EGD, food  Family Goals: RADHA    Progress made toward(s) clinical / shift goals:  EGD completed. Neuro checks stable. Q2 turns completed, able to work with PT. Fall precautions in place, no falls this shift, call light within reach.       Problem: Neuro Status  Goal: Neuro status will remain stable or improve  Description: Target End Date:  Prior to discharge or change in level of care    Document on Neuro assessment in the Assessment flowsheet    1.  Assess and monitor neurologic status per provider order/protocol/unit policy  2.  Assess level of consciousness and orientation  3.  Assess for speech, dysarthria, dysphagia, facial symmetry  4.  Assess visual field, eye movements, gaze preference, pupil reaction and size  5.  Assess muscle strength and motor response in all four extremities  6.  Assess for sensation (numbness and tingling)  7.  Assess basic neuro reflexes (cough, gag, corneal)  8.  Identify changes in neuro status and report to provider for testing/treatment orders  Outcome: Progressing     Problem: Mobility - Stroke  Goal: Patient's capacity to carry out activities will improve  Description: Target End Date:  Prior to discharge or change in level of care    1.  Assess for barriers to mobility/activity  2.  Implement activity per interdisciplinary team recommendations  3.  Target activity level identified and patient/family/caregiver aware of goal  4.  Provide assistive devices  5.  Instruct patient/caregiver on proper use of assistive/adaptive devices  6.  Schedule activities and rest periods to decrease effects of fatigue  7.  Encourage mobilization to extent of ability  8.  Maintain proper body alignment  9.  Provide adequate pain management to allow progressive mobilization  10. Implement pace maker precautions as needed  Outcome: Progressing      Problem: Skin Integrity  Goal: Skin integrity is maintained or improved  Description: Target End Date:  Prior to discharge or change in level of care    Document interventions on Skin Risk/Lucius flowsheet groups and corresponding LDA    1.  Assess and monitor skin integrity, appearance and/or temperature  2.  Assess risk factors for impaired skin integrity and/or pressures ulcers  3.  Implement precautions to protect skin integrity in collaboration with interdisciplinary team  4.  Implement pressure ulcer prevention protocol if at risk for skin breakdown  5.  Confirm wound care consult if at risk for skin breakdown  6.  Ensure patient use of pressure relieving devices  (Low air loss bed, waffle overlay, heel protectors, ROHO cushion, etc)  Outcome: Progressing

## 2023-02-14 PROBLEM — G92.8 TOXIC METABOLIC ENCEPHALOPATHY: Status: RESOLVED | Noted: 2023-01-15 | Resolved: 2023-02-14

## 2023-02-14 LAB
ANION GAP SERPL CALC-SCNC: 9 MMOL/L (ref 7–16)
BUN SERPL-MCNC: 13 MG/DL (ref 8–22)
CALCIUM SERPL-MCNC: 8.7 MG/DL (ref 8.5–10.5)
CHLORIDE SERPL-SCNC: 101 MMOL/L (ref 96–112)
CO2 SERPL-SCNC: 26 MMOL/L (ref 20–33)
CREAT SERPL-MCNC: 0.54 MG/DL (ref 0.5–1.4)
ERYTHROCYTE [DISTWIDTH] IN BLOOD BY AUTOMATED COUNT: 43 FL (ref 35.9–50)
GFR SERPLBLD CREATININE-BSD FMLA CKD-EPI: 113 ML/MIN/1.73 M 2
GLUCOSE SERPL-MCNC: 91 MG/DL (ref 65–99)
HCT VFR BLD AUTO: 32.5 % (ref 42–52)
HGB BLD-MCNC: 11.3 G/DL (ref 14–18)
MAGNESIUM SERPL-MCNC: 1.5 MG/DL (ref 1.5–2.5)
MCH RBC QN AUTO: 32.4 PG (ref 27–33)
MCHC RBC AUTO-ENTMCNC: 34.8 G/DL (ref 33.7–35.3)
MCV RBC AUTO: 93.1 FL (ref 81.4–97.8)
PHOSPHATE SERPL-MCNC: 3.9 MG/DL (ref 2.5–4.5)
PLATELET # BLD AUTO: 150 K/UL (ref 164–446)
PMV BLD AUTO: 10.9 FL (ref 9–12.9)
POTASSIUM SERPL-SCNC: 3.9 MMOL/L (ref 3.6–5.5)
RBC # BLD AUTO: 3.49 M/UL (ref 4.7–6.1)
SODIUM SERPL-SCNC: 136 MMOL/L (ref 135–145)
WBC # BLD AUTO: 5.6 K/UL (ref 4.8–10.8)

## 2023-02-14 PROCEDURE — A9270 NON-COVERED ITEM OR SERVICE: HCPCS | Performed by: GENERAL PRACTICE

## 2023-02-14 PROCEDURE — 85027 COMPLETE CBC AUTOMATED: CPT

## 2023-02-14 PROCEDURE — A9270 NON-COVERED ITEM OR SERVICE: HCPCS | Performed by: INTERNAL MEDICINE

## 2023-02-14 PROCEDURE — 97530 THERAPEUTIC ACTIVITIES: CPT | Mod: CQ

## 2023-02-14 PROCEDURE — 97110 THERAPEUTIC EXERCISES: CPT | Mod: CQ

## 2023-02-14 PROCEDURE — 700102 HCHG RX REV CODE 250 W/ 637 OVERRIDE(OP): Performed by: INTERNAL MEDICINE

## 2023-02-14 PROCEDURE — 36415 COLL VENOUS BLD VENIPUNCTURE: CPT

## 2023-02-14 PROCEDURE — 99232 SBSQ HOSP IP/OBS MODERATE 35: CPT | Performed by: HOSPITALIST

## 2023-02-14 PROCEDURE — 80048 BASIC METABOLIC PNL TOTAL CA: CPT

## 2023-02-14 PROCEDURE — 84100 ASSAY OF PHOSPHORUS: CPT

## 2023-02-14 PROCEDURE — 700111 HCHG RX REV CODE 636 W/ 250 OVERRIDE (IP): Performed by: INTERNAL MEDICINE

## 2023-02-14 PROCEDURE — 83735 ASSAY OF MAGNESIUM: CPT

## 2023-02-14 PROCEDURE — 770006 HCHG ROOM/CARE - MED/SURG/GYN SEMI*

## 2023-02-14 PROCEDURE — 700102 HCHG RX REV CODE 250 W/ 637 OVERRIDE(OP): Performed by: GENERAL PRACTICE

## 2023-02-14 PROCEDURE — 700111 HCHG RX REV CODE 636 W/ 250 OVERRIDE (IP): Performed by: HOSPITALIST

## 2023-02-14 RX ORDER — MAGNESIUM SULFATE HEPTAHYDRATE 40 MG/ML
2 INJECTION, SOLUTION INTRAVENOUS ONCE
Status: COMPLETED | OUTPATIENT
Start: 2023-02-14 | End: 2023-02-14

## 2023-02-14 RX ADMIN — ATORVASTATIN CALCIUM 80 MG: 80 TABLET, FILM COATED ORAL at 16:46

## 2023-02-14 RX ADMIN — TERAZOSIN 1 MG: 1 CAPSULE ORAL at 16:46

## 2023-02-14 RX ADMIN — Medication 5 MG: at 22:05

## 2023-02-14 RX ADMIN — ASPIRIN 81 MG 81 MG: 81 TABLET ORAL at 04:45

## 2023-02-14 RX ADMIN — OMEPRAZOLE 40 MG: 20 CAPSULE, DELAYED RELEASE ORAL at 04:45

## 2023-02-14 RX ADMIN — SENNOSIDES AND DOCUSATE SODIUM 1 TABLET: 50; 8.6 TABLET ORAL at 16:46

## 2023-02-14 RX ADMIN — SENNOSIDES AND DOCUSATE SODIUM 1 TABLET: 50; 8.6 TABLET ORAL at 04:45

## 2023-02-14 RX ADMIN — CLOPIDOGREL BISULFATE 75 MG: 75 TABLET ORAL at 04:46

## 2023-02-14 RX ADMIN — MAGNESIUM SULFATE HEPTAHYDRATE 2 G: 40 INJECTION, SOLUTION INTRAVENOUS at 14:42

## 2023-02-14 RX ADMIN — ENOXAPARIN SODIUM 40 MG: 40 INJECTION SUBCUTANEOUS at 16:46

## 2023-02-14 ASSESSMENT — COGNITIVE AND FUNCTIONAL STATUS - GENERAL
MOVING FROM LYING ON BACK TO SITTING ON SIDE OF FLAT BED: UNABLE
STANDING UP FROM CHAIR USING ARMS: TOTAL
MOVING TO AND FROM BED TO CHAIR: UNABLE
TURNING FROM BACK TO SIDE WHILE IN FLAT BAD: A LOT
WALKING IN HOSPITAL ROOM: TOTAL
MOBILITY SCORE: 7
CLIMB 3 TO 5 STEPS WITH RAILING: TOTAL
SUGGESTED CMS G CODE MODIFIER MOBILITY: CM

## 2023-02-14 ASSESSMENT — ENCOUNTER SYMPTOMS
VOMITING: 0
ABDOMINAL PAIN: 0
NAUSEA: 0
WEAKNESS: 1
FEVER: 0
CHILLS: 0
SHORTNESS OF BREATH: 0

## 2023-02-14 ASSESSMENT — GAIT ASSESSMENTS: GAIT LEVEL OF ASSIST: UNABLE TO PARTICIPATE

## 2023-02-14 ASSESSMENT — PAIN DESCRIPTION - PAIN TYPE: TYPE: ACUTE PAIN

## 2023-02-14 NOTE — PROGRESS NOTES
"Hospital Medicine Daily Progress Note    Date of Service  2/14/2023    Chief Complaint  Kaiden Quiroz is a 62 y.o. male admitted 1/12/2023 with left-sided weakness    Hospital Course  This is a 62-year-old male with past medical history of hypertension, type 2 diabetes, alcohol use, tobacco use disorder and obesity who was admitted on 1/12/2023 with left-sided weakness, significant for right MCA CVA s/p ICA stent and thrombectomy on 1/12, complicated by hemorrhagic conversion.     \"He presented 1/12/22 with Left facial droop and Left-sided weakness. CTH demonstrated hyperdense MCA, for which subsequent CT perfusion demonstrated infarct with penumbra. CTA demonstrated Right ICA and Right MCA occlusion. Neurology was consulted. He was not a candidate for tPA due to hypertension. Neuro-IR performed a Right MCA thrombectomy and Right ICA stent 1/12/23. Subsequent MRI demonstrated Right frontal, basal ganglia, and caudate nucleus infarct. Petechial hemorrhages were noted concerning for hemorrhagic conversion, for which he was not a candidate for DAPT but treated with ASA monotherapy per Neurology recommendation. He was admitted to the ICU post-procedure for neurologic monitoring and nicardipine gtt due to uncontrolled HTN.Started on DAPT 1/24.     During his IR procedure he developed a sinus pause for which CPR was not required prior to spontaneous ROSC. He was administered atropine and glycopyrrolate for sinus bradycardia. He was intubated post-procedure and extubated 1/13/23 to [facility]. CXR demonstrated evolving pulmonary edema. TTE demonstrated normal systolic function and was negative for shunt. He has been diuresed with IV lasix with vast improvement.\"    Patient noted to have significant dysphagia, resulting in aspiration pneumonia, patient required PEG tube placement in 1/24. Patient is s/p FEES 2/8, started on a modified diet, tolerating 100% PO intake.    Patient noted to have melena, history of extensive " alcohol use prior to admission.  No prior EGD or colonoscopy.  GI consulted he is s/p EGD on 2/13 with 2 mm nonbleeding angioectasia in second portion of duodenum treated with APC, no active bleeding.  PPI x4 weeks.  Resumed Plavix.    Interval Problem Update    EGD negative for active bleed  Restarted on aspirin Plavix  Magnesium 1.5 I have ordered repletion  On 2 L nasal cannula  Discussed with case management    I have discussed this patient's plan of care and discharge plan at IDT rounds today with Case Management, Nursing, Nursing leadership, and other members of the IDT team.    Consultants/Specialty  neurology and pulmonary    Code Status  Full Code    Disposition  Patient is medically cleared for discharge.   Anticipate discharge to to skilled nursing facility.  I have placed the appropriate orders for post-discharge needs.    Review of Systems  Review of Systems   Constitutional:  Negative for chills and fever.   Respiratory:  Negative for shortness of breath.    Cardiovascular:  Negative for chest pain.   Gastrointestinal:  Negative for abdominal pain, nausea and vomiting.   Neurological:  Positive for weakness.   All other systems reviewed and are negative.     Physical Exam  Temp:  [36.1 °C (96.9 °F)-37 °C (98.6 °F)] 36.1 °C (96.9 °F)  Pulse:  [15-85] 60  Resp:  [15-24] 18  BP: ()/(50-89) 129/71  SpO2:  [94 %-99 %] 95 %    Physical Exam  Vitals and nursing note reviewed.   Constitutional:       Appearance: He is well-developed. He is obese. He is not diaphoretic.   HENT:      Head: Normocephalic and atraumatic.      Mouth/Throat:      Pharynx: No oropharyngeal exudate.   Eyes:      General: No scleral icterus.        Right eye: No discharge.         Left eye: No discharge.      Conjunctiva/sclera: Conjunctivae normal.      Pupils: Pupils are equal, round, and reactive to light.   Neck:      Vascular: No JVD.      Trachea: No tracheal deviation.   Cardiovascular:      Rate and Rhythm: Normal rate  and regular rhythm.      Heart sounds: No murmur heard.    No friction rub. No gallop.   Pulmonary:      Effort: Pulmonary effort is normal. No respiratory distress.      Breath sounds: No stridor. Decreased breath sounds present. No wheezing.   Chest:      Chest wall: No tenderness.   Abdominal:      General: Bowel sounds are normal. There is no distension.      Palpations: Abdomen is soft.      Tenderness: There is no abdominal tenderness. There is no rebound.   Musculoskeletal:         General: No tenderness.      Cervical back: Neck supple.   Skin:     General: Skin is warm and dry.      Nails: There is no clubbing.   Neurological:      Mental Status: He is alert and oriented to person, place, and time.      Cranial Nerves: No cranial nerve deficit.      Motor: Weakness present. No abnormal muscle tone.      Comments: Left hemiparesis   Psychiatric:         Behavior: Behavior normal.       Fluids    Intake/Output Summary (Last 24 hours) at 2/14/2023 1334  Last data filed at 2/14/2023 1000  Gross per 24 hour   Intake 1090 ml   Output 0 ml   Net 1090 ml         Laboratory  Recent Labs     02/12/23  1057 02/13/23  0537 02/14/23  0452   WBC  --  5.7 5.6   RBC  --  3.66* 3.49*   HEMOGLOBIN 11.5* 11.4* 11.3*   HEMATOCRIT 34.8* 34.1* 32.5*   MCV  --  93.2 93.1   MCH  --  31.1 32.4   MCHC  --  33.4* 34.8   RDW  --  43.7 43.0   PLATELETCT  --  142* 150*   MPV  --  10.7 10.9         Recent Labs     02/13/23  0537 02/14/23  0452   SODIUM 135 136   POTASSIUM 3.7 3.9   CHLORIDE 101 101   CO2 25 26   GLUCOSE 97 91   BUN 9 13   CREATININE 0.55 0.54   CALCIUM 9.0 8.7         Recent Labs     02/13/23  0537   INR 1.04                 Imaging  CT-HEAD W/O   Final Result      No new intracranial abnormality is identified.      Interval decrease in mass effect from subacute right MCA territory infarction with  expected evolution of macro hemorrhagic transformation      IR-GASTROSTOMY PLACEMENT   Final Result      1.  Fluoroscopic  guided percutaneous gastrostomy with placement of an 18-[place] ELISE balloon gastrostomy catheter.      2. The gastrostomy tube may be used for tube feeds 18 hours after placement. Thereafter, liquid diet orders per the referring physician. Saline flush daily as per protocol.      3. In 10-14 days, the anchor button suture or sutures should be cut, releasing the anchor or anchors into the stomach. The suture anchors will then pass through the GI tract as desired. This can be performed in the interventional radiology department if    desired. Call [phone] any weekday [phone] hours to make an appointment.      CT-CTA CHEST PULMONARY ARTERY W/ RECONS   Final Result         1.  No pulmonary embolus appreciated.   2.  Linear densities the bilateral lung bases suggests changes of atelectasis, component of infiltrate is not excluded.   3.  Atherosclerosis and atherosclerotic coronary artery disease.      US-EXTREMITY VENOUS LOWER BILAT   Final Result      DX-ABDOMEN FOR TUBE PLACEMENT   Final Result      NG tube extends below the diaphragm into the region of the stomach. The tip is not visualized.      EC-ECHOCARDIOGRAM LTD W/ CONT   Final Result      DX-CHEST-PORTABLE (1 VIEW)   Final Result         1. No significant interval change.      CT-ABDOMEN-PELVIS W/O   Final Result         1.  Nondependent foci of air in the bladder, could represent changes from recent instrumentation or urinary tract infection.   2.  Diverticulosis   3.  Atherosclerosis and atherosclerotic coronary artery disease      DX-CHEST-PORTABLE (1 VIEW)   Final Result      Ill-defined atelectasis versus consolidation in the left lower lung. Pneumonia can be considered in appropriate clinical setting.         DX-ABDOMEN FOR TUBE PLACEMENT   Final Result         1.  Nonspecific bowel gas pattern in the upper abdomen.   2.  Nasogastric tube tip terminates overlying the expected location of the gastric body.   3.  Cardiomegaly   4.  Hazy linear density  left lung base could represent atelectasis or infiltrate.      CT-HEAD W/O   Final Result         1.  Evolving infarct/encephalomalacia in the right MCA distribution.   2.  Areas of hemorrhage in the right basal ganglia and involving the right frontal and anterior temporal sulci, similar compared to MRI January 13, 2023   3.  Atherosclerosis.      DX-CHEST-PORTABLE (1 VIEW)   Final Result         Diffuse interstitial and groundglass opacity throughout both lungs could relate to developing pulmonary edema.      DX-ABDOMEN FOR TUBE PLACEMENT   Final Result         Gastric drainage tube with tip projecting over the expected area of the stomach.      DX-CHEST-PORTABLE (1 VIEW)   Final Result      1.  Interval extubation without pneumothorax.   2.  Stable enlarged cardiac silhouette.   3.  There is bibasilar linear atelectasis.      EC-ECHOCARDIOGRAM COMPLETE W/ CONT   Final Result      MR-BRAIN-W/O   Final Result         Acute infarction involving the right frontal region, right basal ganglia and right caudate nucleus. Minimal punctate infarction involving the right parietal cortex. There is slight mass effect upon the right lateral ventricle with only minimal midline    shift measuring approximately 4 mm.      Petechial hemorrhage is noted within the right frontal cortical infarct. Also noted is petechial hemorrhage in the right basal ganglia and right caudate nucleus but without surrounding vasogenic edema.         DX-PELVIS-1 OR 2 VIEWS   Final Result         1.  No acute traumatic bony injury.      DX-CHEST-PORTABLE (1 VIEW)   Final Result         1.  Left midlung and lower lobe infiltrates, similar to prior study.      BC-[id number]-1 VIEW   Final Result         1.  Nonspecific bowel gas pattern in the upper abdomen.   2.  Cardiomegaly   3.  Hazy left lower lobe infiltrates.   4.  Nasogastric tube tip terminates overlying the expected location of the gastric fundus.      IR-THROMBO MECHANICAL ARTERY,INIT   Final  Result         62-year-old patient who presented with complete occlusion of the right ICA at its origin underwent emergent mechanical thrombectomy. After initial angioplasty,   a right cervical internal carotid artery stent was deployed and right MCA thrombectomy was performed. The final angiographic images show complete restoration of flow into the right ICA and MCA with no evidence of distal intracranial embolization.      Final recanalization score: TICI 3      I, Petra Mobley was physically present and participated during the entire procedure of the IR-THROMBO MECHANICAL ARTERY,INIT.                  DX-CHEST-PORTABLE (1 VIEW)   Final Result      No acute cardiac or pulmonary abnormalities are identified.      CT-CTA NECK WITH & W/O-POST PROCESSING   Final Result      1.  Occlusion of the right internal carotid artery at its origin.      2.  Occlusion of the right vertebral artery at its origin.      Dr. Gongora discussed findings with Dr. Mccarthy.      CT-CTA HEAD WITH & W/O-POST PROCESS   Final Result         1.  Occlusion of the right internal carotid artery and right middle cerebral artery.   2.  Narrowed left M1 segment with severely narrowed and M2 and M3 branches, may represent chronic changes.      These findings were discussed with the patient's clinician, Joseline Mccarthy, on 1/12/2023 8:27 PM.      CT-CEREBRAL PERFUSION ANALYSIS   Final Result         1.  Cerebral blood flow less than 30% likely representing completed infarct = 52 mL.      2.  T Max more than 6 seconds likely representing combination of completed infarct and ischemia = 172 mL.      3.  Mismatched volume likely representing ischemic brain/penumbra = 120 mL      4.  Please note that the cerebral perfusion was performed on the limited brain tissue around the basal ganglia region. Infarct/ischemia outside the CT perfusion sections can be missed in this study.      CT-HEAD W/O   Final Result      1.  No evidence of intracranial  hemorrhage, midline shift or mass effect.      2.  Question of hyperdense right MCA though evaluation limited by oblique positioning.                Assessment/Plan  * Acute ischemic right middle cerebral artery (MCA) stroke (HCC)- (present on admission)  Assessment & Plan  Right MCA CVA with hemorrhagic conversion  S/p right ICA stent and MCA thrombectomy 1/12/23  Neurology consulted   High Intensity statin therapy   BP control  DAPT  TTE negative for shunt  Tobacco cessation  PEG placed 1/24, Patient is s/p FEES 2/8, started on a modified diet.  2/4 repeated CT head: Interval decrease in mass effect from subacute right MCA territory infarction with expected evolution of macro hemorrhagic transformation.  PT/OT/SLP - recommending post-acute placement    Melena  Assessment & Plan  Patient noted to have melena 2/11 PM  Evaluated by GI EGD on 2/13/2023 with no active bleeding revealed  duodenal angioectasia, intact PEG without ulceration    Continue Prilosec for 4 weeks per GI recommendations    Urinary retention  Assessment & Plan  Likely in the setting of BPH  Tried voiding trial 1/25 and 1/31, failed replaced 2/3  Starting terazosin 1/28  Continue Mcclendon catheter and outpatient urology evaluation    Internal carotid artery stent present  Assessment & Plan  Placed 1/12  Per Neuro OK to start DAPT: ASA & Plavix for 6 wks    Hypernatremia  Assessment & Plan  Resolved  Resolved with Free H2O 200ml q6    Pneumonia due to infectious organism- (present on admission)  Assessment & Plan  Resolved    Aspiration pneumonia (Developed 01/19/23)   Procal low  Sputum cultures neg  Strict aspiration precautions   Has completed 5 days of Abx's  MRSA nares neg  Mucomyst/3% normal saline nebs to maintain pulmonary toilet, RT protocol to continue  Pulmonology evaluated him and made recommendations          Oropharyngeal dysphagia- (present on admission)  Assessment & Plan  Secondary to acute stroke.    Aspiration precautions   PEG  1/24  Nutrition following  SLP following  Patient is s/p FEES 2/8, patient continues to tolerate modified diet. PEG tube to remain in place at this time.     Acute left hemiparesis (HCC)- (present on admission)  Assessment & Plan  Secondary to right MCA stroke.    Pressure ulcer precautions  Aspiration, Fall precautions  PT and OT: SNF placement    Stroke, hemorrhagic (HCC)- (present on admission)  Assessment & Plan  Ischemic stroke with hemorrhagic conversion.  Neuro has OKd for antiplatelet therapy with ASA and plavix given presence of new ICA stent    Acute respiratory failure with hypoxia (HCC)- (present on admission)  Assessment & Plan  Improved oxygen requirements    01/19/23 CXRAY consistent with Aspiration Pneumonia - Started on IV unasyn  Subsequently trnasitioned to [facility].  At present off Abx's  MRSA nares neg  trachael aspirate cultures neg  CTA chest neg for PE  TTE LVEF 70%, no signficant valvular or structural abnormalities    Cont weaning O2    Severe obesity (BMI 35.0-35.9 with comorbidity) ([facility])- (present on admission)  Assessment & Plan  Body mass index is 37.39 kg/m².  Co-morbid HTN, T2DM, KENDAL  Outpatient weight loss    Type 2 diabetes mellitus without complication, without long-term current use of insulin ([facility])- (present on admission)  Assessment & Plan  A1c 5.3  No indication for strict BG control - POCT/SSI deferred  Atorvastatin    Alcohol dependence with withdrawal ([facility])- (present on admission)  Assessment & Plan  Resolved.    Tobacco use disorder- (present on admission)  Assessment & Plan  On nicotine patch     Primary hypertension- (present on admission)  Assessment & Plan  Stable on amlodipine and lisinopril continue to monitor         VTE prophylaxis: SCDs/TEDs and enoxaparin ppx    I have performed a physical exam and reviewed and updated ROS and Plan today (2/14/2023). In review of yesterday's note (2/13/2023), there are no changes except as documented above.

## 2023-02-14 NOTE — DISCHARGE PLANNING
Agency/Facility Name: [facility]  Outcome: DPA left vmail with facility asking for updated referral status     1010    Agency/Facility Name: [facility]  Spoke To: Hannah  Outcome: DPA received call back from facility. Per Hannah, she will relook at referral    RN CM notified     1034    Agency/Facility Name: [facility]  Spoke To: Hannah  Outcome: DPA called facility to give update on dc plan. Pt will be dc to home with spouse after SNF.  Per Hannah, Paula will be on site today and will come and assess pt.     RN CM notified

## 2023-02-14 NOTE — CARE PLAN
The patient is Stable - Low risk of patient condition declining or worsening    Shift Goals  Clinical Goals: No complications related to neuro status; Preventative skin measures; PO food and fluid intake will be tolerated and increased  Patient Goals: Sleep; Food  Family Goals: RADHA    Progress made toward(s) clinical / shift goals:  Patient remains alert and oriented X 4. Neuro checks continued with no new complications observed. Patient S/P EGD procedure. Oxygen administered at 2L/min via nasal cannula. Preventative skin measures continued. No signs of distress or discomfort have been observed. Will continue to monitor.   Problem: Optimal Care of the Stroke Patient  Goal: Optimal emergency care for the stroke patient  Outcome: Progressing  Goal: Optimal acute care for the stroke patient  Outcome: Progressing     Problem: Knowledge Deficit - Stroke Education  Goal: Patient's knowledge of stroke and risk factors will improve  Outcome: Progressing     Problem: Psychosocial - Patient Condition  Goal: Patient's ability to verbalize feelings about condition will improve  Outcome: Progressing  Goal: Patient's ability to re-evaluate and adapt role responsibilities will improve  Outcome: Progressing     Problem: Discharge Planning - Stroke  Goal: Ensure Stroke Core Measures are met prior to discharge  Outcome: Progressing  Goal: Patient’s continuum of care needs will be met  Outcome: Progressing     Problem: Neuro Status  Goal: Neuro status will remain stable or improve  Outcome: Progressing     Problem: Hemodynamic Monitoring  Goal: Patient's hemodynamics, fluid balance and neurologic status will be stable or improve  Outcome: Progressing     Problem: Respiratory - Stroke Patient  Goal: Patient will achieve/maintain optimum respiratory rate/effort  Outcome: Progressing     Problem: Dysphagia  Goal: Dysphagia will improve  Outcome: Progressing     Problem: Risk for Aspiration  Goal: Patient's risk for aspiration will be  absent or decrease  Outcome: Progressing     Problem: Urinary Elimination  Goal: Establish and maintain regular urinary output  Outcome: Progressing     Problem: Bowel Elimination  Goal: Establish and maintain regular bowel function  Outcome: Progressing     Problem: Mobility - Stroke  Goal: Patient's capacity to carry out activities will improve  Outcome: Progressing     Problem: Mobility - Stroke  Goal: Spasticity will be prevented or improved  Outcome: Progressing     Problem: Mobility - Stroke  Goal: Subluxation will be prevented or improved  Outcome: Progressing     Problem: Self Care  Goal: Patient will have the ability to perform ADLs independently or with assistance (bathe, groom, dress, toilet and feed)  Outcome: Progressing     Problem: Knowledge Deficit - Standard  Goal: Patient and family/care givers will demonstrate understanding of plan of care, disease process/condition, diagnostic tests and medications  Outcome: Progressing     Problem: Pain - Standard  Goal: Alleviation of pain or a reduction in pain to the patient’s comfort goal  Outcome: Progressing     Problem: Skin Integrity  Goal: Skin integrity is maintained or improved  Outcome: Progressing     Problem: Fall Risk  Goal: Patient will remain free from falls  Outcome: Progressing

## 2023-02-14 NOTE — DISCHARGE PLANNING
Case Management Discharge Planning    Admission Date: 1/12/2023  GMLOS: 7.3  ALOS: 33    6-Clicks ADL Score: 12  6-Clicks Mobility Score: 7  PT and/or OT Eval ordered: Yes  Post-acute Referrals Ordered: Yes  Post-acute Choice Obtained: Yes  Has referral(s) been sent to post-acute provider:  Yes      Anticipated Discharge Dispo: Discharge Disposition: D/T to SNF with Medicare cert in anticipation of skilled care (03)    DME Needed: No    Action(s) Taken:  Case discussed in morning DC rounds; Patient medically cleared for DC to SNF, pending acceptance.   [facility] reviewing and Admission liaison will come to assess patient today.  CM updated patient & wife, Cori #[phone], on DCP & wife confirmed that she will be available for DC support post SNF.    Escalations Completed: None    Medically Clear: Yes    Next Steps: CM will f/u on SNF referrals/acceptance    Barriers to Discharge: Pending Placement/SNF acceptance, insurance auth    Is the patient up for discharge tomorrow:  Medically cleared

## 2023-02-14 NOTE — PROGRESS NOTES
"Hospital Medicine Daily Progress Note    Date of Service  2/13/2023    Chief Complaint  Kaiden Quiroz is a 62 y.o. male admitted 1/12/2023 with left-sided weakness    Hospital Course  This is a 62-year-old male with past medical history of hypertension, type 2 diabetes, alcohol use, tobacco use disorder and obesity who was admitted on 1/12/2023 with left-sided weakness, significant for right MCA CVA s/p ICA stent and thrombectomy on 1/12, complicated by hemorrhagic conversion.     \"He presented 1/12/22 with Left facial droop and Left-sided weakness. CTH demonstrated hyperdense MCA, for which subsequent CT perfusion demonstrated infarct with penumbra. CTA demonstrated Right ICA and Right MCA occlusion. Neurology was consulted. He was not a candidate for tPA due to hypertension. Neuro-IR performed a Right MCA thrombectomy and Right ICA stent 1/12/23. Subsequent MRI demonstrated Right frontal, basal ganglia, and caudate nucleus infarct. Petechial hemorrhages were noted concerning for hemorrhagic conversion, for which he was not a candidate for DAPT but treated with ASA monotherapy per Neurology recommendation. He was admitted to the ICU post-procedure for neurologic monitoring and nicardipine gtt due to uncontrolled HTN.Started on DAPT 1/24.     During his IR procedure he developed a sinus pause for which CPR was not required prior to spontaneous ROSC. He was administered atropine and glycopyrrolate for sinus bradycardia. He was intubated post-procedure and extubated 1/13/23 to [facility]. CXR demonstrated evolving pulmonary edema. TTE demonstrated normal systolic function and was negative for shunt. He has been diuresed with IV lasix with vast improvement.\"    Patient noted to have significant dysphagia, resulting in aspiration pneumonia, patient required PEG tube placement in 1/24. Patient is s/p FEES 2/8, started on a modified diet, tolerating 100% PO intake.    Patient noted to have melena, history of extensive " alcohol use prior to admission.  No prior EGD or colonoscopy.  GI consulted he is s/p EGD on 2/13 with 2 mm nonbleeding angioectasia in second portion of duodenum treated with APC, no active bleeding.  PPI x4 weeks.  Resumed Plavix.    Interval Problem Update  Patient noted to have melena, history of extensive alcohol use prior to admission.  No prior EGD or colonoscopy.  GI consulted he is s/p EGD on 2/13 with 2 mm nonbleeding angioectasia in second portion of duodenum treated with APC, no active bleeding.  PPI x4 weeks.  Resumed Plavix.    Continue to monitor hemoglobin.    Patient has failed voiding trial x2, to discharge with Mcclendon catheter in place.  Outpatient urology referral placed.    As per CM, there is possible SNF life care that is evaluating patient.    I have discussed this patient's plan of care and discharge plan at IDT rounds today with Case Management, Nursing, Nursing leadership, and other members of the IDT team.    Consultants/Specialty  neurology and pulmonary    Code Status  Full Code    Disposition  Patient is not medically cleared for discharge.   Anticipate discharge to to skilled nursing facility.  I have placed the appropriate orders for post-discharge needs.    Review of Systems  Review of Systems   Neurological:  Positive for speech change, focal weakness and weakness.   All other systems reviewed and are negative.     Physical Exam  Temp:  [36.1 °C (97 °F)-37.3 °C (99.2 °F)] 36.1 °C (97 °F)  Pulse:  [15-71] 15  Resp:  [15-19] 16  BP: ()/(50-74) 107/74  SpO2:  [94 %-99 %] 97 %    Physical Exam  Constitutional:       General: He is not in acute distress.     Appearance: Normal appearance. He is well-developed. He is obese. He is not diaphoretic.      Comments: Sleepy but wakes to voice   HENT:      Head: Normocephalic and atraumatic.      Nose: Nose normal.      Mouth/Throat:      Mouth: Mucous membranes are moist.   Eyes:      General: No scleral icterus.     Conjunctiva/sclera:  Conjunctivae normal.   Neck:      Vascular: No JVD.   Cardiovascular:      Rate and Rhythm: Normal rate.      Heart sounds: No murmur heard.    No gallop.   Pulmonary:      Effort: Pulmonary effort is normal. No respiratory distress.      Breath sounds: No stridor. No wheezing or rales.   Abdominal:      Palpations: Abdomen is soft.      Tenderness: There is no abdominal tenderness. There is no guarding or rebound.      Comments: Gtube in place c/d/i   Genitourinary:     Comments: Mcclendon in place  Musculoskeletal:         General: No tenderness.      Right lower leg: No edema.      Left lower leg: No edema.   Skin:     General: Skin is warm and dry.      Capillary Refill: Capillary refill takes less than 2 seconds.      Coloration: Skin is not jaundiced or pale.      Findings: No rash.   Neurological:      Comments: Left-sided hemiparesis  +L side facial droop  Purposeful and 5/5 R upper and lower   Psychiatric:         Mood and Affect: Mood and affect normal.         Behavior: Behavior normal.       Fluids    Intake/Output Summary (Last 24 hours) at 2/13/2023 1826  Last data filed at 2/13/2023 1431  Gross per 24 hour   Intake 250 ml   Output 1500 ml   Net -1250 ml       Laboratory  Recent Labs     02/12/23  1057 02/13/23  0537   WBC  --  5.7   RBC  --  3.66*   HEMOGLOBIN 11.5* 11.4*   HEMATOCRIT 34.8* 34.1*   MCV  --  93.2   MCH  --  31.1   MCHC  --  33.4*   RDW  --  43.7   PLATELETCT  --  142*   MPV  --  10.7       Recent Labs     02/13/23  0537   SODIUM 135   POTASSIUM 3.7   CHLORIDE 101   CO2 25   GLUCOSE 97   BUN 9   CREATININE 0.55   CALCIUM 9.0       Recent Labs     02/13/23  0537   INR 1.04               Imaging  CT-HEAD W/O   Final Result      No new intracranial abnormality is identified.      Interval decrease in mass effect from subacute right MCA territory infarction with  expected evolution of macro hemorrhagic transformation      IR-GASTROSTOMY PLACEMENT   Final Result      1.  Fluoroscopic guided  percutaneous gastrostomy with placement of an 18-Telugu ELISE balloon gastrostomy catheter.      2. The gastrostomy tube may be used for tube feeds 18 hours after placement. Thereafter, liquid diet orders per the referring physician. Saline flush daily as per protocol.      3. In 10-14 days, the anchor button suture or sutures should be cut, releasing the anchor or anchors into the stomach. The suture anchors will then pass through the GI tract as desired. This can be performed in the interventional radiology department if    desired. Call [phone] any weekday [phone] hours to make an appointment.      CT-CTA CHEST PULMONARY ARTERY W/ RECONS   Final Result         1.  No pulmonary embolus appreciated.   2.  Linear densities the bilateral lung bases suggests changes of atelectasis, component of infiltrate is not excluded.   3.  Atherosclerosis and atherosclerotic coronary artery disease.      US-EXTREMITY VENOUS LOWER BILAT   Final Result      DX-ABDOMEN FOR TUBE PLACEMENT   Final Result      NG tube extends below the diaphragm into the region of the stomach. The tip is not visualized.      EC-ECHOCARDIOGRAM LTD W/ CONT   Final Result      DX-CHEST-PORTABLE (1 VIEW)   Final Result         1. No significant interval change.      CT-ABDOMEN-PELVIS W/O   Final Result         1.  Nondependent foci of air in the bladder, could represent changes from recent instrumentation or urinary tract infection.   2.  Diverticulosis   3.  Atherosclerosis and atherosclerotic coronary artery disease      DX-CHEST-PORTABLE (1 VIEW)   Final Result      Ill-defined atelectasis versus consolidation in the left lower lung. Pneumonia can be considered in appropriate clinical setting.         DX-ABDOMEN FOR TUBE PLACEMENT   Final Result         1.  Nonspecific bowel gas pattern in the upper abdomen.   2.  Nasogastric tube tip terminates overlying the expected location of the gastric body.   3.  Cardiomegaly   4.  Hazy linear density left lung  base could represent atelectasis or infiltrate.      CT-HEAD W/O   Final Result         1.  Evolving infarct/encephalomalacia in the right MCA distribution.   2.  Areas of hemorrhage in the right basal ganglia and involving the right frontal and anterior temporal sulci, similar compared to MRI January 13, 2023   3.  Atherosclerosis.      DX-CHEST-PORTABLE (1 VIEW)   Final Result         Diffuse interstitial and groundglass opacity throughout both lungs could relate to developing pulmonary edema.      DX-ABDOMEN FOR TUBE PLACEMENT   Final Result         Gastric drainage tube with tip projecting over the expected area of the stomach.      DX-CHEST-PORTABLE (1 VIEW)   Final Result      1.  Interval extubation without pneumothorax.   2.  Stable enlarged cardiac silhouette.   3.  There is bibasilar linear atelectasis.      EC-ECHOCARDIOGRAM COMPLETE W/ CONT   Final Result      MR-BRAIN-W/O   Final Result         Acute infarction involving the right frontal region, right basal ganglia and right caudate nucleus. Minimal punctate infarction involving the right parietal cortex. There is slight mass effect upon the right lateral ventricle with only minimal midline    shift measuring approximately 4 mm.      Petechial hemorrhage is noted within the right frontal cortical infarct. Also noted is petechial hemorrhage in the right basal ganglia and right caudate nucleus but without surrounding vasogenic edema.         DX-PELVIS-1 OR 2 VIEWS   Final Result         1.  No acute traumatic bony injury.      DX-CHEST-PORTABLE (1 VIEW)   Final Result         1.  Left midlung and lower lobe infiltrates, similar to prior study.      ED-VETZVVT-9 VIEW   Final Result         1.  Nonspecific bowel gas pattern in the upper abdomen.   2.  Cardiomegaly   3.  Hazy left lower lobe infiltrates.   4.  Nasogastric tube tip terminates overlying the expected location of the gastric fundus.      IR-THROMBO MECHANICAL ARTERY,INIT   Final Result          62-year-old patient who presented with complete occlusion of the right ICA at its origin underwent emergent mechanical thrombectomy. After initial angioplasty,   a right cervical internal carotid artery stent was deployed and right MCA thrombectomy was performed. The final angiographic images show complete restoration of flow into the right ICA and MCA with no evidence of distal intracranial embolization.      Final recanalization score: TICI 3      I, Petra Mobley was physically present and participated during the entire procedure of the IR-THROMBO MECHANICAL ARTERY,INIT.                  DX-CHEST-PORTABLE (1 VIEW)   Final Result      No acute cardiac or pulmonary abnormalities are identified.      CT-CTA NECK WITH & W/O-POST PROCESSING   Final Result      1.  Occlusion of the right internal carotid artery at its origin.      2.  Occlusion of the right vertebral artery at its origin.      Dr. Gongora discussed findings with Dr. Mccarthy.      CT-CTA HEAD WITH & W/O-POST PROCESS   Final Result         1.  Occlusion of the right internal carotid artery and right middle cerebral artery.   2.  Narrowed left M1 segment with severely narrowed and M2 and M3 branches, may represent chronic changes.      These findings were discussed with the patient's clinician, Joseline Mccarthy, on 1/12/2023 8:27 PM.      CT-CEREBRAL PERFUSION ANALYSIS   Final Result         1.  Cerebral blood flow less than 30% likely representing completed infarct = 52 mL.      2.  T Max more than 6 seconds likely representing combination of completed infarct and ischemia = 172 mL.      3.  Mismatched volume likely representing ischemic brain/penumbra = 120 mL      4.  Please note that the cerebral perfusion was performed on the limited brain tissue around the basal ganglia region. Infarct/ischemia outside the CT perfusion sections can be missed in this study.      CT-HEAD W/O   Final Result      1.  No evidence of intracranial hemorrhage, midline  shift or mass effect.      2.  Question of hyperdense right MCA though evaluation limited by oblique positioning.              Assessment/Plan  * Acute ischemic right middle cerebral artery (MCA) stroke (HCC)- (present on admission)  Assessment & Plan  Right MCA CVA with hemorrhagic conversion  S/p right ICA stent and MCA thrombectomy 1/12/23  Neurology consulted   High Intensity statin therapy   BP control  DAPT  TTE negative for shunt  Tobacco cessation  PEG placed 1/24, Patient is s/p FEES 2/8, started on a modified diet.  2/4 repeated CT head: Interval decrease in mass effect from subacute right MCA territory infarction with expected evolution of macro hemorrhagic transformation.  PT/OT/SLP - recommending post-acute placement    Melena  Assessment & Plan  Patient noted to have melena 2/11 PM  Patient with history of extensive alcohol use prior to admission  No prior EGD or colonoscopy  Hemoglobin stable  Started on PPI twice daily  Continued aspirin, held Plavix and DVT prophylaxis    GI consulted, patient is for EGD 2/13    Urinary retention  Assessment & Plan  Likely in the setting of BPH  Tried voiding trial 1/25 and 1/31, failed replaced 2/3  Starting terazosin 1/28    Internal carotid artery stent present  Assessment & Plan  Placed 1/12  Per Neuro OK to start DAPT: ASA & Plavix for 6 wks    Hypernatremia  Assessment & Plan  Resolved  Resolved with Free H2O 200ml q6    Pneumonia due to infectious organism- (present on admission)  Assessment & Plan  Resolved    Aspiration pneumonia (Developed 01/19/23)   Procal low  Sputum cultures neg  Strict aspiration precautions   Has completed 5 days of Abx's  MRSA nares neg  Mucomyst/3% normal saline nebs to maintain pulmonary toilet, RT protocol to continue  Pulmonology evaluated him and made recommendations          Oropharyngeal dysphagia- (present on admission)  Assessment & Plan  Secondary to acute stroke.    Aspiration precautions   PEG 1/24  Nutrition  following  SLP following  Patient is s/p FEES 2/8, patient continues to tolerate modified diet. PEG tube to remain in place at this time.     Acute left hemiparesis (HCC)- (present on admission)  Assessment & Plan  Secondary to right MCA stroke.    Pressure ulcer precautions  Aspiration, Fall precautions  PT and OT: SNF placement    Stroke, hemorrhagic (HCC)- (present on admission)  Assessment & Plan  Ischemic stroke with hemorrhagic conversion.  Neuro has OKd for antiplatelet therapy with ASA and plavix given presence of new ICA stent    Toxic metabolic encephalopathy- (present on admission)  Assessment & Plan  Resolved    Persistent 01/20/23  Secondary to stroke, delirium and now concern for relation to underlying pneumonia  Limit restraints as able  Avoid polypharmacy, sedating medications  Delirium precautions  Management of underlying medical conditions  Obtain EEG 01/20/23 and it did not show seizure activity.   cont's to improve daily though examination is limited by dysarthria at times    Acute respiratory failure with hypoxia (HCC)- (present on admission)  Assessment & Plan  Improve, currently on 1 L of oxygen    01/19/23 CXRAY consistent with Aspiration Pneumonia - Started on IV unasyn  Subsequently trnasitioned to [facility].  At present off Abx's  MRSA nares neg  trachael aspirate cultures neg  CTA chest neg for PE  TTE LVEF 70%, no signficant valvular or structural abnormalities  O2 demand decreasing: now off HFNC and on 1 LNC  Cont weaning O2    Severe obesity (BMI 35.0-35.9 with comorbidity) ([facility])- (present on admission)  Assessment & Plan  Body mass index is 37.39 kg/m².  Co-morbid HTN, T2DM, KENDAL  Outpatient weight loss    Type 2 diabetes mellitus without complication, without long-term current use of insulin ([facility])- (present on admission)  Assessment & Plan  A1c 5.3  No indication for strict BG control - POCT/SSI deferred  Atorvastatin    Alcohol dependence with withdrawal ([facility])- (present on  admission)  Assessment & Plan  Resolved.    Tobacco use disorder- (present on admission)  Assessment & Plan  On nicotine patch     Primary hypertension- (present on admission)  Assessment & Plan  Amlodipine 10 mg  Added lisinopril 10 mg         VTE prophylaxis: SCDs/TEDs and enoxaparin ppx    I have performed a physical exam and reviewed and updated ROS and Plan today (2/13/2023). In review of yesterday's note (2/12/2023), there are no changes except as documented above.

## 2023-02-15 PROBLEM — E87.0 HYPERNATREMIA: Status: RESOLVED | Noted: 2023-01-21 | Resolved: 2023-02-15

## 2023-02-15 PROCEDURE — 97535 SELF CARE MNGMENT TRAINING: CPT

## 2023-02-15 PROCEDURE — 700111 HCHG RX REV CODE 636 W/ 250 OVERRIDE (IP): Performed by: INTERNAL MEDICINE

## 2023-02-15 PROCEDURE — A9270 NON-COVERED ITEM OR SERVICE: HCPCS | Performed by: GENERAL PRACTICE

## 2023-02-15 PROCEDURE — A9270 NON-COVERED ITEM OR SERVICE: HCPCS | Performed by: INTERNAL MEDICINE

## 2023-02-15 PROCEDURE — 99232 SBSQ HOSP IP/OBS MODERATE 35: CPT | Performed by: HOSPITALIST

## 2023-02-15 PROCEDURE — 97112 NEUROMUSCULAR REEDUCATION: CPT

## 2023-02-15 PROCEDURE — 770006 HCHG ROOM/CARE - MED/SURG/GYN SEMI*

## 2023-02-15 PROCEDURE — 700102 HCHG RX REV CODE 250 W/ 637 OVERRIDE(OP): Performed by: INTERNAL MEDICINE

## 2023-02-15 PROCEDURE — 700102 HCHG RX REV CODE 250 W/ 637 OVERRIDE(OP): Performed by: GENERAL PRACTICE

## 2023-02-15 PROCEDURE — 92526 ORAL FUNCTION THERAPY: CPT

## 2023-02-15 RX ORDER — LISINOPRIL 20 MG/1
20 TABLET ORAL
Status: DISCONTINUED | OUTPATIENT
Start: 2023-02-16 | End: 2023-02-16 | Stop reason: HOSPADM

## 2023-02-15 RX ADMIN — Medication 5 MG: at 22:10

## 2023-02-15 RX ADMIN — ASPIRIN 81 MG 81 MG: 81 TABLET ORAL at 07:01

## 2023-02-15 RX ADMIN — OMEPRAZOLE 40 MG: 20 CAPSULE, DELAYED RELEASE ORAL at 07:02

## 2023-02-15 RX ADMIN — LISINOPRIL 10 MG: 10 TABLET ORAL at 07:01

## 2023-02-15 RX ADMIN — ACETAMINOPHEN 650 MG: 325 TABLET, FILM COATED ORAL at 22:10

## 2023-02-15 RX ADMIN — ACETAMINOPHEN 650 MG: 325 TABLET, FILM COATED ORAL at 11:39

## 2023-02-15 RX ADMIN — ATORVASTATIN CALCIUM 80 MG: 80 TABLET, FILM COATED ORAL at 17:19

## 2023-02-15 RX ADMIN — AMLODIPINE BESYLATE 10 MG: 5 TABLET ORAL at 07:01

## 2023-02-15 RX ADMIN — TERAZOSIN 1 MG: 1 CAPSULE ORAL at 17:19

## 2023-02-15 RX ADMIN — ENOXAPARIN SODIUM 40 MG: 40 INJECTION SUBCUTANEOUS at 17:19

## 2023-02-15 RX ADMIN — CLOPIDOGREL BISULFATE 75 MG: 75 TABLET ORAL at 07:01

## 2023-02-15 ASSESSMENT — COGNITIVE AND FUNCTIONAL STATUS - GENERAL
DAILY ACTIVITIY SCORE: 14
DRESSING REGULAR LOWER BODY CLOTHING: A LOT
PERSONAL GROOMING: A LITTLE
SUGGESTED CMS G CODE MODIFIER DAILY ACTIVITY: CK
TOILETING: A LOT
EATING MEALS: A LITTLE
HELP NEEDED FOR BATHING: A LOT
DRESSING REGULAR UPPER BODY CLOTHING: A LOT

## 2023-02-15 ASSESSMENT — ENCOUNTER SYMPTOMS
SHORTNESS OF BREATH: 0
ABDOMINAL PAIN: 0
FEVER: 0
FOCAL WEAKNESS: 1
NAUSEA: 0
WEAKNESS: 1
VOMITING: 0

## 2023-02-15 ASSESSMENT — PAIN DESCRIPTION - PAIN TYPE
TYPE: ACUTE PAIN

## 2023-02-15 NOTE — DISCHARGE PLANNING
Case Management Discharge Planning    Admission Date: 1/12/2023  GMLOS: 7.3  ALOS: 34    6-Clicks ADL Score: 12  6-Clicks Mobility Score: 7  PT and/or OT Eval ordered: Yes  Post-acute Referrals Ordered: Yes  Post-acute Choice Obtained: Yes  Has referral(s) been sent to post-acute provider:  Yes      Anticipated Discharge Dispo: Discharge Disposition: D/T to SNF with Medicare cert in anticipation of skilled care (03)    DME Needed: No    Action(s) Taken: Patient accepted @ [facility], pending insurance authorization.  CM updated patient & wife, Cori #[phone], on DCP; Patient agreeable to transfer to [facility], wife/Cori reluctant at first however, respectful of patient's wishes.    Escalations Completed: None    Medically Clear: Yes    Next Steps: CM will f/u on SNF transfer    Barriers to Discharge: Pending Insurance Authorization    Is the patient up for discharge tomorrow:  Medically cleared

## 2023-02-15 NOTE — PROGRESS NOTES
"Hospital Medicine Daily Progress Note    Date of Service  2/15/2023    Chief Complaint  Kaiden Quiroz is a 62 y.o. male admitted 1/12/2023 with left-sided weakness    Hospital Course  This is a 62-year-old male with past medical history of hypertension, type 2 diabetes, alcohol use, tobacco use disorder and obesity who was admitted on 1/12/2023 with left-sided weakness, significant for right MCA CVA s/p ICA stent and thrombectomy on 1/12, complicated by hemorrhagic conversion.     \"He presented 1/12/22 with Left facial droop and Left-sided weakness. CTH demonstrated hyperdense MCA, for which subsequent CT perfusion demonstrated infarct with penumbra. CTA demonstrated Right ICA and Right MCA occlusion. Neurology was consulted. He was not a candidate for tPA due to hypertension. Neuro-IR performed a Right MCA thrombectomy and Right ICA stent 1/12/23. Subsequent MRI demonstrated Right frontal, basal ganglia, and caudate nucleus infarct. Petechial hemorrhages were noted concerning for hemorrhagic conversion, for which he was not a candidate for DAPT but treated with ASA monotherapy per Neurology recommendation. He was admitted to the ICU post-procedure for neurologic monitoring and nicardipine gtt due to uncontrolled HTN.Started on DAPT 1/24.     During his IR procedure he developed a sinus pause for which CPR was not required prior to spontaneous ROSC. He was administered atropine and glycopyrrolate for sinus bradycardia. He was intubated post-procedure and extubated 1/13/23 to [facility]. CXR demonstrated evolving pulmonary edema. TTE demonstrated normal systolic function and was negative for shunt. He has been diuresed with IV lasix with vast improvement.\"    Patient noted to have significant dysphagia, resulting in aspiration pneumonia, patient required PEG tube placement in 1/24. Patient is s/p FEES 2/8, started on a modified diet, tolerating 100% PO intake.    Patient noted to have melena, history of extensive " alcohol use prior to admission.  No prior EGD or colonoscopy.  GI consulted he is s/p EGD on 2/13 with 2 mm nonbleeding angioectasia in second portion of duodenum treated with APC, no active bleeding.  PPI x4 weeks.  Resumed Plavix.    Interval Problem Update    Patient is afebrile on 2 L nasal cannula  -142  Left-sided weakness unchanged  Discussed with case management accepted by [facility] awaiting insurance authorization    I have discussed this patient's plan of care and discharge plan at IDT rounds today with Case Management, Nursing, Nursing leadership, and other members of the IDT team.    Consultants/Specialty  neurology and pulmonary    Code Status  Full Code    Disposition  Patient is medically cleared for discharge.   Anticipate discharge to to skilled nursing facility.  I have placed the appropriate orders for post-discharge needs.    Review of Systems  Review of Systems   Constitutional:  Negative for fever.   Respiratory:  Negative for shortness of breath.    Cardiovascular:  Negative for chest pain.   Gastrointestinal:  Negative for abdominal pain, nausea and vomiting.   Neurological:  Positive for focal weakness and weakness.   All other systems reviewed and are negative.     Physical Exam  Temp:  [36.3 °C (97.4 °F)-37.1 °C (98.7 °F)] 36.7 °C (98.1 °F)  Pulse:  [68-78] 68  Resp:  [18] 18  BP: (128-142)/(75-80) 141/75  SpO2:  [93 %-96 %] 96 %    Physical Exam  Vitals and nursing note reviewed.   Constitutional:       Appearance: He is well-developed. He is not diaphoretic.   HENT:      Head: Normocephalic and atraumatic.      Mouth/Throat:      Pharynx: No oropharyngeal exudate.   Eyes:      General: No scleral icterus.        Right eye: No discharge.         Left eye: No discharge.      Conjunctiva/sclera: Conjunctivae normal.      Pupils: Pupils are equal, round, and reactive to light.   Neck:      Vascular: No JVD.      Trachea: No tracheal deviation.   Cardiovascular:      Rate and  Rhythm: Normal rate and regular rhythm.      Heart sounds: No murmur heard.    No friction rub. No gallop.   Pulmonary:      Effort: Pulmonary effort is normal. No respiratory distress.      Breath sounds: No stridor. Decreased breath sounds present. No wheezing.   Chest:      Chest wall: No tenderness.   Abdominal:      General: Bowel sounds are normal. There is no distension.      Palpations: Abdomen is soft.      Tenderness: There is no abdominal tenderness. There is no rebound.      Comments: PEG tube   Musculoskeletal:         General: No tenderness.      Cervical back: Neck supple.   Skin:     General: Skin is warm and dry.      Nails: There is no clubbing.   Neurological:      Mental Status: He is alert and oriented to person, place, and time.      Cranial Nerves: No cranial nerve deficit.      Motor: Weakness present. No abnormal muscle tone.      Comments: Left hemiparesis unchanged   Psychiatric:         Behavior: Behavior normal.       Fluids    Intake/Output Summary (Last 24 hours) at 2/15/2023 1505  Last data filed at 2/15/2023 1137  Gross per 24 hour   Intake 859.47 ml   Output 2100 ml   Net -1240.53 ml         Laboratory  Recent Labs     02/13/23  0537 02/14/23  0452   WBC 5.7 5.6   RBC 3.66* 3.49*   HEMOGLOBIN 11.4* 11.3*   HEMATOCRIT 34.1* 32.5*   MCV 93.2 93.1   MCH 31.1 32.4   MCHC 33.4* 34.8   RDW 43.7 43.0   PLATELETCT 142* 150*   MPV 10.7 10.9         Recent Labs     02/13/23  0537 02/14/23  0452   SODIUM 135 136   POTASSIUM 3.7 3.9   CHLORIDE 101 101   CO2 25 26   GLUCOSE 97 91   BUN 9 13   CREATININE 0.55 0.54   CALCIUM 9.0 8.7         Recent Labs     02/13/23  0537   INR 1.04                 Imaging  CT-HEAD W/O   Final Result      No new intracranial abnormality is identified.      Interval decrease in mass effect from subacute right MCA territory infarction with  expected evolution of macro hemorrhagic transformation      IR-GASTROSTOMY PLACEMENT   Final Result      1.  Fluoroscopic guided  percutaneous gastrostomy with placement of an 18-English ELISE balloon gastrostomy catheter.      2. The gastrostomy tube may be used for tube feeds 18 hours after placement. Thereafter, liquid diet orders per the referring physician. Saline flush daily as per protocol.      3. In 10-14 days, the anchor button suture or sutures should be cut, releasing the anchor or anchors into the stomach. The suture anchors will then pass through the GI tract as desired. This can be performed in the interventional radiology department if    desired. Call [phone] any weekday [phone] hours to make an appointment.      CT-CTA CHEST PULMONARY ARTERY W/ RECONS   Final Result         1.  No pulmonary embolus appreciated.   2.  Linear densities the bilateral lung bases suggests changes of atelectasis, component of infiltrate is not excluded.   3.  Atherosclerosis and atherosclerotic coronary artery disease.      US-EXTREMITY VENOUS LOWER BILAT   Final Result      DX-ABDOMEN FOR TUBE PLACEMENT   Final Result      NG tube extends below the diaphragm into the region of the stomach. The tip is not visualized.      EC-ECHOCARDIOGRAM LTD W/ CONT   Final Result      DX-CHEST-PORTABLE (1 VIEW)   Final Result         1. No significant interval change.      CT-ABDOMEN-PELVIS W/O   Final Result         1.  Nondependent foci of air in the bladder, could represent changes from recent instrumentation or urinary tract infection.   2.  Diverticulosis   3.  Atherosclerosis and atherosclerotic coronary artery disease      DX-CHEST-PORTABLE (1 VIEW)   Final Result      Ill-defined atelectasis versus consolidation in the left lower lung. Pneumonia can be considered in appropriate clinical setting.         DX-ABDOMEN FOR TUBE PLACEMENT   Final Result         1.  Nonspecific bowel gas pattern in the upper abdomen.   2.  Nasogastric tube tip terminates overlying the expected location of the gastric body.   3.  Cardiomegaly   4.  Hazy linear density left lung  base could represent atelectasis or infiltrate.      CT-HEAD W/O   Final Result         1.  Evolving infarct/encephalomalacia in the right MCA distribution.   2.  Areas of hemorrhage in the right basal ganglia and involving the right frontal and anterior temporal sulci, similar compared to MRI January 13, 2023   3.  Atherosclerosis.      DX-CHEST-PORTABLE (1 VIEW)   Final Result         Diffuse interstitial and groundglass opacity throughout both lungs could relate to developing pulmonary edema.      DX-ABDOMEN FOR TUBE PLACEMENT   Final Result         Gastric drainage tube with tip projecting over the expected area of the stomach.      DX-CHEST-PORTABLE (1 VIEW)   Final Result      1.  Interval extubation without pneumothorax.   2.  Stable enlarged cardiac silhouette.   3.  There is bibasilar linear atelectasis.      EC-ECHOCARDIOGRAM COMPLETE W/ CONT   Final Result      MR-BRAIN-W/O   Final Result         Acute infarction involving the right frontal region, right basal ganglia and right caudate nucleus. Minimal punctate infarction involving the right parietal cortex. There is slight mass effect upon the right lateral ventricle with only minimal midline    shift measuring approximately 4 mm.      Petechial hemorrhage is noted within the right frontal cortical infarct. Also noted is petechial hemorrhage in the right basal ganglia and right caudate nucleus but without surrounding vasogenic edema.         DX-PELVIS-1 OR 2 VIEWS   Final Result         1.  No acute traumatic bony injury.      DX-CHEST-PORTABLE (1 VIEW)   Final Result         1.  Left midlung and lower lobe infiltrates, similar to prior study.      [id number] VIEW   Final Result         1.  Nonspecific bowel gas pattern in the upper abdomen.   2.  Cardiomegaly   3.  Hazy left lower lobe infiltrates.   4.  Nasogastric tube tip terminates overlying the expected location of the gastric fundus.      IR-THROMBO MECHANICAL ARTERY,INIT   Final Result          62-year-old patient who presented with complete occlusion of the right ICA at its origin underwent emergent mechanical thrombectomy. After initial angioplasty,   a right cervical internal carotid artery stent was deployed and right MCA thrombectomy was performed. The final angiographic images show complete restoration of flow into the right ICA and MCA with no evidence of distal intracranial embolization.      Final recanalization score: TICI 3      I, Petra Mobley was physically present and participated during the entire procedure of the IR-THROMBO MECHANICAL ARTERY,INIT.                  DX-CHEST-PORTABLE (1 VIEW)   Final Result      No acute cardiac or pulmonary abnormalities are identified.      CT-CTA NECK WITH & W/O-POST PROCESSING   Final Result      1.  Occlusion of the right internal carotid artery at its origin.      2.  Occlusion of the right vertebral artery at its origin.      Dr. Gongora discussed findings with Dr. Mccarthy.      CT-CTA HEAD WITH & W/O-POST PROCESS   Final Result         1.  Occlusion of the right internal carotid artery and right middle cerebral artery.   2.  Narrowed left M1 segment with severely narrowed and M2 and M3 branches, may represent chronic changes.      These findings were discussed with the patient's clinician, Joseline Mccarthy, on 1/12/2023 8:27 PM.      CT-CEREBRAL PERFUSION ANALYSIS   Final Result         1.  Cerebral blood flow less than 30% likely representing completed infarct = 52 mL.      2.  T Max more than 6 seconds likely representing combination of completed infarct and ischemia = 172 mL.      3.  Mismatched volume likely representing ischemic brain/penumbra = 120 mL      4.  Please note that the cerebral perfusion was performed on the limited brain tissue around the basal ganglia region. Infarct/ischemia outside the CT perfusion sections can be missed in this study.      CT-HEAD W/O   Final Result      1.  No evidence of intracranial hemorrhage, midline  shift or mass effect.      2.  Question of hyperdense right MCA though evaluation limited by oblique positioning.                Assessment/Plan  * Acute ischemic right middle cerebral artery (MCA) stroke (HCC)- (present on admission)  Assessment & Plan  Right MCA CVA with hemorrhagic conversion  S/p right ICA stent and MCA thrombectomy 1/12/23  Neurology consulted   High Intensity statin therapy   Continue DAPT  Blood pressure control we will increase lisinopril  TTE negative for shunt  Tobacco cessation  PEG placed 1/24, Patient is s/p FEES 2/8, started on a modified diet.  2/4 repeated CT head: Interval decrease in mass effect from subacute right MCA territory infarction with expected evolution of macro hemorrhagic transformation.  PT/OT/SLP - recommending post-acute placement accepted by SNF awaiting insurance Auth      Melena  Assessment & Plan  Patient noted to have melena 2/11 PM  Evaluated by GI EGD on 2/13/2023 with no active bleeding revealed  duodenal angioectasia, intact PEG without ulceration    Continue Prilosec for 4 weeks per GI recommendations    Urinary retention  Assessment & Plan  Likely in the setting of BPH  Tried voiding trial 1/25 and 1/31, failed replaced 2/3  Starting terazosin 1/28  Continue Mcclendon catheter and outpatient urology evaluation    Internal carotid artery stent present  Assessment & Plan  Placed 1/12  Per Neuro OK to start DAPT: ASA & Plavix for 6 wks and subsequently aspirin therapy    Pneumonia due to infectious organism- (present on admission)  Assessment & Plan  Resolved    Aspiration pneumonia (Developed 01/19/23)   Procal low  Sputum cultures neg  Strict aspiration precautions   Has completed 5 days of Abx's  MRSA nares neg  Mucomyst/3% normal saline nebs to maintain pulmonary toilet, RT protocol to continue  Pulmonology evaluated him and made recommendations          Oropharyngeal dysphagia- (present on admission)  Assessment & Plan  Secondary to acute stroke.     Aspiration precautions   PEG 1/24  Nutrition following  SLP following  Patient is s/p FEES 2/8, patient continues to tolerate modified diet. PEG tube to remain in place at this time and reevaluate removal after he completes rehab    Acute left hemiparesis (HCC)- (present on admission)  Assessment & Plan  Secondary to right MCA stroke.    Pressure ulcer precautions  Aspiration, Fall precautions  PT and OT: SNF placement    Stroke, hemorrhagic (HCC)- (present on admission)  Assessment & Plan  Ischemic stroke with hemorrhagic conversion.  Neuro has OKd for antiplatelet therapy with ASA and plavix given presence of new ICA stent    Acute respiratory failure with hypoxia (HCC)- (present on admission)  Assessment & Plan  Improved oxygen requirements    01/19/23 CXRAY consistent with Aspiration Pneumonia - Started on IV unasyn  Subsequently trnasitioned to [facility].  At present off Abx's  MRSA nares neg  trachael aspirate cultures neg  CTA chest neg for PE  TTE LVEF 70%, no signficant valvular or structural abnormalities    Cont weaning O2    Severe obesity (BMI 35.0-35.9 with comorbidity) ([facility])- (present on admission)  Assessment & Plan  Body mass index is 37.39 kg/m².  Co-morbid HTN, T2DM, KENDAL  Outpatient weight loss    Type 2 diabetes mellitus without complication, without long-term current use of insulin (HCC)- (present on admission)  Assessment & Plan  A1c 5.3  Lifestyle changes  Atorvastatin    Alcohol dependence with withdrawal (HCC)- (present on admission)  Assessment & Plan  Resolved.    Tobacco use disorder- (present on admission)  Assessment & Plan  On nicotine patch     Primary hypertension- (present on admission)  Assessment & Plan  Uncontrolled continue amlodipine  Increase lisinopril         VTE prophylaxis: SCDs/TEDs and enoxaparin ppx    I have performed a physical exam and reviewed and updated ROS and Plan today (2/15/2023). In review of yesterday's note (2/14/2023), there are no changes except as  documented above.

## 2023-02-15 NOTE — CARE PLAN
The patient is Stable - Low risk of patient condition declining or worsening    Shift Goals  Clinical Goals: Safety/mobility  Patient Goals: Sleep; Food  Family Goals: RADHA    Progress made toward(s) clinical / shift goals:  Patient remains alert and oriented X 4. Neuro checks continued with no new complications observed. Oxygen administered at 2L/min via nasal cannula. Patient assisted with ADLs, as needed. Preventative skin measures continued, as ordered.   Problem: Optimal Care of the Stroke Patient  Goal: Optimal emergency care for the stroke patient  Outcome: Progressing  Goal: Optimal acute care for the stroke patient  Outcome: Progressing     Problem: Knowledge Deficit - Stroke Education  Goal: Patient's knowledge of stroke and risk factors will improve  Outcome: Progressing     Problem: Psychosocial - Patient Condition  Goal: Patient's ability to verbalize feelings about condition will improve  Outcome: Progressing  Goal: Patient's ability to re-evaluate and adapt role responsibilities will improve  Outcome: Progressing     Problem: Discharge Planning - Stroke  Goal: Ensure Stroke Core Measures are met prior to discharge  Outcome: Progressing  Goal: Patient’s continuum of care needs will be met  Outcome: Progressing     Problem: Neuro Status  Goal: Neuro status will remain stable or improve  Outcome: Progressing     Problem: Hemodynamic Monitoring  Goal: Patient's hemodynamics, fluid balance and neurologic status will be stable or improve  Outcome: Progressing     Problem: Respiratory - Stroke Patient  Goal: Patient will achieve/maintain optimum respiratory rate/effort  Outcome: Progressing     Problem: Dysphagia  Goal: Dysphagia will improve  Outcome: Progressing     Problem: Risk for Aspiration  Goal: Patient's risk for aspiration will be absent or decrease  Outcome: Progressing     Problem: Urinary Elimination  Goal: Establish and maintain regular urinary output  Outcome: Progressing     Problem: Bowel  Elimination  Goal: Establish and maintain regular bowel function  Outcome: Progressing     Problem: Mobility - Stroke  Goal: Patient's capacity to carry out activities will improve  Outcome: Progressing  Goal: Spasticity will be prevented or improved  Outcome: Progressing  Goal: Subluxation will be prevented or improved  Outcome: Progressing     Problem: Self Care  Goal: Patient will have the ability to perform ADLs independently or with assistance (bathe, groom, dress, toilet and feed)  Outcome: Progressing     Problem: Knowledge Deficit - Standard  Goal: Patient and family/care givers will demonstrate understanding of plan of care, disease process/condition, diagnostic tests and medications  Outcome: Progressing     Problem: Pain - Standard  Goal: Alleviation of pain or a reduction in pain to the patient’s comfort goal  Outcome: Progressing     Problem: Skin Integrity  Goal: Skin integrity is maintained or improved  Outcome: Progressing     Problem: Fall Risk  Goal: Patient will remain free from falls  Outcome: Progressing

## 2023-02-15 NOTE — DISCHARGE PLANNING
Agency/Facility Name: [facility]  Spoke To: Hannah  Outcome: PASQUALE spoke to facility and asked if admissions has looked at referral. Per Hannah, referral is now being looked over by the facilities dietician to see if facility can accommodate pts tube feeds     RN CM notified       1027    Agency/Facility Name: [facility]  Outcome: DPA left vmail updating facility know that pt is no longer on tube feeds and he is eating by mouth.      1205    Agency/Facility Name: [facility]  Outcome: PASQUALE left vmail asking facility to start insurance auth     RN MYNOR notified       1211    Agency/Facility Name: [facility]  Spoke To: Hannah  Outcome: PASQUALE received call back from facility. Per Hannah, she will start insurance auth and it normally takes 1-3 days to come back     RN MYNOR notified

## 2023-02-15 NOTE — THERAPY
Speech Language Pathology  Daily Treatment     Patient Name: Kaiden Quiroz  Age:  62 y.o., Sex:  male  Medical Record #: [id number]  Today's Date: 2/15/2023     Precautions  Precautions: Fall Risk, Swallow Precautions ( See Comments), PEG Tube  Comments: L side deficits    HPI: 62 y.o. man admitted on 1/12/23 for acute R MCA stroke. FEES 2/8 with recommendation for SB6/MT2 (PAS 3 with TN0) and anticipation for bedside diet upgrade.       Subjective:  Patient seen this date for dysphagia management. Patient sleeping, but wakened easily. Patient alert, agreeable to session, and positioned to upright in bed. Patient stating he should not have thin liquids due to aspiration risk, though amenable to trials with education. Throughout session, patient perseverative on worry for aspiration PNA.       Assessment:  PO presentation of TN0, SB6, and RG7. Adequate oral bolus acceptance/containment, complete AP transfer, and functional mastication. No cough/throat clear appreciated with PO, including TN0 cup sip (x8) and straw sip (x5). Vocal quality remained stable throughout assessment. Double swallow completed per bolus, in alignment with FEES recommendations. Provided significant education regarding general aspiration precautions as well as signs of aspiration and indication for diet upgrade. Patient agreeable.       Clinical Impressions:   Patient presented with mild oropharyngeal swallow per FEES on 2/8/23. Presentation appears improved and patient appears appropriate for diet upgrade. Service will continue to follow to assess diet tolerance.     Recommendations  Regular solids with thin liquids  PEG tube feedings per RD recs  2.   Swallowing Instructions & Precautions:   Supervision: Direct supervision during meals  Positioning: Fully upright and midline during oral intake  Medication: Whole with liquid  Strategies: Small bites/sips, Slow rate of intake, Multiple swallows (x 2) per bite/sip , straws OK w/ MTL  Oral  "Care: TID    Following for dysphagia/cognitive management      Plan    Treatment plan modified to 3 times per week until therapy goals are met for the following treatments:  Dysphagia Training, Cognitive-Linguistic Training, and Patient / Family / Caregiver Education.    Discharge Recommendations: Recommend home health for continued speech therapy services     Objective       02/15/23 1507   Patient / Family Goals   Patient / Family Goal #1 \"Some nice cold water.\"   Goal #1 Outcome Progressing as expected   Short Term Goals   Short Term Goal # 1 Pt will consume prefeeding trials without any overt s/sx of aspiration   Goal Outcome # 1 Goal met   Short Term Goal # 2 Pt will complete instrumental evaluation of swallow with SLP to further assess swallow function and determine POC.   Goal Outcome # 2  Goal met, new goal aded   Short Term Goal # 2 B  Patient will consume a SB6/MT2 diet with no overt s/sx of aspiration given dir sup.   Goal Outcome  # 2 B Goal met   Short Term Goal # 3 Pt will sequence 5-7 step tasks givne min cues from SLP with 90% accuracy.   Short Term Goal # 4 Patient will consume sips of thin liquids w/ no overt s/sx of aspiration.   Goal Outcome  # 4 Goal met, new goal added   Short Term Goal # 4 B  Patient will consume a diet of RG7/TN0 without overt indicators of aspiration or decline in pulmonary status.       "

## 2023-02-15 NOTE — THERAPY
Physical Therapy   Daily Treatment     Patient Name: Kaiden Quiroz  Age:  62 y.o., Sex:  male  Medical Record #: [id number]  Today's Date: 2/14/2023     Precautions  Precautions: Fall Risk;PEG Tube  Comments: L side deficits    Assessment    Pt greeted and seen for PT treatment. Pt was able to perform log roll to come sit EOB w/ [place]. Pt was then able to stand w/ MaxA, good initiation to stand, able to come fully upright. Pt req'd new bed, with assist from tech pt transferred bed<>chair w/ MaxA x2 person, pt able to pivot and demo'd increased difficultly when trying to take steps. In supine pt performed bridges x10 squeezing pillow between knees, and heel slides resisting orange theraband to full knee extension. Pt currently limited by weakness, L side deficits, and decreased activity tolerance which negatively impacts functional mobility. Pt will continue to benefit from skilled PT to address deficits.       Plan    Physical Therapy Treatment Plan  Physical Therapy Treatment Plan: Continue Current Treatment Plan    DC Equipment Recommendations: Unable to determine at this time  Discharge Recommendations: Recommend post-acute placement for additional physical therapy services prior to discharge home       02/14/23 1533   Cognition    Comments very pleasant, cooperative   Supine Lower Body Exercise   Supine Lower Body Exercises Yes   Bridges Two Legged;1 set of 10  (orange theraband)   Heel Slide 1 set of 10;Other Resistance (See Comments)  (orange theraband)   Comments pt was able to perform w/o cuing after taught, pt unable to place band around foot, but is able to switch feet in band w/o assist   Balance   Sitting Balance (Static) Fair   Sitting Balance (Dynamic) Fair -   Standing Balance (Static) Trace +   Standing Balance (Dynamic) Trace +   Weight Shift Sitting Fair   Weight Shift Standing Absent   Skilled Intervention Verbal Cuing   Bed Mobility    Supine to Sit Minimal Assist   Sit to Supine Minimal  Assist   Scooting Minimal Assist   Rolling Minimal Assist to Rt.;Minimum Assist to Lt.;Moderate Assist to Rt.;Moderate Assist to Lt.   Skilled Intervention Verbal Cuing;Tactile Cuing   Comments log roll, good initiation   Gait Analysis   Gait Level Of Assist Unable to Participate   Functional Mobility   Sit to Stand Maximal Assist   Bed, Chair, Wheelchair Transfer Maximal Assist  (x2 person for transfer)   Mobility bed mobility, stand pivot bed<>chair   Skilled Intervention Verbal Cuing;Tactile Cuing;Sequencing;Postural Facilitation   Comments pt demo'd improved standing today, req'd increased assist to transfer bed<>chair to swap out beds, pt was able to pivot, but unable to lift feet to take steps   Short Term Goals    Short Term Goal # 1 Pt will transition from supine to EOB w/ Bandar in 6 visits to improve independence in bed mobility   Goal Outcome # 1 Goal met   Short Term Goal # 2 Pt will transfer from EOB to chair w/ FWW w/ ModA in 6 visits to improve OOB mobility   Goal Outcome # 2 Progressing slower than expected   Short Term Goal # 3 Pt will perform a STS w/ FWW w/ Bandar in 6 visits to improve OOB mobility   Goal Outcome # 3 Progressing slower than expected   Short Term Goal # 4 Pt will be able to ambulate 15 ft w/ FWW w/ ModA in 6 visits to access household distances   Goal Outcome # 4 Goal not met   Short Term Goal # 5 Pt will be able to complete 4 steps w/ FWW w/Mod A in 6 visits to access household   Goal Outcome # 5 Goal not met   Supervising Physical Therapist (PTA Treatments Only)   Supervising Physical Therapist Aurelia Nesbitt

## 2023-02-16 VITALS
HEIGHT: 67 IN | OXYGEN SATURATION: 95 % | HEART RATE: 58 BPM | TEMPERATURE: 97 F | SYSTOLIC BLOOD PRESSURE: 130 MMHG | DIASTOLIC BLOOD PRESSURE: 67 MMHG | RESPIRATION RATE: 17 BRPM | WEIGHT: 183.2 LBS | BODY MASS INDEX: 28.75 KG/M2

## 2023-02-16 LAB
SARS-COV+SARS-COV-2 AG RESP QL IA.RAPID: NOTDETECTED
SPECIMEN SOURCE: NORMAL

## 2023-02-16 PROCEDURE — A9270 NON-COVERED ITEM OR SERVICE: HCPCS | Performed by: GENERAL PRACTICE

## 2023-02-16 PROCEDURE — A9270 NON-COVERED ITEM OR SERVICE: HCPCS | Performed by: HOSPITALIST

## 2023-02-16 PROCEDURE — 700102 HCHG RX REV CODE 250 W/ 637 OVERRIDE(OP): Performed by: GENERAL PRACTICE

## 2023-02-16 PROCEDURE — 87426 SARSCOV CORONAVIRUS AG IA: CPT

## 2023-02-16 PROCEDURE — 700102 HCHG RX REV CODE 250 W/ 637 OVERRIDE(OP): Performed by: HOSPITALIST

## 2023-02-16 PROCEDURE — 99239 HOSP IP/OBS DSCHRG MGMT >30: CPT | Performed by: HOSPITALIST

## 2023-02-16 PROCEDURE — 700102 HCHG RX REV CODE 250 W/ 637 OVERRIDE(OP): Performed by: INTERNAL MEDICINE

## 2023-02-16 PROCEDURE — A9270 NON-COVERED ITEM OR SERVICE: HCPCS | Performed by: INTERNAL MEDICINE

## 2023-02-16 RX ORDER — OMEPRAZOLE 40 MG/1
40 CAPSULE, DELAYED RELEASE ORAL DAILY
Qty: 30 CAPSULE | Status: SHIPPED
Start: 2023-02-17 | End: 2023-03-19

## 2023-02-16 RX ORDER — ACETAMINOPHEN 325 MG/1
650 TABLET ORAL EVERY 6 HOURS PRN
Qty: 30 TABLET | Refills: 0 | Status: ON HOLD
Start: 2023-02-16 | End: 2023-08-10

## 2023-02-16 RX ORDER — LISINOPRIL 10 MG/1
20 TABLET ORAL DAILY
Qty: 30 TABLET | Status: SHIPPED
Start: 2023-02-16

## 2023-02-16 RX ADMIN — AMLODIPINE BESYLATE 10 MG: 5 TABLET ORAL at 05:35

## 2023-02-16 RX ADMIN — OMEPRAZOLE 40 MG: 20 CAPSULE, DELAYED RELEASE ORAL at 05:35

## 2023-02-16 RX ADMIN — ASPIRIN 81 MG 81 MG: 81 TABLET ORAL at 05:36

## 2023-02-16 RX ADMIN — LISINOPRIL 20 MG: 20 TABLET ORAL at 05:36

## 2023-02-16 RX ADMIN — CLOPIDOGREL BISULFATE 75 MG: 75 TABLET ORAL at 05:36

## 2023-02-16 ASSESSMENT — PAIN DESCRIPTION - PAIN TYPE: TYPE: ACUTE PAIN

## 2023-02-16 NOTE — CARE PLAN
The patient is Stable - Low risk of patient condition declining or worsening    Shift Goals  Clinical Goals: safety/mobility  Patient Goals: rest/food  Family Goals: RADHA    Progress made toward(s) clinical / shift goals:    Problem: Optimal Care of the Stroke Patient  Goal: Optimal acute care for the stroke patient  Outcome: Progressing     Problem: Knowledge Deficit - Stroke Education  Goal: Patient's knowledge of stroke and risk factors will improve  Outcome: Progressing     Problem: Psychosocial - Patient Condition  Goal: Patient's ability to verbalize feelings about condition will improve  Outcome: Progressing       Problem: Discharge Planning - Stroke  Goal: Patient’s continuum of care needs will be met  Outcome: Progressing     Problem: Neuro Status  Goal: Neuro status will remain stable or improve  Outcome: Progressing

## 2023-02-16 NOTE — ANESTHESIA POSTPROCEDURE EVALUATION
Patient: Kaiden Quiroz    Procedure Summary     Date: 02/13/23 Room / Location: [facility] ROOM 26 / SURGERY SAME DAY [facility]    Anesthesia Start: 1402 Anesthesia Stop: 1432    Procedures:       GASTROSCOPY (Esophagus)      GASTROSCOPY, WITH ARGON PLASMA COAGULATION (Esophagus) Diagnosis: (DUODENAL AVM)    Surgeons: Dagmar Allan M.D. Responsible Provider: Armando Delarosa M.D.    Anesthesia Type: MAC ASA Status: 4          Final Anesthesia Type: MAC  Last vitals  BP   Blood Pressure: 131/73    Temp   36.9 °C (98.5 °F)    Pulse   75   Resp   18    SpO2   95 %      Anesthesia Post Evaluation    Patient location during evaluation: PACU  Patient participation: complete - patient participated  Level of consciousness: awake and alert    Airway patency: patent  Anesthetic complications: no  Cardiovascular status: hemodynamically stable  Respiratory status: acceptable  Hydration status: euvolemic    PONV: none          No notable events documented.     Nurse Pain Score: 3 (NPRS)

## 2023-02-16 NOTE — CARE PLAN
The patient is Stable - Low risk of patient condition declining or worsening    Shift Goals  Clinical Goals: (P) safety/ discharge planning  Patient Goals: (P) Rest  Family Goals: (P) RADHA    Progress made toward(s) clinical / shift goals:  Patient remains alert and oriented X 4. Neuro checks continued with no new complications observed. Patient receiving continuous oxygen at 2L/min. Patient assisted with ADLs, as needed. No signs of distress or discomfort have been observed. Preventative skin measures continued.   Problem: Optimal Care of the Stroke Patient  Goal: Optimal emergency care for the stroke patient  Outcome: Progressing  Goal: Optimal acute care for the stroke patient  Outcome: Progressing     Problem: Knowledge Deficit - Stroke Education  Goal: Patient's knowledge of stroke and risk factors will improve  Outcome: Progressing     Problem: Psychosocial - Patient Condition  Goal: Patient's ability to verbalize feelings about condition will improve  Outcome: Progressing  Goal: Patient's ability to re-evaluate and adapt role responsibilities will improve  Outcome: Progressing     Problem: Discharge Planning - Stroke  Goal: Ensure Stroke Core Measures are met prior to discharge  Outcome: Progressing  Goal: Patient’s continuum of care needs will be met  Outcome: Progressing     Problem: Neuro Status  Goal: Neuro status will remain stable or improve  Outcome: Progressing     Problem: Hemodynamic Monitoring  Goal: Patient's hemodynamics, fluid balance and neurologic status will be stable or improve  Outcome: Progressing     Problem: Respiratory - Stroke Patient  Goal: Patient will achieve/maintain optimum respiratory rate/effort  Outcome: Progressing     Problem: Dysphagia  Goal: Dysphagia will improve  Outcome: Progressing     Problem: Risk for Aspiration  Goal: Patient's risk for aspiration will be absent or decrease  Outcome: Progressing     Problem: Urinary Elimination  Goal: Establish and maintain regular  urinary output  Outcome: Progressing     Problem: Bowel Elimination  Goal: Establish and maintain regular bowel function  Outcome: Progressing     Problem: Mobility - Stroke  Goal: Patient's capacity to carry out activities will improve  Outcome: Progressing  Goal: Spasticity will be prevented or improved  Outcome: Progressing  Goal: Subluxation will be prevented or improved  Outcome: Progressing     Problem: Self Care  Goal: Patient will have the ability to perform ADLs independently or with assistance (bathe, groom, dress, toilet and feed)  Outcome: Progressing     Problem: Knowledge Deficit - Standard  Goal: Patient and family/care givers will demonstrate understanding of plan of care, disease process/condition, diagnostic tests and medications  Outcome: Progressing     Problem: Pain - Standard  Goal: Alleviation of pain or a reduction in pain to the patient’s comfort goal  Outcome: Progressing     Problem: Skin Integrity  Goal: Skin integrity is maintained or improved  Outcome: Progressing     Problem: Fall Risk  Goal: Patient will remain free from falls  Outcome: Progressing     Problem: Optimal Care for Alcohol Withdrawal  Goal: Optimal Care for the alcohol withdrawal patient  Outcome: Progressing   Will continue to monitor and re-assess.     Patient is not progressing towards the following goals:

## 2023-02-16 NOTE — THERAPY
"Occupational Therapy  Daily Treatment     Patient Name: Kaiden Quiroz  Age:  62 y.o., Sex:  male  Medical Record #: [id number]  Today's Date: 2/15/2023     Precautions  Precautions: Fall Risk, Swallow Precautions ( See Comments), PEG Tube  Comments: (P) L sided deficits    Assessment    Pt seen for OT tx session. Pt demo'd seated grooming w/ CGA seated grooming, Mod A UB dressing using hemitech, max A for STS (performed 3x) . Continued ed/training, on joint protection. Pt reported back pain w/ L UE WBing.     Plan    Occupational Therapy Treatment Plan  O.T. Treatment Plan: (P) Continue Current Treatment Plan    DC Equipment Recommendations: (P) Unable to determine at this time  Discharge Recommendations: (P) Recommend post-acute placement for additional occupational therapy services prior to discharge home    Subjective    \"I was reverse planking yesterday!\"  \"Before this I was walking 30,000 steps a day, I lost 80 lbs, I bet you can't tell that now!\"     Objective       02/15/23 1032   Treatment Charges   OT Self Care / ADL (Units) 1   OT Neuromuscular Re-education / Balance (Units) 1   Total Time Spent   OT Self Care / ADL (Minutes) 20   OT Neuromuscular Re-education / Balance (Minutes) 10   Precautions   Precautions Fall Risk;Swallow Precautions ( See Comments);PEG Tube   Comments L sided deficits   Vitals   O2 (LPM) 2   O2 Delivery Device Silicone Nasal Cannula   Cognition    Cognition / Consciousness X   Speech/ Communication Delayed Responses   Orientation Level Not Oriented to Day;Not Oriented to Month   Level of Consciousness Alert   Ability To Follow Commands 1 Step   Safety Awareness Impaired   New Learning Impaired   Attention Impaired   Sequencing Impaired   Initiation Impaired   Comments very pleasent and cooperative   Strength Upper Body   Comments attempting to use L UE to grasp objects   Neuro-Muscular Treatments   Neuro-Muscular Treatments Postural Facilitation;Tactile Cuing;Verbal Cuing "   Comments difficulty tolerating WBing activities on L UE   Balance   Sitting Balance (Static) Fair   Sitting Balance (Dynamic) Fair -   Standing Balance (Static) Trace +   Standing Balance (Dynamic) Trace   Weight Shift Sitting Fair   Weight Shift Standing Absent   Skilled Intervention Verbal Cuing   Comments standing required physical assist x2   Bed Mobility    Supine to Sit Minimal Assist   Sit to Supine Minimal Assist   Scooting Minimal Assist   Rolling Minimal Assist to Rt.;Minimum Assist to Lt.   Skilled Intervention Verbal Cuing;Tactile Cuing;Sequencing   Activities of Daily Living   Grooming Contact Guard Assist;Seated  (brushed hair and washed beard. Min A for R UE, max A w/ HHA for L UE)   Upper Body Dressing Moderate Assist  (ed/trained on nubia tech)   Lower Body Dressing Maximal Assist   Skilled Intervention Tactile Cuing;Verbal Cuing;Facilitation   How much help from another person does the patient currently need...   Putting on and taking off regular lower body clothing? 2   Bathing (including washing, rinsing, and drying)? 2   Toileting, which includes using a toilet, bedpan, or urinal? 2   Putting on and taking off regular upper body clothing? 2   Taking care of personal grooming such as brushing teeth? 3   Eating meals? 3   6 Clicks Daily Activity Score 14   Functional Mobility   Sit to Stand Maximal Assist   Bed, Chair, Wheelchair Transfer Maximal Assist  (2 people for safety)   Mobility stood 3x EOB   Skilled Intervention Tactile Cuing;Verbal Cuing;Facilitation   Activity Tolerance   Sitting Edge of Bed 20 min   Standing 3 min total   Patient / Family Goals   Patient / Family Goal #1 to go home   Goal #1 Outcome Progressing as expected   Short Term Goals   Short Term Goal # 1 pt will groom seated EOB w/ setup   Goal Outcome # 1 Progressing as expected   Short Term Goal # 2 pt will maintain sitting balance unsupported 10 sec in prep for seated ADLs   Goal Outcome # 2 Goal met, new goal added    Short Term Goal # 2 B  Pt will demo UB dressing using nubia tech w/ SPV   Goal Outcome # 2 B Progressing as expected   Short Term Goal # 3 pt will demo ADL txfs with Bandar   Goal Outcome # 3 Goal not met   Education Group   Role of Occupational Therapist Patient Response Patient;Acceptance;Explanation   Joint Protection Patient Response Patient;Acceptance;Explanation;Demonstration;Verbal Demonstration;Action Demonstration   Stroke Patient Response Patient;Acceptance;Explanation;Demonstration;Verbal Demonstration;Action Demonstration   ADL Patient Response Patient;Acceptance;Explanation;Demonstration;Verbal Demonstration;Action Demonstration   Occupational Therapy Treatment Plan   O.T. Treatment Plan Continue Current Treatment Plan   Anticipated Discharge Equipment and Recommendations   DC Equipment Recommendations Unable to determine at this time   Discharge Recommendations Recommend post-acute placement for additional occupational therapy services prior to discharge home   Interdisciplinary Plan of Care Collaboration   IDT Collaboration with  Nursing;Therapy Tech   Patient Position at End of Therapy In Bed;Bed Alarm On;Call Light within Reach;Tray Table within Reach;Phone within Reach   Collaboration Comments Report given   Session Information   Date / Session Number  2/15, #11 (2/4, 2/19)

## 2023-02-16 NOTE — DISCHARGE PLANNING
Agency/Facility Name: [facility]  Spoke To: Hannah  Outcome: DPA received call from facility. Per Hannah, pts insurance auth came back and pt has a bed today. DPA asked team for a rapid covid and will request transport for 1430. DPA to call facility back with confirmed transport time     RN MYNOR notified     0922    Agency/Facility Name: [facility]  Outcome: DPA left vmail letting facility know of confirmed transport with [facility] via [facility] at 1430    RN CM notified

## 2023-02-16 NOTE — DISCHARGE PLANNING
DC Transport Scheduled    Received request at: 0854 2/16    Transport Company Scheduled:  [employer]  Spoke with Nickie at [facility] to schedule transport.    Scheduled Date: 2/16/2023  Scheduled Time: 1430    Destination: [facility]    Notified care team of scheduled transport via [employer].     If there are any changes needed to the DC transportation scheduled, please contact [facility] at ext. [phone] between the hours of 4731-2874 Mon-Fri. If outside those hours, contact the ED Case Manager at ext. [phone].

## 2023-02-16 NOTE — DISCHARGE SUMMARY
"Discharge Summary    CHIEF COMPLAINT ON ADMISSION  Chief Complaint   Patient presents with    Possible Stroke     BIBA from home for stroke like symptoms  Wife found pt with slurred speech, L facial droop, and L sided weakness   LKW 1830  Pt had a fall yesterday, + head strike, - thinners  , aox4  Initial NIH of 20 by neurologist, pt taken to CT then red 11       Reason for Admission  Stroke    Admission Date  1/12/2023     CODE STATUS  Full Code    HPI & HOSPITAL COURSE    This is a 62-year-old male with past medical history of hypertension, type 2 diabetes, alcohol use, tobacco use disorder and obesity who was admitted on 1/12/2023 with left-sided weakness, significant for right MCA CVA s/p ICA stent and thrombectomy on 1/12, complicated by hemorrhagic conversion.     \"He presented 1/12/22 with Left facial droop and Left-sided weakness. CTH demonstrated hyperdense MCA, for which subsequent CT perfusion demonstrated infarct with penumbra. CTA demonstrated Right ICA and Right MCA occlusion. Neurology was consulted. He was not a candidate for tPA due to hypertension. Neuro-IR performed a Right MCA thrombectomy and Right ICA stent 1/12/23. Subsequent MRI demonstrated Right frontal, basal ganglia, and caudate nucleus infarct. Petechial hemorrhages were noted concerning for hemorrhagic conversion, for which he was not a candidate for DAPT but treated with ASA monotherapy per Neurology recommendation. He was admitted to the ICU post-procedure for neurologic monitoring and nicardipine gtt due to uncontrolled HTN.Started on DAPT 1/24.     During his IR procedure he developed a sinus pause for which CPR was not required prior to spontaneous ROSC. He was administered atropine and glycopyrrolate for sinus bradycardia. He was intubated post-procedure and extubated 1/13/23 to [facility]. CXR demonstrated evolving pulmonary edema. TTE demonstrated normal systolic function and was negative for shunt. He has been diuresed with IV " "lasix with vast improvement.\"    Patient noted to have significant dysphagia, resulting in aspiration pneumonia, patient required PEG tube placement in 1/24. Patient is s/p FEES 2/8, started on a modified diet, tolerating 100% PO intake.    Patient noted to have melena, history of extensive alcohol use prior to admission.  No prior EGD or colonoscopy.  GI consulted he is s/p EGD on 2/13 with 2 mm nonbleeding angioectasia in second portion of duodenum treated with APC, no active bleeding.  GI recommended omeprazole for 4 weeks .and the patient was restarted on Plavix and has tolerated it with no signs of bleeding .    The patient had urinary retention he failed voiding trial and has been started on terazosin.  Patient's hemoglobin A1c was 5.3 his CBGs have been controlled on diet.    Neurology recommended to continue Plavix for total of 6 weeks from date of initiation on 1/24/2023 and subsequently lifelong aspirin monotherapy        Therefore, he is discharged in good and stable condition to skilled nursing facility.    The patient met 2-midnight criteria for an inpatient stay at the time of discharge.      FOLLOW UP ITEMS POST DISCHARGE  Continue Plavix for total of 6 weeks discontinue around March 7, 2023  Continue Prilosec for 4 weeks  The patient continues to do well and tolerating diet reevaluate for removal of PEG tube  Monitor blood pressure and CBGs and adjust medical therapy accordingly  Follow-up with [facility] and primary care after discharge from SNF  If patient fails repeat voiding trial follow-up with urology  Follow-up with neuro interventional radiology    DISCHARGE DIAGNOSES  Principal Problem:    Acute ischemic right middle cerebral artery (MCA) stroke (HCC) POA: Yes  Active Problems:    Primary hypertension POA: Yes    Tobacco use disorder POA: Yes    Alcohol dependence with withdrawal (HCC) POA: Yes    Type 2 diabetes mellitus without complication, without long-term current use of " insulin (HCC) POA: Yes    Severe obesity (BMI 35.0-35.9 with comorbidity) (HCC) POA: Yes    Acute respiratory failure with hypoxia (HCC) POA: Yes    Stroke, hemorrhagic (HCC) POA: Yes    Acute left hemiparesis (HCC) POA: Yes    Oropharyngeal dysphagia POA: Yes    Pneumonia due to infectious organism POA: Yes    Internal carotid artery stent present POA: Unknown    Urinary retention POA: Unknown    Melena POA: Unknown  Resolved Problems:    Toxic metabolic encephalopathy POA: Yes    Hypernatremia POA: Unknown      FOLLOW UP  No future appointments.  PRIMARY CARE PROVIDER    Follow up  Please call your primary care provider to schedule a hospital follow up. Thank you.    [facility]  [address] [facility] [place] [address]  [phone]        Petra Mobley M.D.  [address] [address] [phone]    Follow up        MEDICATIONS ON DISCHARGE     Medication List        START taking these medications        Instructions   acetaminophen 325 MG Tabs  Commonly known as: Tylenol  Replaces: acetaminophen 650 MG CR tablet   Take 2 Tablets by mouth every 6 hours as needed for Mild Pain, Moderate Pain or Fever.  Dose: 650 mg     amLODIPine 10 MG Tabs  Commonly known as: NORVASC   Take 1 Tablet by mouth every day.  Dose: 10 mg     aspirin 81 MG Chew chewable tablet  Commonly known as: ASA   Chew 1 Tablet every day.  Dose: 81 mg     atorvastatin 80 MG tablet  Commonly known as: LIPITOR   Take 1 Tablet by mouth every evening.  Dose: 80 mg     clopidogrel 75 MG Tabs  Commonly known as: PLAVIX   Take 1 Tablet by mouth every day.  Dose: 75 mg     lisinopril 10 MG Tabs  Commonly known as: PRINIVIL   Take 2 Tablets by mouth every day.  Dose: 20 mg     melatonin 5 mg Tabs   Take 1 Tablet by mouth every evening.  Dose: 5 mg     omeprazole 40 MG delayed-release capsule  Start taking on: February 17, 2023  Commonly known as: PRILOSEC   Take 1 Capsule by mouth every day for 30 days.  Dose: 40  mg     terazosin 1 MG Caps  Commonly known as: HYTRIN   Take 1 Capsule by mouth every evening.  Dose: 1 mg            STOP taking these medications      acetaminophen 650 MG CR tablet  Commonly known as: TYLENOL  Replaced by: acetaminophen 325 MG Tabs     Naproxen Sodium 220 MG Caps     Neuriva Plus Caps              Allergies  No Known Allergies    DIET  Orders Placed This Encounter   Procedures    Diet Order Diet: Regular (Assist with meal tray set up)     Standing Status:   Standing     Number of Occurrences:   1     Order Specific Question:   Diet:     Answer:   Regular [1]     Comments:   Assist with meal tray set up       ACTIVITY  As tolerated.  Weight bearing as tolerated    LINES, DRAINS, AND WOUNDS  This is an automated list. Peripheral IVs will be removed prior to discharge.  Peripheral IV 01/20/23 20 G Anterior;Right Forearm (Active)   Site Assessment Clean;Intact;Dry 02/16/23 0800   Dressing Type Transparent 02/16/23 0800   Line Status Scrubbed the hub prior to access;Saline locked;Flushed 02/16/23 0800   Dressing Status Clean;Dry;Intact 02/16/23 0800   Dressing Intervention N/A 02/16/23 0800   Dressing Change Due 02/18/23 02/13/23 0800   Date Primary Tubing Changed 01/21/23 01/21/23 2100   Date Secondary Tubing Changed 01/21/23 01/21/23 2100   NEXT Primary Tubing Change  02/11/23 02/08/23 2000   NEXT Secondary Tubing Change  02/11/23 02/08/23 2000   Date IV Connector(s) Changed 01/28/23 01/28/23 2000   Infiltration Grading ([facility]) 0 02/16/23 0800   Phlebitis Scale ([facility] Only) 0 02/16/23 0800       Peripheral IV 01/21/23 20 G Anterior;Left;Proximal Forearm (Active)   Site Assessment Clean;Dry;Intact 02/16/23 0800   Dressing Type Transparent 02/16/23 0800   Line Status Scrubbed the hub prior to access;Flushed;Saline locked 02/16/23 0800   Dressing Status Clean;Intact;Dry 02/16/23 0800   Dressing Intervention N/A 02/16/23 0800   Dressing Change Due 02/18/23 02/12/23 0800   NEXT Primary Tubing  Change  02/11/23 02/08/23 2000   NEXT Secondary Tubing Change  02/11/23 02/08/23 2000   Date IV Connector(s) Changed 01/28/23 01/28/23 2000   Infiltration Grading ([facility]) 0 02/16/23 0800   Phlebitis Scale ([facility] Only) 0 02/16/23 0800     Urethral Catheter 16 Fr. (Active)   Site Assessment Clean;Skin intact 02/16/23 0800   Collection Container Standard drainage bag 02/16/23 0800   Urinary Catheter Care Tamper Evident Seal in Place;Drainage Tube Extended;Drainage Bag Not Overfilled;Drainage Tubing Properly Secured;Drainage Bag Below Bladder Level and Not on Floor 02/16/23 0800   Securement Method Securing device (Describe) 02/16/23 0800   Output (mL) 400 mL 02/16/23 0334      Wound 01/26/23 Other (comment) Arm Anterior;Upper Left (Active)   Wound Image   01/26/23 0500   Site Assessment Clean;Dry;Intact 02/16/23 0800   Periwound Assessment Clean;Dry;Intact 02/16/23 0800   Margins Attached edges 02/16/23 0800   Closure Open to air 02/16/23 0800   Drainage Amount None 02/16/23 0800   Drainage Description RADHA 02/15/23 2000   Treatments Offloading 02/16/23 0800   Wound Cleansing Not Applicable 02/16/23 0800   Dressing Options Open to Air 02/15/23 2000   Dressing Changed Observed 02/15/23 2000   Dressing Status Open to Air 02/15/23 2000   Dressing Change/Treatment Frequency As Needed 02/13/23 2000       Wound 01/28/23 Traumatic Sacrum Per patient, he fell on a curb PTA, (Active)   Wound Image   02/01/23 1200   Site Assessment Clean;Dry;Intact 02/16/23 0800   Periwound Assessment Clean;Dry;Intact 02/16/23 0800   Margins Attached edges 02/16/23 0800   Closure Other (Comment) 02/16/23 0800   Drainage Amount None 02/16/23 0800   Treatments Offloading 02/16/23 0800   Wound Cleansing Not Applicable 02/16/23 0800   Periwound Protectant Not Applicable 02/16/23 0800   Dressing Cleansing/Solutions Not Applicable 02/10/23 2000   Dressing Options Mepilex 02/13/23 2000   Dressing Changed Observed 02/14/23 0800   Dressing Status  Clean;Dry;Intact 02/13/23 2000   Dressing Change/Treatment Frequency Every 72 hrs, and As Needed 02/13/23 2000   NEXT Dressing Change/Treatment Date 02/13/23 02/12/23 2000   NEXT Weekly Photo (Inpatient Only) 02/15/23 02/10/23 2000   Non-staged Wound Description Not applicable 02/01/23 1200   Shape diffuse, excoriation marks 02/01/23 1200   Wound Odor None 01/29/23 1431   Pulses N/A 01/29/23 1431   Exposed Structures None 02/01/23 1200   WOUND NURSE ONLY - Time Spent with Patient (mins) 30 02/01/23 1200       Peripheral IV 01/20/23 20 G Anterior;Right Forearm (Active)   Site Assessment Clean;Intact;Dry 02/16/23 0800   Dressing Type Transparent 02/16/23 0800   Line Status Scrubbed the hub prior to access;Saline locked;Flushed 02/16/23 0800   Dressing Status Clean;Dry;Intact 02/16/23 0800   Dressing Intervention N/A 02/16/23 0800   Dressing Change Due 02/18/23 02/13/23 0800   Date Primary Tubing Changed 01/21/23 01/21/23 2100   Date Secondary Tubing Changed 01/21/23 01/21/23 2100   NEXT Primary Tubing Change  02/11/23 02/08/23 2000   NEXT Secondary Tubing Change  02/11/23 02/08/23 2000   Date IV Connector(s) Changed 01/28/23 01/28/23 2000   Infiltration Grading ([facility] 0 02/16/23 0800   Phlebitis Scale ([facility] Only) 0 02/16/23 0800       Peripheral IV 01/21/23 20 G Anterior;Left;Proximal Forearm (Active)   Site Assessment Clean;Dry;Intact 02/16/23 0800   Dressing Type Transparent 02/16/23 0800   Line Status Scrubbed the hub prior to access;Flushed;Saline locked 02/16/23 0800   Dressing Status Clean;Intact;Dry 02/16/23 0800   Dressing Intervention N/A 02/16/23 0800   Dressing Change Due 02/18/23 02/12/23 0800   NEXT Primary Tubing Change  02/11/23 02/08/23 2000   NEXT Secondary Tubing Change  02/11/23 02/08/23 2000   Date IV Connector(s) Changed 01/28/23 01/28/23 2000   Infiltration Grading ([facility] 0 02/16/23 0800   Phlebitis Scale ([facility] Only) 0 02/16/23 0800           Urethral Catheter 16 Fr. (Active)    Site Assessment Clean;Skin intact 02/16/23 0800   Collection Container Standard drainage bag 02/16/23 0800   Urinary Catheter Care Tamper Evident Seal in Place;Drainage Tube Extended;Drainage Bag Not Overfilled;Drainage Tubing Properly Secured;Drainage Bag Below Bladder Level and Not on Floor 02/16/23 0800   Securement Method Securing device (Describe) 02/16/23 0800   Output (mL) 400 mL 02/16/23 0334        MENTAL STATUS ON TRANSFER             CONSULTATIONS  Neurology, critical care, GI, physiatry Central line insertion    PROCEDURES  Central line insertion  EGD on 2/13/2023  PEG tube placement by Dr. Johnston on 1/24/2023  Procedure(s): Cerebral Angiogram and right MCA mechanical thrombectomy, Rt ICA angioplasty and stent placement on 1/12/2023      LABORATORY  Lab Results   Component Value Date    SODIUM 136 02/14/2023    POTASSIUM 3.9 02/14/2023    CHLORIDE 101 02/14/2023    CO2 26 02/14/2023    GLUCOSE 91 02/14/2023    BUN 13 02/14/2023    CREATININE 0.54 02/14/2023        Lab Results   Component Value Date    WBC 5.6 02/14/2023    HEMOGLOBIN 11.3 (L) 02/14/2023    HEMATOCRIT 32.5 (L) 02/14/2023    PLATELETCT 150 (L) 02/14/2023        Total time of the discharge process exceeds 35 minutes.

## 2023-02-16 NOTE — DISCHARGE INSTRUCTIONS
Diet    Resume your normal diet as tolerated.  A diet low in cholesterol, fat, and sodium is recommended for good health.     Activity    Resume Your Normal Activity    You may resume your normal activity as tolerated.  Rest as needed.      Stroke / CVA / TIA / Hemorrhagic Ischemia Discharge Instructions    You have had a stroke. Your risk factors have been identified as follows:  Age - Over 55  Gender - Men are at a higher risk than women  High blood pressure  Diabetes  Smoking  Overweight  Alcohol or drug abuse    It is important that you reduce your risk factors to avoid another stroke in the future. Here are some general guidelines to follow:    Eat healthy - avoid food high in fat  Maintain a healthy weight  Avoid alcohol and illegal drug use Get regular exercise  Avoid smoking  Take your medications as directed     For more information regarding risk factors, refer to pages 17-19 in your Stroke Patient Education Guide. Stroke Education Guide was given to patient.    Warning signs of a stroke include (which can also be found on page 3 of your Stroke Patient Education Guide):  Sudden numbness of weakness of the face, arm or leg (especially on one side of the body).  Sudden confusion, trouble speaking or understanding.  Sudden trouble seeing in one or both eyes.  Sudden trouble walking, dizziness, loss of balance or coordination.  Sudden severe headache with no known cause.  It is very important to get treatment quickly when a stroke occurs. If you experience any of the above warning signs, call 911 immediately.     Some patients who have had a stroke will be going home on a blood thinner medication called Warfarin (Coumadin).  This medication requires very close monitoring and follow up.  This follow up can be provided by either your Primary Care Physician or by [facility]'s Outpatient Anticoagulation Service.  The Outpatient Anticoagulation Service is located at the [place] [facility] at [facility]  [facility]  If you do not know when your follow up appointment is scheduled, call [phone] to verify your appointment time.

## 2023-04-14 ENCOUNTER — HOSPITAL ENCOUNTER (OUTPATIENT)
Dept: RADIOLOGY | Facility: MEDICAL CENTER | Age: 63
End: 2023-04-14
Attending: FAMILY MEDICINE
Payer: COMMERCIAL

## 2023-04-14 DIAGNOSIS — Z93.1 GASTROSTOMY STATUS (HCC): ICD-10-CM

## 2023-04-18 ENCOUNTER — TELEPHONE (OUTPATIENT)
Dept: OCCUPATIONAL THERAPY | Facility: MEDICAL CENTER | Age: 63
End: 2023-04-18

## 2023-04-18 NOTE — PROGRESS NOTES
OPIR     Pt presented for Gastrostomy Tube Removal by Dr. Mann.  No issue with removal.  Site cleansed with saline and dressed with gauze and medipore tape, C/D/I, no drainage.    All questions and concerns answered prior to being dc'd; pt provided with appropriate education for procedure, pt discharge to home.

## 2023-05-08 ENCOUNTER — HOSPITAL ENCOUNTER (OUTPATIENT)
Dept: LAB | Facility: MEDICAL CENTER | Age: 63
End: 2023-05-08
Attending: STUDENT IN AN ORGANIZED HEALTH CARE EDUCATION/TRAINING PROGRAM
Payer: COMMERCIAL

## 2023-05-08 LAB
ALBUMIN SERPL BCP-MCNC: 4.4 G/DL (ref 3.2–4.9)
ALBUMIN/GLOB SERPL: 1.5 G/DL
ALP SERPL-CCNC: 112 U/L (ref 30–99)
ALT SERPL-CCNC: 37 U/L (ref 2–50)
ANION GAP SERPL CALC-SCNC: 14 MMOL/L (ref 7–16)
AST SERPL-CCNC: 22 U/L (ref 12–45)
BILIRUB SERPL-MCNC: 0.7 MG/DL (ref 0.1–1.5)
BUN SERPL-MCNC: 13 MG/DL (ref 8–22)
CALCIUM ALBUM COR SERPL-MCNC: 9.4 MG/DL (ref 8.5–10.5)
CALCIUM SERPL-MCNC: 9.7 MG/DL (ref 8.5–10.5)
CHLORIDE SERPL-SCNC: 104 MMOL/L (ref 96–112)
CO2 SERPL-SCNC: 20 MMOL/L (ref 20–33)
CREAT SERPL-MCNC: 0.68 MG/DL (ref 0.5–1.4)
ERYTHROCYTE [DISTWIDTH] IN BLOOD BY AUTOMATED COUNT: 43.3 FL (ref 35.9–50)
GFR SERPLBLD CREATININE-BSD FMLA CKD-EPI: 105 ML/MIN/1.73 M 2
GLOBULIN SER CALC-MCNC: 2.9 G/DL (ref 1.9–3.5)
GLUCOSE SERPL-MCNC: 106 MG/DL (ref 65–99)
HCT VFR BLD AUTO: 44.5 % (ref 42–52)
HGB BLD-MCNC: 15.4 G/DL (ref 14–18)
MCH RBC QN AUTO: 30.3 PG (ref 27–33)
MCHC RBC AUTO-ENTMCNC: 34.6 G/DL (ref 33.7–35.3)
MCV RBC AUTO: 87.4 FL (ref 81.4–97.8)
PLATELET # BLD AUTO: 209 K/UL (ref 164–446)
PMV BLD AUTO: 10.2 FL (ref 9–12.9)
POTASSIUM SERPL-SCNC: 3.9 MMOL/L (ref 3.6–5.5)
PROT SERPL-MCNC: 7.3 G/DL (ref 6–8.2)
PSA SERPL-MCNC: 1.53 NG/ML (ref 0–4)
RBC # BLD AUTO: 5.09 M/UL (ref 4.7–6.1)
SODIUM SERPL-SCNC: 138 MMOL/L (ref 135–145)
WBC # BLD AUTO: 5.8 K/UL (ref 4.8–10.8)

## 2023-05-08 PROCEDURE — 84153 ASSAY OF PSA TOTAL: CPT

## 2023-05-08 PROCEDURE — 36415 COLL VENOUS BLD VENIPUNCTURE: CPT

## 2023-05-08 PROCEDURE — 85027 COMPLETE CBC AUTOMATED: CPT

## 2023-05-08 PROCEDURE — 80053 COMPREHEN METABOLIC PANEL: CPT

## 2023-05-27 ENCOUNTER — HOSPITAL ENCOUNTER (OUTPATIENT)
Dept: LAB | Facility: MEDICAL CENTER | Age: 63
End: 2023-05-27
Attending: INTERNAL MEDICINE
Payer: COMMERCIAL

## 2023-05-27 LAB
CHOLEST SERPL-MCNC: 140 MG/DL (ref 100–199)
FASTING STATUS PATIENT QL REPORTED: NORMAL
HDLC SERPL-MCNC: 50 MG/DL
LDLC SERPL CALC-MCNC: 67 MG/DL
TRIGL SERPL-MCNC: 117 MG/DL (ref 0–149)

## 2023-05-27 PROCEDURE — 80061 LIPID PANEL: CPT

## 2023-05-27 PROCEDURE — 36415 COLL VENOUS BLD VENIPUNCTURE: CPT

## 2023-08-06 ENCOUNTER — APPOINTMENT (OUTPATIENT)
Dept: RADIOLOGY | Facility: MEDICAL CENTER | Age: 63
DRG: 441 | End: 2023-08-06
Attending: EMERGENCY MEDICINE
Payer: COMMERCIAL

## 2023-08-06 ENCOUNTER — HOSPITAL ENCOUNTER (INPATIENT)
Facility: MEDICAL CENTER | Age: 63
LOS: 4 days | DRG: 441 | End: 2023-08-10
Attending: EMERGENCY MEDICINE | Admitting: INTERNAL MEDICINE
Payer: COMMERCIAL

## 2023-08-06 DIAGNOSIS — B17.9 ACUTE HEPATITIS: ICD-10-CM

## 2023-08-06 DIAGNOSIS — J96.01 ACUTE RESPIRATORY FAILURE WITH HYPOXIA (HCC): ICD-10-CM

## 2023-08-06 DIAGNOSIS — B16.9 VIRAL HEPATITIS B ACUTE: ICD-10-CM

## 2023-08-06 PROBLEM — E66.9 CLASS 1 OBESITY IN ADULT: Status: ACTIVE | Noted: 2023-01-12

## 2023-08-06 PROBLEM — R17 ELEVATED BILIRUBIN: Status: ACTIVE | Noted: 2023-08-06

## 2023-08-06 PROBLEM — R74.8 ELEVATED LIVER ENZYMES: Status: ACTIVE | Noted: 2023-08-06

## 2023-08-06 PROBLEM — R42 DIZZINESS: Status: ACTIVE | Noted: 2023-08-06

## 2023-08-06 PROBLEM — Z71.89 ADVANCE CARE PLANNING: Status: ACTIVE | Noted: 2023-08-06

## 2023-08-06 LAB
ALBUMIN SERPL BCP-MCNC: 3.9 G/DL (ref 3.2–4.9)
ALBUMIN/GLOB SERPL: 1.3 G/DL
ALP SERPL-CCNC: 263 U/L (ref 30–99)
ALT SERPL-CCNC: 2313 U/L (ref 2–50)
AMPHET UR QL SCN: NEGATIVE
ANION GAP SERPL CALC-SCNC: 13 MMOL/L (ref 7–16)
APAP SERPL-MCNC: <5 UG/ML (ref 10–30)
APPEARANCE UR: ABNORMAL
AST SERPL-CCNC: 1375 U/L (ref 12–45)
BACTERIA #/AREA URNS HPF: ABNORMAL /HPF
BARBITURATES UR QL SCN: NEGATIVE
BASOPHILS # BLD AUTO: 0.7 % (ref 0–1.8)
BASOPHILS # BLD: 0.03 K/UL (ref 0–0.12)
BENZODIAZ UR QL SCN: NEGATIVE
BILIRUB SERPL-MCNC: 4.4 MG/DL (ref 0.1–1.5)
BILIRUB UR QL STRIP.AUTO: ABNORMAL
BUN SERPL-MCNC: 22 MG/DL (ref 8–22)
BZE UR QL SCN: NEGATIVE
CALCIUM ALBUM COR SERPL-MCNC: 9.9 MG/DL (ref 8.5–10.5)
CALCIUM SERPL-MCNC: 9.8 MG/DL (ref 8.5–10.5)
CANNABINOIDS UR QL SCN: NEGATIVE
CHLORIDE SERPL-SCNC: 101 MMOL/L (ref 96–112)
CO2 SERPL-SCNC: 20 MMOL/L (ref 20–33)
COLOR UR: ABNORMAL
CREAT SERPL-MCNC: 0.92 MG/DL (ref 0.5–1.4)
D DIMER PPP IA.FEU-MCNC: 0.33 UG/ML (FEU) (ref 0–0.5)
EKG IMPRESSION: NORMAL
EOSINOPHIL # BLD AUTO: 0.27 K/UL (ref 0–0.51)
EOSINOPHIL NFR BLD: 6.6 % (ref 0–6.9)
EPI CELLS #/AREA URNS HPF: NEGATIVE /HPF
ERYTHROCYTE [DISTWIDTH] IN BLOOD BY AUTOMATED COUNT: 48 FL (ref 35.9–50)
FENTANYL UR QL: NEGATIVE
FLUAV RNA SPEC QL NAA+PROBE: NEGATIVE
FLUBV RNA SPEC QL NAA+PROBE: NEGATIVE
GFR SERPLBLD CREATININE-BSD FMLA CKD-EPI: 94 ML/MIN/1.73 M 2
GLOBULIN SER CALC-MCNC: 3 G/DL (ref 1.9–3.5)
GLUCOSE SERPL-MCNC: 145 MG/DL (ref 65–99)
GLUCOSE UR STRIP.AUTO-MCNC: NEGATIVE MG/DL
GRAN CASTS #/AREA URNS LPF: ABNORMAL /LPF
HAV IGM SERPL QL IA: ABNORMAL
HBV CORE IGM SER QL: REACTIVE
HBV SURFACE AB SERPL IA-ACNC: <3.5 MIU/ML (ref 0–10)
HBV SURFACE AG SER QL: REACTIVE
HCT VFR BLD AUTO: 45.3 % (ref 42–52)
HCV AB SER QL: ABNORMAL
HGB BLD-MCNC: 15.2 G/DL (ref 14–18)
HYALINE CASTS #/AREA URNS LPF: ABNORMAL /LPF
IMM GRANULOCYTES # BLD AUTO: 0 K/UL (ref 0–0.11)
IMM GRANULOCYTES NFR BLD AUTO: 0 % (ref 0–0.9)
INR PPP: 1.04 (ref 0.87–1.13)
KETONES UR STRIP.AUTO-MCNC: NEGATIVE MG/DL
LEUKOCYTE ESTERASE UR QL STRIP.AUTO: ABNORMAL
LIPASE SERPL-CCNC: 40 U/L (ref 11–82)
LYMPHOCYTES # BLD AUTO: 0.93 K/UL (ref 1–4.8)
LYMPHOCYTES NFR BLD: 22.6 % (ref 22–41)
MCH RBC QN AUTO: 31.3 PG (ref 27–33)
MCHC RBC AUTO-ENTMCNC: 33.6 G/DL (ref 32.3–36.5)
MCV RBC AUTO: 93.4 FL (ref 81.4–97.8)
METHADONE UR QL SCN: NEGATIVE
MICRO URNS: ABNORMAL
MONOCYTES # BLD AUTO: 0.54 K/UL (ref 0–0.85)
MONOCYTES NFR BLD AUTO: 13.1 % (ref 0–13.4)
NEUTROPHILS # BLD AUTO: 2.35 K/UL (ref 1.82–7.42)
NEUTROPHILS NFR BLD: 57 % (ref 44–72)
NITRITE UR QL STRIP.AUTO: NEGATIVE
NRBC # BLD AUTO: 0 K/UL
NRBC BLD-RTO: 0 /100 WBC (ref 0–0.2)
OPIATES UR QL SCN: NEGATIVE
OXYCODONE UR QL SCN: NEGATIVE
PCP UR QL SCN: NEGATIVE
PH UR STRIP.AUTO: 5.5 [PH] (ref 5–8)
PLATELET # BLD AUTO: 169 K/UL (ref 164–446)
PMV BLD AUTO: 10.4 FL (ref 9–12.9)
POTASSIUM SERPL-SCNC: 4.1 MMOL/L (ref 3.6–5.5)
PROPOXYPH UR QL SCN: NEGATIVE
PROT SERPL-MCNC: 6.9 G/DL (ref 6–8.2)
PROT UR QL STRIP: NEGATIVE MG/DL
PROTHROMBIN TIME: 13.5 SEC (ref 12–14.6)
RBC # BLD AUTO: 4.85 M/UL (ref 4.7–6.1)
RBC # URNS HPF: ABNORMAL /HPF
RBC UR QL AUTO: ABNORMAL
RSV RNA SPEC QL NAA+PROBE: NEGATIVE
SALICYLATES SERPL-MCNC: <1 MG/DL (ref 15–25)
SARS-COV-2 RNA RESP QL NAA+PROBE: NOTDETECTED
SODIUM SERPL-SCNC: 134 MMOL/L (ref 135–145)
SP GR UR STRIP.AUTO: 1.01
SPECIMEN SOURCE: NORMAL
TROPONIN T SERPL-MCNC: 17 NG/L (ref 6–19)
TROPONIN T SERPL-MCNC: 18 NG/L (ref 6–19)
UROBILINOGEN UR STRIP.AUTO-MCNC: 1 MG/DL
WBC # BLD AUTO: 4.1 K/UL (ref 4.8–10.8)
WBC #/AREA URNS HPF: ABNORMAL /HPF

## 2023-08-06 PROCEDURE — 99285 EMERGENCY DEPT VISIT HI MDM: CPT

## 2023-08-06 PROCEDURE — 93005 ELECTROCARDIOGRAM TRACING: CPT

## 2023-08-06 PROCEDURE — 85610 PROTHROMBIN TIME: CPT

## 2023-08-06 PROCEDURE — 80143 DRUG ASSAY ACETAMINOPHEN: CPT

## 2023-08-06 PROCEDURE — 700111 HCHG RX REV CODE 636 W/ 250 OVERRIDE (IP): Mod: JZ | Performed by: INTERNAL MEDICINE

## 2023-08-06 PROCEDURE — 80179 DRUG ASSAY SALICYLATE: CPT

## 2023-08-06 PROCEDURE — 700102 HCHG RX REV CODE 250 W/ 637 OVERRIDE(OP): Performed by: INTERNAL MEDICINE

## 2023-08-06 PROCEDURE — 700102 HCHG RX REV CODE 250 W/ 637 OVERRIDE(OP)

## 2023-08-06 PROCEDURE — 81001 URINALYSIS AUTO W/SCOPE: CPT

## 2023-08-06 PROCEDURE — 87350 HEPATITIS BE AG IA: CPT

## 2023-08-06 PROCEDURE — 87341 HEP B SURFACE AG NEUTRLZJ IA: CPT

## 2023-08-06 PROCEDURE — 700105 HCHG RX REV CODE 258: Performed by: INTERNAL MEDICINE

## 2023-08-06 PROCEDURE — 80053 COMPREHEN METABOLIC PANEL: CPT

## 2023-08-06 PROCEDURE — A9270 NON-COVERED ITEM OR SERVICE: HCPCS

## 2023-08-06 PROCEDURE — 99223 1ST HOSP IP/OBS HIGH 75: CPT | Mod: 25 | Performed by: INTERNAL MEDICINE

## 2023-08-06 PROCEDURE — 770020 HCHG ROOM/CARE - TELE (206)

## 2023-08-06 PROCEDURE — 87517 HEPATITIS B DNA QUANT: CPT

## 2023-08-06 PROCEDURE — 85025 COMPLETE CBC W/AUTO DIFF WBC: CPT

## 2023-08-06 PROCEDURE — 0241U HCHG SARS-COV-2 COVID-19 NFCT DS RESP RNA 4 TRGT MIC: CPT

## 2023-08-06 PROCEDURE — 99497 ADVNCD CARE PLAN 30 MIN: CPT | Performed by: INTERNAL MEDICINE

## 2023-08-06 PROCEDURE — 36415 COLL VENOUS BLD VENIPUNCTURE: CPT

## 2023-08-06 PROCEDURE — 80074 ACUTE HEPATITIS PANEL: CPT

## 2023-08-06 PROCEDURE — 86706 HEP B SURFACE ANTIBODY: CPT

## 2023-08-06 PROCEDURE — 85379 FIBRIN DEGRADATION QUANT: CPT

## 2023-08-06 PROCEDURE — 83690 ASSAY OF LIPASE: CPT

## 2023-08-06 PROCEDURE — A9270 NON-COVERED ITEM OR SERVICE: HCPCS | Performed by: INTERNAL MEDICINE

## 2023-08-06 PROCEDURE — 71045 X-RAY EXAM CHEST 1 VIEW: CPT

## 2023-08-06 PROCEDURE — 84484 ASSAY OF TROPONIN QUANT: CPT

## 2023-08-06 PROCEDURE — 76705 ECHO EXAM OF ABDOMEN: CPT

## 2023-08-06 PROCEDURE — 80307 DRUG TEST PRSMV CHEM ANLYZR: CPT

## 2023-08-06 PROCEDURE — 93005 ELECTROCARDIOGRAM TRACING: CPT | Performed by: EMERGENCY MEDICINE

## 2023-08-06 PROCEDURE — 86707 HEPATITIS BE ANTIBODY: CPT

## 2023-08-06 PROCEDURE — C9803 HOPD COVID-19 SPEC COLLECT: HCPCS | Performed by: EMERGENCY MEDICINE

## 2023-08-06 RX ORDER — BISACODYL 10 MG
10 SUPPOSITORY, RECTAL RECTAL
Status: DISCONTINUED | OUTPATIENT
Start: 2023-08-06 | End: 2023-08-10 | Stop reason: HOSPADM

## 2023-08-06 RX ORDER — GABAPENTIN 300 MG/1
300-600 CAPSULE ORAL 2 TIMES DAILY
COMMUNITY

## 2023-08-06 RX ORDER — AMLODIPINE BESYLATE 2.5 MG/1
2.5 TABLET ORAL DAILY
COMMUNITY

## 2023-08-06 RX ORDER — SODIUM CHLORIDE 9 MG/ML
INJECTION, SOLUTION INTRAVENOUS CONTINUOUS
Status: DISCONTINUED | OUTPATIENT
Start: 2023-08-06 | End: 2023-08-08

## 2023-08-06 RX ORDER — CHOLECALCIFEROL (VITAMIN D3) 125 MCG
5 CAPSULE ORAL ONCE
Status: COMPLETED | OUTPATIENT
Start: 2023-08-06 | End: 2023-08-06

## 2023-08-06 RX ORDER — ENOXAPARIN SODIUM 100 MG/ML
40 INJECTION SUBCUTANEOUS DAILY
Status: DISCONTINUED | OUTPATIENT
Start: 2023-08-06 | End: 2023-08-10 | Stop reason: HOSPADM

## 2023-08-06 RX ORDER — LANOLIN ALCOHOL/MO/W.PET/CERES
6 CREAM (GRAM) TOPICAL
COMMUNITY

## 2023-08-06 RX ORDER — AMOXICILLIN 250 MG
2 CAPSULE ORAL 2 TIMES DAILY
Status: DISCONTINUED | OUTPATIENT
Start: 2023-08-06 | End: 2023-08-10 | Stop reason: HOSPADM

## 2023-08-06 RX ORDER — HYDRALAZINE HYDROCHLORIDE 20 MG/ML
10 INJECTION INTRAMUSCULAR; INTRAVENOUS EVERY 4 HOURS PRN
Status: DISCONTINUED | OUTPATIENT
Start: 2023-08-06 | End: 2023-08-10 | Stop reason: HOSPADM

## 2023-08-06 RX ORDER — ATORVASTATIN CALCIUM 40 MG/1
40 TABLET, FILM COATED ORAL EVERY EVENING
Status: ON HOLD | COMMUNITY
End: 2023-08-10

## 2023-08-06 RX ORDER — CLOPIDOGREL BISULFATE 75 MG/1
75 TABLET ORAL DAILY
Status: DISCONTINUED | OUTPATIENT
Start: 2023-08-06 | End: 2023-08-10 | Stop reason: HOSPADM

## 2023-08-06 RX ORDER — GABAPENTIN 300 MG/1
600 CAPSULE ORAL EVERY EVENING
Status: DISCONTINUED | OUTPATIENT
Start: 2023-08-06 | End: 2023-08-10 | Stop reason: HOSPADM

## 2023-08-06 RX ORDER — POLYETHYLENE GLYCOL 3350 17 G/17G
1 POWDER, FOR SOLUTION ORAL
Status: DISCONTINUED | OUTPATIENT
Start: 2023-08-06 | End: 2023-08-10 | Stop reason: HOSPADM

## 2023-08-06 RX ORDER — ASPIRIN 81 MG/1
81 TABLET, CHEWABLE ORAL DAILY
Status: DISCONTINUED | OUTPATIENT
Start: 2023-08-06 | End: 2023-08-10 | Stop reason: HOSPADM

## 2023-08-06 RX ORDER — GABAPENTIN 300 MG/1
600 CAPSULE ORAL
Status: SHIPPED | COMMUNITY
End: 2023-08-06

## 2023-08-06 RX ADMIN — SODIUM CHLORIDE: 9 INJECTION, SOLUTION INTRAVENOUS at 17:50

## 2023-08-06 RX ADMIN — GABAPENTIN 600 MG: 300 CAPSULE ORAL at 23:20

## 2023-08-06 RX ADMIN — CLOPIDOGREL BISULFATE 75 MG: 75 TABLET ORAL at 17:51

## 2023-08-06 RX ADMIN — ASPIRIN 81 MG 81 MG: 81 TABLET ORAL at 17:51

## 2023-08-06 RX ADMIN — ENOXAPARIN SODIUM 40 MG: 100 INJECTION SUBCUTANEOUS at 17:51

## 2023-08-06 RX ADMIN — Medication 5 MG: at 23:20

## 2023-08-06 RX ADMIN — SENNOSIDES AND DOCUSATE SODIUM 2 TABLET: 50; 8.6 TABLET ORAL at 17:51

## 2023-08-06 ASSESSMENT — ENCOUNTER SYMPTOMS
HEADACHES: 0
COUGH: 0
BACK PAIN: 0
ABDOMINAL PAIN: 0
TREMORS: 0
MYALGIAS: 0
DIZZINESS: 1
NECK PAIN: 0
SPEECH CHANGE: 0
SENSORY CHANGE: 0
EYE PAIN: 0
CONSTIPATION: 0
HALLUCINATIONS: 0
CHILLS: 0
WEIGHT LOSS: 0
BLURRED VISION: 0
SPUTUM PRODUCTION: 0
TINGLING: 0
FEVER: 0
PALPITATIONS: 0
FOCAL WEAKNESS: 0
DIARRHEA: 0
ORTHOPNEA: 0
SHORTNESS OF BREATH: 1
DOUBLE VISION: 0
VOMITING: 0
PHOTOPHOBIA: 0
NAUSEA: 0

## 2023-08-06 ASSESSMENT — LIFESTYLE VARIABLES
AVERAGE NUMBER OF DAYS PER WEEK YOU HAVE A DRINK CONTAINING ALCOHOL: 0
SUBSTANCE_ABUSE: 0
EVER HAD A DRINK FIRST THING IN THE MORNING TO STEADY YOUR NERVES TO GET RID OF A HANGOVER: NO
CONSUMPTION TOTAL: NEGATIVE
ON A TYPICAL DAY WHEN YOU DRINK ALCOHOL HOW MANY DRINKS DO YOU HAVE: 0
HAVE YOU EVER FELT YOU SHOULD CUT DOWN ON YOUR DRINKING: NO
TOTAL SCORE: 0
ALCOHOL_USE: NO
TOTAL SCORE: 0
TOTAL SCORE: 0
HAVE PEOPLE ANNOYED YOU BY CRITICIZING YOUR DRINKING: NO
EVER FELT BAD OR GUILTY ABOUT YOUR DRINKING: NO
HOW MANY TIMES IN THE PAST YEAR HAVE YOU HAD 5 OR MORE DRINKS IN A DAY: 0

## 2023-08-06 ASSESSMENT — COGNITIVE AND FUNCTIONAL STATUS - GENERAL
DAILY ACTIVITIY SCORE: 24
MOBILITY SCORE: 24
SUGGESTED CMS G CODE MODIFIER MOBILITY: CH
SUGGESTED CMS G CODE MODIFIER DAILY ACTIVITY: CH

## 2023-08-06 ASSESSMENT — FIBROSIS 4 INDEX
FIB4 SCORE: 10.49
FIB4 SCORE: 1.07

## 2023-08-06 NOTE — ASSESSMENT & PLAN NOTE
I am holding his blood pressure medication I started him on IV hydralazine 10 mg every 4 hours as needed with parameters.  Plan is to resume his outpatient blood pressure medication.  GI requested to hold his ACE inhibitor and statin.  Plan is to admit him to telemetry.

## 2023-08-06 NOTE — PROGRESS NOTES
Brief GI note:    Consult received for evaluation of suspected acute hepatitis B.     Additional labs ordered:  -Hep B DNA  -Hep Be ang  -Hep Be ant  -Hep  SAB  -Urine tox  -Acetaminophen level  -Salicylate level    Full consult and recommendations to follow pending lab results.    ..RABIA Enamorado.

## 2023-08-06 NOTE — ED TRIAGE NOTES
Kaiden Quiroz.  63 y.o.  Male  Chief Complaint   Patient presents with    Near Syncopal     Had a near syncopal episode at the store a few days ago, felt better after sitting & eating a snack. BG checked & was normal.   Since this episode, pt has had several episodes of feeling lightheaded. Also c/o dark urine.     Shortness of Breath     Pt short of breath at times along with feeling presyncopal. Checked own sats & level was 82% - called MD & told to come to ED. No cough/cold sx.      Patient educated on triage process, to alert staff if any changes in condition.    Labs & EKG ordered. Hx stroke with mild cognitive & speech deficits per pt.

## 2023-08-06 NOTE — ASSESSMENT & PLAN NOTE
He found to have significant elevated liver enzymes  I reviewed ultrasound right upper quadrant pain  His elevated liver enzymes most likely secondary to hepatitis B infection.  Requested consultation with GI.

## 2023-08-06 NOTE — ED PROVIDER NOTES
ED Provider Note    CHIEF COMPLAINT  Chief Complaint   Patient presents with    Near Syncopal     Had a near syncopal episode at the store a few days ago, felt better after sitting & eating a snack. BG checked & was normal.   Since this episode, pt has had several episodes of feeling lightheaded. Also c/o dark urine.     Shortness of Breath     Pt short of breath at times along with feeling presyncopal. Checked own sats & level was 82% - called MD & told to come to ED. No cough/cold sx.            HPI/ROS  LIMITATION TO HISTORY   Select: : None  OUTSIDE HISTORIAN(S):  wife    Kaiden Quiroz is a 63 y.o. male who presents w/ CC of felt SOB.   Pt feeling lightheaded for past few days.   Patient denies vomiting or diarrhea.  Patient denies any hemoptysis.  Patient denies any lower extremity swelling.  Patient denies any cough or runny nose or sore throat.  Patient has been taking medication as prescribed.  Patient with positive lightheadedness.     PAST MEDICAL HISTORY   has a past medical history of Diabetes (HCC) and Hypertension.    SURGICAL HISTORY   has a past surgical history that includes upper gi endoscopy,diagnosis (N/A, 2023) and upper gi endoscopy,ctrl bleed (N/A, 2023).    FAMILY HISTORY  Family History   Problem Relation Age of Onset    Hypertension Mother     Diabetes Mother     Hypertension Father     Diabetes Father     Hypertension Sister     Stroke Neg Hx        SOCIAL HISTORY  Social History     Tobacco Use    Smoking status: Former     Packs/day: 0.50     Years: 46.00     Pack years: 23.00     Types: Cigarettes     Start date:      Quit date: 2023     Years since quittin.5    Smokeless tobacco: Never   Vaping Use    Vaping Use: Some days    Substances: Nicotine   Substance and Sexual Activity    Alcohol use: Yes     Alcohol/week: 25.2 oz     Types: 42 Cans of beer per week    Drug use: Yes     Types: Inhaled     Comment: MJ    Sexual activity: Not on file       CURRENT  "MEDICATIONS  Home Medications       Reviewed by Mariana Roth, PharmD (Pharmacist) on 08/06/23 at 1532  Med List Status: Complete     Medication Last Dose Status   acetaminophen (TYLENOL) 325 MG Tab 8/2/2023 Active   amLODIPine (NORVASC) 2.5 MG Tab 8/3/2023 Active   aspirin (ASA) 81 MG Chew Tab chewable tablet 8/5/2023 Active   atorvastatin (LIPITOR) 40 MG Tab 8/5/2023 Active   clopidogrel (PLAVIX) 75 MG Tab 8/5/2023 Active   gabapentin (NEURONTIN) 300 MG Cap 8/6/2023 Active   gabapentin (NEURONTIN) 300 MG Cap 8/4/2023 Active   lisinopril (PRINIVIL) 10 MG Tab 8/6/2023 Active   melatonin 3 MG Tab 8/5/2023 Active   terazosin (HYTRIN) 1 MG Cap 8/5/2023 Active                    ALLERGIES  No Known Allergies    PHYSICAL EXAM  VITAL SIGNS: /59   Pulse 62   Temp 36.9 °C (98.4 °F)   Resp 18   Ht 1.702 m (5' 7\")   Wt 99.8 kg (220 lb 0.3 oz)   SpO2 94%   BMI 34.46 kg/m²    Constitutional: Alert in no apparent distress.  HENT: No signs of trauma, Bilateral external ears normal, Nose normal.   Eyes: Pupils are equal and reactive, Conjunctiva normal, Non-icteric.   Neck: Normal range of motion, No tenderness, Supple, No stridor.   Lymphatic: No lymphadenopathy noted.   Cardiovascular: Regular rate and rhythm, no murmurs.   Thorax & Lungs: Normal breath sounds, No respiratory distress, No wheezing, No chest tenderness.   Abdomen: Bowel sounds normal, Soft, No tenderness, No peritoneal signs, No masses, No pulsatile masses.   Skin: Warm, Dry, No erythema, No rash.   Back: No bony tenderness, No CVA tenderness.   Extremities: Intact distal pulses, No edema, No tenderness, No cyanosis.  Musculoskeletal: Good range of motion in all major joints. No tenderness to palpation or major deformities noted.   Neurologic: Alert , Normal motor function, Normal sensory function, No focal deficits noted.   Psychiatric: Affect normal, Judgment normal, Mood normal.      DIAGNOSTIC STUDIES / PROCEDURES  EKG  I have independently " interpreted this EKG   Report   Date Value Ref Range Status   2023       [facility] Emergency Dept.    Test Date:  2023  Pt Name:    JUANITA PRICE               Department: ER  MRN:        [id number]                      Room:       T717  Gender:     Male                         Technician: [id number]  :        1960                   Requested By:ER TRIAGE PROTOCOL  Order #:    [id number]                    Reading MD: Gil Camarena MD    Measurements  Intervals                                Axis  Rate:       69                           P:          7  IN:         191                          QRS:        -42  QRSD:       115                          T:          58  QT:         416  QTc:        446    Interpretive Statements  Sinus rhythm  Incomplete RBBB and LAFB  Consider anterior infarct  Compared to ECG 2023 08:35:19  Left anterior fascicular block now present  Incomplete right bundle-branch block now present  Right bundle-branch block now present    Electronically Signed On 2023 19:46:11 PDT by Gil Camarena MD                LABS  Labs Reviewed   CBC WITH DIFFERENTIAL - Abnormal; Notable for the following components:       Result Value    WBC 4.1 (*)     Lymphs (Absolute) 0.93 (*)     All other components within normal limits    Narrative:     Biotin intake of greater than 5 mg per day may interfere with  troponin levels, causing false low values.   COMP METABOLIC PANEL - Abnormal; Notable for the following components:    Sodium 134 (*)     Glucose 145 (*)     AST(SGOT) 1375 (*)     ALT(SGPT) 2313 (*)     Alkaline Phosphatase 263 (*)     Total Bilirubin 4.4 (*)     All other components within normal limits    Narrative:     Biotin intake of greater than 5 mg per day may interfere with  troponin levels, causing false low values.   URINALYSIS - Abnormal; Notable for the following components:    Character Cloudy (*)     Bilirubin Small (*)     Leukocyte Esterase Large  (*)     Occult Blood Trace (*)     All other components within normal limits   HEPATITIS PANEL ACUTE(4 COMPONENTS) - Abnormal; Notable for the following components:    Hepatitis B Surface Antigen Reactive (*)     Hepatitis B Cors Ab,IgM Reactive (*)     All other components within normal limits    Narrative:     Biotin intake of greater than 5 mg per day may interfere with  troponin levels, causing false low values.   ACETAMINOPHEN - Abnormal; Notable for the following components:    Acetaminophen -Tylenol <5.0 (*)     All other components within normal limits   SALICYLATE - Abnormal; Notable for the following components:    Salicylates, Quant. <1.0 (*)     All other components within normal limits   URINE MICROSCOPIC (W/UA) - Abnormal; Notable for the following components:    WBC Packed (*)     RBC 2-5 (*)     Bacteria Many (*)     Hyaline Cast 3-5 (*)     Granular Casts 6-10 (*)     All other components within normal limits   TROPONIN    Narrative:     Biotin intake of greater than 5 mg per day may interfere with  troponin levels, causing false low values.   TROPONIN    Narrative:     Biotin intake of greater than 5 mg per day may interfere with  troponin levels, causing false low values.   COV-2, FLU A/B, AND RSV BY PCR ([employer])   D-DIMER    Narrative:     Biotin intake of greater than 5 mg per day may interfere with  troponin levels, causing false low values.   ESTIMATED GFR    Narrative:     Biotin intake of greater than 5 mg per day may interfere with  troponin levels, causing false low values.   LIPASE    Narrative:     Biotin intake of greater than 5 mg per day may interfere with  troponin levels, causing false low values.   PROTHROMBIN TIME    Narrative:     Biotin intake of greater than 5 mg per day may interfere with  troponin levels, causing false low values.   HEP B SURFACE AB   URINE DRUG SCREEN   HEP B SURFACE  CONF    Narrative:     Biotin intake of greater than 5 mg per day may interfere  with  troponin levels, causing false low values.   HBV PCR QUANT   HEP BE ANTIBODY   HEP BE ANTIGEN        RADIOLOGY  I have independently interpreted the diagnostic imaging associated with this visit and am waiting the final reading from the radiologist.   My preliminary interpretation is as follows: no mass on US  Radiologist interpretation:   US-RUQ   Final Result      1.  Contracted gallbladder. No evidence of gallstone or biliary ductal dilatation.      2.  The gallbladder wall likely related to contracted gallbladder.      3.  The liver is echogenic consistent with fatty change versus hepatocellular dysfunction.      DX-CHEST-PORTABLE (1 VIEW)   Final Result      Mild cardiomegaly.            COURSE & MEDICAL DECISION MAKING        INITIAL ASSESSMENT, COURSE AND PLAN  Care Narrative: Patient with acute respiratory failure with hypoxia.  No evidence of pneumonia  Placed on 2 L, negative D-dimer unlikely PE    Patient also with significant elevation in LFTs  Patient found to have hepatitis B  Plan for admission and serial LFTs along with oxygen administration        DISPOSITION AND DISCUSSIONS  I have discussed management of the patient with the following physicians and TOSHIA's:  Philip    FINAL DIAGNOSIS  1. Viral hepatitis B acute    2. Acute respiratory failure with hypoxia (HCC)           Electronically signed by: Gil Camarena M.D., 8/6/2023 12:27 PM

## 2023-08-06 NOTE — ASSESSMENT & PLAN NOTE
Patient found to have acute respiratory failure with hypoxia he is requiring supplemental oxygen.  He found to have ox saturation on room air 88% and at home he found to have oxygen saturation in 80s.  Incentive spirometry.  Admitted telemetry for close monitoring.

## 2023-08-06 NOTE — ASSESSMENT & PLAN NOTE
"Patient found to have acute hepatitis B infection.  He was found to significant elevated bilirubin and liver enzymes  The ultrasound right upper quadrant did not show any acute abnormalities.  AST/ALT downtrending however total bilirubin has increased during hospitalization  GI consult, recommendations appreciated. Per GI \"He does not need treatment at this time. His INR is normal. If bilirubin would remain elevated after 4 weeks or inr rise, he may be candidate for treatment. Urgent referral to OP GI placed. \"  Monitor renal function with CMP's   Holding all hepatotoxic medication including blood pressure medication that can potentially cause liver damage  I discussed plan of care with patient's wife regarding precaution for hepatitis B and she expressed understanding.    "

## 2023-08-06 NOTE — ASSESSMENT & PLAN NOTE
I discussed CODE STATUS with patient and his wife at the bedside.  Patient's wife reported that they have advance directive at home.  I explained them the importance of confirming CODE STATUS in the hospital and after discussion patient requested to be full code as per patient request I placed full code orders.    Time spent 16 minutes

## 2023-08-06 NOTE — ASSESSMENT & PLAN NOTE
October 20, 2022     Brandyn Little, [address] [address]    Patient: Alejandra Green   YOB: 1981   Date of Visit: 10/20/2022       Dear Dr Duarte Hines: Thank you for referring Nuzhat Kong to me for evaluation  Below are my notes for this consultation  If you have questions, please do not hesitate to call me  I look forward to following your patient along with you  Sincerely,        Sonido Rodrigez DPM        CC: No Recipients  Martha Paula  10/20/2022 11:40 AM  Signed  Assessment/Plan:    I personally reviewed x-rays of the left foot  They reveal metatarsus primus elevatus  No dorsal spur present 1st metatarsal head  Explained the patient that she has pain in the left great toe joint due to an elevation of the 1st metatarsal ray  Discussed treatment options  Cortisone injection could provide temporary relief but also atrophy at the site of injection could prove problematic in the future  In order to correct this disorder, recommended [facility] bunionectomy left foot  This is a osteotomy that should plantar flex the 1st metatarsal head  Patient desires the surgery as soon as possible  The procedure was explained including pre and postoperative course, risks and possible complications  The consent form was signed  Procedure has been scheduled for December 16th at [facility]  No problem-specific Assessment & Plan notes found for this encounter  Diagnoses and all orders for this visit:    Pain in joint of left foot  -     XR foot 2 vw left; Future          Subjective:      Patient ID: Alejandra Green is a 37 y o  female  HPI     Patient, a 60-year-old female in apparent good health presents with pain in her left great toe joint  Patient notes a bone prominence on the top of the left 1st metatarsal head  Pressure on this prominence causes pain along with radiation towards the left great toe    At times, the left Patient found to have BMI of 34.40  He reported that he has been eating vegetables and trying to eat healthy  Lifestyle modifications  Outpatient follow-up.   great toe feels numb  Patient denies trauma to this joint  The pain began in July of 2022  The pain is intermittent but has been intensifying  Patient tried meloxicam to no avail  The following portions of the patient's history were reviewed and updated as appropriate: allergies, current medications, past family history, past medical history, past social history, past surgical history and problem list     Review of Systems   Gastrointestinal:        Irritable bowel syndrome   Musculoskeletal: Positive for arthralgias  Objective:      /80   Pulse 64   Ht 5' 4" (1 626 m)   Wt 73 4 kg (161 lb 12 8 oz)   BMI 27 77 kg/m²          Physical Exam  Constitutional:       Appearance: Normal appearance  Cardiovascular:      Pulses: Normal pulses  Musculoskeletal:         General: Tenderness and deformity present  Comments: Dorsal bone prominence noted in weight-bearing left 1st metatarsal head  Direct pain on the prominence causes pain  Range of motion left 1st MPJ is within normal limits  There is pain with plantar flexion  Skin:     General: Skin is warm  Neurological:      General: No focal deficit present  Mental Status: She is oriented to person, place, and time

## 2023-08-06 NOTE — ASSESSMENT & PLAN NOTE
He found to have significantly elevated bilirubin of 4.4 and it was normal in May of this year.  Elevated bilirubin most likely secondary to hepatitis B infection.  Ordered CMP for tomorrow.

## 2023-08-06 NOTE — ASSESSMENT & PLAN NOTE
He has been having symptoms of dizziness that is associated with hypoxia.  Due to his ongoing symptoms of dizziness and history of stroke plan is to admit him to telemetry.  Discussed plan of care with patient and his wife at the bedside.

## 2023-08-06 NOTE — ED NOTES
Med rec completed via patient and S.O. Allergies reviewed with pt. Been holding amlodipine d/t SBP <110.

## 2023-08-06 NOTE — H&P
Hospital Medicine History & Physical Note    Date of Service  8/6/2023    Primary Care Physician  Pcp Pt States None    Consultants  GI    Specialist Names: MARIN Enamorado    Code Status  Full Code    Chief Complaint  Chief Complaint   Patient presents with    Near Syncopal     Had a near syncopal episode at the store a few days ago, felt better after sitting & eating a snack. BG checked & was normal.   Since this episode, pt has had several episodes of feeling lightheaded. Also c/o dark urine.     Shortness of Breath     Pt short of breath at times along with feeling presyncopal. Checked own sats & level was 82% - called MD & told to come to ED. No cough/cold sx.        History of Presenting Illness    Kaiden Quiroz is a 63 y.o. male with past medical history of stroke, hypertension and hyperlipidemia who presented to the hospital on 8/6/2023 with dizziness and shortness of breath.  Patient's wife at the bedside reported that they went to grocery shopping and he started having dizziness and he ate peanut butter and it helped in his symptoms.  She checked her blood glucose and he found to have normal blood glucose.  Next day he was not feeling well and he was having episodes of brain fogginess and patient's wife checked pulse ox and that showed oxygen saturation in 80s.  She reported that he has been taking his medication as prescribed.  He has been having symptoms of dizziness.  He recently noticed change in his urine color.  There is no specific aggravating or elevating factors.  I know him from his prior hospitalization and at that time he was admitted for acute ischemic right middle cerebral artery stroke.  Patient denies any history of IV drug use, blood transfusion and he is in monogamous relationship with his wife.  He denies complaint of nausea, vomiting, diarrhea and constipation.    ER course: Patient found to have significantly elevated liver enzymes and he found to be positive with  hepatitis B infection.  He underwent ultrasound right upper quadrant that did not show signs of acute cholecystitis or biliary obstruction.  Requested consultation with gastroenterology.    I discussed about this admission with ER physician Dr. Camarena.    I discussed the plan of care with patient and family.    Review of Systems  Review of Systems   Constitutional:  Positive for malaise/fatigue. Negative for chills, fever and weight loss.   HENT:  Negative for hearing loss and tinnitus.    Eyes:  Negative for blurred vision, double vision, photophobia and pain.   Respiratory:  Positive for shortness of breath. Negative for cough and sputum production.    Cardiovascular:  Negative for chest pain, palpitations, orthopnea and leg swelling.   Gastrointestinal:  Negative for abdominal pain, constipation, diarrhea, nausea and vomiting.   Genitourinary:  Negative for dysuria, frequency and urgency.   Musculoskeletal:  Negative for back pain, joint pain, myalgias and neck pain.   Skin:  Negative for rash.   Neurological:  Positive for dizziness. Negative for tingling, tremors, sensory change, speech change, focal weakness and headaches.   Psychiatric/Behavioral:  Negative for hallucinations and substance abuse.    All other systems reviewed and are negative.      Past Medical History   has a past medical history of Diabetes (HCC) and Hypertension.    Surgical History   has a past surgical history that includes pr upper gi endoscopy,diagnosis (N/A, 2/13/2023) and pr upper gi endoscopy,ctrl bleed (N/A, 2/13/2023).     Family History  family history includes Diabetes in his father and mother; Hypertension in his father, mother, and sister.   Family history reviewed with patient. There is no family history that is pertinent to the chief complaint.     Social History   reports that he quit smoking about 6 months ago. His smoking use included cigarettes. He started smoking about 47 years ago. He has a 23.00 pack-year smoking  history. He has never used smokeless tobacco. He reports current alcohol use of about 25.2 oz of alcohol per week. He reports current drug use. Drug: Inhaled.    Allergies  No Known Allergies    Medications  Prior to Admission Medications   Prescriptions Last Dose Informant Patient Reported? Taking?   acetaminophen (TYLENOL) 325 MG Tab 8/2/2023 at PM Significant Other, Patient No No   Sig: Take 2 Tablets by mouth every 6 hours as needed for Mild Pain, Moderate Pain or Fever.   amLODIPine (NORVASC) 2.5 MG Tab 8/3/2023 at AM Significant Other, Patient Yes Yes   Sig: Take 2.5 mg by mouth every day.   aspirin (ASA) 81 MG Chew Tab chewable tablet 8/5/2023 at PM Significant Other, Patient No No   Sig: Chew 1 Tablet every day.   atorvastatin (LIPITOR) 40 MG Tab 8/5/2023 at PM Significant Other, Patient Yes Yes   Sig: Take 40 mg by mouth every evening.   clopidogrel (PLAVIX) 75 MG Tab 8/5/2023 at PM Significant Other, Patient No No   Sig: Take 1 Tablet by mouth every day.   gabapentin (NEURONTIN) 300 MG Cap 8/6/2023 at AM Significant Other, Patient Yes Yes   Sig: Take 300-600 mg by mouth 2 times a day. 300mg every morning 600mg every night at bedtime   lisinopril (PRINIVIL) 10 MG Tab 8/6/2023 at AM Significant Other, Patient No No   Sig: Take 2 Tablets by mouth every day.   melatonin 3 MG Tab 8/5/2023 Significant Other, Patient Yes Yes   Sig: Take 6 mg by mouth at bedtime.   terazosin (HYTRIN) 1 MG Cap 8/5/2023 at PM Significant Other, Patient No No   Sig: Take 1 Capsule by mouth every evening.      Facility-Administered Medications: None       Physical Exam  Temp:  [37.1 °C (98.8 °F)] 37.1 °C (98.8 °F)  Pulse:  [52-72] 69  Resp:  [12-20] 18  BP: ()/(50-72) 111/67  SpO2:  [88 %-96 %] 94 %  Blood Pressure: 111/67   Temperature: 37.1 °C (98.8 °F)   Pulse: 69   Respiration: 18   Pulse Oximetry: 94 %       Physical Exam  Vitals reviewed.   Constitutional:       General: He is not in acute distress.     Appearance: He is  obese.   HENT:      Head: Normocephalic and atraumatic. No contusion.      Right Ear: External ear normal.      Left Ear: External ear normal.      Nose: Nose normal.      Comments: Oxygen nasal cannula     Mouth/Throat:      Mouth: Mucous membranes are dry.      Pharynx: No oropharyngeal exudate.   Eyes:      General:         Right eye: No discharge.         Left eye: No discharge.      Pupils: Pupils are equal, round, and reactive to light.   Cardiovascular:      Rate and Rhythm: Normal rate and regular rhythm.      Heart sounds: No murmur heard.     No friction rub. No gallop.   Pulmonary:      Effort: Pulmonary effort is normal.      Breath sounds: No wheezing or rhonchi.   Abdominal:      General: Bowel sounds are normal. There is no distension.      Palpations: Abdomen is soft.      Tenderness: There is no abdominal tenderness. There is no rebound.      Comments: No right upper quadrant tenderness   Musculoskeletal:         General: No swelling or tenderness. Normal range of motion.      Cervical back: No rigidity. No muscular tenderness.   Skin:     General: Skin is warm and dry.      Coloration: Skin is not jaundiced.   Neurological:      General: No focal deficit present.      Mental Status: He is alert and oriented to person, place, and time.      Cranial Nerves: No cranial nerve deficit.      Sensory: No sensory deficit.      Comments: He is moving all his extremities   Psychiatric:         Mood and Affect: Mood normal.         Laboratory:  Recent Labs     08/06/23  1230   WBC 4.1*   RBC 4.85   HEMOGLOBIN 15.2   HEMATOCRIT 45.3   MCV 93.4   MCH 31.3   MCHC 33.6   RDW 48.0   PLATELETCT 169   MPV 10.4     Recent Labs     08/06/23  1230   SODIUM 134*   POTASSIUM 4.1   CHLORIDE 101   CO2 20   GLUCOSE 145*   BUN 22   CREATININE 0.92   CALCIUM 9.8     Recent Labs     08/06/23  1230   ALTSGPT 2313*   ASTSGOT 1375*   ALKPHOSPHAT 263*   TBILIRUBIN 4.4*   LIPASE 40   GLUCOSE 145*     Recent Labs     08/06/23  1230    INR 1.04     No results for input(s): NTPROBNP in the last 72 hours.      Recent Labs     08/06/23  1230 08/06/23  1420   TROPONINT 17 18       Imaging:  [id number]   Final Result      1.  Contracted gallbladder. No evidence of gallstone or biliary ductal dilatation.      2.  The gallbladder wall likely related to contracted gallbladder.      3.  The liver is echogenic consistent with fatty change versus hepatocellular dysfunction.      DX-CHEST-PORTABLE (1 VIEW)   Final Result      Mild cardiomegaly.          X-Ray:  My impression is: Chest x-ray on my interpretation did not show any acute abnormalities.    Assessment/Plan:  Justification for Admission Status  I anticipate this patient will require at least two midnights for appropriate medical management, necessitating inpatient admission because for possible treatment for acute hepatitis B infection as he found to have significant evaded liver enzymes and jaundice.  He also found to have acute respiratory failure with hypoxia as he has been having brain fogginess and ox saturation in 80s.    Patient will need a Telemetry bed on MEDICAL service .  The need is secondary to acute hepatitis B infection.    * Acute hepatitis- (present on admission)  Assessment & Plan  Patient found to have acute hepatitis B infection.  He found to significant elevated bilirubin and liver enzymes  I reviewed ultrasound right upper quadrant that did not show any acute abnormalities.  Patient may need treatment for his acute hepatitis B due to his significant elevated bilirubin although his INR came back within normal limits.  I requested consultation with GI.  I ordered CMP for tomorrow  Holding all hepatotoxic medication including blood pressure medication that can potentially cause liver damage  I discussed plan of care with patient's wife regarding precaution for hepatitis B and she expressed understanding.      Elevated bilirubin  Assessment & Plan  He found to have significantly  elevated bilirubin of 4.4 and it was normal in May of this year.  Elevated bilirubin most likely secondary to hepatitis B infection.  Ordered CMP for tomorrow.    Elevated liver enzymes  Assessment & Plan  He found to have significant elevated liver enzymes  I reviewed ultrasound right upper quadrant pain  His elevated liver enzymes most likely secondary to hepatitis B infection.  Requested consultation with GI.    Advance care planning  Assessment & Plan  I discussed CODE STATUS with patient and his wife at the bedside.  Patient's wife reported that they have advance directive at home.  I explained them the importance of confirming CODE STATUS in the hospital and after discussion patient requested to be full code as per patient request I placed full code orders.    Time spent 16 minutes    Dizziness  Assessment & Plan  He has been having symptoms of dizziness that is associated with hypoxia.  Due to his ongoing symptoms of dizziness and history of stroke plan is to admit him to telemetry.  Discussed plan of care with patient and his wife at the bedside.    Internal carotid artery stent present- (present on admission)  Assessment & Plan  I am continuing his outpatient dual antiplatelet therapy.  He denies bleeding.    Acute respiratory failure with hypoxia (HCC)- (present on admission)  Assessment & Plan  Patient found to have acute respiratory failure with hypoxia he is requiring supplemental oxygen.  He found to have ox saturation on room air 88% and at home he found to have oxygen saturation in 80s.  I have ordered incentive spirometry.  Admitted telemetry for close monitoring.      Class 1 obesity in adult  Assessment & Plan  Patient found to have BMI of 34.40  He reported that he has been eating vegetables and trying to eat healthy  Lifestyle modifications and outpatient follow-up.    Primary hypertension- (present on admission)  Assessment & Plan  I am holding his blood pressure medication I started him on IV  hydralazine 10 mg every 4 hours as needed with parameters.  Plan is to resume his outpatient blood pressure medication.  GI requested to hold his ACE inhibitor and statin.  Plan is to admit him to telemetry.      I discussed about this admission with ER physician Dr. Camarena.    I discussed plan of care with GI team regarding patient acute hepatitis B infection.    VTE prophylaxis: enoxaparin ppx

## 2023-08-07 LAB
ALBUMIN SERPL BCP-MCNC: 3.8 G/DL (ref 3.2–4.9)
ALBUMIN/GLOB SERPL: 1.4 G/DL
ALP SERPL-CCNC: 256 U/L (ref 30–99)
ALT SERPL-CCNC: 2055 U/L (ref 2–50)
ANION GAP SERPL CALC-SCNC: 11 MMOL/L (ref 7–16)
AST SERPL-CCNC: 1126 U/L (ref 12–45)
BASOPHILS # BLD AUTO: 0.8 % (ref 0–1.8)
BASOPHILS # BLD: 0.03 K/UL (ref 0–0.12)
BILIRUB SERPL-MCNC: 4.4 MG/DL (ref 0.1–1.5)
BUN SERPL-MCNC: 15 MG/DL (ref 8–22)
CALCIUM ALBUM COR SERPL-MCNC: 9.5 MG/DL (ref 8.5–10.5)
CALCIUM SERPL-MCNC: 9.3 MG/DL (ref 8.5–10.5)
CHLORIDE SERPL-SCNC: 103 MMOL/L (ref 96–112)
CO2 SERPL-SCNC: 21 MMOL/L (ref 20–33)
CREAT SERPL-MCNC: 0.72 MG/DL (ref 0.5–1.4)
EOSINOPHIL # BLD AUTO: 0.29 K/UL (ref 0–0.51)
EOSINOPHIL NFR BLD: 7.3 % (ref 0–6.9)
ERYTHROCYTE [DISTWIDTH] IN BLOOD BY AUTOMATED COUNT: 48.4 FL (ref 35.9–50)
GFR SERPLBLD CREATININE-BSD FMLA CKD-EPI: 103 ML/MIN/1.73 M 2
GLOBULIN SER CALC-MCNC: 2.8 G/DL (ref 1.9–3.5)
GLUCOSE SERPL-MCNC: 109 MG/DL (ref 65–99)
HBV CORE IGM SER QL: REACTIVE
HBV SURFACE AG SER QL: REACTIVE
HCT VFR BLD AUTO: 44.7 % (ref 42–52)
HGB BLD-MCNC: 15 G/DL (ref 14–18)
IMM GRANULOCYTES # BLD AUTO: 0.01 K/UL (ref 0–0.11)
IMM GRANULOCYTES NFR BLD AUTO: 0.3 % (ref 0–0.9)
INR PPP: 1.11 (ref 0.87–1.13)
LYMPHOCYTES # BLD AUTO: 1.1 K/UL (ref 1–4.8)
LYMPHOCYTES NFR BLD: 27.8 % (ref 22–41)
MAGNESIUM SERPL-MCNC: 1.7 MG/DL (ref 1.5–2.5)
MCH RBC QN AUTO: 31.3 PG (ref 27–33)
MCHC RBC AUTO-ENTMCNC: 33.6 G/DL (ref 32.3–36.5)
MCV RBC AUTO: 93.1 FL (ref 81.4–97.8)
MONOCYTES # BLD AUTO: 0.71 K/UL (ref 0–0.85)
MONOCYTES NFR BLD AUTO: 18 % (ref 0–13.4)
NEUTROPHILS # BLD AUTO: 1.81 K/UL (ref 1.82–7.42)
NEUTROPHILS NFR BLD: 45.8 % (ref 44–72)
NRBC # BLD AUTO: 0 K/UL
NRBC BLD-RTO: 0 /100 WBC (ref 0–0.2)
PLATELET # BLD AUTO: 161 K/UL (ref 164–446)
PMV BLD AUTO: 10.6 FL (ref 9–12.9)
POTASSIUM SERPL-SCNC: 3.9 MMOL/L (ref 3.6–5.5)
PROT SERPL-MCNC: 6.6 G/DL (ref 6–8.2)
PROTHROMBIN TIME: 14.2 SEC (ref 12–14.6)
RBC # BLD AUTO: 4.8 M/UL (ref 4.7–6.1)
SODIUM SERPL-SCNC: 135 MMOL/L (ref 135–145)
WBC # BLD AUTO: 4 K/UL (ref 4.8–10.8)

## 2023-08-07 PROCEDURE — A9270 NON-COVERED ITEM OR SERVICE: HCPCS

## 2023-08-07 PROCEDURE — 87340 HEPATITIS B SURFACE AG IA: CPT

## 2023-08-07 PROCEDURE — 85025 COMPLETE CBC W/AUTO DIFF WBC: CPT

## 2023-08-07 PROCEDURE — 99233 SBSQ HOSP IP/OBS HIGH 50: CPT | Mod: GC | Performed by: STUDENT IN AN ORGANIZED HEALTH CARE EDUCATION/TRAINING PROGRAM

## 2023-08-07 PROCEDURE — 770020 HCHG ROOM/CARE - TELE (206)

## 2023-08-07 PROCEDURE — 99232 SBSQ HOSP IP/OBS MODERATE 35: CPT | Performed by: SPECIALIST

## 2023-08-07 PROCEDURE — 97165 OT EVAL LOW COMPLEX 30 MIN: CPT

## 2023-08-07 PROCEDURE — 82105 ALPHA-FETOPROTEIN SERUM: CPT

## 2023-08-07 PROCEDURE — 700111 HCHG RX REV CODE 636 W/ 250 OVERRIDE (IP): Mod: JZ | Performed by: INTERNAL MEDICINE

## 2023-08-07 PROCEDURE — 83735 ASSAY OF MAGNESIUM: CPT

## 2023-08-07 PROCEDURE — 85610 PROTHROMBIN TIME: CPT

## 2023-08-07 PROCEDURE — 87341 HEP B SURFACE AG NEUTRLZJ IA: CPT

## 2023-08-07 PROCEDURE — 86705 HEP B CORE ANTIBODY IGM: CPT

## 2023-08-07 PROCEDURE — 700111 HCHG RX REV CODE 636 W/ 250 OVERRIDE (IP): Mod: JZ | Performed by: HOSPITALIST

## 2023-08-07 PROCEDURE — 700102 HCHG RX REV CODE 250 W/ 637 OVERRIDE(OP)

## 2023-08-07 PROCEDURE — 97161 PT EVAL LOW COMPLEX 20 MIN: CPT

## 2023-08-07 PROCEDURE — A9270 NON-COVERED ITEM OR SERVICE: HCPCS | Performed by: INTERNAL MEDICINE

## 2023-08-07 PROCEDURE — 97535 SELF CARE MNGMENT TRAINING: CPT

## 2023-08-07 PROCEDURE — 80053 COMPREHEN METABOLIC PANEL: CPT

## 2023-08-07 PROCEDURE — 36415 COLL VENOUS BLD VENIPUNCTURE: CPT

## 2023-08-07 PROCEDURE — 700102 HCHG RX REV CODE 250 W/ 637 OVERRIDE(OP): Performed by: INTERNAL MEDICINE

## 2023-08-07 RX ORDER — MAGNESIUM SULFATE HEPTAHYDRATE 40 MG/ML
2 INJECTION, SOLUTION INTRAVENOUS ONCE
Status: COMPLETED | OUTPATIENT
Start: 2023-08-07 | End: 2023-08-07

## 2023-08-07 RX ADMIN — ASPIRIN 81 MG 81 MG: 81 TABLET ORAL at 05:03

## 2023-08-07 RX ADMIN — CLOPIDOGREL BISULFATE 75 MG: 75 TABLET ORAL at 05:03

## 2023-08-07 RX ADMIN — Medication 6 MG: at 22:52

## 2023-08-07 RX ADMIN — MAGNESIUM SULFATE HEPTAHYDRATE 2 G: 2 INJECTION, SOLUTION INTRAVENOUS at 12:49

## 2023-08-07 RX ADMIN — SENNOSIDES AND DOCUSATE SODIUM 2 TABLET: 50; 8.6 TABLET ORAL at 05:03

## 2023-08-07 RX ADMIN — GABAPENTIN 600 MG: 300 CAPSULE ORAL at 21:41

## 2023-08-07 RX ADMIN — ENOXAPARIN SODIUM 40 MG: 100 INJECTION SUBCUTANEOUS at 18:05

## 2023-08-07 ASSESSMENT — GAIT ASSESSMENTS
ASSISTIVE DEVICE: 4 WHEEL WALKER
DISTANCE (FEET): 200
GAIT LEVEL OF ASSIST: SUPERVISED

## 2023-08-07 ASSESSMENT — COGNITIVE AND FUNCTIONAL STATUS - GENERAL
SUGGESTED CMS G CODE MODIFIER DAILY ACTIVITY: CJ
SUGGESTED CMS G CODE MODIFIER MOBILITY: CH
DAILY ACTIVITIY SCORE: 21
PERSONAL GROOMING: A LITTLE
DRESSING REGULAR LOWER BODY CLOTHING: A LITTLE
MOBILITY SCORE: 24
HELP NEEDED FOR BATHING: A LITTLE

## 2023-08-07 ASSESSMENT — ACTIVITIES OF DAILY LIVING (ADL): TOILETING: INDEPENDENT

## 2023-08-07 ASSESSMENT — FIBROSIS 4 INDEX: FIB4 SCORE: 9.57

## 2023-08-07 NOTE — THERAPY
Occupational Therapy   Initial Evaluation     Patient Name: Kaiden Quiroz  Age:  63 y.o., Sex:  male  Medical Record #: [id number]  Today's Date: 8/7/2023     Precautions  Precautions: Fall Risk  Comments: Hx of R MCA CVA    Assessment  Patient is 63 y.o. male admitted for near syncope, acute hepatitis B, elevated liver enzymes/bilirubin, and acute respiratory failure. PMHx of R MCA CVA, w/ residual L sided deficits, and HTN. Completed ADLs/txfs with SPV and mod-max v/cs for safety and functional ambulation w/4ww and v/cs for safety; appears to be near functional baseline. Reports wife is available to assist PRN 24/7; assists at baseline. Patient will not be actively followed for occupational therapy services at this time, however may be seen if requested by physician for 1 more visit within 30 days to address any discharge or equipment needs.     Plan    Occupational Therapy Initial Treatment Plan   Duration: Discharge Needs Only    DC Equipment Recommendations: Grab Bar(s) by Toilet  Discharge Recommendations: Anticipate that the patient will have no further occupational therapy needs after discharge from the hospital (Pt reports supposed to be getting into OP PT)     Objective     08/07/23 0920   Prior Living Situation   Prior Services Skilled Home Health Services   Housing / Facility 1 Story Apartment / Condo   Steps Into Home 0   Bathroom Set up Bathtub / Shower Combination;Shower Chair;Grab Bars   Equipment Owned 4-Wheel Walker;Tub / Shower Seat;Grab Bar(s) In Tub / Shower   Lives with - Patient's Self Care Capacity Spouse   Comments Reports wife is available to assist PRN 24/7; assists at baseline   Prior Level of ADL Function   Self Feeding Independent   Grooming / Hygiene Independent   Bathing Requires Assist   Dressing Requires Assist   Toileting Independent   Prior Level of IADL Function   Medication Management Requires Assist   Laundry Requires Assist   Kitchen Mobility Requires Assist   Finances  Requires Assist   Home Management Requires Assist   Shopping Requires Assist   Prior Level Of Mobility Supervision With Device in Home;Supervision With Device in Community   Driving / Transportation Relatives / Others Provide Transportation   History of Falls   History of Falls Yes   Date of Last Fall   (recently when stepped of scale too fast)   Precautions   Precautions Fall Risk   Comments Hx of R MCA CVA   Vitals   O2 Delivery Device None - Room Air   Pain 0 - 10 Group   Therapist Pain Assessment Post Activity Pain Same as Prior to Activity;During Activity;Nurse Notified;0   Cognition    Cognition / Consciousness X   Speech/ Communication Slurred  (intermittently difficult to understand)   Level of Consciousness Alert   Safety Awareness Impaired   Attention Impaired   Sequencing Impaired   Comments very pleasant and cooperative; tangential req redirection. Req v/cs for safety with sequencing and remembering to take 4ww and put on brakes during txfs   Passive ROM Upper Body   Passive ROM Upper Body WDL   Active ROM Upper Body   Active ROM Upper Body  X   Comments decreased at L shoulder; residual from CVA   Strength Upper Body   Upper Body Strength  X   Comments L slightly weaker than R; still functional for ADLs   Sensation Upper Body   Upper Extremity Sensation  WDL   Coordination Upper Body   Coordination X   Comments slightly impaired on L side; able to complete tasks with extra time   Balance Assessment   Sitting Balance (Static) Fair +   Sitting Balance (Dynamic) Fair   Standing Balance (Static) Fair   Standing Balance (Dynamic) Fair   Weight Shift Sitting Fair   Weight Shift Standing Fair   Comments w/4ww   Bed Mobility    Supine to Sit Supervised   Sit to Supine   (left in chair)   Scooting Supervised   Comments HOB flat   ADL Assessment   Eating Supervision   Grooming   (declined need)   Lower Body Dressing Minimal Assist  (baseline; wife assists d/t needs for extra time)   Toileting Supervision    Functional Mobility   Sit to Stand Supervised   Bed, Chair, Wheelchair Transfer Supervised   Toilet Transfers Contact Guard Assist  (max v/cs for sequencing for safety with 4ww)   Transfer Method Stand Step   Mobility w/4ww in room and hallway   Edema / Skin Assessment   Edema / Skin  Not Assessed   Activity Tolerance   Comments limited by fatigue   Education Group   Education Provided Role of Occupational Therapist;Pathology of bedrest   Role of Occupational Therapist Patient Response Patient;Acceptance;Explanation;Verbal Demonstration;Reinforcement Needed   Pathology of Bedrest Patient Response Patient;Acceptance;Explanation;Verbal Demonstration;Reinforcement Needed

## 2023-08-07 NOTE — CARE PLAN
The patient is Stable - Low risk of patient condition declining or worsening         Progress made toward(s) clinical / shift goals:  Pt resting comfortably. Vitals within normal limits.    Patient is not progressing towards the following goals:

## 2023-08-07 NOTE — CONSULTS
GASTROENTEROLOGY CONSULTATION    PATIENT NAME: Kaiden Quiroz  : 1960  CSN: [id number]  MRN:  [id number]     CONSULTATION DATE:  2023    PRIMARY CARE PROVIDER:  Pcp Pt States None      REASON FOR CONSULT:  Elevated LFT's    HISTORY OF PRESENT ILLNESS:  Kaiden Quiroz is a 63 y.o. male with past medical history of stroke  hypertension and hyperlipidemia who presented to the hospital on 2023 with dizziness and shortness of breath.  Patient's wife at the bedside reported that they went to grocery shopping and he started having dizziness and he ate peanut butter and it helped in his symptoms.  He took a nap. She checked her blood glucose and he found to have normal blood glucose.  Next day he was not feeling well and he was having episodes of brain fogginess and patient's wife checked pulse ox and that showed oxygen saturation in 80s.  They called doctor and they told him to go to ER. She reported that he has been taking his medication as prescribed.   He recently noticed change in his urine color.  There is no specific aggravating or elevating factors. Patient denies any history of IV drug use, blood transfusion and he is in monogamous relationship with his wife.  He also denies tattoos, sharing toothbrush or sharing razors. The wife states that she has had blood transfusions when she was an infant. He used to drink heavily and smoke but has not done either since the stroke. He was taking metformin  but stopped. He denies complaint of nausea, vomiting, diarrhea and constipation. When he came into ER he was found to have markedly high LFT's and acute heb B surf anti body and Igm core were positive. He underwent ultrasound  that did not show abnormalities or PVT      PAST MEDICAL HISTORY:  Past Medical History:   Diagnosis Date    Diabetes (HCC)     Hypertension        PAST SURGICAL HISTORY:  Past Surgical History:   Procedure Laterality Date    [place] UPPER GI ENDOSCOPY,DIAGNOSIS N/A 2023     Procedure: GASTROSCOPY;  Surgeon: Dagmar Allan M.D.;  Location: SURGERY SAME DAY [facility];  Service: Gastroenterology    [place] UPPER GI ENDOSCOPY,CTRL BLEED N/A 2/13/2023    Procedure: GASTROSCOPY, WITH ARGON PLASMA COAGULATION;  Surgeon: Dagmar Allan M.D.;  Location: SURGERY SAME DAY [facility];  Service: Gastroenterology        CURRENT MEDS:  Current Facility-Administered Medications   Medication Dose Route Frequency Provider Last Rate Last Admin    senna-docusate (Pericolace Or Senokot S) 8.6-50 MG per tablet 2 Tablet  2 Tablet Oral BID Marsha Palacios M.D.   2 Tablet at 08/07/23 0503    And    polyethylene glycol/lytes (Miralax) PACKET 1 Packet  1 Packet Oral QDAY PRN Marsha Palacios M.D.        And    magnesium hydroxide (Milk Of Magnesia) suspension 30 mL  30 mL Oral QDAY PRN Marsha Palacios M.D.        And    bisacodyl (Dulcolax) suppository 10 mg  10 mg Rectal QDAY PRN Marsha Palacios M.D.        NS infusion   Intravenous Continuous Marsha Palacios M.D. 83 mL/hr at 08/06/23 1750 New Bag at 08/06/23 1750    enoxaparin (Lovenox) inj 40 mg  40 mg Subcutaneous DAILY AT 1800 Marsha Palacios M.D.   40 mg at 08/06/23 1751    hydrALAZINE (Apresoline) injection 10 mg  10 mg Intravenous Q4HRS PRN Marsha Palacios M.D.        aspirin (Asa) chewable tab 81 mg  81 mg Oral DAILY Marsha Palacios M.D.   81 mg at 08/07/23 0503    clopidogrel (Plavix) tablet 75 mg  75 mg Oral DAILY Marsha Palacios M.D.   75 mg at 08/07/23 0503    gabapentin (Neurontin) capsule 600 mg  600 mg Oral Q EVENING MADONNA Ashton.P.RTravisNTravis   600 mg at 08/06/23 2320        ALLERGIES:  No Known Allergies    SOCIAL HISTORY:  Social History     Socioeconomic History    Marital status:      Spouse name: Not on file    Number of children: Not on file    Years of education: Not on file    Highest education level: Not on file   Occupational History    Not on file   Tobacco Use     "Smoking status: Former     Packs/day: 0.50     Years: 46.00     Pack years: 23.00     Types: Cigarettes     Start date:      Quit date: 2023     Years since quittin.5    Smokeless tobacco: Never   Vaping Use    Vaping Use: Some days    Substances: Nicotine   Substance and Sexual Activity    Alcohol use: Yes     Alcohol/week: 25.2 oz     Types: 42 Cans of beer per week    Drug use: Yes     Types: Inhaled     Comment: MJ    Sexual activity: Not on file   Other Topics Concern    Not on file   Social History Narrative    Not on file     Social Determinants of Health     Financial Resource Strain: Not on file   Food Insecurity: Not on file   Transportation Needs: Not on file   Physical Activity: Not on file   Stress: Not on file   Social Connections: Not on file   Intimate Partner Violence: Not on file   Housing Stability: Not on file       FAMILY HISTORY:  Family History   Problem Relation Age of Onset    Hypertension Mother     Diabetes Mother     Hypertension Father     Diabetes Father     Hypertension Sister     Stroke Neg Hx         REVIEW OF SYSTEMS:  General ROS: Negative for - chills, fever, night sweats or weight loss.  HEENT ROS: Negative  Respiratory ROS: Negative for - cough or shortness of breath.  Cardiovascular ROS:  Negative for - chest pain or palpitations.  Gastrointestinal ROS: As per the history of present illness.  Genito-Urinary ROS: Negative  Musculoskeletal ROS: Negative.  Neurological ROS: Negative  Skin ROS: negative  Hematology ROS: negative  Endocrinology ROS: Negative        PHYSICAL EXAM:  VITALS: /82   Pulse 66   Temp 36.7 °C (98.1 °F) (Temporal)   Resp 18   Ht 1.702 m (5' 7\")   Wt 99.8 kg (220 lb 0.3 oz)   SpO2 96%   BMI 34.46 kg/m²   GEN:  Kaiden Quiroz is a 63 y.o. male in no acute distress.  HEENT: Mucous membranes pink and moist.  Sclera icteric.    NECK:    Neck supple without lymphadenopathy or thyromegaly.  LUNGS: Clear to auscultation " posteriorly.  HEART: Regular rate and rhythm. S1 and S2 normal. No murmurs, gallops  ABD:            + BS NT/ND -hsm  RECTAL: Not done at this time.  EXT:  Without cyanosis, deformity or pitting edema.  SKIN:  Pink, warm, dry.  NEURO: Grossly intact, A/OR.    LABS:  Recent Labs     08/06/23  1230 08/07/23  0118   WBC 4.1* 4.0*   MCV 93.4 93.1     Recent Labs     08/06/23  1230 08/07/23  0118   GLUCOSE 145* 109*   BUN 22 15   CO2 20 21     Lab Results   Component Value Date    INR 1.04 08/06/2023    INR 1.04 02/13/2023    INR 0.96 01/12/2023     No components found for: ALT, AST, GGT, ALKPHOS  No results found for: ESTELITAO      @[facility]([id number])@     @[facility]([id number])@       IMPRESSION/PLAN:  Elevated LFT's  Hep b surf antigen - positive  Hep b core igm positive    At this time we will rule out all etiologies of acute LFT elevation to this extreme, but most likely Hep B - acute - I have ordered repeat testing as can have false positive and further evaluation of Hep B.     If he does indeed have Hep B how he got it is a mystery. The only thing that is interesting is his wife had blood transfusions as an infant. He may also have reactivation. Again, I have ordered labs to assess these things.    I will also test wife    He does not need treatment at this time. His INR is normal. If bilirubin would remain elevated after 4 weeks or inr rise, he may be candidate for treatment.     We will follow closely    Of note patient states he had EGD in 2/23 and had ulcers. He did not have ulcers but he did have a treated AVM. He denies any GI bleeding at this time     Joseline Vargas M.D.  Gastroenterology

## 2023-08-07 NOTE — PROGRESS NOTES
Patient arrived to T7, A&Ox4, vitals WNL on RA. Denies pain. L sided weakness from past stroke. Ambulates with walker, standby assist, steady gait. Wife at bedside.

## 2023-08-07 NOTE — PROGRESS NOTES
[facility] Internal Medicine Daily Progress Note    This note is intended for the purposes of medical student education and feedback only.   Please refer to the documentation by this patient's assigned medical practitioner for details of care and plans.    Date of Service  8/7/2023    R Team: R IM White Team   Attending: Refugio Goldsmith M.d.  Senior Resident: Dr. Negrita Roberson  Intern:  TEX  Contact Number: [phone]    Chief Complaint  Kaiden Quiroz is a 63 y.o. male admitted 8/6/2023 with acute hypoxic respiratory failure and acute hepatitis B.     Hospital Course  Kaiden Quiroz is a 63 y.o. 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 He was accompanied by his wife who noted a near-syncopal episode a few days prior that had left him feeling lightheaded since then. Prior to arrival, patient's wife checked his oxygenation and found him to be hypoxic at 82%. Denies any recent viral symptoms.     In the ED, he was found to be afebrile with stable vitals after he was initiated on oxygen 2 LPM via NC. CBC WNL. Chemistry WNL. UDS negative. Troponin 17. AST elevated 1375. ALT elevated 2313. Alkaline phosphatase elevated 263. Total bilirubin elevated 4.4. PT/INR WNL. D-Dimer WNL. Hepatitis B surface antigen positive. Hepatitis B core IgM positive. Hepatitis A and C antibody assays negative. Viral panel negative. Patient denied any history of IV drug abuse, blood transfusions, and reported being in a monogamous relationship with his wife. CXR showed mild cardiomegaly. RUQ US showed an echogenic liver consistent with fatty liver disease without nodularity suggestive of cirrhosis or HCC. UA suggestive of UTI.     Patient was  admitted to telemetry for further monitoring of his ongoing dizziness given his stroke history. Antihypertensive medications held in favor of IV hydralazine 10 mg Q4H with holding parameters. GI consulted for treatment of his acute hepatitis B.     Interval Problem Update  - Patient was evaluated by GI who have ordered repeat hepatitis B confirmatory testing.   - Per GI, patient does not require active hepatitis B treatment at this time per his normal INR value.     Subjective  Patient today did not require any oxygenation but reports he did become short of breath overnight due to not having his CPAP. He continues to endorse intermittent dizziness with standing but reports this is improved from admission. Denies any fevers, chills, nausea, vomiting, abdominal pain, diarrhea, constipation, hemoptysis, blood in his stool, or melena. He reports he was previously drinking 6+ alcoholic beverages daily prior to his stroke but has quit since. Prior 40 PYH, reports having quit 6 months ago.     Consultants/Specialty  GI    Code Status  Full Code    Disposition  The patient is not medically cleared for discharge to home or a post-acute facility.        Review of Systems  ROS     Physical Exam  Temp:  [36.4 °C (97.5 °F)-36.9 °C (98.4 °F)] 36.5 °C (97.7 °F)  Pulse:  [54-80] 80  Resp:  [18] 18  BP: (116-132)/(64-82) 125/75  SpO2:  [92 %-96 %] 94 %    Physical Exam    Fluids    Intake/Output Summary (Last 24 hours) at 8/7/2023 1720  Last data filed at 8/7/2023 0904  Gross per 24 hour   Intake 588.12 ml   Output 1151 ml   Net -562.88 ml       Laboratory  Recent Labs     08/06/23  1230 08/07/23  0118   WBC 4.1* 4.0*   RBC 4.85 4.80   HEMOGLOBIN 15.2 15.0   HEMATOCRIT 45.3 44.7   MCV 93.4 93.1   MCH 31.3 31.3   MCHC 33.6 33.6   RDW 48.0 48.4   PLATELETCT 169 161*   MPV 10.4 10.6     Recent Labs     08/06/23  1230 08/07/23  0118   SODIUM 134* 135   POTASSIUM 4.1 3.9   CHLORIDE 101 103   CO2 20 21   GLUCOSE 145* 109*   BUN 22 15    CREATININE 0.92 0.72   CALCIUM 9.8 9.3     Recent Labs     08/06/23  1230 08/07/23  1139   INR 1.04 1.11               Imaging  US-RUQ   Final Result      1.  Contracted gallbladder. No evidence of gallstone or biliary ductal dilatation.      2.  The gallbladder wall likely related to contracted gallbladder.      3.  The liver is echogenic consistent with fatty change versus hepatocellular dysfunction.      DX-CHEST-PORTABLE (1 VIEW)   Final Result      Mild cardiomegaly.           Assessment/Plan  Problem Representation:  Kaiden Quiroz is a pleasant 63 y.o. 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 Patient presented to the ED after being found hypoxic at 82% at home PTA. Testing demonstrated elevated LFTs and total bilirubin. Hepatitis B surface antigen and core IgM positive. Total bilirubin elevated 4.4. PT/INR WNL. RUQ US demonstrated echogenic liver without nodularity. Admitted for further monitoring of his dizziness and dyspnea given his stroke history. He was evaluated by GI who do not recommend starting antiviral therapy at this time.     * Acute hepatitis- (present on admission)  Assessment & Plan  Severe transaminitis at admission with AST/ALT 1376/2313. Total bilirubin elevated 4.4. PT/INR WNL. Hepatitis B surface antigen and core IgM antibody testing positive. Negative for hepatitis A and C antibody assay. RUQ US demonstrated echogenicity consistent with fatty change vs. Hepatocellular dysfunction; no nodularity or lesions observed. AST/ALT trending down on recent labs. Total bilirubin unchanged. He does not have any risk factors of IV drug abuse or blood transfusions and is in a monogamous relationship with his wife.   - Patient was evaluated by GI who will repeat hepatitis B confirmatory  testing. Will also test the wife.   - Per GI, patient does not require treatment at this time given his normal INR. If total bilirubin remains elevated after 4 weeks or INR changes, then patient may become a candidate for treatment at that time.   - Continue holding all hepatotoxic medications including antihypertensive medications (ACE inhibitor) to mitigate hepatic damage.   - Will repeat daily chemistries for further monitoring.     Elevated bilirubin  Assessment & Plan  Total bilirubin elevated to 4.4 on admission. Prior value was normal at 0.7 in May 2023. Elevation is most likely secondary to his acute hepatitis B infection.   - Will continue to monitor with daily chemistries.     Elevated liver enzymes  Assessment & Plan  AST and ALT were significantly elevated on admission. RUQ US demonstrated an echogenic liver consistent with fatty liver changes. Repeat testing shows AST and ALT are trending down, now at 1126 and 2055, respectively. Transaminitis most likely secondary to active hepatitis B infection.   - Will continue to monitor with daily chemistries.     Dizziness  Assessment & Plan  Patient presented with multiple days of persistent dizziness, exacerbated with exertion. He was found to be hypoxic at 82% prior to arrival and started on 2 LPM via NC. Today he reports his dizziness has improved but is still intermittently present with standing. Saturation has improved to 94% without supplemental oxygenation.   Telemetry monitoring has shown sinus rhythm / bradycardia with frequent PVCs; rate 53. Etiology is likely secondary to symptomatic bradycardia.   - Continue telemetry monitoring.  - Patient was evaluated by PT/OT who recommend outpatient PT services to address higher level deficits.     Internal carotid artery stent present- (present on admission)  Assessment & Plan  Stent placed 1/12/23 secondary to CVA. Patient has been maintained on DAPT with aspirin 81 mg daily and Plavix 75 mg daily. Patient did  have an EGD in February 2023 which showed non-bleeding duodenal angioectasia s/p APC repair. No active signs or symptoms of GI bleed at this time.   - Continue DAPT.     Acute respiratory failure with hypoxia (HCC)- (present on admission)  Assessment & Plan  Patient was found to be hypoxic at 82% prior to presentation. Initiated on oxygen 2 LPM via NC. Since admission, patient's oxygenation has improved and he is saturating at 94% on room air.   - Continue telemetry monitoring.   - Encourage incentive spirometer use Q1H.     Obstructive sleep apnea  Assessment & Plan  Patient endorses a prior history of KENDAL and reports using a CPAP at home.     Primary hypertension- (present on admission)  Assessment & Plan  Lisinopril 10 mg and amlodipine 2.5 mg held at admission given hepatotoxicity risk. Patient was initiated on IV hydralazine. Blood pressure stable today at 125/75.  - Continue telemetry monitoring.  - Continue holding lisinopril and amlodipine.   - Continue treatment with IV hydralazine 10 mg Q4H with holding parameters.     VTE prophylaxis: enoxaparin ppx    I have performed a physical exam and reviewed and updated ROS and Plan today (8/7/2023). In review of yesterday's note (8/6/2023), there are no changes except as documented above.

## 2023-08-07 NOTE — ED NOTES
Call to receiving unit [id number] and report to receiving RN, RN to come to bedside for transport to unit

## 2023-08-07 NOTE — PROGRESS NOTES
4 Eyes Skin Assessment Completed by PINA Molina and SUSHIL Carrington.    Head WDL  Ears WDL  Nose WDL  Mouth WDL  Neck WDL  Breast/Chest WDL  Shoulder Blades WDL  Spine Bruising minimal bruising  (R) Arm/Elbow/Hand WDL  (L) Arm/Elbow/Hand WDL  Abdomen Scar  Groin WDL  Scrotum/Coccyx/Buttocks WDL  (R) Leg WDL  (L) Leg WDL  (R) Heel/Foot/Toe Scar R ankle scar  (L) Heel/Foot/Toe WDL          Devices In Places Tele Box, Blood Pressure Cuff, and Pulse Ox      Interventions In Place Pillows    Possible Skin Injury No    Pictures Uploaded Into Epic N/A  Wound Consult Placed N/A  RN Wound Prevention Protocol Ordered No

## 2023-08-07 NOTE — THERAPY
Physical Therapy   Initial Evaluation     Patient Name: Kaiden Quiroz  Age:  63 y.o., Sex:  male  Medical Record #: [id number]  Today's Date: 8/7/2023     Precautions  Precautions: Fall Risk    Assessment  Patient is 63 y.o. male with a diagnosis of past medical history of stroke, hypertension and hyperlipidemia who presented to the hospital on 8/6/2023 with dizziness and shortness of breath.Findings + for Hepatitis B. Additional factors influencing patient status / progress : Today, pt is supervised for OOB, transfers and ambulation with his 4WW. Pt has good support at home from his spouse and he and his wife are looking for an outpt PT that takes his insurance to work on higher level strength and balance activities. Pt will benefit from ambulation using his 4WW with nsg supervision to maintain endurance.       Plan      DC Equipment Recommendations: None  Discharge Recommendations: Recommend outpatient physical therapy services to address higher level deficits. Patient will not be actively followed for physical therapy services at this time, however may be seen if requested by physician for 1 more visit within 30 days to address any discharge or equipment needs.           Objective       08/07/23 0937   Charge Group   PT Evaluation PT Evaluation Low   PT Self Care / Home Evaluation (Units) 1   Total Time Spent   PT Total Time Yes   PT Evaluation Time Spent (Mins) 20   PT Self Care/Home Evaluation Time Spent (Mins) 10   PT Total Time Spent (Calculated) 30   Precautions   Precautions Fall Risk   Vitals   O2 Delivery Device None - Room Air   Pain 0 - 10 Group   Therapist Pain Assessment During Activity;Nurse Notified;0   Prior Living Situation   Prior Services Skilled Home Health Services   Housing / Facility 1 Story Apartment / Condo   Steps Into Home 0   Steps In Home 0   Equipment Owned 4-Wheel Walker   Lives with - Patient's Self Care Capacity Spouse  (helps as needed. Does grocery shop, cook, clean)   Prior  "Level of Functional Mobility   Bed Mobility Independent   Transfer Status Independent   Ambulation Independent   Ambulation Distance community distances   Assistive Devices Used 4-Wheel Walker   History of Falls   History of Falls Yes   Date of Last Fall   (few weeks ago, stepped off scale too quickly)   Cognition    Cognition / Consciousness X   Speech/ Communication Slurred  (speaks quickly, some words difficult to understand.)   Level of Consciousness Alert   Comments Pt states \"I have the gift of omar\"   Active ROM Lower Body    Active ROM Lower Body  WDL   Strength Lower Body   Lower Body Strength  WDL   Comments 5/5 B ankle df, knee ext, hip flexion. Pt reports improvement since his stroke   Sensation Lower Body   Lower Extremity Sensation   WDL   Comments intact to light touch B LE   Coordination Lower Body    Coordination Lower Body  X   Comments more labored with L LE for heel-toe tapping.   Balance Assessment   Sitting Balance (Static) Fair   Sitting Balance (Dynamic) Fair   Standing Balance (Static) Fair   Standing Balance (Dynamic) Fair   Weight Shift Sitting Fair   Weight Shift Standing Fair   Comments with 4WW   Bed Mobility    Supine to Sit Supervised   Sit to Supine Supervised   Scooting Supervised   Rolling Supervised   Gait Analysis   Gait Level Of Assist Supervised   Assistive Device 4 Wheel Walker   Distance (Feet) 200   # of Times Distance was Traveled 1   Weight Bearing Status no restrictions   Functional Mobility   Sit to Stand Supervised   Bed, Chair, Wheelchair Transfer Supervised   Transfer Method Stand Pivot   How much difficulty does the patient currently have...   Turning over in bed (including adjusting bedclothes, sheets and blankets)? 4   Sitting down on and standing up from a chair with arms (e.g., wheelchair, bedside commode, etc.) 4   Moving from lying on back to sitting on the side of the bed? 4   How much help from another person does the patient currently need...   Moving to and " from a bed to a chair (including a wheelchair)? 4   Need to walk in a hospital room? 4   Climbing 3-5 steps with a railing? 4   6 clicks Mobility Score 24   Activity Tolerance   Standing 10   Education Group   Education Provided Role of Physical Therapist   Role of Physical Therapist Patient Response Patient;Acceptance;Explanation;Verbal Demonstration   Additional Comments pt discussing that higher level strength and balance activites were most challenging, discussion/ed re progression to outpt PT. Pt and wife working on finding outpt PT that takes his insurance. 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 Pt ed re need/benefits of walking with nsg as able while in house to maintain his endurance.   Anticipated Discharge Equipment and Recommendations   DC Equipment Recommendations None   Discharge Recommendations Recommend outpatient physical therapy services to address higher level deficits   Interdisciplinary Plan of Care Collaboration   IDT Collaboration with  Nursing   Patient Position at End of Therapy Seated;Chair Alarm On;Call Light within Reach;Tray Table within Reach;Phone within Reach   Collaboration Comments alejandra updated.   Session Information   Date / Session Number  8/7-1x only, dc needs only

## 2023-08-08 LAB
ALBUMIN SERPL BCP-MCNC: 3.6 G/DL (ref 3.2–4.9)
ALBUMIN/GLOB SERPL: 1.2 G/DL
ALP SERPL-CCNC: 255 U/L (ref 30–99)
ALT SERPL-CCNC: 1954 U/L (ref 2–50)
ANION GAP SERPL CALC-SCNC: 10 MMOL/L (ref 7–16)
AST SERPL-CCNC: 1060 U/L (ref 12–45)
BASOPHILS # BLD AUTO: 0.8 % (ref 0–1.8)
BASOPHILS # BLD: 0.03 K/UL (ref 0–0.12)
BILIRUB SERPL-MCNC: 5 MG/DL (ref 0.1–1.5)
BUN SERPL-MCNC: 10 MG/DL (ref 8–22)
CALCIUM ALBUM COR SERPL-MCNC: 9.3 MG/DL (ref 8.5–10.5)
CALCIUM SERPL-MCNC: 9 MG/DL (ref 8.5–10.5)
CHLORIDE SERPL-SCNC: 105 MMOL/L (ref 96–112)
CO2 SERPL-SCNC: 22 MMOL/L (ref 20–33)
CREAT SERPL-MCNC: 0.62 MG/DL (ref 0.5–1.4)
EOSINOPHIL # BLD AUTO: 0.3 K/UL (ref 0–0.51)
EOSINOPHIL NFR BLD: 7.6 % (ref 0–6.9)
ERYTHROCYTE [DISTWIDTH] IN BLOOD BY AUTOMATED COUNT: 49.5 FL (ref 35.9–50)
GFR SERPLBLD CREATININE-BSD FMLA CKD-EPI: 108 ML/MIN/1.73 M 2
GLOBULIN SER CALC-MCNC: 3.1 G/DL (ref 1.9–3.5)
GLUCOSE SERPL-MCNC: 119 MG/DL (ref 65–99)
HBV E AB SERPL QL IA: NEGATIVE
HBV E AG SERPL QL IA: POSITIVE
HBV SURFACE AG SERPL QL NT: POSITIVE
HCT VFR BLD AUTO: 46.1 % (ref 42–52)
HGB BLD-MCNC: 15.1 G/DL (ref 14–18)
IMM GRANULOCYTES # BLD AUTO: 0.01 K/UL (ref 0–0.11)
IMM GRANULOCYTES NFR BLD AUTO: 0.3 % (ref 0–0.9)
LYMPHOCYTES # BLD AUTO: 1.12 K/UL (ref 1–4.8)
LYMPHOCYTES NFR BLD: 28.4 % (ref 22–41)
MAGNESIUM SERPL-MCNC: 1.7 MG/DL (ref 1.5–2.5)
MCH RBC QN AUTO: 31.3 PG (ref 27–33)
MCHC RBC AUTO-ENTMCNC: 32.8 G/DL (ref 32.3–36.5)
MCV RBC AUTO: 95.4 FL (ref 81.4–97.8)
MONOCYTES # BLD AUTO: 0.68 K/UL (ref 0–0.85)
MONOCYTES NFR BLD AUTO: 17.2 % (ref 0–13.4)
NEUTROPHILS # BLD AUTO: 1.81 K/UL (ref 1.82–7.42)
NEUTROPHILS NFR BLD: 45.7 % (ref 44–72)
NRBC # BLD AUTO: 0 K/UL
NRBC BLD-RTO: 0 /100 WBC (ref 0–0.2)
PLATELET # BLD AUTO: 166 K/UL (ref 164–446)
PMV BLD AUTO: 10.2 FL (ref 9–12.9)
POTASSIUM SERPL-SCNC: 4 MMOL/L (ref 3.6–5.5)
PROT SERPL-MCNC: 6.7 G/DL (ref 6–8.2)
RBC # BLD AUTO: 4.83 M/UL (ref 4.7–6.1)
SODIUM SERPL-SCNC: 137 MMOL/L (ref 135–145)
WBC # BLD AUTO: 4 K/UL (ref 4.8–10.8)

## 2023-08-08 PROCEDURE — 770020 HCHG ROOM/CARE - TELE (206)

## 2023-08-08 PROCEDURE — 700102 HCHG RX REV CODE 250 W/ 637 OVERRIDE(OP)

## 2023-08-08 PROCEDURE — 700111 HCHG RX REV CODE 636 W/ 250 OVERRIDE (IP): Mod: JZ | Performed by: INTERNAL MEDICINE

## 2023-08-08 PROCEDURE — 700111 HCHG RX REV CODE 636 W/ 250 OVERRIDE (IP)

## 2023-08-08 PROCEDURE — 36415 COLL VENOUS BLD VENIPUNCTURE: CPT

## 2023-08-08 PROCEDURE — 85025 COMPLETE CBC W/AUTO DIFF WBC: CPT

## 2023-08-08 PROCEDURE — 700102 HCHG RX REV CODE 250 W/ 637 OVERRIDE(OP): Performed by: INTERNAL MEDICINE

## 2023-08-08 PROCEDURE — 99232 SBSQ HOSP IP/OBS MODERATE 35: CPT | Performed by: NURSE PRACTITIONER

## 2023-08-08 PROCEDURE — 700105 HCHG RX REV CODE 258: Performed by: INTERNAL MEDICINE

## 2023-08-08 PROCEDURE — A9270 NON-COVERED ITEM OR SERVICE: HCPCS

## 2023-08-08 PROCEDURE — 83735 ASSAY OF MAGNESIUM: CPT

## 2023-08-08 PROCEDURE — 99233 SBSQ HOSP IP/OBS HIGH 50: CPT | Mod: GC | Performed by: INTERNAL MEDICINE

## 2023-08-08 PROCEDURE — 700111 HCHG RX REV CODE 636 W/ 250 OVERRIDE (IP): Performed by: HOSPITALIST

## 2023-08-08 PROCEDURE — 80053 COMPREHEN METABOLIC PANEL: CPT

## 2023-08-08 PROCEDURE — A9270 NON-COVERED ITEM OR SERVICE: HCPCS | Performed by: INTERNAL MEDICINE

## 2023-08-08 RX ORDER — ONDANSETRON 4 MG/1
4 TABLET, ORALLY DISINTEGRATING ORAL ONCE
Status: COMPLETED | OUTPATIENT
Start: 2023-08-08 | End: 2023-08-08

## 2023-08-08 RX ORDER — MAGNESIUM SULFATE HEPTAHYDRATE 40 MG/ML
4 INJECTION, SOLUTION INTRAVENOUS ONCE
Status: COMPLETED | OUTPATIENT
Start: 2023-08-08 | End: 2023-08-08

## 2023-08-08 RX ADMIN — SODIUM CHLORIDE: 9 INJECTION, SOLUTION INTRAVENOUS at 01:25

## 2023-08-08 RX ADMIN — Medication 6 MG: at 20:29

## 2023-08-08 RX ADMIN — ONDANSETRON 4 MG: 4 TABLET, ORALLY DISINTEGRATING ORAL at 20:29

## 2023-08-08 RX ADMIN — ASPIRIN 81 MG 81 MG: 81 TABLET ORAL at 05:37

## 2023-08-08 RX ADMIN — GABAPENTIN 600 MG: 300 CAPSULE ORAL at 23:00

## 2023-08-08 RX ADMIN — MAGNESIUM SULFATE IN WATER 4 G: 40 INJECTION, SOLUTION INTRAVENOUS at 09:03

## 2023-08-08 RX ADMIN — CLOPIDOGREL BISULFATE 75 MG: 75 TABLET ORAL at 05:36

## 2023-08-08 RX ADMIN — ENOXAPARIN SODIUM 40 MG: 100 INJECTION SUBCUTANEOUS at 18:19

## 2023-08-08 ASSESSMENT — ENCOUNTER SYMPTOMS
EYES NEGATIVE: 1
MYALGIAS: 0
FEVER: 0
DIARRHEA: 0
HEMOPTYSIS: 0
ABDOMINAL PAIN: 0
NERVOUS/ANXIOUS: 1
PALPITATIONS: 0
INSOMNIA: 0
TINGLING: 0
LOSS OF CONSCIOUSNESS: 0
WEAKNESS: 0
CONSTIPATION: 0
VOMITING: 0
SORE THROAT: 0
PSYCHIATRIC NEGATIVE: 1
FALLS: 0
CHILLS: 0
SHORTNESS OF BREATH: 0
DIZZINESS: 1
MUSCULOSKELETAL NEGATIVE: 1
SHORTNESS OF BREATH: 1
BLOOD IN STOOL: 0
COUGH: 0
NAUSEA: 0
DOUBLE VISION: 0

## 2023-08-08 ASSESSMENT — FIBROSIS 4 INDEX: FIB4 SCORE: 8.96

## 2023-08-08 ASSESSMENT — PAIN DESCRIPTION - PAIN TYPE: TYPE: ACUTE PAIN

## 2023-08-08 NOTE — CARE PLAN
The patient is Stable - Low risk of patient condition declining or worsening  Problem: Pain - Standard  Goal: Alleviation of pain or a reduction in pain to the patient’s comfort goal  Outcome: Progressing       Progress made toward(s) clinical / shift goals:    Problem: Pain - Standard  Goal: Alleviation of pain or a reduction in pain to the patient’s comfort goal  Outcome: Progressing     Problem: Fall Risk  Goal: Patient will remain free from falls  Outcome: Progressing

## 2023-08-08 NOTE — PROGRESS NOTES
Assumed care of patient at 1900, Report received by Day shift RN. Pt resting in bed with wife at bedside.Tele box in place. Call bell with in reach and fall precautions maintained.

## 2023-08-08 NOTE — PROGRESS NOTES
Gastroenterology Progress Note               Author:  MARIN Booth Date & Time Created: 8/8/2023 2:27 PM       Patient ID:  Name:             Kaiden Quiroz  YOB: 1960  Age:                 63 y.o.  male  MRN:               [id number]        Medical Decision Making, by Problem:  Active Hospital Problems    Diagnosis     Acute hepatitis [B17.9]     Dizziness [R42]     Advance care planning [Z71.89]     Elevated liver enzymes [R74.8]     Elevated bilirubin [R17]     Internal carotid artery stent present [Z95.828]     Primary hypertension [I10]     Acute respiratory failure with hypoxia (HCC) [J96.01]     Class 1 obesity in adult [E66.9]            Presenting Chief Complaint:  Elevated LFT's     HISTORY OF PRESENT ILLNESS:  Kaiden Quiroz is a 63 y.o. male with past medical history of stroke 1/23 hypertension and hyperlipidemia who presented to the hospital on 8/6/2023 with dizziness and shortness of breath.  Patient's wife at the bedside reported that they went to grocery shopping and he started having dizziness and he ate peanut butter and it helped in his symptoms.  He took a nap. She checked her blood glucose and he found to have normal blood glucose.  Next day he was not feeling well and he was having episodes of brain fogginess and patient's wife checked pulse ox and that showed oxygen saturation in 80s.  They called doctor and they told him to go to ER. She reported that he has been taking his medication as prescribed.   He recently noticed change in his urine color.  There is no specific aggravating or elevating factors. Patient denies any history of IV drug use, blood transfusion and he is in monogamous relationship with his wife.  He also denies tattoos, sharing toothbrush or sharing razors. The wife states that she has had blood transfusions when she was an infant. He used to drink heavily and smoke but has not done either since the stroke. He was taking metformin  but  stopped. He denies complaint of nausea, vomiting, diarrhea and constipation. When he came into ER he was found to have markedly high LFT's and acute heb B surf anti body and Igm core were positive. He underwent ultrasound  that did not show abnormalities or PVT         Interval History:  8/8/2023: Patient seen at bedside.  Tolerating oral intake without difficulty.  LFTs remain elevated but mildly decreased.  Total bilirubin 4.4-5.  Albumin 3.6.      Hospital Medications:  Current Facility-Administered Medications   Medication Dose Frequency Provider Last Rate Last Admin    melatonin tablet 6 mg  6 mg Nightly Noni Duron M.D.   6 mg at 08/07/23 2252    senna-docusate (Pericolace Or Senokot S) 8.6-50 MG per tablet 2 Tablet  2 Tablet BID Marsha Palacios M.D.   2 Tablet at 08/07/23 0503    And    polyethylene glycol/lytes (Miralax) PACKET 1 Packet  1 Packet QDAY PRN Marsha Palacios M.D.        And    magnesium hydroxide (Milk Of Magnesia) suspension 30 mL  30 mL QDAY PRN Marsha Palacios M.D.        And    bisacodyl (Dulcolax) suppository 10 mg  10 mg QDAY PRN Marsha Palacios M.D.        enoxaparin (Lovenox) inj 40 mg  40 mg DAILY AT 1800 Marsha Palacios M.D.   40 mg at 08/07/23 1805    hydrALAZINE (Apresoline) injection 10 mg  10 mg Q4HRS PRN Marsha Palacios M.D.        aspirin (Asa) chewable tab 81 mg  81 mg DAILY Marsha Palacios M.D.   81 mg at 08/08/23 0537    clopidogrel (Plavix) tablet 75 mg  75 mg DAILY Marsha Palacios M.D.   75 mg at 08/08/23 0536    gabapentin (Neurontin) capsule 600 mg  600 mg Q EVENING MADONNA Ashton.P.RTravisN.   600 mg at 08/07/23 2141   Last reviewed on 8/6/2023  3:32 PM by Mariana Roth PharmD       Review of Systems:  Review of Systems   Constitutional:  Negative for chills, fever and malaise/fatigue.   HENT:  Negative for congestion and sore throat.    Respiratory:  Negative for cough and shortness of breath.   "  Cardiovascular:  Negative for chest pain and leg swelling.   Gastrointestinal:  Negative for abdominal pain, blood in stool, constipation, diarrhea, melena, nausea and vomiting.   Genitourinary:  Negative for dysuria.   Musculoskeletal:  Negative for falls and myalgias.   Neurological:  Negative for tingling and weakness.   Psychiatric/Behavioral:  The patient is nervous/anxious. The patient does not have insomnia.    All other systems reviewed and are negative.        Vital signs:  Weight/BMI: Body mass index is 33.53 kg/m².  /76   Pulse 64   Temp 36.8 °C (98.2 °F) (Temporal)   Resp 18   Ht 1.702 m (5' 7\")   Wt 97.1 kg (214 lb 1.1 oz)   SpO2 96%   Vitals:    08/08/23 0002 08/08/23 0342 08/08/23 0718 08/08/23 1119   BP: 132/87 (!) 155/87 (!) 153/91 124/76   Pulse: (!) 55 67 61 64   Resp: 17 17 18 18   Temp: 37.1 °C (98.8 °F) 37.8 °C (100 °F) 36.7 °C (98.1 °F) 36.8 °C (98.2 °F)   TempSrc: Temporal Temporal Temporal Temporal   SpO2: 97% 96% 95% 96%   Weight:       Height:         Oxygen Therapy:  Pulse Oximetry: 96 %, O2 (LPM): 0, O2 Delivery Device: CPAP    Intake/Output Summary (Last 24 hours) at 8/8/2023 1427  Last data filed at 8/8/2023 0552  Gross per 24 hour   Intake 200 ml   Output 551 ml   Net -351 ml         Physical Exam:  Physical Exam  Vitals and nursing note reviewed.   Constitutional:       General: He is not in acute distress.     Appearance: He is obese.   HENT:      Head: Normocephalic.      Nose: Nose normal. No congestion.      Mouth/Throat:      Mouth: Mucous membranes are moist.      Pharynx: Oropharynx is clear. No oropharyngeal exudate.   Eyes:      General: No scleral icterus.     Extraocular Movements: Extraocular movements intact.      Conjunctiva/sclera: Conjunctivae normal.   Cardiovascular:      Rate and Rhythm: Normal rate and regular rhythm.      Pulses: Normal pulses.      Heart sounds: Normal heart sounds. No murmur heard.  Pulmonary:      Effort: Pulmonary effort is " normal. No respiratory distress.      Breath sounds: Normal breath sounds. No wheezing.   Abdominal:      General: Abdomen is flat. Bowel sounds are normal. There is no distension.      Palpations: Abdomen is soft.      Tenderness: There is no abdominal tenderness.   Musculoskeletal:      Right lower leg: No edema.      Left lower leg: No edema.   Skin:     General: Skin is warm and dry.      Capillary Refill: Capillary refill takes less than 2 seconds.      Coloration: Skin is jaundiced (mild).   Neurological:      General: No focal deficit present.      Mental Status: He is alert and oriented to person, place, and time. Mental status is at baseline.   Psychiatric:         Mood and Affect: Mood normal.         Behavior: Behavior normal.         Thought Content: Thought content normal.         Judgment: Judgment normal.             Labs:  Recent Labs     08/06/23 1230 08/07/23  0118 08/08/23  0123   SODIUM 134* 135 137   POTASSIUM 4.1 3.9 4.0   CHLORIDE 101 103 105   CO2 20 21 22   BUN 22 15 10   CREATININE 0.92 0.72 0.62   MAGNESIUM  --  1.7 1.7   CALCIUM 9.8 9.3 9.0     Recent Labs     08/06/23  1230 08/07/23  0118 08/08/23  0123   ALTSGPT 2313* 2055* 1954*   ASTSGOT 1375* 1126* 1060*   ALKPHOSPHAT 263* 256* 255*   TBILIRUBIN 4.4* 4.4* 5.0*   LIPASE 40  --   --    GLUCOSE 145* 109* 119*     Recent Labs     08/06/23  1230 08/07/23  0118 08/08/23  0123   WBC 4.1* 4.0* 4.0*   NEUTSPOLYS 57.00 45.80 45.70   LYMPHOCYTES 22.60 27.80 28.40   MONOCYTES 13.10 18.00* 17.20*   EOSINOPHILS 6.60 7.30* 7.60*   BASOPHILS 0.70 0.80 0.80   ASTSGOT 1375* 1126* 1060*   ALTSGPT 2313* 2055* 1954*   ALKPHOSPHAT 263* 256* 255*   TBILIRUBIN 4.4* 4.4* 5.0*     Recent Labs     08/06/23  1230 08/07/23  0118 08/07/23  1139 08/08/23  0123   RBC 4.85 4.80  --  4.83   HEMOGLOBIN 15.2 15.0  --  15.1   HEMATOCRIT 45.3 44.7  --  46.1   PLATELETCT 169 161*  --  166   PROTHROMBTM 13.5  --  14.2  --    INR 1.04  --  1.11  --      Recent Results  (from the past 24 hour(s))   HEP B SURFACE ANTIGEN    Collection Time: 08/07/23  4:47 PM   Result Value Ref Range    Hepatitis B Surface Antigen Reactive (A) Non-Reactive   HEP B CORE AB IGM    Collection Time: 08/07/23  4:47 PM   Result Value Ref Range    Hepatitis B Cors Ab,IgM Reactive (A) Non-Reactive   Comp Metabolic Panel    Collection Time: 08/08/23  1:23 AM   Result Value Ref Range    Sodium 137 135 - 145 mmol/L    Potassium 4.0 3.6 - 5.5 mmol/L    Chloride 105 96 - 112 mmol/L    Co2 22 20 - 33 mmol/L    Anion Gap 10.0 7.0 - 16.0    Glucose 119 (H) 65 - 99 mg/dL    Bun 10 8 - 22 mg/dL    Creatinine 0.62 0.50 - 1.40 mg/dL    Calcium 9.0 8.5 - 10.5 mg/dL    Correct Calcium 9.3 8.5 - 10.5 mg/dL    AST(SGOT) 1060 (HH) 12 - 45 U/L    ALT(SGPT) 1954 (HH) 2 - 50 U/L    Alkaline Phosphatase 255 (H) 30 - 99 U/L    Total Bilirubin 5.0 (H) 0.1 - 1.5 mg/dL    Albumin 3.6 3.2 - 4.9 g/dL    Total Protein 6.7 6.0 - 8.2 g/dL    Globulin 3.1 1.9 - 3.5 g/dL    A-G Ratio 1.2 g/dL   CBC WITH DIFFERENTIAL    Collection Time: 08/08/23  1:23 AM   Result Value Ref Range    WBC 4.0 (L) 4.8 - 10.8 K/uL    RBC 4.83 4.70 - 6.10 M/uL    Hemoglobin 15.1 14.0 - 18.0 g/dL    Hematocrit 46.1 42.0 - 52.0 %    MCV 95.4 81.4 - 97.8 fL    MCH 31.3 27.0 - 33.0 pg    MCHC 32.8 32.3 - 36.5 g/dL    RDW 49.5 35.9 - 50.0 fL    Platelet Count 166 164 - 446 K/uL    MPV 10.2 9.0 - 12.9 fL    Neutrophils-Polys 45.70 44.00 - 72.00 %    Lymphocytes 28.40 22.00 - 41.00 %    Monocytes 17.20 (H) 0.00 - 13.40 %    Eosinophils 7.60 (H) 0.00 - 6.90 %    Basophils 0.80 0.00 - 1.80 %    Immature Granulocytes 0.30 0.00 - 0.90 %    Nucleated RBC 0.00 0.00 - 0.20 /100 WBC    Neutrophils (Absolute) 1.81 (L) 1.82 - 7.42 K/uL    Lymphs (Absolute) 1.12 1.00 - 4.80 K/uL    Monos (Absolute) 0.68 0.00 - 0.85 K/uL    Eos (Absolute) 0.30 0.00 - 0.51 K/uL    Baso (Absolute) 0.03 0.00 - 0.12 K/uL    Immature Granulocytes (abs) 0.01 0.00 - 0.11 K/uL    NRBC (Absolute) 0.00 K/uL    MAGNESIUM    Collection Time: 23  1:23 AM   Result Value Ref Range    Magnesium 1.7 1.5 - 2.5 mg/dL   ESTIMATED GFR    Collection Time: 23  1:23 AM   Result Value Ref Range    GFR (CKD-EPI) 108 >60 mL/min/1.73 m 2       Radiology Review:  US-RUQ   Final Result      1.  Contracted gallbladder. No evidence of gallstone or biliary ductal dilatation.      2.  The gallbladder wall likely related to contracted gallbladder.      3.  The liver is echogenic consistent with fatty change versus hepatocellular dysfunction.      DX-CHEST-PORTABLE (1 VIEW)   Final Result      Mild cardiomegaly.            MDM (Data Review):   -Records reviewed and summarized in current documentation  -I personally reviewed and interpreted the laboratory results  -I personally reviewed the radiology images    Assessment/Recommendations:  Transaminitis  Hepatitis B surface antigen positive  Hepatitis B core IgM positive.    MDM:  This is a pleasant 63-year-old male with a past medical history as listed above who presents to [facility] 2023 for acute hepatitis.    He denies drug use, tattoos, blood transfusion.  He states that his wife had blood transfusions as an infant.  Hepatitis B surface antigen and core IgM positive.  Additionally, hepatitis B surface antibody <3.5, hepatitis B E antigen positive, hepatitis B E antibody negative, hepatitis B PCR quantitative remains pending.  Hepatitis B surface confirmation and AFP also pending. Patient states nervousness regarding hepatic disease and would like to start treatment since he has known multiple people that have  from liver disease.  Discussed treatment initiation parameters with patient, risk associated with initiating treatment and that we are monitoring patient's liver function to determine if initiation of therapy is indicated.  Patient verbalized understanding.      Plan:  He does not need treatment at this time. His INR is normal. If bilirubin would  remain elevated after 4 weeks or inr rise, he may be candidate for treatment.   Monitor liver function  Awaiting further results from hepatitis B studies.      Discussed with patient, Dr. Vargas, Dr. Tom        Core Quality Measures   Reviewed items::  Labs, Medications and Radiology reports reviewed     [FreeTextEntry1] : declined flu vaccine\par OK to LM with lab results

## 2023-08-08 NOTE — CARE PLAN
The patient is Stable - Low risk of patient condition declining or worsening         Progress made toward(s) clinical / shift goals:    Problem: Fall Risk  Goal: Patient will remain free from falls  Outcome: Progressing     Problem: Knowledge Deficit - Standard  Goal: Patient and family/care givers will demonstrate understanding of plan of care, disease process/condition, diagnostic tests and medications  Outcome: Progressing

## 2023-08-08 NOTE — CARE PLAN
The patient is Stable - Low risk of patient condition declining or worsening    Shift Goals  Clinical Goals: rest  Patient Goals: rest  Family Goals: rest    Progress made toward(s) clinical / shift goals:    Problem: Pain - Standard  Goal: Alleviation of pain or a reduction in pain to the patient’s comfort goal  Outcome: Progressing     Problem: Fall Risk  Goal: Patient will remain free from falls  Outcome: Progressing     Problem: Knowledge Deficit - Standard  Goal: Patient and family/care givers will demonstrate understanding of plan of care, disease process/condition, diagnostic tests and medications  Outcome: Progressing

## 2023-08-08 NOTE — PROGRESS NOTES
Critical lab result of ALT-1954 and AST 1060. Labs improved from 8/7. Pt has ongoing workup for hepatitis

## 2023-08-08 NOTE — PROGRESS NOTES
[facility] Internal Medicine Daily Progress Note    This note is intended for the purposes of medical student education and feedback only.   Please refer to the documentation by this patient's assigned medical practitioner for details of care and plans.    Date of Service  8/8/2023    R Team: [employer] IM White Team   Attending: Eugenie Tom M.d.  Senior Resident: Dr. Negrita Roberson  Intern:  TEX  Contact Number: [phone]    Chief Complaint  Kaiden Quiroz is a 63 y.o. male admitted 8/6/2023 with acute hypoxic respiratory failure and acute hepatitis B.     Hospital Course  Kaiden Quiroz is a 63 y.o. 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 He was accompanied by his wife who noted a near-syncopal episode a few days prior that had left him feeling lightheaded since then. Prior to arrival, patient's wife checked his oxygenation and found him to be hypoxic at 82%. Denies any recent viral symptoms.     In the ED, he was found to be afebrile with stable vitals after he was initiated on oxygen 2 LPM via NC. CBC WNL. Chemistry WNL. UDS negative. Troponin 17. AST elevated 1375. ALT elevated 2313. Alkaline phosphatase elevated 263. Total bilirubin elevated 4.4. PT/INR WNL. D-Dimer WNL. Hepatitis B surface antigen positive. Hepatitis B core IgM positive. Hepatitis A and C antibody assays negative. Viral panel negative. Patient denied any history of IV drug abuse, blood transfusions, and reported being in a monogamous relationship with his wife. CXR showed mild cardiomegaly. RUQ US showed an echogenic liver consistent with fatty liver disease without nodularity suggestive of cirrhosis or HCC. UA suggestive of UTI.     Patient  was admitted to telemetry for further monitoring of his ongoing dizziness given his stroke history. Antihypertensive medications held in favor of IV hydralazine 10 mg Q4H with holding parameters. GI consulted for treatment of his acute hepatitis B.     Interval Problem Update  - Repeat confirmatory hepatitis B testing positive. Hepatitis B surface antibody < 3.5. Hepatitis B E antigen positive. Hepatitis B E antibody negative. Quantitative remains pending.   - Per GI, patient does not require active hepatitis B treatment at this time per his normal INR value.   - AST and ALT are trending down. Total bilirubin increased form 4.4 to 5.0.     Subjective  Patient's wife brought him his CPAP machine which he was able to use overnight and reports his shortness of breath and dizziness have improved today. He reports he has been having mild shortness of breath in the past 3 months but denies any episodes of syncope or weakness. Denies any chest pain, palpitations, fevers, chills, nausea, vomiting, abdominal pain, constipation, or diarrhea.     Consultants/Specialty  GI    Code Status  Full Code    Review of Systems  Review of Systems   Constitutional:  Negative for chills and fever.   HENT: Negative.     Eyes: Negative.    Respiratory:  Positive for shortness of breath. Negative for hemoptysis.    Cardiovascular:  Negative for chest pain and palpitations.   Gastrointestinal:  Negative for abdominal pain, constipation, diarrhea, nausea and vomiting.   Genitourinary:  Negative for dysuria and hematuria.   Musculoskeletal: Negative.    Skin: Negative.    Neurological:  Positive for dizziness. Negative for loss of consciousness and weakness.   Endo/Heme/Allergies: Negative.    Psychiatric/Behavioral: Negative.     All other systems reviewed and are negative.       Physical Exam  Temp:  [36.3 °C (97.4 °F)-37.8 °C (100 °F)] 37.2 °C (99 °F)  Pulse:  [55-67] 65  Resp:  [17-18] 18  BP: (115-155)/(61-91) 134/78  SpO2:  [94 %-97 %] 94  %    Physical Exam    Fluids    Intake/Output Summary (Last 24 hours) at 8/8/2023 1556  Last data filed at 8/8/2023 0552  Gross per 24 hour   Intake 200 ml   Output 551 ml   Net -351 ml       Laboratory  Recent Labs     08/06/23  1230 08/07/23  0118 08/08/23  0123   WBC 4.1* 4.0* 4.0*   RBC 4.85 4.80 4.83   HEMOGLOBIN 15.2 15.0 15.1   HEMATOCRIT 45.3 44.7 46.1   MCV 93.4 93.1 95.4   MCH 31.3 31.3 31.3   MCHC 33.6 33.6 32.8   RDW 48.0 48.4 49.5   PLATELETCT 169 161* 166   MPV 10.4 10.6 10.2     Recent Labs     08/06/23  1230 08/07/23  0118 08/08/23  0123   SODIUM 134* 135 137   POTASSIUM 4.1 3.9 4.0   CHLORIDE 101 103 105   CO2 20 21 22   GLUCOSE 145* 109* 119*   BUN 22 15 10   CREATININE 0.92 0.72 0.62   CALCIUM 9.8 9.3 9.0     Recent Labs     08/06/23  1230 08/07/23  1139   INR 1.04 1.11               Imaging  US-RUQ   Final Result      1.  Contracted gallbladder. No evidence of gallstone or biliary ductal dilatation.      2.  The gallbladder wall likely related to contracted gallbladder.      3.  The liver is echogenic consistent with fatty change versus hepatocellular dysfunction.      DX-CHEST-PORTABLE (1 VIEW)   Final Result      Mild cardiomegaly.           Assessment/Plan  Problem Representation:  Kaiden Quiroz is a pleasant 63 y.o. 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 Patient presented to the ED after being found hypoxic at 82% at home PTA. Testing demonstrated elevated LFTs and total bilirubin. Hepatitis B surface antigen and core IgM positive. PT/INR WNL. RUQ US demonstrated echogenic liver without nodularity. Admitted for further monitoring of his dizziness and dyspnea given his stroke history. He was evaluated by GI who did not recommend starting antiviral therapy at this time. AST and  ALT are trending down, but bilirubin now increased to 5.0.     * Acute hepatitis- (present on admission)  Assessment & Plan  Severe transaminitis at admission with AST/ALT 1376/2313. Bilirubin on presentation was 4.4. PT/INR WNL. Hepatitis B surface antigen and core IgM antibody testing positive. Negative for hepatitis A and C antibody assay. RUQ US demonstrated echogenicity consistent with fatty change vs. hepatocellular dysfunction; no nodularity or lesions observed. Repeat viral testing was positive. AST and ALT have been trending down, but total bilirubin increased to 5.0 from 4.4. He does not have any risk factors of IV drug abuse or blood transfusions and is in a monogamous relationship with his wife.   - Will continue to monitor his total bilirubin with a repeat chemistry tomorrow to assess for any continued increase in value.   - Patient likely has acute hepatitis B rather than a chronic infection given his significantly elevated ALT > 2,000 on presentation. Testing also shows positive Hepatitis B surface antigen without formation of antibodies yet (< 3.5). As patient does not have evidence of jaundice, further classification would be acute anicteric hepatitis B.   - Per GI, patient does not require treatment at this time given his normal INR. If bilirubin remains elevated for 4 weeks or if his INR changes, then patient may be eligible for treatment with antiviral therapy.   - As RUQ was negative for cirrhotic changes, he still has a low risk of developing fulminant liver disease secondary to his acute hepatitis B. Literature indicates that fulminant disease is approximately a 1% risk in patients without decompensated cirrhosis. Seroconversion and resolution typically occurs in 70% of infected patients within 3-6 months.   - Continue holding all hepatotoxic medications including antihypertensive medications (ACE inhibitor) to mitigate hepatic damage. Review of [employer] and patient's medications do show any  significant risks aside from acetaminophen which he has discontinued.   - Will repeat daily chemistries for further monitoring.     Elevated bilirubin  Assessment & Plan  Total bilirubin elevated to 4.4 on admission. Prior value was normal at 0.7 in May 2023. Elevation is most likely secondary to his acute hepatitis B infection. Bilirubin today increased to 5.0. No obstructive pathology was observed on his RUQ US.   - Will continue to monitor with a repeat chemistry.     Elevated liver enzymes  Assessment & Plan  AST and ALT were significantly elevated on admission. RUQ US demonstrated an echogenic liver consistent with fatty liver changes. Repeat testing shows AST and ALT continue to trend down, now at 1060 and 1954, respectively. Transaminitis most likely secondary to active hepatitis B infection.   - Will continue to monitor with daily chemistries.     Dizziness  Assessment & Plan  Patient presented with multiple days of persistent dizziness, exacerbated with exertion. He was found to be hypoxic at 82% prior to arrival and started on 2 LPM via NC. Patient's dizziness has progressively improved since admission and is minimally present with standing. He is currently saturating well on room air. Telemetry monitoring has shown sinus rhythm/bradycardia with 1st degree AV block.   - Continue telemetry monitoring.  - Patient was evaluated by PT/OT who recommend outpatient PT services to address higher level deficits.     Internal carotid artery stent present- (present on admission)  Assessment & Plan  Stent placed 1/12/23 secondary to CVA. Patient has been maintained on DAPT with aspirin 81 mg daily and Plavix 75 mg daily. Patient did have an EGD in February 2023 which showed non-bleeding duodenal angioectasia s/p APC repair. No active signs or symptoms of GI bleed at this time.   - Continue DAPT.     Acute respiratory failure with hypoxia (HCC)- (present on admission)  Assessment & Plan  Patient was found to be hypoxic  at 82% prior to presentation. Initiated on oxygen 2 LPM via NC. Since admission, patient's oxygenation has improved and he is saturating at 94% on room air.   - Continue telemetry monitoring.   - Encourage incentive spirometer use Q1H.     Obstructive sleep apnea  Assessment & Plan  Patient endorses a prior history of KENDAL and reports using a CPAP at home.     Primary hypertension- (present on admission)  Assessment & Plan  Lisinopril 10 mg and amlodipine 2.5 mg held at admission given hepatotoxicity risk. Patient was initiated on IV hydralazine. Blood pressure stable today at 134/78.  - Continue telemetry monitoring.  - Continue holding lisinopril and amlodipine.   - Continue treatment with IV hydralazine 10 mg Q4H with holding parameters.     VTE prophylaxis: enoxaparin ppx    I have performed a physical exam and reviewed and updated ROS and Plan today (8/8/2023). In review of yesterday's note (8/7/2023), there are no changes except as documented above.

## 2023-08-08 NOTE — PROGRESS NOTES
[facility] Internal Medicine Daily Progress Note    Date of Service  8/7/2023    R Team: [facility] IM White Team   Attending: Refugio Goldsmith M.d.  Senior Resident: Dr. Roberson  Contact Number: [phone]    Chief Complaint  Kaiden Quiroz is a 63 y.o. male admitted 8/6/2023 with near syncope an shortness of breath.    Hospital Course  Kaiden Quiroz is a 63 y.o. 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 He was accompanied by his wife who noted a near-syncopal episode a few days prior that had left him feeling lightheaded since then. Prior to arrival, patient's wife checked his oxygenation and found him to be hypoxic at 82%. Denies any recent viral symptoms.      In the ED, he was found to be afebrile with stable vitals after he was initiated on oxygen 2 LPM via NC. CBC WNL. Chemistry WNL. UDS negative. Troponin 17. AST elevated 1375. ALT elevated 2313. Alkaline phosphatase elevated 263. Total bilirubin elevated 4.4. PT/INR WNL. D-Dimer WNL. Hepatitis B surface antigen positive. Hepatitis B core IgM positive. Hepatitis A and C antibody assays negative. Viral panel negative. Patient denied any history of IV drug abuse, blood transfusions, and reported being in a monogamous relationship with his wife. CXR showed mild cardiomegaly. RUQ US showed an echogenic liver consistent with fatty liver disease without nodularity suggestive of cirrhosis or HCC. UA suggestive of UTI.      Patient was admitted to telemetry for further monitoring of his ongoing dizziness given his stroke history. Antihypertensive medications held in favor of IV hydralazine 10 mg Q4H with holding parameters. GI consulted for treatment of his acute hepatitis B.      Interval Problem Update    -Reports persistent dizziness, but improved since admission.  -Has a significant alcohol use history; reports 6+ alcoholic beverages daily prior to his stroke but has quit since. He also has a 40 pack year history of smoking  -GI consulted, repeat hepatitis labs    I have discussed this patient's plan of care and discharge plan at IDT rounds today with Case Management, Nursing, Nursing leadership, and other members of the IDT team.    Consultants/Specialty  GI    Code Status  Full Code    Disposition  The patient is not medically cleared for discharge to home or a post-acute facility.      I have placed the appropriate orders for post-discharge needs.    Review of Systems  Review of Systems   Constitutional:  Negative for chills and fever.   Eyes:  Negative for double vision.   Respiratory:  Positive for shortness of breath (improved since admission).    Cardiovascular:  Negative for chest pain.   Gastrointestinal:  Negative for blood in stool, constipation, melena, nausea and vomiting.   Neurological:  Positive for dizziness.        Physical Exam  Temp:  [36.3 °C (97.4 °F)-36.8 °C (98.2 °F)] 36.3 °C (97.4 °F)  Pulse:  [54-80] 60  Resp:  [18] 18  BP: (115-132)/(61-82) 115/61  SpO2:  [93 %-96 %] 95 %    Physical Exam  Constitutional:       General: He is not in acute distress.     Appearance: He is not toxic-appearing.   HENT:      Nose: No rhinorrhea.   Eyes:      General: No scleral icterus.        Right eye: No discharge.         Left eye: No discharge.      Extraocular Movements: Extraocular movements intact.   Cardiovascular:      Rate and Rhythm: Normal rate.   Pulmonary:      Effort: No respiratory distress.   Musculoskeletal:         General: No deformity.      Cervical back: Normal range of motion. No rigidity.   Skin:     Coloration: Skin is not jaundiced.   Neurological:      Mental Status: Mental status is at baseline.   Psychiatric:         Mood and Affect: Mood normal.          Behavior: Behavior normal.         Thought Content: Thought content normal.         Fluids    Intake/Output Summary (Last 24 hours) at 8/7/2023 2359  Last data filed at 8/7/2023 2200  Gross per 24 hour   Intake 788.12 ml   Output 1152 ml   Net -363.88 ml       Laboratory  Recent Labs     08/06/23  1230 08/07/23  0118   WBC 4.1* 4.0*   RBC 4.85 4.80   HEMOGLOBIN 15.2 15.0   HEMATOCRIT 45.3 44.7   MCV 93.4 93.1   MCH 31.3 31.3   MCHC 33.6 33.6   RDW 48.0 48.4   PLATELETCT 169 161*   MPV 10.4 10.6     Recent Labs     08/06/23  1230 08/07/23  0118   SODIUM 134* 135   POTASSIUM 4.1 3.9   CHLORIDE 101 103   CO2 20 21   GLUCOSE 145* 109*   BUN 22 15   CREATININE 0.92 0.72   CALCIUM 9.8 9.3     Recent Labs     08/06/23  1230 08/07/23  1139   INR 1.04 1.11               Imaging  US-RUQ   Final Result      1.  Contracted gallbladder. No evidence of gallstone or biliary ductal dilatation.      2.  The gallbladder wall likely related to contracted gallbladder.      3.  The liver is echogenic consistent with fatty change versus hepatocellular dysfunction.      DX-CHEST-PORTABLE (1 VIEW)   Final Result      Mild cardiomegaly.           Assessment/Plan  Problem Representation:    * Acute hepatitis- (present on admission)  Assessment & Plan  Patient found to have acute hepatitis B infection.  He found to significant elevated bilirubin and liver enzymes  I reviewed ultrasound right upper quadrant that did not show any acute abnormalities.  Patient may need treatment for his acute hepatitis B due to his significant elevated bilirubin although his INR came back within normal limits.  GI consult, recommendations appreciated   I ordered CMP for tomorrow  Holding all hepatotoxic medication including blood pressure medication that can potentially cause liver damage  I discussed plan of care with patient's wife regarding precaution for hepatitis B and she expressed understanding.      Elevated bilirubin  Assessment & Plan  He found to  have significantly elevated bilirubin of 4.4 and it was normal in May of this year.  Elevated bilirubin most likely secondary to hepatitis B infection.  Ordered CMP for tomorrow.    Elevated liver enzymes  Assessment & Plan  He found to have significant elevated liver enzymes  I reviewed ultrasound right upper quadrant pain  His elevated liver enzymes most likely secondary to hepatitis B infection.  Requested consultation with GI.    Advance care planning  Assessment & Plan  I discussed CODE STATUS with patient and his wife at the bedside.  Patient's wife reported that they have advance directive at home.  I explained them the importance of confirming CODE STATUS in the hospital and after discussion patient requested to be full code as per patient request I placed full code orders.    Time spent 16 minutes    Dizziness  Assessment & Plan  He has been having symptoms of dizziness that is associated with hypoxia.  Due to his ongoing symptoms of dizziness and history of stroke plan is to admit him to telemetry.  Discussed plan of care with patient and his wife at the bedside.    Internal carotid artery stent present- (present on admission)  Assessment & Plan  I am continuing his outpatient dual antiplatelet therapy.  He denies bleeding.    Acute respiratory failure with hypoxia (HCC)- (present on admission)  Assessment & Plan  Patient found to have acute respiratory failure with hypoxia he is requiring supplemental oxygen.  He found to have ox saturation on room air 88% and at home he found to have oxygen saturation in 80s.  Incentive spirometry.  Admitted telemetry for close monitoring.      Class 1 obesity in adult  Assessment & Plan  Patient found to have BMI of 34.40  He reported that he has been eating vegetables and trying to eat healthy  Lifestyle modifications and outpatient follow-up.    Primary hypertension- (present on admission)  Assessment & Plan  I am holding his blood pressure medication I started him on  IV hydralazine 10 mg every 4 hours as needed with parameters.  Plan is to resume his outpatient blood pressure medication.  GI requested to hold his ACE inhibitor and statin.  Plan is to admit him to telemetry.         VTE prophylaxis: enoxaparin ppx    I have performed a physical exam and reviewed and updated ROS and Plan today (8/7/2023). In review of yesterday's note (8/6/2023), there are no changes except as documented above.

## 2023-08-08 NOTE — HOSPITAL COURSE
Kaiden Quiroz is a 63 y.o. 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 He was accompanied by his wife who noted a near-syncopal episode a few days prior that had left him feeling lightheaded since then. Prior to arrival, patient's wife checked his oxygenation and found him to be hypoxic at 82%. Denies any recent viral symptoms.      In the ED, he was found to be afebrile with stable vitals after he was initiated on oxygen 2 LPM via NC. CBC WNL. Chemistry WNL. UDS negative. Troponin 17. AST elevated 1375. ALT elevated 2313. Alkaline phosphatase elevated 263. Total bilirubin elevated 4.4. PT/INR WNL. D-Dimer WNL. Hepatitis B surface antigen positive. Hepatitis B core IgM positive. Hepatitis A and C antibody assays negative. Viral panel negative. Patient denied any history of IV drug abuse, blood transfusions, and reported being in a monogamous relationship with his wife. CXR showed mild cardiomegaly. RUQ US showed an echogenic liver consistent with fatty liver disease without nodularity suggestive of cirrhosis or HCC. UA suggestive of UTI.      Patient was admitted to telemetry for further monitoring of his ongoing dizziness given his stroke history. Antihypertensive medications held in favor of IV hydralazine 10 mg Q4H with holding parameters. GI consulted for treatment of his acute hepatitis B.

## 2023-08-09 PROBLEM — N30.00 ACUTE CYSTITIS: Status: ACTIVE | Noted: 2023-08-09

## 2023-08-09 LAB
AFP-TM SERPL-MCNC: 3 NG/ML (ref 0–9)
ALBUMIN SERPL BCP-MCNC: 3.8 G/DL (ref 3.2–4.9)
ALBUMIN/GLOB SERPL: 1.3 G/DL
ALP SERPL-CCNC: 249 U/L (ref 30–99)
ALT SERPL-CCNC: 1819 U/L (ref 2–50)
AMORPH CRY #/AREA URNS HPF: PRESENT /HPF
ANION GAP SERPL CALC-SCNC: 14 MMOL/L (ref 7–16)
APPEARANCE UR: ABNORMAL
AST SERPL-CCNC: 846 U/L (ref 12–45)
BACTERIA #/AREA URNS HPF: ABNORMAL /HPF
BILIRUB SERPL-MCNC: 6.4 MG/DL (ref 0.1–1.5)
BILIRUB UR QL STRIP.AUTO: ABNORMAL
BUN SERPL-MCNC: 7 MG/DL (ref 8–22)
CALCIUM ALBUM COR SERPL-MCNC: 9.3 MG/DL (ref 8.5–10.5)
CALCIUM SERPL-MCNC: 9.1 MG/DL (ref 8.5–10.5)
CHLORIDE SERPL-SCNC: 102 MMOL/L (ref 96–112)
CO2 SERPL-SCNC: 20 MMOL/L (ref 20–33)
COLOR UR: ABNORMAL
CREAT SERPL-MCNC: 0.62 MG/DL (ref 0.5–1.4)
EPI CELLS #/AREA URNS HPF: NEGATIVE /HPF
ERYTHROCYTE [DISTWIDTH] IN BLOOD BY AUTOMATED COUNT: 49.5 FL (ref 35.9–50)
GFR SERPLBLD CREATININE-BSD FMLA CKD-EPI: 108 ML/MIN/1.73 M 2
GLOBULIN SER CALC-MCNC: 2.9 G/DL (ref 1.9–3.5)
GLUCOSE SERPL-MCNC: 151 MG/DL (ref 65–99)
GLUCOSE UR STRIP.AUTO-MCNC: 100 MG/DL
HBV DNA SERPL NAA+PROBE-ACNC: ABNORMAL IU/ML
HBV DNA SERPL NAA+PROBE-LOG IU: 7.81 LOG IU/ML
HBV DNA SERPL QL NAA+PROBE: DETECTED
HCT VFR BLD AUTO: 47.5 % (ref 42–52)
HGB BLD-MCNC: 15.7 G/DL (ref 14–18)
HYALINE CASTS #/AREA URNS LPF: ABNORMAL /LPF
INR PPP: 1.09 (ref 0.87–1.13)
KETONES UR STRIP.AUTO-MCNC: NEGATIVE MG/DL
LEUKOCYTE ESTERASE UR QL STRIP.AUTO: ABNORMAL
MAGNESIUM SERPL-MCNC: 1.8 MG/DL (ref 1.5–2.5)
MCH RBC QN AUTO: 31 PG (ref 27–33)
MCHC RBC AUTO-ENTMCNC: 33.1 G/DL (ref 32.3–36.5)
MCV RBC AUTO: 93.7 FL (ref 81.4–97.8)
MICRO URNS: ABNORMAL
NITRITE UR QL STRIP.AUTO: POSITIVE
PH UR STRIP.AUTO: 5.5 [PH] (ref 5–8)
PLATELET # BLD AUTO: 161 K/UL (ref 164–446)
PMV BLD AUTO: 10.7 FL (ref 9–12.9)
POTASSIUM SERPL-SCNC: 3.8 MMOL/L (ref 3.6–5.5)
PROT SERPL-MCNC: 6.7 G/DL (ref 6–8.2)
PROT UR QL STRIP: 30 MG/DL
PROTHROMBIN TIME: 14 SEC (ref 12–14.6)
RBC # BLD AUTO: 5.07 M/UL (ref 4.7–6.1)
RBC # URNS HPF: ABNORMAL /HPF
RBC UR QL AUTO: ABNORMAL
SODIUM SERPL-SCNC: 136 MMOL/L (ref 135–145)
SP GR UR STRIP.AUTO: >=1.03
UROBILINOGEN UR STRIP.AUTO-MCNC: 1 MG/DL
WBC # BLD AUTO: 4.5 K/UL (ref 4.8–10.8)
WBC #/AREA URNS HPF: ABNORMAL /HPF

## 2023-08-09 PROCEDURE — 700111 HCHG RX REV CODE 636 W/ 250 OVERRIDE (IP): Performed by: HOSPITALIST

## 2023-08-09 PROCEDURE — 99232 SBSQ HOSP IP/OBS MODERATE 35: CPT | Performed by: NURSE PRACTITIONER

## 2023-08-09 PROCEDURE — 80053 COMPREHEN METABOLIC PANEL: CPT

## 2023-08-09 PROCEDURE — 81001 URINALYSIS AUTO W/SCOPE: CPT

## 2023-08-09 PROCEDURE — 87186 SC STD MICRODIL/AGAR DIL: CPT

## 2023-08-09 PROCEDURE — 85027 COMPLETE CBC AUTOMATED: CPT

## 2023-08-09 PROCEDURE — A9270 NON-COVERED ITEM OR SERVICE: HCPCS

## 2023-08-09 PROCEDURE — 85610 PROTHROMBIN TIME: CPT

## 2023-08-09 PROCEDURE — A9270 NON-COVERED ITEM OR SERVICE: HCPCS | Performed by: INTERNAL MEDICINE

## 2023-08-09 PROCEDURE — 700111 HCHG RX REV CODE 636 W/ 250 OVERRIDE (IP): Mod: JZ | Performed by: INTERNAL MEDICINE

## 2023-08-09 PROCEDURE — 99232 SBSQ HOSP IP/OBS MODERATE 35: CPT | Mod: GC | Performed by: INTERNAL MEDICINE

## 2023-08-09 PROCEDURE — 700102 HCHG RX REV CODE 250 W/ 637 OVERRIDE(OP): Performed by: INTERNAL MEDICINE

## 2023-08-09 PROCEDURE — 83735 ASSAY OF MAGNESIUM: CPT

## 2023-08-09 PROCEDURE — 87086 URINE CULTURE/COLONY COUNT: CPT

## 2023-08-09 PROCEDURE — 700111 HCHG RX REV CODE 636 W/ 250 OVERRIDE (IP)

## 2023-08-09 PROCEDURE — 700102 HCHG RX REV CODE 250 W/ 637 OVERRIDE(OP)

## 2023-08-09 PROCEDURE — 770020 HCHG ROOM/CARE - TELE (206)

## 2023-08-09 PROCEDURE — 51798 US URINE CAPACITY MEASURE: CPT

## 2023-08-09 PROCEDURE — 87077 CULTURE AEROBIC IDENTIFY: CPT

## 2023-08-09 PROCEDURE — 700101 HCHG RX REV CODE 250: Performed by: HOSPITALIST

## 2023-08-09 PROCEDURE — 36415 COLL VENOUS BLD VENIPUNCTURE: CPT

## 2023-08-09 RX ORDER — CALCIUM CARBONATE 500 MG/1
500 TABLET, CHEWABLE ORAL ONCE
Status: COMPLETED | OUTPATIENT
Start: 2023-08-09 | End: 2023-08-09

## 2023-08-09 RX ORDER — ONDANSETRON 4 MG/1
4 TABLET, ORALLY DISINTEGRATING ORAL EVERY 4 HOURS PRN
Status: DISCONTINUED | OUTPATIENT
Start: 2023-08-09 | End: 2023-08-10 | Stop reason: HOSPADM

## 2023-08-09 RX ADMIN — Medication 6 MG: at 20:23

## 2023-08-09 RX ADMIN — CEFTRIAXONE SODIUM 2000 MG: 10 INJECTION, POWDER, FOR SOLUTION INTRAVENOUS at 14:37

## 2023-08-09 RX ADMIN — ONDANSETRON 4 MG: 4 TABLET, ORALLY DISINTEGRATING ORAL at 21:01

## 2023-08-09 RX ADMIN — ENOXAPARIN SODIUM 40 MG: 100 INJECTION SUBCUTANEOUS at 16:57

## 2023-08-09 RX ADMIN — GABAPENTIN 600 MG: 300 CAPSULE ORAL at 21:52

## 2023-08-09 RX ADMIN — ONDANSETRON 4 MG: 4 TABLET, ORALLY DISINTEGRATING ORAL at 01:58

## 2023-08-09 RX ADMIN — CLOPIDOGREL BISULFATE 75 MG: 75 TABLET ORAL at 04:47

## 2023-08-09 RX ADMIN — ANTACID TABLETS 500 MG: 500 TABLET, CHEWABLE ORAL at 20:22

## 2023-08-09 RX ADMIN — ASPIRIN 81 MG 81 MG: 81 TABLET ORAL at 04:47

## 2023-08-09 RX ADMIN — SENNOSIDES AND DOCUSATE SODIUM 2 TABLET: 50; 8.6 TABLET ORAL at 16:58

## 2023-08-09 ASSESSMENT — ENCOUNTER SYMPTOMS
FALLS: 0
DIZZINESS: 1
FEVER: 0
TINGLING: 0
DOUBLE VISION: 0
DIARRHEA: 0
ABDOMINAL PAIN: 0
CONSTIPATION: 0
NERVOUS/ANXIOUS: 1
SHORTNESS OF BREATH: 1
NAUSEA: 0
INSOMNIA: 0
BLOOD IN STOOL: 0
VOMITING: 0
MYALGIAS: 0
SORE THROAT: 0
WEAKNESS: 0
COUGH: 0
SHORTNESS OF BREATH: 0
CHILLS: 0
PALPITATIONS: 0

## 2023-08-09 ASSESSMENT — PAIN DESCRIPTION - PAIN TYPE
TYPE: ACUTE PAIN
TYPE: ACUTE PAIN
TYPE: ACUTE PAIN;CHRONIC PAIN
TYPE: ACUTE PAIN
TYPE: ACUTE PAIN

## 2023-08-09 ASSESSMENT — PATIENT HEALTH QUESTIONNAIRE - PHQ9
SUM OF ALL RESPONSES TO PHQ9 QUESTIONS 1 AND 2: 0
2. FEELING DOWN, DEPRESSED, IRRITABLE, OR HOPELESS: NOT AT ALL
1. LITTLE INTEREST OR PLEASURE IN DOING THINGS: NOT AT ALL

## 2023-08-09 ASSESSMENT — FIBROSIS 4 INDEX
FIB4 SCORE: 7.64
FIB4 SCORE: 7.64

## 2023-08-09 NOTE — PROGRESS NOTES
[facility] Internal Medicine Daily Progress Note    Date of Service  8/8/2023    [employer] Team: R IM White Team   Attending: Eugenie Tom M.d.  Senior Resident: Dr. Roberson  Contact Number: [phone]    Chief Complaint  Kaiden Quiroz is a 63 y.o. male admitted 8/6/2023 with near syncope an shortness of breath.    Hospital Course  Kaiden Quiroz is a 63 y.o. 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 He was accompanied by his wife who noted a near-syncopal episode a few days prior that had left him feeling lightheaded since then. Prior to arrival, patient's wife checked his oxygenation and found him to be hypoxic at 82%. Denies any recent viral symptoms.      In the ED, he was found to be afebrile with stable vitals after he was initiated on oxygen 2 LPM via NC. CBC WNL. Chemistry WNL. UDS negative. Troponin 17. AST elevated 1375. ALT elevated 2313. Alkaline phosphatase elevated 263. Total bilirubin elevated 4.4. PT/INR WNL. D-Dimer WNL. Hepatitis B surface antigen positive. Hepatitis B core IgM positive. Hepatitis A and C antibody assays negative. Viral panel negative. Patient denied any history of IV drug abuse, blood transfusions, and reported being in a monogamous relationship with his wife. CXR showed mild cardiomegaly. RUQ US showed an echogenic liver consistent with fatty liver disease without nodularity suggestive of cirrhosis or HCC. UA suggestive of UTI.      Patient was admitted to telemetry for further monitoring of his ongoing dizziness given his stroke history. Antihypertensive medications held in favor of IV hydralazine 10 mg Q4H with holding parameters. GI consulted for treatment of his acute hepatitis  B.     Subjective   -Reports concern about infecting others.  -Wife is at bedside, she brought his CPAP machine. Patient's SOB and dizziness improved with CPAP use.   -Has a significant alcohol use history; reports 6+ alcoholic beverages daily prior to his stroke but has quit since. He also has a 40 pack year history of smoking.      Interval Problem Update  -Repeat hepatitis B virus test is positive  -GI onboard, additional workup is pending. Treatment is not needed at this time    I have discussed this patient's plan of care and discharge plan at IDT rounds today with Case Management, Nursing, Nursing leadership, and other members of the IDT team.    Consultants/Specialty  GI    Code Status  Full Code    Disposition  The patient is not medically cleared for discharge to home or a post-acute facility.      I have placed the appropriate orders for post-discharge needs.    Review of Systems  Review of Systems   Constitutional:  Negative for chills and fever.   Eyes:  Negative for double vision.   Respiratory:  Positive for shortness of breath (improved since admission).    Cardiovascular:  Negative for chest pain and palpitations.   Gastrointestinal:  Negative for abdominal pain, constipation and vomiting.   Neurological:  Positive for dizziness.        Physical Exam  Temp:  [36.3 °C (97.4 °F)-37.8 °C (100 °F)] 37.2 °C (99 °F)  Pulse:  [55-67] 65  Resp:  [17-18] 18  BP: (115-155)/(61-91) 134/78  SpO2:  [94 %-97 %] 94 %    Physical Exam  Constitutional:       General: He is not in acute distress.     Appearance: He is not toxic-appearing.   HENT:      Head: Normocephalic and atraumatic.      Nose: No rhinorrhea.   Eyes:      General: No scleral icterus.        Right eye: No discharge.         Left eye: No discharge.      Extraocular Movements: Extraocular movements intact.   Cardiovascular:      Rate and Rhythm: Normal rate.      Pulses: Normal pulses.   Pulmonary:      Effort: No respiratory distress.    Musculoskeletal:         General: No deformity.      Cervical back: Normal range of motion. No rigidity.   Skin:     Coloration: Skin is not jaundiced.      Findings: No bruising.   Neurological:      Mental Status: He is oriented to person, place, and time. Mental status is at baseline.   Psychiatric:      Comments: Sad affect         Fluids    Intake/Output Summary (Last 24 hours) at 8/8/2023 1732  Last data filed at 8/8/2023 0552  Gross per 24 hour   Intake 200 ml   Output 551 ml   Net -351 ml         Laboratory  Recent Labs     08/06/23  1230 08/07/23  0118 08/08/23  0123   WBC 4.1* 4.0* 4.0*   RBC 4.85 4.80 4.83   HEMOGLOBIN 15.2 15.0 15.1   HEMATOCRIT 45.3 44.7 46.1   MCV 93.4 93.1 95.4   MCH 31.3 31.3 31.3   MCHC 33.6 33.6 32.8   RDW 48.0 48.4 49.5   PLATELETCT 169 161* 166   MPV 10.4 10.6 10.2       Recent Labs     08/06/23  1230 08/07/23  0118 08/08/23  0123   SODIUM 134* 135 137   POTASSIUM 4.1 3.9 4.0   CHLORIDE 101 103 105   CO2 20 21 22   GLUCOSE 145* 109* 119*   BUN 22 15 10   CREATININE 0.92 0.72 0.62   CALCIUM 9.8 9.3 9.0       Recent Labs     08/06/23  1230 08/07/23  1139   INR 1.04 1.11                 Imaging  US-RUQ   Final Result      1.  Contracted gallbladder. No evidence of gallstone or biliary ductal dilatation.      2.  The gallbladder wall likely related to contracted gallbladder.      3.  The liver is echogenic consistent with fatty change versus hepatocellular dysfunction.      DX-CHEST-PORTABLE (1 VIEW)   Final Result      Mild cardiomegaly.             Assessment/Plan  Problem Representation:    * Acute hepatitis- (present on admission)  Assessment & Plan  Patient found to have acute hepatitis B infection.  He found to significant elevated bilirubin and liver enzymes  I reviewed ultrasound right upper quadrant that did not show any acute abnormalities.  Patient may need treatment for his acute hepatitis B due to his significant elevated bilirubin although his INR came back within  normal limits.  GI consult, recommendations appreciated   Monitor renal function with CMP's   Holding all hepatotoxic medication including blood pressure medication that can potentially cause liver damage  I discussed plan of care with patient's wife regarding precaution for hepatitis B and she expressed understanding.      Elevated bilirubin  Assessment & Plan  He found to have significantly elevated bilirubin of 4.4 and it was normal in May of this year.  Elevated bilirubin most likely secondary to hepatitis B infection.  Ordered CMP for tomorrow.    Elevated liver enzymes  Assessment & Plan  He found to have significant elevated liver enzymes  I reviewed ultrasound right upper quadrant pain  His elevated liver enzymes most likely secondary to hepatitis B infection.  Requested consultation with GI.    Advance care planning  Assessment & Plan  I discussed CODE STATUS with patient and his wife at the bedside.  Patient's wife reported that they have advance directive at home.  I explained them the importance of confirming CODE STATUS in the hospital and after discussion patient requested to be full code as per patient request I placed full code orders.    Time spent 16 minutes    Dizziness  Assessment & Plan  He has been having symptoms of dizziness that is associated with hypoxia.  Due to his ongoing symptoms of dizziness and history of stroke plan is to admit him to telemetry.  Discussed plan of care with patient and his wife at the bedside.    Internal carotid artery stent present- (present on admission)  Assessment & Plan  Continue outpatient dual antiplatelet therapy.  He denies bleeding.    Acute respiratory failure with hypoxia (HCC)- (present on admission)  Assessment & Plan  Patient found to have acute respiratory failure with hypoxia he is requiring supplemental oxygen.  He found to have ox saturation on room air 88% and at home he found to have oxygen saturation in 80s.  Incentive spirometry.  Admitted  telemetry for close monitoring.      Class 1 obesity in adult  Assessment & Plan  Patient found to have BMI of 34.40  He reported that he has been eating vegetables and trying to eat healthy  Lifestyle modifications  Outpatient follow-up.    Primary hypertension- (present on admission)  Assessment & Plan  I am holding his blood pressure medication I started him on IV hydralazine 10 mg every 4 hours as needed with parameters.  Plan is to resume his outpatient blood pressure medication.  GI requested to hold his ACE inhibitor and statin.  Plan is to admit him to telemetry.         VTE prophylaxis: enoxaparin ppx    I have performed a physical exam and reviewed and updated ROS and Plan today (8/8/2023). In review of yesterday's note (8/7/2023), there are no changes except as documented above.

## 2023-08-09 NOTE — CARE PLAN
The patient is Stable - Low risk of patient condition declining or worsening    Shift Goals  Clinical Goals: rest  Patient Goals: rest  Family Goals: rest      Problem: Pain - Standard  Goal: Alleviation of pain or a reduction in pain to the patient’s comfort goal  Outcome: Progressing     Problem: Fall Risk  Goal: Patient will remain free from falls  Outcome: Progressing

## 2023-08-09 NOTE — PROGRESS NOTES
Received bedside report from RN, pt care assumed, VSS, pt assessment complete. Pt AAOx4, with 0/10 of pain at this time. No signs of acute distress noted at this time. Plan of care discussed with pt and verbalizes no questions. Pt denies any additional needs at this time. Bed locked/in lowest position, pt educated on fall risk and verbalized understanding, call light within reach, hourly rounding initiated.

## 2023-08-09 NOTE — PROGRESS NOTES
Gastroenterology Progress Note               Author:  MARIN Booth Date & Time Created: 8/9/2023 2:07 PM       Patient ID:  Name:             Kaiden Quiroz  YOB: 1960  Age:                 63 y.o.  male  MRN:               [id number]        Medical Decision Making, by Problem:  Active Hospital Problems    Diagnosis     Acute hepatitis [B17.9]     Dizziness [R42]     Advance care planning [Z71.89]     Elevated liver enzymes [R74.8]     Elevated bilirubin [R17]     Internal carotid artery stent present [Z95.828]     Primary hypertension [I10]     Acute respiratory failure with hypoxia (HCC) [J96.01]     Class 1 obesity in adult [E66.9]            Presenting Chief Complaint:  Elevated LFT's     HISTORY OF PRESENT ILLNESS:  Kaiden Quiroz is a 63 y.o. male with past medical history of stroke 1/23 hypertension and hyperlipidemia who presented to the hospital on 8/6/2023 with dizziness and shortness of breath.  Patient's wife at the bedside reported that they went to grocery shopping and he started having dizziness and he ate peanut butter and it helped in his symptoms.  He took a nap. She checked her blood glucose and he found to have normal blood glucose.  Next day he was not feeling well and he was having episodes of brain fogginess and patient's wife checked pulse ox and that showed oxygen saturation in 80s.  They called doctor and they told him to go to ER. She reported that he has been taking his medication as prescribed.   He recently noticed change in his urine color.  There is no specific aggravating or elevating factors. Patient denies any history of IV drug use, blood transfusion and he is in monogamous relationship with his wife.  He also denies tattoos, sharing toothbrush or sharing razors. The wife states that she has had blood transfusions when she was an infant. He used to drink heavily and smoke but has not done either since the stroke. He was taking metformin  but  stopped. He denies complaint of nausea, vomiting, diarrhea and constipation. When he came into ER he was found to have markedly high LFT's and acute heb B surf anti body and Igm core were positive. He underwent ultrasound  that did not show abnormalities or PVT         Interval History:  8/8/2023: Patient seen at bedside.  Tolerating oral intake without difficulty.  LFTs remain elevated but mildly decreased.  Total bilirubin 4.4-5.  Albumin 3.6.      8/9/2023: Patient seen at bedside with wife.  Tolerating oral intake without difficulty.  Total bilirubin 4.4-5-6.4.  LFTs decreasing, INR normal at 1.09.  Hepatitis B surface antigen and hep B core antibody IgM reactive, hep B surface antibody quant <3.5, hep B E antigen reactive, hep B E antibody negative.         Hospital Medications:  Current Facility-Administered Medications   Medication Dose Frequency Provider Last Rate Last Admin    ondansetron (Zofran ODT) dispertab 4 mg  4 mg Q4HRS PRN Noni Duron M.D.   4 mg at 08/09/23 0158    cefTRIAXone (Rocephin) syringe 2,000 mg  2,000 mg Q24HRS Negrita Roberson M.D.        melatonin tablet 6 mg  6 mg Nightly Noni Duron M.D.   6 mg at 08/08/23 2029    senna-docusate (Pericolace Or Senokot S) 8.6-50 MG per tablet 2 Tablet  2 Tablet BID Marsha Palacios M.D.   2 Tablet at 08/07/23 0503    And    polyethylene glycol/lytes (Miralax) PACKET 1 Packet  1 Packet QDAY PRN Marsha Palacios M.D.        And    magnesium hydroxide (Milk Of Magnesia) suspension 30 mL  30 mL QDAY PRN Marsha Palacios M.D.        And    bisacodyl (Dulcolax) suppository 10 mg  10 mg QDAY PRN Marsha Palacios M.D.        enoxaparin (Lovenox) inj 40 mg  40 mg DAILY AT 1800 Marsha Palacios M.D.   40 mg at 08/08/23 1819    hydrALAZINE (Apresoline) injection 10 mg  10 mg Q4HRS PRN Marsha Palacios M.D.        aspirin (Asa) chewable tab 81 mg  81 mg DAILY Marsha Palacios M.D.   81 mg at 08/09/23  "0447    clopidogrel (Plavix) tablet 75 mg  75 mg DAILY Marsha Palacios M.D.   75 mg at 08/09/23 0447    gabapentin (Neurontin) capsule 600 mg  600 mg Q EVENING MARIN Ashton   600 mg at 08/08/23 2300   Last reviewed on 8/6/2023  3:32 PM by Mariana Roth, Student       Review of Systems:  Review of Systems   Constitutional:  Negative for chills, fever and malaise/fatigue.   HENT:  Negative for congestion and sore throat.    Respiratory:  Negative for cough and shortness of breath.    Cardiovascular:  Negative for chest pain and leg swelling.   Gastrointestinal:  Negative for abdominal pain, blood in stool, constipation, diarrhea, melena, nausea and vomiting.   Genitourinary:  Negative for dysuria.   Musculoskeletal:  Negative for falls and myalgias.   Neurological:  Negative for tingling and weakness.   Psychiatric/Behavioral:  The patient is nervous/anxious. The patient does not have insomnia.    All other systems reviewed and are negative.        Vital signs:  Weight/BMI: Body mass index is 33.84 kg/m².  /65   Pulse 74   Temp 36.9 °C (98.4 °F) (Temporal)   Resp 17   Ht 1.702 m (5' 7\")   Wt 98 kg (216 lb 0.8 oz)   SpO2 94%   Vitals:    08/09/23 0038 08/09/23 0407 08/09/23 0713 08/09/23 1054   BP: 122/75 136/75 122/76 107/65   Pulse: 82 76 71 74   Resp: 18 17 16 17   Temp: 37.9 °C (100.2 °F) 37.1 °C (98.8 °F) 37.1 °C (98.8 °F) 36.9 °C (98.4 °F)   TempSrc: Temporal Temporal Temporal Temporal   SpO2: 96% 95% 94% 94%   Weight:       Height:         Oxygen Therapy:  Pulse Oximetry: 94 %, O2 (LPM): 0, O2 Delivery Device: None - Room Air    Intake/Output Summary (Last 24 hours) at 8/9/2023 1407  Last data filed at 8/9/2023 1200  Gross per 24 hour   Intake 480 ml   Output 525 ml   Net -45 ml           Physical Exam:  Physical Exam  Vitals and nursing note reviewed.   Constitutional:       General: He is not in acute distress.     Appearance: He is obese.   HENT:      Head: Normocephalic.      " Nose: Nose normal. No congestion.      Mouth/Throat:      Mouth: Mucous membranes are moist.      Pharynx: Oropharynx is clear. No oropharyngeal exudate.   Eyes:      General: No scleral icterus.     Extraocular Movements: Extraocular movements intact.      Conjunctiva/sclera: Conjunctivae normal.   Cardiovascular:      Rate and Rhythm: Normal rate and regular rhythm.      Pulses: Normal pulses.      Heart sounds: Normal heart sounds. No murmur heard.  Pulmonary:      Effort: Pulmonary effort is normal. No respiratory distress.      Breath sounds: Normal breath sounds. No wheezing.   Abdominal:      General: Abdomen is flat. Bowel sounds are normal. There is no distension.      Palpations: Abdomen is soft.      Tenderness: There is no abdominal tenderness.   Musculoskeletal:      Right lower leg: No edema.      Left lower leg: No edema.   Skin:     General: Skin is warm and dry.      Capillary Refill: Capillary refill takes less than 2 seconds.      Coloration: Skin is jaundiced (mild).   Neurological:      General: No focal deficit present.      Mental Status: He is alert and oriented to person, place, and time. Mental status is at baseline.   Psychiatric:         Mood and Affect: Mood normal.         Behavior: Behavior normal.         Thought Content: Thought content normal.         Judgment: Judgment normal.             Labs:  Recent Labs     08/07/23  0118 08/08/23 0123 08/09/23 0107   SODIUM 135 137 136   POTASSIUM 3.9 4.0 3.8   CHLORIDE 103 105 102   CO2 21 22 20   BUN 15 10 7*   CREATININE 0.72 0.62 0.62   MAGNESIUM 1.7 1.7 1.8   CALCIUM 9.3 9.0 9.1       Recent Labs     08/07/23 0118 08/08/23 0123 08/09/23  0107   ALTSGPT 2055* 1954* 1819*   ASTSGOT 1126* 1060* 846*   ALKPHOSPHAT 256* 255* 249*   TBILIRUBIN 4.4* 5.0* 6.4*   GLUCOSE 109* 119* 151*       Recent Labs     08/07/23 0118 08/08/23 0123 08/09/23  0107   WBC 4.0* 4.0* 4.5*   NEUTSPOLYS 45.80 45.70  --    LYMPHOCYTES 27.80 28.40  --    MONOCYTES  18.00* 17.20*  --    EOSINOPHILS 7.30* 7.60*  --    BASOPHILS 0.80 0.80  --    ASTSGOT 1126* 1060* 846*   ALTSGPT 2055* 1954* 1819*   ALKPHOSPHAT 256* 255* 249*   TBILIRUBIN 4.4* 5.0* 6.4*       Recent Labs     08/07/23  0118 08/07/23  1139 08/08/23  0123 08/09/23  0107   RBC 4.80  --  4.83 5.07   HEMOGLOBIN 15.0  --  15.1 15.7   HEMATOCRIT 44.7  --  46.1 47.5   PLATELETCT 161*  --  166 161*   PROTHROMBTM  --  14.2  --  14.0   INR  --  1.11  --  1.09       Recent Results (from the past 24 hour(s))   Comp Metabolic Panel    Collection Time: 08/09/23  1:07 AM   Result Value Ref Range    Sodium 136 135 - 145 mmol/L    Potassium 3.8 3.6 - 5.5 mmol/L    Chloride 102 96 - 112 mmol/L    Co2 20 20 - 33 mmol/L    Anion Gap 14.0 7.0 - 16.0    Glucose 151 (H) 65 - 99 mg/dL    Bun 7 (L) 8 - 22 mg/dL    Creatinine 0.62 0.50 - 1.40 mg/dL    Calcium 9.1 8.5 - 10.5 mg/dL    Correct Calcium 9.3 8.5 - 10.5 mg/dL    AST(SGOT) 846 (H) 12 - 45 U/L    ALT(SGPT) 1819 (HH) 2 - 50 U/L    Alkaline Phosphatase 249 (H) 30 - 99 U/L    Total Bilirubin 6.4 (H) 0.1 - 1.5 mg/dL    Albumin 3.8 3.2 - 4.9 g/dL    Total Protein 6.7 6.0 - 8.2 g/dL    Globulin 2.9 1.9 - 3.5 g/dL    A-G Ratio 1.3 g/dL   CBC WITHOUT DIFFERENTIAL    Collection Time: 08/09/23  1:07 AM   Result Value Ref Range    WBC 4.5 (L) 4.8 - 10.8 K/uL    RBC 5.07 4.70 - 6.10 M/uL    Hemoglobin 15.7 14.0 - 18.0 g/dL    Hematocrit 47.5 42.0 - 52.0 %    MCV 93.7 81.4 - 97.8 fL    MCH 31.0 27.0 - 33.0 pg    MCHC 33.1 32.3 - 36.5 g/dL    RDW 49.5 35.9 - 50.0 fL    Platelet Count 161 (L) 164 - 446 K/uL    MPV 10.7 9.0 - 12.9 fL   MAGNESIUM    Collection Time: 08/09/23  1:07 AM   Result Value Ref Range    Magnesium 1.8 1.5 - 2.5 mg/dL   Prothrombin Time    Collection Time: 08/09/23  1:07 AM   Result Value Ref Range    PT 14.0 12.0 - 14.6 sec    INR 1.09 0.87 - 1.13   ESTIMATED GFR    Collection Time: 08/09/23  1:07 AM   Result Value Ref Range    GFR (CKD-EPI) 108 >60 mL/min/1.73 m 2    URINALYSIS    Collection Time: 08/09/23  1:50 AM    Specimen: Urine, Clean Catch   Result Value Ref Range    Color Orange (A)     Character Cloudy (A)     Specific Gravity >=1.030 <1.035    Ph 5.5 5.0 - 8.0    Glucose 100 (A) Negative mg/dL    Ketones Negative Negative mg/dL    Protein 30 (A) Negative mg/dL    Bilirubin Large (A) Negative    Urobilinogen, Urine 1.0 Negative    Nitrite Positive (A) Negative    Leukocyte Esterase Trace (A) Negative    Occult Blood Trace (A) Negative    Micro Urine Req Microscopic    URINE MICROSCOPIC (W/UA)    Collection Time: 08/09/23  1:50 AM   Result Value Ref Range    WBC 10-20 (A) /hpf    RBC 2-5 (A) /hpf    Bacteria Many (A) None /hpf    Epithelial Cells Negative /hpf    Amorphous Crystal Present /hpf    Hyaline Cast 3-5 (A) /lpf       Radiology Review:  US-RUQ   Final Result      1.  Contracted gallbladder. No evidence of gallstone or biliary ductal dilatation.      2.  The gallbladder wall likely related to contracted gallbladder.      3.  The liver is echogenic consistent with fatty change versus hepatocellular dysfunction.      DX-CHEST-PORTABLE (1 VIEW)   Final Result      Mild cardiomegaly.            MDM (Data Review):   -Records reviewed and summarized in current documentation  -I personally reviewed and interpreted the laboratory results  -I personally reviewed the radiology images    Assessment/Recommendations:  Transaminitis  Acute viral hepatitis B    MDM:  This is a pleasant 63-year-old male with a past medical history as listed above who presents to [facility]/6/2023 for acute hepatitis.    He denies drug use, tattoos, blood transfusion.  He states that his wife had blood transfusions as an infant.   Total bilirubin 4.4-5-6.4.  LFTs decreasing, INR normal at 1.09.  Hepatitis B surface antigen and hep B core antibody IgM reactive, hep B surface antibody quant <3.5, hep B E antigen reactive, hep B E antibody negative. AFP negative. hepatitis B  PCR quantitative, Hepatitis B surface conf pending. Patient states nervousness regarding hepatic disease and would like to start treatment since he has known multiple people that have  from liver disease.  Discussed treatment initiation parameters with patient, risk associated with initiating treatment and that we are monitoring patient's liver function to determine if initiation of therapy is indicated. Patient's wife at bedside conveyed same concerns in addition to distress not knowing how patient contracted infection. Discussed patient's liver is functioning at this time and patient to have ongoing monitoring of liver function to determine need for initiation of therapy. This can be done on outpatient basis with repeat labs in 1 week as well as close OP GI follow up. Urgent referral to OP GI placed.  Patient and wife verbalized understanding. Of note, patient's wife also states she had blood work done on herself through urgent care for testing. She was encouraged to follow up with her primary care provider for results.       Plan:  He does not need treatment at this time. His INR is normal. If bilirubin would remain elevated after 4 weeks or inr rise, he may be candidate for treatment.   Urgent referral to OP GI placed. Please call schedulers to arrange OP follow up with GI in 1 weeks.   Will need CMP and INR in 1 week- to be drawn prior to OP GI follow up.     No further interventions from acute GI team. GI to sign off. Please re- consult for further questions or concerns      Discussed with patient, patient's wife at bedside, Dr. Vargas, Dr. Tom      Core Quality Measures   Reviewed items::  Labs, Medications and Radiology reports reviewed

## 2023-08-10 ENCOUNTER — PHARMACY VISIT (OUTPATIENT)
Dept: PHARMACY | Facility: MEDICAL CENTER | Age: 63
End: 2023-08-10
Payer: COMMERCIAL

## 2023-08-10 VITALS
BODY MASS INDEX: 34.39 KG/M2 | DIASTOLIC BLOOD PRESSURE: 68 MMHG | HEIGHT: 67 IN | WEIGHT: 219.14 LBS | OXYGEN SATURATION: 94 % | TEMPERATURE: 98.1 F | SYSTOLIC BLOOD PRESSURE: 129 MMHG | RESPIRATION RATE: 18 BRPM | HEART RATE: 60 BPM

## 2023-08-10 PROBLEM — J96.01 ACUTE RESPIRATORY FAILURE WITH HYPOXIA (HCC): Status: RESOLVED | Noted: 2023-01-12 | Resolved: 2023-08-10

## 2023-08-10 PROBLEM — R42 DIZZINESS: Status: RESOLVED | Noted: 2023-08-06 | Resolved: 2023-08-10

## 2023-08-10 LAB
ALBUMIN SERPL BCP-MCNC: 3.5 G/DL (ref 3.2–4.9)
ALBUMIN/GLOB SERPL: 1.3 G/DL
ALP SERPL-CCNC: 243 U/L (ref 30–99)
ALT SERPL-CCNC: 1616 U/L (ref 2–50)
ANION GAP SERPL CALC-SCNC: 12 MMOL/L (ref 7–16)
AST SERPL-CCNC: 881 U/L (ref 12–45)
BASOPHILS # BLD AUTO: 0.5 % (ref 0–1.8)
BASOPHILS # BLD: 0.02 K/UL (ref 0–0.12)
BILIRUB SERPL-MCNC: 5.3 MG/DL (ref 0.1–1.5)
BUN SERPL-MCNC: 6 MG/DL (ref 8–22)
CALCIUM ALBUM COR SERPL-MCNC: 9.5 MG/DL (ref 8.5–10.5)
CALCIUM SERPL-MCNC: 9.1 MG/DL (ref 8.5–10.5)
CHLORIDE SERPL-SCNC: 104 MMOL/L (ref 96–112)
CO2 SERPL-SCNC: 21 MMOL/L (ref 20–33)
CREAT SERPL-MCNC: 0.63 MG/DL (ref 0.5–1.4)
EOSINOPHIL # BLD AUTO: 0.32 K/UL (ref 0–0.51)
EOSINOPHIL NFR BLD: 7.8 % (ref 0–6.9)
ERYTHROCYTE [DISTWIDTH] IN BLOOD BY AUTOMATED COUNT: 49.4 FL (ref 35.9–50)
GFR SERPLBLD CREATININE-BSD FMLA CKD-EPI: 108 ML/MIN/1.73 M 2
GLOBULIN SER CALC-MCNC: 2.8 G/DL (ref 1.9–3.5)
GLUCOSE SERPL-MCNC: 95 MG/DL (ref 65–99)
HBV SURFACE AG SERPL QL NT: POSITIVE
HCT VFR BLD AUTO: 44.1 % (ref 42–52)
HGB BLD-MCNC: 14.9 G/DL (ref 14–18)
IMM GRANULOCYTES # BLD AUTO: 0.01 K/UL (ref 0–0.11)
IMM GRANULOCYTES NFR BLD AUTO: 0.2 % (ref 0–0.9)
INR PPP: 1.04 (ref 0.87–1.13)
LYMPHOCYTES # BLD AUTO: 1.1 K/UL (ref 1–4.8)
LYMPHOCYTES NFR BLD: 26.7 % (ref 22–41)
MCH RBC QN AUTO: 31.4 PG (ref 27–33)
MCHC RBC AUTO-ENTMCNC: 33.8 G/DL (ref 32.3–36.5)
MCV RBC AUTO: 92.8 FL (ref 81.4–97.8)
MONOCYTES # BLD AUTO: 0.54 K/UL (ref 0–0.85)
MONOCYTES NFR BLD AUTO: 13.1 % (ref 0–13.4)
NEUTROPHILS # BLD AUTO: 2.13 K/UL (ref 1.82–7.42)
NEUTROPHILS NFR BLD: 51.7 % (ref 44–72)
NRBC # BLD AUTO: 0 K/UL
NRBC BLD-RTO: 0 /100 WBC (ref 0–0.2)
PLATELET # BLD AUTO: 172 K/UL (ref 164–446)
PMV BLD AUTO: 10.6 FL (ref 9–12.9)
POTASSIUM SERPL-SCNC: 4 MMOL/L (ref 3.6–5.5)
PROT SERPL-MCNC: 6.3 G/DL (ref 6–8.2)
PROTHROMBIN TIME: 13.5 SEC (ref 12–14.6)
RBC # BLD AUTO: 4.75 M/UL (ref 4.7–6.1)
SODIUM SERPL-SCNC: 137 MMOL/L (ref 135–145)
WBC # BLD AUTO: 4.1 K/UL (ref 4.8–10.8)

## 2023-08-10 PROCEDURE — 80053 COMPREHEN METABOLIC PANEL: CPT

## 2023-08-10 PROCEDURE — 85025 COMPLETE CBC W/AUTO DIFF WBC: CPT

## 2023-08-10 PROCEDURE — A9270 NON-COVERED ITEM OR SERVICE: HCPCS | Performed by: INTERNAL MEDICINE

## 2023-08-10 PROCEDURE — RXMED WILLOW AMBULATORY MEDICATION CHARGE: Performed by: HOSPITALIST

## 2023-08-10 PROCEDURE — 99239 HOSP IP/OBS DSCHRG MGMT >30: CPT | Mod: GC | Performed by: INTERNAL MEDICINE

## 2023-08-10 PROCEDURE — 85610 PROTHROMBIN TIME: CPT

## 2023-08-10 PROCEDURE — 700111 HCHG RX REV CODE 636 W/ 250 OVERRIDE (IP): Performed by: HOSPITALIST

## 2023-08-10 PROCEDURE — 700102 HCHG RX REV CODE 250 W/ 637 OVERRIDE(OP): Performed by: INTERNAL MEDICINE

## 2023-08-10 PROCEDURE — 36415 COLL VENOUS BLD VENIPUNCTURE: CPT

## 2023-08-10 RX ORDER — SULFAMETHOXAZOLE AND TRIMETHOPRIM 800; 160 MG/1; MG/1
1 TABLET ORAL 2 TIMES DAILY
Qty: 4 TABLET | Refills: 0 | Status: ACTIVE | OUTPATIENT
Start: 2023-08-10 | End: 2023-08-12

## 2023-08-10 RX ADMIN — CEFTRIAXONE SODIUM 2000 MG: 10 INJECTION, POWDER, FOR SOLUTION INTRAVENOUS at 05:08

## 2023-08-10 RX ADMIN — ASPIRIN 81 MG 81 MG: 81 TABLET ORAL at 05:08

## 2023-08-10 RX ADMIN — CLOPIDOGREL BISULFATE 75 MG: 75 TABLET ORAL at 05:08

## 2023-08-10 RX ADMIN — SENNOSIDES AND DOCUSATE SODIUM 2 TABLET: 50; 8.6 TABLET ORAL at 05:08

## 2023-08-10 ASSESSMENT — ENCOUNTER SYMPTOMS
PALPITATIONS: 0
MUSCULOSKELETAL NEGATIVE: 1
ABDOMINAL PAIN: 0
PSYCHIATRIC NEGATIVE: 1
DIZZINESS: 0
CONSTIPATION: 0
SHORTNESS OF BREATH: 0
EYES NEGATIVE: 1
VOMITING: 0
LOSS OF CONSCIOUSNESS: 0
DIARRHEA: 0
HEMOPTYSIS: 0
NAUSEA: 0
FEVER: 0
WEAKNESS: 0
CHILLS: 0

## 2023-08-10 ASSESSMENT — PATIENT HEALTH QUESTIONNAIRE - PHQ9
1. LITTLE INTEREST OR PLEASURE IN DOING THINGS: NOT AT ALL
2. FEELING DOWN, DEPRESSED, IRRITABLE, OR HOPELESS: NOT AT ALL
SUM OF ALL RESPONSES TO PHQ9 QUESTIONS 1 AND 2: 0

## 2023-08-10 NOTE — CARE PLAN
The patient is Stable - Low risk of patient condition declining or worsening    Shift Goals  Clinical Goals: Abx Therapy, Monitor Liver Enzymes  Patient Goals: Comfort, Rest  Family Goals: rest    Progress made toward(s) clinical / shift goals:        Problem: Knowledge Deficit - Standard  Goal: Patient and family/care givers will demonstrate understanding of plan of care, disease process/condition, diagnostic tests and medications  Outcome: Progressing  Note: Patient educated about Abx therapy and POC. Patient verbalizes an understanding.      Problem: Hemodynamics  Goal: Patient's hemodynamics, fluid balance and neurologic status will be stable or improve  Outcome: Progressing  Note: Patient's vital signs and fluid balance maintained. Patient voiding and noticeable improvement on output and clarity of urine.        Patient is not progressing towards the following goals: NA

## 2023-08-10 NOTE — PROGRESS NOTES
Received bedside report from RN, pt care assumed and pt assessment complete. A&Ox4 and reports 0/10 pain. No signs of acute distress. Pt denies any additional needs at this time.Bed locked in lowest position and call light within reach.

## 2023-08-10 NOTE — ASSESSMENT & PLAN NOTE
Ceftriaxone  Post void bladder scan with <400ml of urine  Discuss non-hepatotoxic oral antibiotic options

## 2023-08-10 NOTE — DISCHARGE SUMMARY
[facility] Internal Medicine Discharge Summary    Attending: Eugenie Tom M.d.  Senior Resident: Dr. Roberson  Contact Number: [phone]    CHIEF COMPLAINT ON ADMISSION  Chief Complaint   Patient presents with    Near Syncopal     Had a near syncopal episode at the store a few days ago, felt better after sitting & eating a snack. BG checked & was normal.   Since this episode, pt has had several episodes of feeling lightheaded. Also c/o dark urine.     Shortness of Breath     Pt short of breath at times along with feeling presyncopal. Checked own sats & level was 82% - called MD & told to come to ED. No cough/cold sx.        Reason for Admission  Sent by Md      Admission Date  8/6/2023    CODE STATUS  Full Code    HPI & HOSPITAL COURSE  This is a 63 y.o. male here with near syncope an shortness of breath found to have acute hepatitis B.      Kaiden Quiroz is a 63 y.o. 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 He was accompanied by his wife who noted a near-syncopal episode a few days prior that had left him feeling lightheaded since then. Prior to arrival, patient's wife checked his oxygenation and found him to be hypoxic at 82%. Denies any recent viral symptoms.      In the ED, he was found to be afebrile with stable vitals after he was initiated on oxygen 2 LPM via NC. CBC WNL. Chemistry WNL. UDS negative. Troponin 17. AST elevated 1375. ALT elevated 2313. Alkaline phosphatase elevated 263. Total bilirubin elevated 4.4. PT/INR WNL. D-Dimer WNL. Hepatitis B surface antigen positive. Hepatitis B core IgM positive. Hepatitis A and C antibody assays negative.    Patient denied any history of IV drug abuse, blood  transfusions, and reported being in a monogamous relationship with his wife. CXR showed mild cardiomegaly. RUQ US showed an echogenic liver consistent with fatty liver disease without nodularity suggestive of cirrhosis or HCC. UA suggestive of UTI. Patient given ceftriaxone while inpatient and discharged on bactrim.    GI consulted while inpatient. Repeat confirmatory hepatitis B testing positive. Hepatitis B surface antibody < 3.5. Hepatitis B E antigen positive. Hepatitis B E antibody negative. Hepatitis B virus quant is 64,240,410; Alpha fetoprotein normal at 3.    Per GI, patient does not require active hepatitis B treatment at this time per his normal INR value.    AST and ALT are trending down (AST/ALT at 881/1616). Total bilirubin decreased at 5.3 (was 6.4 the day prior) prior to discharge. Patient is to follow up with GI outpatient in one week.  If bilirubin would remain elevated after 4 weeks or inr rise, he may be candidate for treatment. Urgent referral to OP GI placed. Please call schedulers to arrange OP follow up with GI in 1 weeks. Will need CMP and INR in 1 week- to be drawn prior to OP GI follow up.       Therefore, he is discharged in fair and stable condition to home with close outpatient follow-up.    The patient met 2-midnight criteria for an inpatient stay at the time of discharge.    Discharge Date  08/10/2023    Physical Exam on Day of Discharge  Physical Exam  Constitutional:       General: He is not in acute distress.     Appearance: He is not ill-appearing or toxic-appearing.   HENT:      Head: Normocephalic and atraumatic.      Nose: No rhinorrhea.   Eyes:      General: No scleral icterus.        Right eye: No discharge.         Left eye: No discharge.      Extraocular Movements: Extraocular movements intact.   Cardiovascular:      Rate and Rhythm: Normal rate.      Pulses: Normal pulses.   Pulmonary:      Effort: Pulmonary effort is normal. No respiratory distress.      Breath sounds: No  wheezing.   Abdominal:      Tenderness: There is no abdominal tenderness. There is no guarding.   Musculoskeletal:         General: No deformity.      Cervical back: Normal range of motion. No rigidity.      Right lower leg: No edema.      Left lower leg: No edema.   Skin:     Coloration: Skin is pale.      Findings: No bruising.   Neurological:      Mental Status: He is oriented to person, place, and time. Mental status is at baseline.      Motor: No weakness.      Gait: Gait normal.   Psychiatric:      Comments: Sad affect       FOLLOW UP ITEMS POST DISCHARGE  -Hold Atorvastatin upon discharge  -Urgent referral to OP GI placed. Will need CMP and INR in 1 week- to be drawn prior to OP GI follow up.   -Follow up with PCP regarding not taking atorvastatin and regarding hospitalization   -Finish course of Bactrim, follow up on urine culture and antibiotic appropriateness    DISCHARGE DIAGNOSES  Principal Problem:    Acute hepatitis (POA: Yes)  Active Problems:    Acute cystitis (POA: Unknown)    Primary hypertension (POA: Yes)    Class 1 obesity in adult (POA: Unknown)    Internal carotid artery stent present (POA: Yes)    Advance care planning (POA: Unknown)    Elevated liver enzymes (POA: Unknown)    Elevated bilirubin (POA: Unknown)  Resolved Problems:    Acute respiratory failure with hypoxia (HCC) (POA: Yes)    Dizziness (POA: Unknown)      FOLLOW UP  No future appointments.  No follow-up provider specified.    MEDICATIONS ON DISCHARGE     Medication List        START taking these medications        Instructions   sulfamethoxazole-trimethoprim 800-160 MG tablet  Commonly known as: Bactrim DS   Take 1 Tablet by mouth 2 times a day for 2 days.  Dose: 1 Tablet            CONTINUE taking these medications        Instructions   amLODIPine 2.5 MG Tabs  Commonly known as: Norvasc   Take 2.5 mg by mouth every day.  Dose: 2.5 mg     aspirin 81 MG Chew chewable tablet  Commonly known as: Asa   Chew 1 Tablet every  day.  Dose: 81 mg     clopidogrel 75 MG Tabs  Commonly known as: Plavix   Take 1 Tablet by mouth every day.  Dose: 75 mg     gabapentin 300 MG Caps  Commonly known as: Neurontin   Take 300-600 mg by mouth 2 times a day. 300mg every morning 600mg every night at bedtime  Dose: 300-600 mg     lisinopril 10 MG Tabs  Commonly known as: Prinivil   Take 2 Tablets by mouth every day.  Dose: 20 mg     melatonin 3 MG Tabs   Take 6 mg by mouth at bedtime.  Dose: 6 mg     terazosin 1 MG Caps  Commonly known as: Hytrin   Take 1 Capsule by mouth every evening.  Dose: 1 mg            STOP taking these medications      acetaminophen 325 MG Tabs  Commonly known as: Tylenol     atorvastatin 40 MG Tabs  Commonly known as: Lipitor              Allergies  No Known Allergies    DIET  Orders Placed This Encounter   Procedures    Diet Order Diet: Cardiac     Standing Status:   Standing     Number of Occurrences:   1     Order Specific Question:   Diet:     Answer:   Cardiac [6]       ACTIVITY  As tolerated.  Weight bearing as tolerated    CONSULTATIONS  GI    PROCEDURES  NA    LABORATORY  Lab Results   Component Value Date    SODIUM 137 08/10/2023    POTASSIUM 4.0 08/10/2023    CHLORIDE 104 08/10/2023    CO2 21 08/10/2023    GLUCOSE 95 08/10/2023    BUN 6 (L) 08/10/2023    CREATININE 0.63 08/10/2023        Lab Results   Component Value Date    WBC 4.1 (L) 08/10/2023    HEMOGLOBIN 14.9 08/10/2023    HEMATOCRIT 44.1 08/10/2023    PLATELETCT 172 08/10/2023        Total time of the discharge process exceeds 30 minutes.

## 2023-08-10 NOTE — PROGRESS NOTES
[facility] Internal Medicine Daily Progress Note    Date of Service  8/9/2023    [facility] Team: R IM White Team   Attending: Eugenie Tom M.d.  Senior Resident: Dr. Roberson  Contact Number: [phone]    Chief Complaint  Kaiden Quiroz is a 63 y.o. male admitted 8/6/2023 with near syncope an shortness of breath.    Hospital Course  Kaiden Quiroz is a 63 y.o. 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 He was accompanied by his wife who noted a near-syncopal episode a few days prior that had left him feeling lightheaded since then. Prior to arrival, patient's wife checked his oxygenation and found him to be hypoxic at 82%. Denies any recent viral symptoms.      In the ED, he was found to be afebrile with stable vitals after he was initiated on oxygen 2 LPM via NC. CBC WNL. Chemistry WNL. UDS negative. Troponin 17. AST elevated 1375. ALT elevated 2313. Alkaline phosphatase elevated 263. Total bilirubin elevated 4.4. PT/INR WNL. D-Dimer WNL. Hepatitis B surface antigen positive. Hepatitis B core IgM positive. Hepatitis A and C antibody assays negative. Viral panel negative. Patient denied any history of IV drug abuse, blood transfusions, and reported being in a monogamous relationship with his wife. CXR showed mild cardiomegaly. RUQ US showed an echogenic liver consistent with fatty liver disease without nodularity suggestive of cirrhosis or HCC. UA suggestive of UTI.      Patient was admitted to telemetry for further monitoring of his ongoing dizziness given his stroke history. Antihypertensive medications held in favor of IV hydralazine 10 mg Q4H with holding parameters. GI consulted for treatment of his acute hepatitis  B.     Subjective   -Reports pain with urination. Urinalysis positive for UTI. Ceftriaxone initiated.   -Dizziness has improved  -Has a significant alcohol use history; reports 6+ alcoholic beverages daily prior to his stroke but has quit since. He also has a 40 pack year history of smoking.      Interval Problem Update  -GI onboard, additional workup is pending. Treatment is not needed at this time, pending evaluation.  -AST/ALT downtrending however total bilirubin has increased during hospitalization; will continue to monitor liver function.     I have discussed this patient's plan of care and discharge plan at IDT rounds today with Case Management, Nursing, Nursing leadership, and other members of the IDT team.    Consultants/Specialty  GI    Code Status  Full Code    Disposition  The patient is not medically cleared for discharge to home or a post-acute facility.      I have placed the appropriate orders for post-discharge needs.    Review of Systems  Review of Systems   Constitutional:  Negative for chills and fever.   Eyes:  Negative for double vision.   Respiratory:  Positive for shortness of breath (improved since admission).    Cardiovascular:  Negative for chest pain and palpitations.   Gastrointestinal:  Negative for abdominal pain, constipation and vomiting.   Neurological:  Positive for dizziness.        Physical Exam  Temp:  [36.9 °C (98.4 °F)-37.9 °C (100.2 °F)] 37.1 °C (98.8 °F)  Pulse:  [62-87] 62  Resp:  [16-18] 18  BP: (107-139)/(61-76) 110/61  SpO2:  [94 %-96 %] 95 %    Physical Exam  Constitutional:       General: He is not in acute distress.     Appearance: He is not toxic-appearing.   HENT:      Head: Normocephalic and atraumatic.      Nose: No rhinorrhea.   Eyes:      General: No scleral icterus.        Right eye: No discharge.         Left eye: No discharge.      Extraocular Movements: Extraocular movements intact.   Cardiovascular:      Rate and Rhythm: Normal rate.      Pulses: Normal  pulses.   Pulmonary:      Effort: No respiratory distress.   Musculoskeletal:         General: No deformity.      Cervical back: Normal range of motion. No rigidity.   Skin:     Coloration: Skin is not jaundiced.      Findings: No bruising.   Neurological:      Mental Status: He is oriented to person, place, and time. Mental status is at baseline.   Psychiatric:      Comments: Sad affect         Fluids    Intake/Output Summary (Last 24 hours) at 8/9/2023 1702  Last data filed at 8/9/2023 1700  Gross per 24 hour   Intake 1220 ml   Output 850 ml   Net 370 ml         Laboratory  Recent Labs     08/07/23  0118 08/08/23  0123 08/09/23  0107   WBC 4.0* 4.0* 4.5*   RBC 4.80 4.83 5.07   HEMOGLOBIN 15.0 15.1 15.7   HEMATOCRIT 44.7 46.1 47.5   MCV 93.1 95.4 93.7   MCH 31.3 31.3 31.0   MCHC 33.6 32.8 33.1   RDW 48.4 49.5 49.5   PLATELETCT 161* 166 161*   MPV 10.6 10.2 10.7       Recent Labs     08/07/23  0118 08/08/23  0123 08/09/23  0107   SODIUM 135 137 136   POTASSIUM 3.9 4.0 3.8   CHLORIDE 103 105 102   CO2 21 22 20   GLUCOSE 109* 119* 151*   BUN 15 10 7*   CREATININE 0.72 0.62 0.62   CALCIUM 9.3 9.0 9.1       Recent Labs     08/07/23  1139 08/09/23  0107   INR 1.11 1.09                 Imaging  [id number]-RUQ   Final Result      1.  Contracted gallbladder. No evidence of gallstone or biliary ductal dilatation.      2.  The gallbladder wall likely related to contracted gallbladder.      3.  The liver is echogenic consistent with fatty change versus hepatocellular dysfunction.      DX-CHEST-PORTABLE (1 VIEW)   Final Result      Mild cardiomegaly.             Assessment/Plan  Problem Representation:    * Acute hepatitis- (present on admission)  Assessment & Plan  Patient found to have acute hepatitis B infection.  He was found to significant elevated bilirubin and liver enzymes  The ultrasound right upper quadrant did not show any acute abnormalities.  AST/ALT downtrending however total bilirubin has increased during  "hospitalization  GI consult, recommendations appreciated. Per GI \"He does not need treatment at this time. His INR is normal. If bilirubin would remain elevated after 4 weeks or inr rise, he may be candidate for treatment. Urgent referral to OP GI placed. \"  Monitor renal function with CMP's   Holding all hepatotoxic medication including blood pressure medication that can potentially cause liver damage  I discussed plan of care with patient's wife regarding precaution for hepatitis B and she expressed understanding.      Acute cystitis  Assessment & Plan  Ceftriaxone  Post void bladder scan with <400ml of urine  Discuss non-hepatotoxic oral antibiotic options    Elevated bilirubin  Assessment & Plan  He found to have significantly elevated bilirubin of 4.4 and it was normal in May of this year.  Elevated bilirubin most likely secondary to hepatitis B infection.  Ordered CMP for tomorrow.    Elevated liver enzymes  Assessment & Plan  He found to have significant elevated liver enzymes  I reviewed ultrasound right upper quadrant pain  His elevated liver enzymes most likely secondary to hepatitis B infection.  Requested consultation with GI.    Advance care planning  Assessment & Plan  I discussed CODE STATUS with patient and his wife at the bedside.  Patient's wife reported that they have advance directive at home.  I explained them the importance of confirming CODE STATUS in the hospital and after discussion patient requested to be full code as per patient request I placed full code orders.    Time spent 16 minutes    Dizziness  Assessment & Plan  He has been having symptoms of dizziness that is associated with hypoxia.  Due to his ongoing symptoms of dizziness and history of stroke plan is to admit him to telemetry.  Discussed plan of care with patient and his wife at the bedside.    Internal carotid artery stent present- (present on admission)  Assessment & Plan  Continue outpatient dual antiplatelet therapy.  He " denies bleeding.    Acute respiratory failure with hypoxia (HCC)- (present on admission)  Assessment & Plan  Patient found to have acute respiratory failure with hypoxia he is requiring supplemental oxygen.  He found to have ox saturation on room air 88% and at home he found to have oxygen saturation in 80s.  Incentive spirometry.  Admitted telemetry for close monitoring.      Class 1 obesity in adult  Assessment & Plan  Patient found to have BMI of 34.40  He reported that he has been eating vegetables and trying to eat healthy  Lifestyle modifications  Outpatient follow-up.    Primary hypertension- (present on admission)  Assessment & Plan  I am holding his blood pressure medication I started him on IV hydralazine 10 mg every 4 hours as needed with parameters.  Plan is to resume his outpatient blood pressure medication.  GI requested to hold his ACE inhibitor and statin.  Plan is to admit him to telemetry.         VTE prophylaxis: enoxaparin ppx    I have performed a physical exam and reviewed and updated ROS and Plan today (8/9/2023). In review of yesterday's note (8/8/2023), there are no changes except as documented above.

## 2023-08-10 NOTE — PROGRESS NOTES
Monitor Summary:     Rhythm: SR, SB  Rate: 52-82  Ectopy: (R) PAC, (R) PVC  Measurements: 0.21/0.08/0.41           12 Hour Chart Check

## 2023-08-11 LAB
BACTERIA UR CULT: ABNORMAL
BACTERIA UR CULT: ABNORMAL
SIGNIFICANT IND 70042: ABNORMAL
SITE SITE: ABNORMAL
SOURCE SOURCE: ABNORMAL

## 2023-08-11 NOTE — PROGRESS NOTES
Discharge instructions reviewed and signed. IV and tele monitor removed. [id number] delivered to bedside. Pt. Discharged home with wife.

## 2023-08-11 NOTE — PROGRESS NOTES
[facility] Daily Progress Note    This note is intended for the purposes of medical student education and feedback only.   Please refer to the documentation by this patient's assigned medical practitioner for details of care and plans.    Date of Service  8/10/2023    R Team: [facility] IM White Team   Attending: Dr. Eugenie Tom  Senior Resident: Dr. Negrita Roberson  Intern:  TEX  Contact Number: [phone]    Chief Complaint  Kaiden Quiroz is a 63 y.o. male admitted 8/6/2023 with acute hypoxic respiratory failure and acute hepatitis B.     Hospital Course  Kaiden Quiroz is a 63 y.o. 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 He was accompanied by his wife who noted a near-syncopal episode a few days prior that had left him feeling lightheaded since then. Prior to arrival, patient's wife checked his oxygenation and found him to be hypoxic at 82%. Denies any recent viral symptoms.     In the ED, he was found to be afebrile with stable vitals after he was initiated on oxygen 2 LPM via NC. CBC WNL. Chemistry WNL. UDS negative. Troponin 17. AST elevated 1375. ALT elevated 2313. Alkaline phosphatase elevated 263. Total bilirubin elevated 4.4. PT/INR WNL. D-Dimer WNL. Hepatitis B surface antigen positive. Hepatitis B core IgM positive. Hepatitis A and C antibody assays negative. Viral panel negative. Patient denied any history of IV drug abuse, blood transfusions, and reported being in a monogamous relationship with his wife. CXR showed mild cardiomegaly. RUQ US showed an echogenic liver consistent with fatty liver disease without nodularity suggestive of cirrhosis or HCC. UA suggestive of UTI.     Patient was  admitted to telemetry for further monitoring of his ongoing dizziness given his stroke history. Antihypertensive medications held in favor of IV hydralazine 10 mg Q4H with holding parameters. GI consulted for treatment of his acute hepatitis B.     Interval Problem Update  - AST slightly increased. ALT trending down. Total bilirubin decreased.   - Per GI, patient does not require active hepatitis B treatment at this time per his normal INR value.   - Symptoms of dizziness and shortness of breath have since resolved. He is stable for discharge and will follow-up with outpatient GI.   - IV ceftriaxone started 8/9 for suspected UTI. Urine culture today positive for Klebsiella. Will discharge with 2 more days of Bactrim BID.      Subjective  Patient reports that his dizziness and shortness of breath have since resolved. Since starting antibiotics and increasing fluid intake, his dysuria has also resolved and urine is now light-yellow in color. Denies any new acute issues.     Consultants/Specialty  GI    Code Status  Full Code    Review of Systems  Review of Systems   Constitutional:  Negative for chills and fever.   HENT: Negative.     Eyes: Negative.    Respiratory:  Negative for hemoptysis and shortness of breath.    Cardiovascular:  Negative for chest pain and palpitations.   Gastrointestinal:  Negative for abdominal pain, constipation, diarrhea, nausea and vomiting.   Genitourinary:  Negative for dysuria and hematuria.   Musculoskeletal: Negative.    Skin: Negative.    Neurological:  Negative for dizziness, loss of consciousness and weakness.   Endo/Heme/Allergies: Negative.    Psychiatric/Behavioral: Negative.     All other systems reviewed and are negative.       Physical Exam  Temp:  [36.6 °C (97.9 °F)-37.1 °C (98.8 °F)] 36.7 °C (98.1 °F)  Pulse:  [57-66] 60  Resp:  [18] 18  BP: (105-129)/(64-78) 129/68  SpO2:  [92 %-94 %] 94 %    Physical Exam  Vitals and nursing note reviewed.   Constitutional:       General:  He is not in acute distress.     Appearance: Normal appearance. He is normal weight.   HENT:      Head: Normocephalic and atraumatic.      Nose: Nose normal.      Mouth/Throat:      Mouth: Mucous membranes are moist.   Eyes:      Extraocular Movements: Extraocular movements intact.      Pupils: Pupils are equal, round, and reactive to light.   Cardiovascular:      Rate and Rhythm: Regular rhythm. Bradycardia present.      Pulses: Normal pulses.      Heart sounds: Normal heart sounds. No murmur heard.     No friction rub. No gallop.   Pulmonary:      Effort: Pulmonary effort is normal.      Breath sounds: Normal breath sounds.   Abdominal:      General: Abdomen is flat. Bowel sounds are normal. There is no distension.      Palpations: Abdomen is soft.   Musculoskeletal:      Cervical back: Normal range of motion.   Skin:     General: Skin is warm and dry.      Coloration: Skin is not jaundiced.   Neurological:      General: No focal deficit present.      Mental Status: He is alert and oriented to person, place, and time. Mental status is at baseline.   Psychiatric:         Mood and Affect: Mood normal.         Behavior: Behavior normal.         Thought Content: Thought content normal.         Judgment: Judgment normal.         Fluids    Intake/Output Summary (Last 24 hours) at 8/10/2023 1720  Last data filed at 8/10/2023 0800  Gross per 24 hour   Intake 240 ml   Output 900 ml   Net -660 ml       Laboratory  Recent Labs     08/08/23 0123 08/09/23 0107 08/10/23  0302   WBC 4.0* 4.5* 4.1*   RBC 4.83 5.07 4.75   HEMOGLOBIN 15.1 15.7 14.9   HEMATOCRIT 46.1 47.5 44.1   MCV 95.4 93.7 92.8   MCH 31.3 31.0 31.4   MCHC 32.8 33.1 33.8   RDW 49.5 49.5 49.4   PLATELETCT 166 161* 172   MPV 10.2 10.7 10.6     Recent Labs     08/08/23  0123 08/09/23  0107 08/10/23  0302   SODIUM 137 136 137   POTASSIUM 4.0 3.8 4.0   CHLORIDE 105 102 104   CO2 22 20 21   GLUCOSE 119* 151* 95   BUN 10 7* 6*   CREATININE 0.62 0.62 0.63   CALCIUM 9.0  9.1 9.1     Recent Labs     08/09/23  0107 08/10/23  0302   INR 1.09 1.04               Imaging  US-RUQ   Final Result      1.  Contracted gallbladder. No evidence of gallstone or biliary ductal dilatation.      2.  The gallbladder wall likely related to contracted gallbladder.      3.  The liver is echogenic consistent with fatty change versus hepatocellular dysfunction.      DX-CHEST-PORTABLE (1 VIEW)   Final Result      Mild cardiomegaly.           Assessment/Plan  Problem Representation:  Kaiden Quiroz is a pleasant 63 y.o. 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 Patient presented to the ED after being found hypoxic at 82% at home PTA. Testing demonstrated elevated LFTs and total bilirubin. Hepatitis B surface antigen and core IgM positive. PT/INR WNL. RUQ US demonstrated echogenic liver without nodularity. Admitted for further monitoring of his dizziness and dyspnea given his stroke history. He was evaluated by GI who did not recommend starting antiviral therapy at this time. LFTs have been trending down but bilirubin increased to 5.0 on 8/8 and 6.4 on 8/9. Repeat testing today demonstrates total bilirubin has decreased back to 5.3. He was initiated on IV ceftriaxone 8/9 after UA was suggestive of an UTI. Urine culture today positive for Klebsiella. Patient's urinary symptoms have resolved and he is medically clear for discharge today.     * Acute hepatitis- (present on admission)  Assessment & Plan  Severe transaminitis at admission with AST/ALT 1376/2313. Bilirubin on presentation was 4.4. PT/INR WNL. Hepatitis B surface antigen and core IgM antibody testing positive. Negative for hepatitis A and C antibody assay. RUQ US demonstrated echogenicity consistent with fatty change vs. hepatocellular  dysfunction; no nodularity or lesions observed. Repeat viral testing was positive. AST and ALT have been trending down, but total bilirubin increased to 5.0 on 8/8 and 5.4 on 8/9. Total bilirubin decreased to 5.3 today on repeat testing. Per GI, patient does not require antiviral therapy at this time and have recommended follow-up in outpatient clinic following discharge.   - Patient will follow-up in one week with outpatient GI with a repeat CMP and INR. If bilirubin remains elevated at 4 weeks or if INR is elevated, will consider starting antiviral therapy at that time.   - Advised patient to avoid hepatotoxic medications including acetaminophen.     Elevated bilirubin  Assessment & Plan  Total bilirubin elevated to 4.4 on admission. Prior value was normal at 0.7 in May 2023. Elevation is most likely secondary to his acute hepatitis B infection. Bilirubin increased to 5.0 on 8/8 and 6.4 on 8/9. Decreased to 5.3 today. No obstructive pathology demonstrated on RUQ US.   - Follow-up with outpatient GI in one week with repeat CMP.     Elevated liver enzymes  Assessment & Plan  AST and ALT were significantly elevated on admission. RUQ US demonstrated an echogenic liver consistent with fatty liver changes. AST slightly increased today but ALT continues to trend down.Transaminitis most likely secondary to active hepatitis B infection.   - Follow-up with outpatient GI in one week with repeat CMP.    Dizziness  Assessment & Plan  Patient presented with multiple days of persistent dizziness, exacerbated with exertion. He was found to be hypoxic at 82% prior to arrival and started on 2 LPM via NC. Patient's dizziness has since resolved and he is ambulating without becoming dyspneic or hypoxic. Telemetry monitoring has demonstrated intermittent sinus bradycardia with 1st degree AV block. Patient's dizziness likely secondary to overexertion as he reports his episode prior to presentation occurred while he was out shopping.   -  Patient was evaluated by PT/OT who recommend outpatient PT services to address higher level deficits.     Acute cystitis  Assessment & Plan  Patient reported mild dysuria without hematuria and UA was suggestive of an active UTI. He was started on IV ceftriaxone 2 grams beginning 8/9. Today patient reports his dysuria has resolved and his urine has become light-yellow from dark-brown after increasing oral fluid intake. No suprapubic or CVA tenderness noted on exam. Urine culture positive for Klebsiella. He reports he previously had a Mcclendon catheter placed in May at [facility] for urinary urgency but denied ever having retention. He has been urinating well without issue during this admission.   - Will switch to PO Bactrim BID for 2 additional days for treatment of his active UTI.  - Encouraged increased PO fluid intake.     Internal carotid artery stent present- (present on admission)  Assessment & Plan  Stent placed 1/12/23 secondary to CVA. Patient has been maintained on DAPT with aspirin 81 mg daily and Plavix 75 mg daily. Patient did have an EGD in February 2023 which showed non-bleeding duodenal angioectasia s/p APC repair. No active signs or symptoms of GI bleed at this time.   - Continue DAPT.   - Advised patient to hold atorvastatin at this time due to concern for hepatotoxicity.     Acute respiratory failure with hypoxia (HCC)- (present on admission)  Assessment & Plan  Patient was found to be hypoxic at 82% prior to presentation. Initiated on oxygen 2 LPM via NC. Since admission, patient's oxygenation has improved and he is saturating well on room air. No home oxygen evaluation required at this time.     Obstructive sleep apnea  Assessment & Plan  Patient endorses a prior history of KENDAL and reports using a CPAP at home.   - Continue CPAP treatment.     Primary hypertension- (present on admission)  Assessment & Plan  Lisinopril 10 mg and amlodipine 2.5 mg held at admission given hepatotoxicity risk. Patient  was initiated on IV hydralazine 10 mg Q4H PRN. Blood pressure stable today at 129/68.  - Restart home medications of lisinopril 10 mg daily and amlodipine 2.5 mg daily.     I have performed a physical exam and reviewed and updated ROS and Plan today (8/10/2023). In review of yesterday's note (8/9/2023), there are no changes except as documented above.

## 2023-08-12 ENCOUNTER — HOSPITAL ENCOUNTER (OUTPATIENT)
Dept: LAB | Facility: MEDICAL CENTER | Age: 63
End: 2023-08-12
Attending: NURSE PRACTITIONER
Payer: COMMERCIAL

## 2023-08-12 ENCOUNTER — HOSPITAL ENCOUNTER (OUTPATIENT)
Dept: LAB | Facility: MEDICAL CENTER | Age: 63
End: 2023-08-12
Attending: HOSPITALIST
Payer: COMMERCIAL

## 2023-08-12 DIAGNOSIS — B17.9 ACUTE HEPATITIS: ICD-10-CM

## 2023-08-12 DIAGNOSIS — B16.9 VIRAL HEPATITIS B ACUTE: ICD-10-CM

## 2023-08-12 LAB
25(OH)D3 SERPL-MCNC: 16 NG/ML (ref 30–100)
ALBUMIN SERPL BCP-MCNC: 3.9 G/DL (ref 3.2–4.9)
ALBUMIN SERPL BCP-MCNC: 3.9 G/DL (ref 3.2–4.9)
ALBUMIN/GLOB SERPL: 1.3 G/DL
ALBUMIN/GLOB SERPL: 1.3 G/DL
ALP SERPL-CCNC: 268 U/L (ref 30–99)
ALP SERPL-CCNC: 268 U/L (ref 30–99)
ALT SERPL-CCNC: 1780 U/L (ref 2–50)
ALT SERPL-CCNC: 1791 U/L (ref 2–50)
ANION GAP SERPL CALC-SCNC: 13 MMOL/L (ref 7–16)
ANION GAP SERPL CALC-SCNC: 13 MMOL/L (ref 7–16)
AST SERPL-CCNC: 1151 U/L (ref 12–45)
AST SERPL-CCNC: 1184 U/L (ref 12–45)
BASOPHILS # BLD AUTO: 0 % (ref 0–1.8)
BASOPHILS # BLD: 0 K/UL (ref 0–0.12)
BILIRUB SERPL-MCNC: 6.7 MG/DL (ref 0.1–1.5)
BILIRUB SERPL-MCNC: 6.9 MG/DL (ref 0.1–1.5)
BUN SERPL-MCNC: 10 MG/DL (ref 8–22)
BUN SERPL-MCNC: 9 MG/DL (ref 8–22)
BURR CELLS BLD QL SMEAR: NORMAL
CALCIUM ALBUM COR SERPL-MCNC: 9.4 MG/DL (ref 8.5–10.5)
CALCIUM ALBUM COR SERPL-MCNC: 9.4 MG/DL (ref 8.5–10.5)
CALCIUM SERPL-MCNC: 9.3 MG/DL (ref 8.5–10.5)
CALCIUM SERPL-MCNC: 9.3 MG/DL (ref 8.5–10.5)
CHLORIDE SERPL-SCNC: 99 MMOL/L (ref 96–112)
CHLORIDE SERPL-SCNC: 99 MMOL/L (ref 96–112)
CO2 SERPL-SCNC: 22 MMOL/L (ref 20–33)
CO2 SERPL-SCNC: 22 MMOL/L (ref 20–33)
CREAT SERPL-MCNC: 0.74 MG/DL (ref 0.5–1.4)
CREAT SERPL-MCNC: 0.77 MG/DL (ref 0.5–1.4)
EOSINOPHIL # BLD AUTO: 0.19 K/UL (ref 0–0.51)
EOSINOPHIL NFR BLD: 5.4 % (ref 0–6.9)
ERYTHROCYTE [DISTWIDTH] IN BLOOD BY AUTOMATED COUNT: 51.8 FL (ref 35.9–50)
GFR SERPLBLD CREATININE-BSD FMLA CKD-EPI: 101 ML/MIN/1.73 M 2
GFR SERPLBLD CREATININE-BSD FMLA CKD-EPI: 102 ML/MIN/1.73 M 2
GLOBULIN SER CALC-MCNC: 2.9 G/DL (ref 1.9–3.5)
GLOBULIN SER CALC-MCNC: 3 G/DL (ref 1.9–3.5)
GLUCOSE SERPL-MCNC: 100 MG/DL (ref 65–99)
GLUCOSE SERPL-MCNC: 100 MG/DL (ref 65–99)
HCT VFR BLD AUTO: 46.5 % (ref 42–52)
HGB BLD-MCNC: 15.3 G/DL (ref 14–18)
INR PPP: 0.98 (ref 0.87–1.13)
LYMPHOCYTES # BLD AUTO: 0.72 K/UL (ref 1–4.8)
LYMPHOCYTES NFR BLD: 20.5 % (ref 22–41)
MANUAL DIFF BLD: NORMAL
MCH RBC QN AUTO: 31.1 PG (ref 27–33)
MCHC RBC AUTO-ENTMCNC: 32.9 G/DL (ref 32.3–36.5)
MCV RBC AUTO: 94.5 FL (ref 81.4–97.8)
MONOCYTES # BLD AUTO: 0.31 K/UL (ref 0–0.85)
MONOCYTES NFR BLD AUTO: 8.9 % (ref 0–13.4)
MORPHOLOGY BLD-IMP: NORMAL
NEUTROPHILS # BLD AUTO: 2.28 K/UL (ref 1.82–7.42)
NEUTROPHILS NFR BLD: 65.2 % (ref 44–72)
NRBC # BLD AUTO: 0 K/UL
NRBC BLD-RTO: 0 /100 WBC (ref 0–0.2)
PLATELET # BLD AUTO: 204 K/UL (ref 164–446)
PLATELET BLD QL SMEAR: NORMAL
PMV BLD AUTO: 10.7 FL (ref 9–12.9)
POIKILOCYTOSIS BLD QL SMEAR: NORMAL
POTASSIUM SERPL-SCNC: 3.9 MMOL/L (ref 3.6–5.5)
POTASSIUM SERPL-SCNC: 3.9 MMOL/L (ref 3.6–5.5)
PROT SERPL-MCNC: 6.8 G/DL (ref 6–8.2)
PROT SERPL-MCNC: 6.9 G/DL (ref 6–8.2)
PROTHROMBIN TIME: 12.9 SEC (ref 12–14.6)
RBC # BLD AUTO: 4.92 M/UL (ref 4.7–6.1)
RBC BLD AUTO: PRESENT
SODIUM SERPL-SCNC: 134 MMOL/L (ref 135–145)
SODIUM SERPL-SCNC: 134 MMOL/L (ref 135–145)
WBC # BLD AUTO: 3.5 K/UL (ref 4.8–10.8)

## 2023-08-12 PROCEDURE — 85025 COMPLETE CBC W/AUTO DIFF WBC: CPT

## 2023-08-12 PROCEDURE — 36415 COLL VENOUS BLD VENIPUNCTURE: CPT

## 2023-08-12 PROCEDURE — 85610 PROTHROMBIN TIME: CPT

## 2023-08-12 PROCEDURE — 85007 BL SMEAR W/DIFF WBC COUNT: CPT

## 2023-08-12 PROCEDURE — 80053 COMPREHEN METABOLIC PANEL: CPT | Mod: 91

## 2023-08-12 PROCEDURE — 82306 VITAMIN D 25 HYDROXY: CPT

## 2023-08-12 PROCEDURE — 80053 COMPREHEN METABOLIC PANEL: CPT

## 2023-08-16 ENCOUNTER — HOSPITAL ENCOUNTER (EMERGENCY)
Facility: MEDICAL CENTER | Age: 63
End: 2023-08-16
Attending: EMERGENCY MEDICINE
Payer: COMMERCIAL

## 2023-08-16 VITALS
HEIGHT: 67 IN | DIASTOLIC BLOOD PRESSURE: 77 MMHG | HEART RATE: 57 BPM | OXYGEN SATURATION: 97 % | SYSTOLIC BLOOD PRESSURE: 129 MMHG | RESPIRATION RATE: 16 BRPM | BODY MASS INDEX: 32.8 KG/M2 | TEMPERATURE: 98 F | WEIGHT: 209 LBS

## 2023-08-16 DIAGNOSIS — B16.9 ACUTE HEPATITIS B: ICD-10-CM

## 2023-08-16 LAB
ALBUMIN SERPL BCP-MCNC: 3.8 G/DL (ref 3.2–4.9)
ALBUMIN/GLOB SERPL: 1.3 G/DL
ALP SERPL-CCNC: 255 U/L (ref 30–99)
ALT SERPL-CCNC: 1906 U/L (ref 2–50)
AMMONIA PLAS-SCNC: 50 UMOL/L (ref 11–45)
ANION GAP SERPL CALC-SCNC: 12 MMOL/L (ref 7–16)
APTT PPP: 31.5 SEC (ref 24.7–36)
AST SERPL-CCNC: 1443 U/L (ref 12–45)
BASOPHILS # BLD AUTO: 0.7 % (ref 0–1.8)
BASOPHILS # BLD: 0.03 K/UL (ref 0–0.12)
BILIRUB SERPL-MCNC: 6.9 MG/DL (ref 0.1–1.5)
BUN SERPL-MCNC: 13 MG/DL (ref 8–22)
CALCIUM ALBUM COR SERPL-MCNC: 9.2 MG/DL (ref 8.5–10.5)
CALCIUM SERPL-MCNC: 9 MG/DL (ref 8.5–10.5)
CHLORIDE SERPL-SCNC: 102 MMOL/L (ref 96–112)
CO2 SERPL-SCNC: 20 MMOL/L (ref 20–33)
CREAT SERPL-MCNC: 0.57 MG/DL (ref 0.5–1.4)
EOSINOPHIL # BLD AUTO: 0.27 K/UL (ref 0–0.51)
EOSINOPHIL NFR BLD: 6 % (ref 0–6.9)
ERYTHROCYTE [DISTWIDTH] IN BLOOD BY AUTOMATED COUNT: 56.2 FL (ref 35.9–50)
GFR SERPLBLD CREATININE-BSD FMLA CKD-EPI: 111 ML/MIN/1.73 M 2
GLOBULIN SER CALC-MCNC: 3 G/DL (ref 1.9–3.5)
GLUCOSE SERPL-MCNC: 116 MG/DL (ref 65–99)
HCT VFR BLD AUTO: 45 % (ref 42–52)
HGB BLD-MCNC: 15.1 G/DL (ref 14–18)
IMM GRANULOCYTES # BLD AUTO: 0.01 K/UL (ref 0–0.11)
IMM GRANULOCYTES NFR BLD AUTO: 0.2 % (ref 0–0.9)
INR PPP: 1.02 (ref 0.87–1.13)
LYMPHOCYTES # BLD AUTO: 1.19 K/UL (ref 1–4.8)
LYMPHOCYTES NFR BLD: 26.4 % (ref 22–41)
MCH RBC QN AUTO: 32.2 PG (ref 27–33)
MCHC RBC AUTO-ENTMCNC: 33.6 G/DL (ref 32.3–36.5)
MCV RBC AUTO: 95.9 FL (ref 81.4–97.8)
MONOCYTES # BLD AUTO: 0.64 K/UL (ref 0–0.85)
MONOCYTES NFR BLD AUTO: 14.2 % (ref 0–13.4)
NEUTROPHILS # BLD AUTO: 2.36 K/UL (ref 1.82–7.42)
NEUTROPHILS NFR BLD: 52.5 % (ref 44–72)
NRBC # BLD AUTO: 0 K/UL
NRBC BLD-RTO: 0 /100 WBC (ref 0–0.2)
PLATELET # BLD AUTO: 199 K/UL (ref 164–446)
PMV BLD AUTO: 10.1 FL (ref 9–12.9)
POTASSIUM SERPL-SCNC: 4.4 MMOL/L (ref 3.6–5.5)
PROT SERPL-MCNC: 6.8 G/DL (ref 6–8.2)
PROTHROMBIN TIME: 13.3 SEC (ref 12–14.6)
RBC # BLD AUTO: 4.69 M/UL (ref 4.7–6.1)
SODIUM SERPL-SCNC: 134 MMOL/L (ref 135–145)
WBC # BLD AUTO: 4.5 K/UL (ref 4.8–10.8)

## 2023-08-16 PROCEDURE — 85610 PROTHROMBIN TIME: CPT

## 2023-08-16 PROCEDURE — 85025 COMPLETE CBC W/AUTO DIFF WBC: CPT

## 2023-08-16 PROCEDURE — 85730 THROMBOPLASTIN TIME PARTIAL: CPT

## 2023-08-16 PROCEDURE — 80053 COMPREHEN METABOLIC PANEL: CPT

## 2023-08-16 PROCEDURE — 99284 EMERGENCY DEPT VISIT MOD MDM: CPT

## 2023-08-16 PROCEDURE — 36415 COLL VENOUS BLD VENIPUNCTURE: CPT

## 2023-08-16 PROCEDURE — 82140 ASSAY OF AMMONIA: CPT

## 2023-08-16 ASSESSMENT — FIBROSIS 4 INDEX: FIB4 SCORE: 8.29

## 2023-08-16 NOTE — ED PROVIDER NOTES
ED Provider Note    CHIEF COMPLAINT  Chief Complaint   Patient presents with    Abnormal Labs     PT PCP SENT HIM TO ER FOR ABNORMAL LABS. PT DOES NOT KNOW WHAT LABS. PT WAS D/C ON FRIDAY FROM HOSPITAL FOR HEPATITIS B. PT DID NOT HAVE GI FOLLOW-UP SET UP SO THEY WANTED HIM TO COME BACK TO ER.     PT WITH ELEVATED ALT/AST.  (LABS WERE ELEVATE WHILE INPATIENT).       EXTERNAL RECORDS REVIEWED  Other reviewed the patient's discharge summary from 10 August.  The patient was admitted to the hospital shortness of breath and dizziness and was found to have acute hepatitis.  The patient's labs were consistent with hepatitis B and GI was consulted.  The patient had a normal INR and albumin and his bilirubin peaked at 6.4 and therefore he was not acutely treated in order to follow the patient outpatient.    HPI/ROS    Kaiden Quiroz is a 63 y.o. male who presents with a transaminitis.  The patient had a follow-up appointment with his neurologist who noted his transaminitis and he is instructed to come to the emergency department.  He was following up from a stroke several months ago.  As mentioned above the patient was recently admitted to the hospital and discharged on 10 August after he was found to have acute hepatitis B.  He has been unable to follow-up with GI.  He states he is a little tired but otherwise has no acute medical plaints.  He states he has not had any vomiting or diarrhea.  His urine seems to be improving.  He has not had any alcohol since January.    PAST MEDICAL HISTORY   has a past medical history of Diabetes (HCC) and Hypertension.    SURGICAL HISTORY   has a past surgical history that includes upper gi endoscopy,diagnosis (N/A, 2/13/2023) and upper gi endoscopy,ctrl bleed (N/A, 2/13/2023).    FAMILY HISTORY  Family History   Problem Relation Age of Onset    Hypertension Mother     Diabetes Mother     Hypertension Father     Diabetes Father     Hypertension Sister     Stroke Neg Hx        SOCIAL  "HISTORY  Social History     Tobacco Use    Smoking status: Former     Packs/day: 0.50     Years: 46.00     Additional pack years: 0.00     Total pack years: 23.00     Types: Cigarettes     Start date:      Quit date: 2023     Years since quittin.5    Smokeless tobacco: Never   Vaping Use    Vaping Use: Former    Substances: Nicotine   Substance and Sexual Activity    Alcohol use: Not Currently     Alcohol/week: 25.2 oz     Types: 42 Cans of beer per week    Drug use: Not Currently     Types: Inhaled     Comment: MJ    Sexual activity: Not on file       CURRENT MEDICATIONS  Home Medications    **Home medications have not yet been reviewed for this encounter**         ALLERGIES  No Known Allergies    PHYSICAL EXAM  VITAL SIGNS: /67   Pulse 65   Temp 36.4 °C (97.5 °F) (Temporal)   Resp 16   Ht 1.702 m (5' 7\")   Wt 94.8 kg (209 lb)   SpO2 97%   BMI 32.73 kg/m²    In general the patient does appear chronically ill    Eyes pupils are 3 and reactive bilaterally and the patient does have some slight scleral icterus, nares and mouth are moist    Pulmonary the patient's lungs are clear to auscultation bilaterally    Cardiovascular S1-S2 with a regular rate and rhythm    GI abdomen is soft with no distention    Skin no obvious jaundice    Extremities no edema    Neurologic examination is grossly intact    DIAGNOSTIC STUDIES   Results for orders placed or performed during the hospital encounter of 23   CBC WITH DIFFERENTIAL   Result Value Ref Range    WBC 4.5 (L) 4.8 - 10.8 K/uL    RBC 4.69 (L) 4.70 - 6.10 M/uL    Hemoglobin 15.1 14.0 - 18.0 g/dL    Hematocrit 45.0 42.0 - 52.0 %    MCV 95.9 81.4 - 97.8 fL    MCH 32.2 27.0 - 33.0 pg    MCHC 33.6 32.3 - 36.5 g/dL    RDW 56.2 (H) 35.9 - 50.0 fL    Platelet Count 199 164 - 446 K/uL    MPV 10.1 9.0 - 12.9 fL    Neutrophils-Polys 52.50 44.00 - 72.00 %    Lymphocytes 26.40 22.00 - 41.00 %    Monocytes 14.20 (H) 0.00 - 13.40 %    Eosinophils 6.00 0.00 - " 6.90 %    Basophils 0.70 0.00 - 1.80 %    Immature Granulocytes 0.20 0.00 - 0.90 %    Nucleated RBC 0.00 0.00 - 0.20 /100 WBC    Neutrophils (Absolute) 2.36 1.82 - 7.42 K/uL    Lymphs (Absolute) 1.19 1.00 - 4.80 K/uL    Monos (Absolute) 0.64 0.00 - 0.85 K/uL    Eos (Absolute) 0.27 0.00 - 0.51 K/uL    Baso (Absolute) 0.03 0.00 - 0.12 K/uL    Immature Granulocytes (abs) 0.01 0.00 - 0.11 K/uL    NRBC (Absolute) 0.00 K/uL   COMP METABOLIC PANEL   Result Value Ref Range    Sodium 134 (L) 135 - 145 mmol/L    Potassium 4.4 3.6 - 5.5 mmol/L    Chloride 102 96 - 112 mmol/L    Co2 20 20 - 33 mmol/L    Anion Gap 12.0 7.0 - 16.0    Glucose 116 (H) 65 - 99 mg/dL    Bun 13 8 - 22 mg/dL    Creatinine 0.57 0.50 - 1.40 mg/dL    Calcium 9.0 8.5 - 10.5 mg/dL    Correct Calcium 9.2 8.5 - 10.5 mg/dL    AST(SGOT) 1443 (HH) 12 - 45 U/L    ALT(SGPT) 1906 (HH) 2 - 50 U/L    Alkaline Phosphatase 255 (H) 30 - 99 U/L    Total Bilirubin 6.9 (H) 0.1 - 1.5 mg/dL    Albumin 3.8 3.2 - 4.9 g/dL    Total Protein 6.8 6.0 - 8.2 g/dL    Globulin 3.0 1.9 - 3.5 g/dL    A-G Ratio 1.3 g/dL   APTT   Result Value Ref Range    APTT 31.5 24.7 - 36.0 sec   PROTHROMBIN TIME (INR)   Result Value Ref Range    PT 13.3 12.0 - 14.6 sec    INR 1.02 0.87 - 1.13   AMMONIA   Result Value Ref Range    Ammonia 50 (H) 11 - 45 umol/L   ESTIMATED GFR   Result Value Ref Range    GFR (CKD-EPI) 111 >60 mL/min/1.73 m 2           COURSE & MEDICAL DECISION MAKING    ED Observation Status? Yes; I am placing the patient in to an observation status due to a diagnostic uncertainty as well as therapeutic intensity. Patient placed in observation status at 1600 PM, 8/16/2023.     Observation plan is as follows: The patient presents with an hepatitis that does not seem to be improving.  He will require repeat laboratory analysis as well as consultation with GI.  Therefore he will be admitted for ED observation.    1800 the patient does have a persistent transaminitis that is slightly worse  from prior.  I did speak with GI and they feel the patient can be safely treated on an outpatient basis.  They are aware the patient does not currently have a GI doctor that he is assigned to.  They assured me they will contact the supervisor tomorrow and contact the patient for outpatient follow-up.  In the interim if the patient becomes symptomatic with jaundice, scleral icterus, confusion, or any further concerns they will return.  I would also like the patient to repeat laboratory analysis within the next 7 days and if he cannot get into GI he is instructed to return to the emergency department or follow-up with a primary care provider.    Upon Reevaluation, the patient's condition has: Improved; and will be discharged.    Patient discharged from ED Observation status at 1600 (Time) 8/16/23 (Date).       FINAL DIAGNOSIS  Acute hepatitis B    Disposition  The patient will be discharged in stable condition       Electronically signed by: Patel Finch M.D., 8/16/2023 4:01 PM

## 2023-08-16 NOTE — ED TRIAGE NOTES
"Chief Complaint   Patient presents with    Abnormal Labs     PT PCP SENT HIM TO ER FOR ABNORMAL LABS. PT DOES NOT KNOW WHAT LABS. PT WAS D/C ON FRIDAY FROM HOSPITAL FOR HEPATITIS B. PT DID NOT HAVE GI FOLLOW-UP SET UP SO THEY WANTED HIM TO COME BACK TO ER.     PT WITH ELEVATED ALT/AST.  (LABS WERE ELEVATE WHILE INPATIENT).           PT AMBULATORY TO TRIAGE WITH WALKER. Pt AA&O X 3, SEE ABOVE.     PT TO LOBBY . PT EDUCATED ON ALERTING STAFF TO CHANGES IN CONDITION. PT VERBALIZED AN UNDERSTANDING.     /67   Pulse 65   Temp 36.4 °C (97.5 °F) (Temporal)   Resp 16   Ht 1.702 m (5' 7\")   Wt 94.8 kg (209 lb)   SpO2 97%   BMI 32.73 kg/m²     "

## 2023-08-17 NOTE — DISCHARGE INSTRUCTIONS
Follow-up with GI as discussed.  Return for any symptoms.  He need to have repeat laboratory and Alysis in 7 to 10 days.

## 2023-08-17 NOTE — ED NOTES
Discharge education provided. Patient verbalizes understanding. Patient A/0x4 and escorted to the lobby via wheelchair. Patient discharged home to self care with significant other.

## 2023-09-11 ENCOUNTER — TELEPHONE (OUTPATIENT)
Dept: GASTROENTEROLOGY | Facility: MEDICAL CENTER | Age: 63
End: 2023-09-11
Payer: COMMERCIAL

## 2023-09-11 DIAGNOSIS — R74.8 ELEVATED LIVER ENZYMES: ICD-10-CM

## 2023-09-11 DIAGNOSIS — B17.9 ACUTE HEPATITIS: ICD-10-CM

## 2023-09-11 NOTE — TELEPHONE ENCOUNTER
Called and spoke to pt's wife, Cori regarding Kaiden's upcoming GI follow up appt.    Kaiden was able to be seen at [facility] a couple weeks ago and has another f/u scheduled in one month. Discussed that pt does not need to be seen at GI clinic. Cori verbalized understanding, [facility] appt will be cancelled.

## 2023-09-23 ENCOUNTER — HOSPITAL ENCOUNTER (OUTPATIENT)
Dept: LAB | Facility: MEDICAL CENTER | Age: 63
End: 2023-09-23
Attending: NURSE PRACTITIONER
Payer: COMMERCIAL

## 2023-09-23 LAB
25(OH)D3 SERPL-MCNC: 21 NG/ML (ref 30–100)
ALBUMIN SERPL BCP-MCNC: 4.2 G/DL (ref 3.2–4.9)
ALBUMIN/GLOB SERPL: 1.6 G/DL
ALP SERPL-CCNC: 85 U/L (ref 30–99)
ALT SERPL-CCNC: 19 U/L (ref 2–50)
ANION GAP SERPL CALC-SCNC: 10 MMOL/L (ref 7–16)
APTT PPP: 29 SEC (ref 24.7–36)
AST SERPL-CCNC: 16 U/L (ref 12–45)
BILIRUB SERPL-MCNC: 1.2 MG/DL (ref 0.1–1.5)
BUN SERPL-MCNC: 14 MG/DL (ref 8–22)
CALCIUM ALBUM COR SERPL-MCNC: 9.4 MG/DL (ref 8.5–10.5)
CALCIUM SERPL-MCNC: 9.6 MG/DL (ref 8.5–10.5)
CHLORIDE SERPL-SCNC: 105 MMOL/L (ref 96–112)
CHOLEST SERPL-MCNC: 225 MG/DL (ref 100–199)
CO2 SERPL-SCNC: 26 MMOL/L (ref 20–33)
CREAT SERPL-MCNC: 0.78 MG/DL (ref 0.5–1.4)
EST. AVERAGE GLUCOSE BLD GHB EST-MCNC: 128 MG/DL
FOLATE SERPL-MCNC: 8.9 NG/ML
GFR SERPLBLD CREATININE-BSD FMLA CKD-EPI: 101 ML/MIN/1.73 M 2
GLOBULIN SER CALC-MCNC: 2.6 G/DL (ref 1.9–3.5)
GLUCOSE SERPL-MCNC: 99 MG/DL (ref 65–99)
HBA1C MFR BLD: 6.1 % (ref 4–5.6)
HDLC SERPL-MCNC: 61 MG/DL
INR PPP: 1.05 (ref 0.87–1.13)
LDLC SERPL CALC-MCNC: 144 MG/DL
POTASSIUM SERPL-SCNC: 5.3 MMOL/L (ref 3.6–5.5)
PROT SERPL-MCNC: 6.8 G/DL (ref 6–8.2)
PROTHROMBIN TIME: 13.9 SEC (ref 12–14.6)
SODIUM SERPL-SCNC: 141 MMOL/L (ref 135–145)
T3 SERPL-MCNC: 125 NG/DL (ref 60–181)
T4 SERPL-MCNC: 7.7 UG/DL (ref 4–12)
TRIGL SERPL-MCNC: 100 MG/DL (ref 0–149)
TSH SERPL DL<=0.005 MIU/L-ACNC: 1.63 UIU/ML (ref 0.38–5.33)
VIT B12 SERPL-MCNC: 521 PG/ML (ref 211–911)

## 2023-09-23 PROCEDURE — 84443 ASSAY THYROID STIM HORMONE: CPT

## 2023-09-23 PROCEDURE — 84425 ASSAY OF VITAMIN B-1: CPT

## 2023-09-23 PROCEDURE — 82746 ASSAY OF FOLIC ACID SERUM: CPT

## 2023-09-23 PROCEDURE — 85730 THROMBOPLASTIN TIME PARTIAL: CPT

## 2023-09-23 PROCEDURE — 36415 COLL VENOUS BLD VENIPUNCTURE: CPT

## 2023-09-23 PROCEDURE — 80061 LIPID PANEL: CPT

## 2023-09-23 PROCEDURE — 84480 ASSAY TRIIODOTHYRONINE (T3): CPT

## 2023-09-23 PROCEDURE — 83036 HEMOGLOBIN GLYCOSYLATED A1C: CPT

## 2023-09-23 PROCEDURE — 82306 VITAMIN D 25 HYDROXY: CPT

## 2023-09-23 PROCEDURE — 82607 VITAMIN B-12: CPT

## 2023-09-23 PROCEDURE — 80053 COMPREHEN METABOLIC PANEL: CPT

## 2023-09-23 PROCEDURE — 85610 PROTHROMBIN TIME: CPT

## 2023-09-23 PROCEDURE — 84436 ASSAY OF TOTAL THYROXINE: CPT

## 2023-09-23 PROCEDURE — 84207 ASSAY OF VITAMIN B-6: CPT

## 2023-09-26 LAB — VIT B1 BLD-MCNC: 123 NMOL/L (ref 70–180)

## 2023-09-27 LAB — VIT B6 SERPL-MCNC: 86.7 NMOL/L (ref 20–125)

## 2023-10-29 ENCOUNTER — OFFICE VISIT (OUTPATIENT)
Dept: URGENT CARE | Facility: PHYSICIAN GROUP | Age: 63
End: 2023-10-29
Payer: COMMERCIAL

## 2023-10-29 VITALS
BODY MASS INDEX: 36.76 KG/M2 | HEART RATE: 65 BPM | SYSTOLIC BLOOD PRESSURE: 120 MMHG | OXYGEN SATURATION: 94 % | HEIGHT: 67 IN | TEMPERATURE: 98.2 F | DIASTOLIC BLOOD PRESSURE: 62 MMHG | WEIGHT: 234.2 LBS | RESPIRATION RATE: 20 BRPM

## 2023-10-29 DIAGNOSIS — B96.89 ACUTE BACTERIAL SINUSITIS: ICD-10-CM

## 2023-10-29 DIAGNOSIS — J01.90 ACUTE BACTERIAL SINUSITIS: ICD-10-CM

## 2023-10-29 PROCEDURE — 99213 OFFICE O/P EST LOW 20 MIN: CPT | Performed by: NURSE PRACTITIONER

## 2023-10-29 PROCEDURE — 3078F DIAST BP <80 MM HG: CPT | Performed by: NURSE PRACTITIONER

## 2023-10-29 PROCEDURE — 3074F SYST BP LT 130 MM HG: CPT | Performed by: NURSE PRACTITIONER

## 2023-10-29 RX ORDER — LISINOPRIL 10 MG/1
1 TABLET ORAL DAILY
COMMUNITY

## 2023-10-29 RX ORDER — AMLODIPINE BESYLATE 2.5 MG/1
1 TABLET ORAL DAILY
COMMUNITY

## 2023-10-29 RX ORDER — ATORVASTATIN CALCIUM 40 MG/1
1 TABLET, FILM COATED ORAL DAILY
COMMUNITY

## 2023-10-29 RX ORDER — TERAZOSIN 1 MG/1
1 CAPSULE ORAL
COMMUNITY

## 2023-10-29 RX ORDER — AMOXICILLIN AND CLAVULANATE POTASSIUM 875; 125 MG/1; MG/1
1 TABLET, FILM COATED ORAL 2 TIMES DAILY
Qty: 14 TABLET | Refills: 0 | Status: SHIPPED | OUTPATIENT
Start: 2023-10-29 | End: 2023-11-05

## 2023-10-29 RX ORDER — OXYBUTYNIN CHLORIDE 5 MG/1
1 TABLET ORAL 2 TIMES DAILY
COMMUNITY

## 2023-10-29 RX ORDER — AMLODIPINE BESYLATE 10 MG/1
1 TABLET ORAL
COMMUNITY

## 2023-10-29 RX ORDER — CLOPIDOGREL BISULFATE 75 MG/1
1 TABLET ORAL DAILY
COMMUNITY

## 2023-10-29 ASSESSMENT — FIBROSIS 4 INDEX: FIB4 SCORE: 1.14

## 2023-10-29 ASSESSMENT — ENCOUNTER SYMPTOMS
COUGH: 0
SINUS PAIN: 0
WEIGHT LOSS: 0
FEVER: 0
SORE THROAT: 0
DIARRHEA: 0
DIZZINESS: 0
CHILLS: 0
HEADACHES: 0
MYALGIAS: 0
NAUSEA: 0

## 2023-10-29 NOTE — PROGRESS NOTES
Subjective     Kaiden Quiroz is a 63 y.o. male who presents with Sinus Problem (Possible sinus infection,sob,x2 weeks)            HPI  New problem.  Patient is a 63-year-old male who presents with nasal congestion, and mild shortness of breath x2 weeks.  He denies fever, chills, cough, nausea, or diarrhea.  He denies any body aches, or headache.  He has not been taking anything but 1 dose of Afrin for his symptoms.    Patient has no known allergies.  Current Outpatient Medications on File Prior to Visit   Medication Sig Dispense Refill    amLODIPine (NORVASC) 2.5 MG Tab Take 2.5 mg by mouth every day.      melatonin 3 MG Tab Take 6 mg by mouth at bedtime.      gabapentin (NEURONTIN) 300 MG Cap Take 300-600 mg by mouth 2 times a day. 300mg every morning 600mg every night at bedtime      lisinopril (PRINIVIL) 10 MG Tab Take 2 Tablets by mouth every day. 30 Tablet     aspirin (ASA) 81 MG Chew Tab chewable tablet Chew 1 Tablet every day. 100 Tablet     clopidogrel (PLAVIX) 75 MG Tab Take 1 Tablet by mouth every day. 30 Tablet     terazosin (HYTRIN) 1 MG Cap Take 1 Capsule by mouth every evening. 30 Capsule 3    amLODIPine (NORVASC) 10 MG Tab Take 1 Tablet by mouth every day. (Patient not taking: Reported on 10/29/2023)      amLODIPine (NORVASC) 2.5 MG Tab Take 1 Tablet by mouth every day. (Patient not taking: Reported on 10/29/2023)      atorvastatin (LIPITOR) 40 MG Tab Take 1 Tablet by mouth every day. (Patient not taking: Reported on 10/29/2023)      lisinopril (PRINIVIL) 10 MG Tab Take 1 Tablet by mouth every day.      oxybutynin (DITROPAN) 5 MG Tab Take 1 Tablet by mouth 2 times a day. (Patient not taking: Reported on 10/29/2023)      clopidogrel (PLAVIX) 75 MG Tab Take 1 Tablet by mouth every day. (Patient not taking: Reported on 10/29/2023)      terazosin (HYTRIN) 1 MG Cap Take 1 Capsule by mouth at bedtime. (Patient not taking: Reported on 10/29/2023)       No current facility-administered medications on  "file prior to visit.     Social History     Socioeconomic History    Marital status:      Spouse name: Not on file    Number of children: Not on file    Years of education: Not on file    Highest education level: Not on file   Occupational History    Not on file   Tobacco Use    Smoking status: Former     Current packs/day: 0.00     Average packs/day: 0.5 packs/day for 47.0 years (23.5 ttl pk-yrs)     Types: Cigarettes     Start date:      Quit date: 2023     Years since quittin.7    Smokeless tobacco: Never   Vaping Use    Vaping Use: Former    Substances: Nicotine   Substance and Sexual Activity    Alcohol use: Not Currently     Alcohol/week: 25.2 oz     Types: 42 Cans of beer per week    Drug use: Not Currently     Types: Inhaled     Comment: MJ    Sexual activity: Not on file   Other Topics Concern    Not on file   Social History Narrative    Not on file     Social Determinants of Health     Financial Resource Strain: Not on file   Food Insecurity: Not on file   Transportation Needs: Not on file   Physical Activity: Not on file   Stress: Not on file   Social Connections: Not on file   Intimate Partner Violence: Not on file   Housing Stability: Not on file     Breast Cancer-related family history is not on file.    Review of Systems   Constitutional:  Negative for chills, fever, malaise/fatigue and weight loss.   HENT:  Positive for congestion. Negative for ear pain, sinus pain and sore throat.    Respiratory:  Negative for cough.    Gastrointestinal:  Negative for diarrhea and nausea.   Musculoskeletal:  Negative for myalgias.   Neurological:  Negative for dizziness and headaches.   All other systems reviewed and are negative.             Objective     /62 (BP Location: Right arm, Patient Position: Sitting, BP Cuff Size: Large adult)   Pulse 65   Temp 36.8 °C (98.2 °F) (Temporal)   Resp 20   Ht 1.702 m (5' 7\")   Wt 106 kg (234 lb 3.2 oz)   SpO2 94%   BMI 36.68 kg/m²      Physical " Exam  Vitals and nursing note reviewed.   Constitutional:       General: He is not in acute distress.     Appearance: Normal appearance. He is well-developed.   HENT:      Head: Normocephalic.      Right Ear: Tympanic membrane and external ear normal.      Left Ear: Tympanic membrane and external ear normal.      Nose: Mucosal edema, congestion and rhinorrhea present.      Mouth/Throat:      Pharynx: No posterior oropharyngeal erythema.   Eyes:      General:         Right eye: No discharge.         Left eye: No discharge.      Conjunctiva/sclera: Conjunctivae normal.   Cardiovascular:      Rate and Rhythm: Normal rate and regular rhythm.      Heart sounds: Normal heart sounds.   Pulmonary:      Effort: Pulmonary effort is normal.      Breath sounds: Normal breath sounds.   Musculoskeletal:         General: Normal range of motion.      Cervical back: Normal range of motion and neck supple.   Lymphadenopathy:      Cervical: No cervical adenopathy.   Skin:     General: Skin is warm and dry.   Neurological:      Mental Status: He is alert and oriented to person, place, and time.   Psychiatric:         Behavior: Behavior normal.         Thought Content: Thought content normal.                             Assessment & Plan        1. Acute bacterial sinusitis  amoxicillin-clavulanate (AUGMENTIN) 875-125 MG Tab

## (undated) DEVICE — ERBE SIDE FIRE - (10/CA)

## (undated) DEVICE — BITE BLOCK, DISP.

## (undated) DEVICE — TOWEL STOP TIMEOUT SAFETY FLAG (40EA/CA)

## (undated) DEVICE — TUBE CONNECTING SUCTION - CLEAR PLASTIC STERILE 72 IN (50EA/CA)

## (undated) DEVICE — NEPTUNE 4 PORT MANIFOLD - (20/PK)

## (undated) DEVICE — CANISTER SUCTION RIGID RED 1500CC (40EA/CA)

## (undated) DEVICE — MASK PANORAMIC OXYGEN PRO2 (30EA/CA)

## (undated) DEVICE — SET LEADWIRE 5 LEAD BEDSIDE DISPOSABLE ECG (1SET OF 5/EA)

## (undated) DEVICE — FILM CASSETTE ENDO

## (undated) DEVICE — BOVIE GRNDNG PAD FOR ARGON - 10/BX 4BX/CS

## (undated) DEVICE — SENSOR OXIMETER ADULT SPO2 RD SET (20EA/BX)

## (undated) DEVICE — Device

## (undated) DEVICE — ELECTRODE 850 FOAM ADHESIVE - HYDROGEL RADIOTRNSPRNT (50/PK)

## (undated) DEVICE — KIT CUSTOM PROCEDURE SINGLE FOR ENDO  (15/CA)

## (undated) DEVICE — WATER IRRIGATION STERILE 1000ML (12EA/CA)